# Patient Record
Sex: MALE | Race: BLACK OR AFRICAN AMERICAN | NOT HISPANIC OR LATINO | URBAN - METROPOLITAN AREA
[De-identification: names, ages, dates, MRNs, and addresses within clinical notes are randomized per-mention and may not be internally consistent; named-entity substitution may affect disease eponyms.]

---

## 2017-03-01 ENCOUNTER — OUTPATIENT (OUTPATIENT)
Dept: OUTPATIENT SERVICES | Facility: HOSPITAL | Age: 77
LOS: 1 days | End: 2017-03-01
Payer: MEDICAID

## 2017-03-14 DIAGNOSIS — R69 ILLNESS, UNSPECIFIED: ICD-10-CM

## 2017-05-01 PROCEDURE — G9001: CPT

## 2018-04-05 ENCOUNTER — INPATIENT (INPATIENT)
Facility: HOSPITAL | Age: 78
LOS: 19 days | Discharge: NURSING, REHAB OR LONG-TERM CARE | End: 2018-04-25
Attending: INTERNAL MEDICINE | Admitting: INTERNAL MEDICINE
Payer: MEDICARE

## 2018-04-05 VITALS
DIASTOLIC BLOOD PRESSURE: 118 MMHG | SYSTOLIC BLOOD PRESSURE: 237 MMHG | TEMPERATURE: 98 F | HEART RATE: 87 BPM | OXYGEN SATURATION: 100 % | RESPIRATION RATE: 16 BRPM

## 2018-04-05 DIAGNOSIS — R06.03 ACUTE RESPIRATORY DISTRESS: ICD-10-CM

## 2018-04-05 DIAGNOSIS — E11.9 TYPE 2 DIABETES MELLITUS WITHOUT COMPLICATIONS: ICD-10-CM

## 2018-04-05 DIAGNOSIS — I96 GANGRENE, NOT ELSEWHERE CLASSIFIED: ICD-10-CM

## 2018-04-05 DIAGNOSIS — Z29.9 ENCOUNTER FOR PROPHYLACTIC MEASURES, UNSPECIFIED: ICD-10-CM

## 2018-04-05 DIAGNOSIS — J81.0 ACUTE PULMONARY EDEMA: ICD-10-CM

## 2018-04-05 DIAGNOSIS — I16.1 HYPERTENSIVE EMERGENCY: ICD-10-CM

## 2018-04-05 DIAGNOSIS — N18.9 CHRONIC KIDNEY DISEASE, UNSPECIFIED: ICD-10-CM

## 2018-04-05 DIAGNOSIS — K29.70 GASTRITIS, UNSPECIFIED, WITHOUT BLEEDING: ICD-10-CM

## 2018-04-05 LAB
ALBUMIN SERPL ELPH-MCNC: 3.6 G/DL — SIGNIFICANT CHANGE UP (ref 3.3–5)
ALP SERPL-CCNC: 106 U/L — SIGNIFICANT CHANGE UP (ref 40–120)
ALT FLD-CCNC: 15 U/L — SIGNIFICANT CHANGE UP (ref 4–41)
APTT BLD: 34.1 SEC — SIGNIFICANT CHANGE UP (ref 27.5–37.4)
APTT BLD: 36.1 SEC — SIGNIFICANT CHANGE UP (ref 27.5–37.4)
AST SERPL-CCNC: 22 U/L — SIGNIFICANT CHANGE UP (ref 4–40)
BASE EXCESS BLDV CALC-SCNC: 5.1 MMOL/L — SIGNIFICANT CHANGE UP
BASE EXCESS BLDV CALC-SCNC: 6.6 MMOL/L — SIGNIFICANT CHANGE UP
BASE EXCESS BLDV CALC-SCNC: 7.9 MMOL/L — SIGNIFICANT CHANGE UP
BASOPHILS # BLD AUTO: 0.03 K/UL — SIGNIFICANT CHANGE UP (ref 0–0.2)
BASOPHILS # BLD AUTO: 0.04 K/UL — SIGNIFICANT CHANGE UP (ref 0–0.2)
BASOPHILS NFR BLD AUTO: 0.5 % — SIGNIFICANT CHANGE UP (ref 0–2)
BASOPHILS NFR BLD AUTO: 0.6 % — SIGNIFICANT CHANGE UP (ref 0–2)
BILIRUB SERPL-MCNC: 0.3 MG/DL — SIGNIFICANT CHANGE UP (ref 0.2–1.2)
BLD GP AB SCN SERPL QL: NEGATIVE — SIGNIFICANT CHANGE UP
BLOOD GAS VENOUS - CREATININE: 1.71 MG/DL — HIGH (ref 0.5–1.3)
BLOOD GAS VENOUS - CREATININE: 1.76 MG/DL — HIGH (ref 0.5–1.3)
BUN SERPL-MCNC: 28 MG/DL — HIGH (ref 7–23)
BUN SERPL-MCNC: 33 MG/DL — HIGH (ref 7–23)
CALCIUM SERPL-MCNC: 8.1 MG/DL — LOW (ref 8.4–10.5)
CALCIUM SERPL-MCNC: 8.4 MG/DL — SIGNIFICANT CHANGE UP (ref 8.4–10.5)
CHLORIDE BLDV-SCNC: 105 MMOL/L — SIGNIFICANT CHANGE UP (ref 96–108)
CHLORIDE BLDV-SCNC: 107 MMOL/L — SIGNIFICANT CHANGE UP (ref 96–108)
CHLORIDE SERPL-SCNC: 104 MMOL/L — SIGNIFICANT CHANGE UP (ref 98–107)
CHLORIDE SERPL-SCNC: 105 MMOL/L — SIGNIFICANT CHANGE UP (ref 98–107)
CK MB BLD-MCNC: 4.84 NG/ML — SIGNIFICANT CHANGE UP (ref 1–6.6)
CK SERPL-CCNC: 124 U/L — SIGNIFICANT CHANGE UP (ref 30–200)
CO2 SERPL-SCNC: 30 MMOL/L — SIGNIFICANT CHANGE UP (ref 22–31)
CO2 SERPL-SCNC: 32 MMOL/L — HIGH (ref 22–31)
CREAT SERPL-MCNC: 1.79 MG/DL — HIGH (ref 0.5–1.3)
CREAT SERPL-MCNC: 1.95 MG/DL — HIGH (ref 0.5–1.3)
CRP SERPL-MCNC: < 5 MG/L — SIGNIFICANT CHANGE UP
EOSINOPHIL # BLD AUTO: 0.07 K/UL — SIGNIFICANT CHANGE UP (ref 0–0.5)
EOSINOPHIL # BLD AUTO: 0.16 K/UL — SIGNIFICANT CHANGE UP (ref 0–0.5)
EOSINOPHIL NFR BLD AUTO: 1 % — SIGNIFICANT CHANGE UP (ref 0–6)
EOSINOPHIL NFR BLD AUTO: 2.8 % — SIGNIFICANT CHANGE UP (ref 0–6)
ERYTHROCYTE [SEDIMENTATION RATE] IN BLOOD: 2 MM/HR — SIGNIFICANT CHANGE UP (ref 1–15)
GAS PNL BLDV: 142 MMOL/L — SIGNIFICANT CHANGE UP (ref 136–146)
GAS PNL BLDV: 143 MMOL/L — SIGNIFICANT CHANGE UP (ref 136–146)
GAS PNL BLDV: 144 MMOL/L — SIGNIFICANT CHANGE UP (ref 136–146)
GLUCOSE BLDV-MCNC: 62 — LOW (ref 70–99)
GLUCOSE BLDV-MCNC: 77 — SIGNIFICANT CHANGE UP (ref 70–99)
GLUCOSE BLDV-MCNC: 84 — SIGNIFICANT CHANGE UP (ref 70–99)
GLUCOSE SERPL-MCNC: 69 MG/DL — LOW (ref 70–99)
GLUCOSE SERPL-MCNC: 86 MG/DL — SIGNIFICANT CHANGE UP (ref 70–99)
HCO3 BLDV-SCNC: 25 MMOL/L — SIGNIFICANT CHANGE UP (ref 20–27)
HCO3 BLDV-SCNC: 27 MMOL/L — SIGNIFICANT CHANGE UP (ref 20–27)
HCO3 BLDV-SCNC: 28 MMOL/L — HIGH (ref 20–27)
HCT VFR BLD CALC: 45 % — SIGNIFICANT CHANGE UP (ref 39–50)
HCT VFR BLD CALC: 45.2 % — SIGNIFICANT CHANGE UP (ref 39–50)
HCT VFR BLDV CALC: 46.8 % — SIGNIFICANT CHANGE UP (ref 39–51)
HCT VFR BLDV CALC: 47.3 % — SIGNIFICANT CHANGE UP (ref 39–51)
HCT VFR BLDV CALC: 48.2 % — SIGNIFICANT CHANGE UP (ref 39–51)
HGB BLD-MCNC: 14.6 G/DL — SIGNIFICANT CHANGE UP (ref 13–17)
HGB BLD-MCNC: 14.7 G/DL — SIGNIFICANT CHANGE UP (ref 13–17)
HGB BLDV-MCNC: 15.3 G/DL — SIGNIFICANT CHANGE UP (ref 13–17)
HGB BLDV-MCNC: 15.4 G/DL — SIGNIFICANT CHANGE UP (ref 13–17)
HGB BLDV-MCNC: 15.7 G/DL — SIGNIFICANT CHANGE UP (ref 13–17)
IMM GRANULOCYTES # BLD AUTO: 0.01 # — SIGNIFICANT CHANGE UP
IMM GRANULOCYTES # BLD AUTO: 0.02 # — SIGNIFICANT CHANGE UP
IMM GRANULOCYTES NFR BLD AUTO: 0.1 % — SIGNIFICANT CHANGE UP (ref 0–1.5)
IMM GRANULOCYTES NFR BLD AUTO: 0.3 % — SIGNIFICANT CHANGE UP (ref 0–1.5)
INR BLD: 1.04 — SIGNIFICANT CHANGE UP (ref 0.88–1.17)
INR BLD: 1.05 — SIGNIFICANT CHANGE UP (ref 0.88–1.17)
LACTATE BLDV-MCNC: 1.6 MMOL/L — SIGNIFICANT CHANGE UP (ref 0.5–2)
LACTATE BLDV-MCNC: 2.5 MMOL/L — HIGH (ref 0.5–2)
LACTATE BLDV-MCNC: 2.9 MMOL/L — HIGH (ref 0.5–2)
LYMPHOCYTES # BLD AUTO: 1.2 K/UL — SIGNIFICANT CHANGE UP (ref 1–3.3)
LYMPHOCYTES # BLD AUTO: 1.54 K/UL — SIGNIFICANT CHANGE UP (ref 1–3.3)
LYMPHOCYTES # BLD AUTO: 17.7 % — SIGNIFICANT CHANGE UP (ref 13–44)
LYMPHOCYTES # BLD AUTO: 26.7 % — SIGNIFICANT CHANGE UP (ref 13–44)
MAGNESIUM SERPL-MCNC: 1.9 MG/DL — SIGNIFICANT CHANGE UP (ref 1.6–2.6)
MCHC RBC-ENTMCNC: 29.5 PG — SIGNIFICANT CHANGE UP (ref 27–34)
MCHC RBC-ENTMCNC: 29.7 PG — SIGNIFICANT CHANGE UP (ref 27–34)
MCHC RBC-ENTMCNC: 32.4 % — SIGNIFICANT CHANGE UP (ref 32–36)
MCHC RBC-ENTMCNC: 32.5 % — SIGNIFICANT CHANGE UP (ref 32–36)
MCV RBC AUTO: 90.6 FL — SIGNIFICANT CHANGE UP (ref 80–100)
MCV RBC AUTO: 91.5 FL — SIGNIFICANT CHANGE UP (ref 80–100)
MONOCYTES # BLD AUTO: 0.59 K/UL — SIGNIFICANT CHANGE UP (ref 0–0.9)
MONOCYTES # BLD AUTO: 0.65 K/UL — SIGNIFICANT CHANGE UP (ref 0–0.9)
MONOCYTES NFR BLD AUTO: 11.3 % — SIGNIFICANT CHANGE UP (ref 2–14)
MONOCYTES NFR BLD AUTO: 8.7 % — SIGNIFICANT CHANGE UP (ref 2–14)
NEUTROPHILS # BLD AUTO: 3.36 K/UL — SIGNIFICANT CHANGE UP (ref 1.8–7.4)
NEUTROPHILS # BLD AUTO: 4.87 K/UL — SIGNIFICANT CHANGE UP (ref 1.8–7.4)
NEUTROPHILS NFR BLD AUTO: 58.4 % — SIGNIFICANT CHANGE UP (ref 43–77)
NEUTROPHILS NFR BLD AUTO: 71.9 % — SIGNIFICANT CHANGE UP (ref 43–77)
NRBC # FLD: 0 — SIGNIFICANT CHANGE UP
NRBC # FLD: 0 — SIGNIFICANT CHANGE UP
NT-PROBNP SERPL-SCNC: 3746 PG/ML — SIGNIFICANT CHANGE UP
PCO2 BLDV: 65 MMHG — HIGH (ref 41–51)
PCO2 BLDV: 68 MMHG — HIGH (ref 41–51)
PCO2 BLDV: 69 MMHG — HIGH (ref 41–51)
PH BLDV: 7.29 PH — LOW (ref 7.32–7.43)
PH BLDV: 7.3 PH — LOW (ref 7.32–7.43)
PH BLDV: 7.34 PH — SIGNIFICANT CHANGE UP (ref 7.32–7.43)
PHOSPHATE SERPL-MCNC: 3 MG/DL — SIGNIFICANT CHANGE UP (ref 2.5–4.5)
PLATELET # BLD AUTO: 208 K/UL — SIGNIFICANT CHANGE UP (ref 150–400)
PLATELET # BLD AUTO: 209 K/UL — SIGNIFICANT CHANGE UP (ref 150–400)
PMV BLD: 12.5 FL — SIGNIFICANT CHANGE UP (ref 7–13)
PMV BLD: 12.5 FL — SIGNIFICANT CHANGE UP (ref 7–13)
PO2 BLDV: 19 MMHG — LOW (ref 35–40)
PO2 BLDV: 25 MMHG — LOW (ref 35–40)
PO2 BLDV: 28 MMHG — LOW (ref 35–40)
POTASSIUM BLDV-SCNC: 3.3 MMOL/L — LOW (ref 3.4–4.5)
POTASSIUM BLDV-SCNC: 3.4 MMOL/L — SIGNIFICANT CHANGE UP (ref 3.4–4.5)
POTASSIUM BLDV-SCNC: 3.5 MMOL/L — SIGNIFICANT CHANGE UP (ref 3.4–4.5)
POTASSIUM SERPL-MCNC: 3.8 MMOL/L — SIGNIFICANT CHANGE UP (ref 3.5–5.3)
POTASSIUM SERPL-MCNC: 3.8 MMOL/L — SIGNIFICANT CHANGE UP (ref 3.5–5.3)
POTASSIUM SERPL-SCNC: 3.8 MMOL/L — SIGNIFICANT CHANGE UP (ref 3.5–5.3)
POTASSIUM SERPL-SCNC: 3.8 MMOL/L — SIGNIFICANT CHANGE UP (ref 3.5–5.3)
PROT SERPL-MCNC: 6.5 G/DL — SIGNIFICANT CHANGE UP (ref 6–8.3)
PROTHROM AB SERPL-ACNC: 11.7 SEC — SIGNIFICANT CHANGE UP (ref 9.8–13.1)
PROTHROM AB SERPL-ACNC: 12 SEC — SIGNIFICANT CHANGE UP (ref 9.8–13.1)
RBC # BLD: 4.92 M/UL — SIGNIFICANT CHANGE UP (ref 4.2–5.8)
RBC # BLD: 4.99 M/UL — SIGNIFICANT CHANGE UP (ref 4.2–5.8)
RBC # FLD: 14.8 % — HIGH (ref 10.3–14.5)
RBC # FLD: 14.8 % — HIGH (ref 10.3–14.5)
RH IG SCN BLD-IMP: POSITIVE — SIGNIFICANT CHANGE UP
RH IG SCN BLD-IMP: POSITIVE — SIGNIFICANT CHANGE UP
SAO2 % BLDV: 21.4 % — LOW (ref 60–85)
SAO2 % BLDV: 34.6 % — LOW (ref 60–85)
SAO2 % BLDV: 40.7 % — LOW (ref 60–85)
SODIUM SERPL-SCNC: 144 MMOL/L — SIGNIFICANT CHANGE UP (ref 135–145)
SODIUM SERPL-SCNC: 146 MMOL/L — HIGH (ref 135–145)
TROPONIN T SERPL-MCNC: < 0.06 NG/ML — SIGNIFICANT CHANGE UP (ref 0–0.06)
TROPONIN T SERPL-MCNC: < 0.06 NG/ML — SIGNIFICANT CHANGE UP (ref 0–0.06)
TSH SERPL-MCNC: 1.25 UIU/ML — SIGNIFICANT CHANGE UP (ref 0.27–4.2)
WBC # BLD: 5.76 K/UL — SIGNIFICANT CHANGE UP (ref 3.8–10.5)
WBC # BLD: 6.78 K/UL — SIGNIFICANT CHANGE UP (ref 3.8–10.5)
WBC # FLD AUTO: 5.76 K/UL — SIGNIFICANT CHANGE UP (ref 3.8–10.5)
WBC # FLD AUTO: 6.78 K/UL — SIGNIFICANT CHANGE UP (ref 3.8–10.5)

## 2018-04-05 PROCEDURE — 99223 1ST HOSP IP/OBS HIGH 75: CPT | Mod: GC

## 2018-04-05 PROCEDURE — 71045 X-RAY EXAM CHEST 1 VIEW: CPT | Mod: 26

## 2018-04-05 PROCEDURE — 71045 X-RAY EXAM CHEST 1 VIEW: CPT | Mod: 26,77

## 2018-04-05 PROCEDURE — 93926 LOWER EXTREMITY STUDY: CPT | Mod: 26

## 2018-04-05 PROCEDURE — 93306 TTE W/DOPPLER COMPLETE: CPT | Mod: 26

## 2018-04-05 PROCEDURE — 73630 X-RAY EXAM OF FOOT: CPT | Mod: 26,LT

## 2018-04-05 RX ORDER — IPRATROPIUM/ALBUTEROL SULFATE 18-103MCG
3 AEROSOL WITH ADAPTER (GRAM) INHALATION EVERY 6 HOURS
Qty: 0 | Refills: 0 | Status: DISCONTINUED | OUTPATIENT
Start: 2018-04-05 | End: 2018-04-05

## 2018-04-05 RX ORDER — VANCOMYCIN HCL 1 G
1000 VIAL (EA) INTRAVENOUS ONCE
Qty: 0 | Refills: 0 | Status: COMPLETED | OUTPATIENT
Start: 2018-04-05 | End: 2018-04-05

## 2018-04-05 RX ORDER — DEXTROSE 50 % IN WATER 50 %
25 SYRINGE (ML) INTRAVENOUS ONCE
Qty: 0 | Refills: 0 | Status: COMPLETED | OUTPATIENT
Start: 2018-04-05 | End: 2018-04-05

## 2018-04-05 RX ORDER — ALBUTEROL 90 UG/1
1 AEROSOL, METERED ORAL EVERY 4 HOURS
Qty: 0 | Refills: 0 | Status: DISCONTINUED | OUTPATIENT
Start: 2018-04-05 | End: 2018-04-05

## 2018-04-05 RX ORDER — NITROGLYCERIN 6.5 MG
200 CAPSULE, EXTENDED RELEASE ORAL
Qty: 50 | Refills: 0 | Status: DISCONTINUED | OUTPATIENT
Start: 2018-04-05 | End: 2018-04-05

## 2018-04-05 RX ORDER — IPRATROPIUM/ALBUTEROL SULFATE 18-103MCG
3 AEROSOL WITH ADAPTER (GRAM) INHALATION EVERY 6 HOURS
Qty: 0 | Refills: 0 | Status: DISCONTINUED | OUTPATIENT
Start: 2018-04-05 | End: 2018-04-25

## 2018-04-05 RX ORDER — IPRATROPIUM/ALBUTEROL SULFATE 18-103MCG
3 AEROSOL WITH ADAPTER (GRAM) INHALATION ONCE
Qty: 0 | Refills: 0 | Status: COMPLETED | OUTPATIENT
Start: 2018-04-05 | End: 2018-04-05

## 2018-04-05 RX ORDER — SENNA PLUS 8.6 MG/1
2 TABLET ORAL AT BEDTIME
Qty: 0 | Refills: 0 | Status: DISCONTINUED | OUTPATIENT
Start: 2018-04-05 | End: 2018-04-25

## 2018-04-05 RX ORDER — FUROSEMIDE 40 MG
40 TABLET ORAL ONCE
Qty: 0 | Refills: 0 | Status: COMPLETED | OUTPATIENT
Start: 2018-04-05 | End: 2018-04-05

## 2018-04-05 RX ORDER — ATORVASTATIN CALCIUM 80 MG/1
80 TABLET, FILM COATED ORAL AT BEDTIME
Qty: 0 | Refills: 0 | Status: DISCONTINUED | OUTPATIENT
Start: 2018-04-05 | End: 2018-04-25

## 2018-04-05 RX ORDER — TIOTROPIUM BROMIDE 18 UG/1
1 CAPSULE ORAL; RESPIRATORY (INHALATION) DAILY
Qty: 0 | Refills: 0 | Status: COMPLETED | OUTPATIENT
Start: 2018-04-05 | End: 2019-03-04

## 2018-04-05 RX ORDER — MONTELUKAST 4 MG/1
10 TABLET, CHEWABLE ORAL DAILY
Qty: 0 | Refills: 0 | Status: DISCONTINUED | OUTPATIENT
Start: 2018-04-05 | End: 2018-04-25

## 2018-04-05 RX ORDER — NITROGLYCERIN 6.5 MG
20 CAPSULE, EXTENDED RELEASE ORAL
Qty: 50 | Refills: 0 | Status: DISCONTINUED | OUTPATIENT
Start: 2018-04-05 | End: 2018-04-06

## 2018-04-05 RX ORDER — DEXTROSE 50 % IN WATER 50 %
50 SYRINGE (ML) INTRAVENOUS ONCE
Qty: 0 | Refills: 0 | Status: COMPLETED | OUTPATIENT
Start: 2018-04-05 | End: 2018-04-05

## 2018-04-05 RX ORDER — DOCUSATE SODIUM 100 MG
100 CAPSULE ORAL
Qty: 0 | Refills: 0 | Status: DISCONTINUED | OUTPATIENT
Start: 2018-04-05 | End: 2018-04-25

## 2018-04-05 RX ORDER — ASPIRIN/CALCIUM CARB/MAGNESIUM 324 MG
81 TABLET ORAL DAILY
Qty: 0 | Refills: 0 | Status: DISCONTINUED | OUTPATIENT
Start: 2018-04-05 | End: 2018-04-25

## 2018-04-05 RX ORDER — SODIUM CHLORIDE 9 MG/ML
1000 INJECTION INTRAMUSCULAR; INTRAVENOUS; SUBCUTANEOUS ONCE
Qty: 0 | Refills: 0 | Status: COMPLETED | OUTPATIENT
Start: 2018-04-05 | End: 2018-04-05

## 2018-04-05 RX ORDER — POTASSIUM CHLORIDE 20 MEQ
20 PACKET (EA) ORAL ONCE
Qty: 0 | Refills: 0 | Status: COMPLETED | OUTPATIENT
Start: 2018-04-05 | End: 2018-04-05

## 2018-04-05 RX ORDER — TAMSULOSIN HYDROCHLORIDE 0.4 MG/1
0.4 CAPSULE ORAL DAILY
Qty: 0 | Refills: 0 | Status: DISCONTINUED | OUTPATIENT
Start: 2018-04-05 | End: 2018-04-25

## 2018-04-05 RX ORDER — MORPHINE SULFATE 50 MG/1
4 CAPSULE, EXTENDED RELEASE ORAL ONCE
Qty: 0 | Refills: 0 | Status: DISCONTINUED | OUTPATIENT
Start: 2018-04-05 | End: 2018-04-05

## 2018-04-05 RX ORDER — OXYCODONE AND ACETAMINOPHEN 5; 325 MG/1; MG/1
1 TABLET ORAL EVERY 6 HOURS
Qty: 0 | Refills: 0 | Status: DISCONTINUED | OUTPATIENT
Start: 2018-04-05 | End: 2018-04-06

## 2018-04-05 RX ORDER — BUDESONIDE AND FORMOTEROL FUMARATE DIHYDRATE 160; 4.5 UG/1; UG/1
2 AEROSOL RESPIRATORY (INHALATION)
Qty: 0 | Refills: 0 | Status: DISCONTINUED | OUTPATIENT
Start: 2018-04-05 | End: 2018-04-25

## 2018-04-05 RX ORDER — PANTOPRAZOLE SODIUM 20 MG/1
40 TABLET, DELAYED RELEASE ORAL
Qty: 0 | Refills: 0 | Status: DISCONTINUED | OUTPATIENT
Start: 2018-04-05 | End: 2018-04-25

## 2018-04-05 RX ORDER — HYDRALAZINE HCL 50 MG
10 TABLET ORAL ONCE
Qty: 0 | Refills: 0 | Status: COMPLETED | OUTPATIENT
Start: 2018-04-05 | End: 2018-04-05

## 2018-04-05 RX ORDER — HYDRALAZINE HCL 50 MG
25 TABLET ORAL EVERY 8 HOURS
Qty: 0 | Refills: 0 | Status: DISCONTINUED | OUTPATIENT
Start: 2018-04-05 | End: 2018-04-06

## 2018-04-05 RX ORDER — HYDROMORPHONE HYDROCHLORIDE 2 MG/ML
0.2 INJECTION INTRAMUSCULAR; INTRAVENOUS; SUBCUTANEOUS ONCE
Qty: 0 | Refills: 0 | Status: DISCONTINUED | OUTPATIENT
Start: 2018-04-05 | End: 2018-04-05

## 2018-04-05 RX ORDER — PIPERACILLIN AND TAZOBACTAM 4; .5 G/20ML; G/20ML
3.38 INJECTION, POWDER, LYOPHILIZED, FOR SOLUTION INTRAVENOUS ONCE
Qty: 0 | Refills: 0 | Status: COMPLETED | OUTPATIENT
Start: 2018-04-05 | End: 2018-04-05

## 2018-04-05 RX ORDER — POLYETHYLENE GLYCOL 3350 17 G/17G
17 POWDER, FOR SOLUTION ORAL DAILY
Qty: 0 | Refills: 0 | Status: DISCONTINUED | OUTPATIENT
Start: 2018-04-05 | End: 2018-04-25

## 2018-04-05 RX ORDER — HEPARIN SODIUM 5000 [USP'U]/ML
5000 INJECTION INTRAVENOUS; SUBCUTANEOUS EVERY 8 HOURS
Qty: 0 | Refills: 0 | Status: DISCONTINUED | OUTPATIENT
Start: 2018-04-05 | End: 2018-04-25

## 2018-04-05 RX ORDER — CHLORHEXIDINE GLUCONATE 213 G/1000ML
1 SOLUTION TOPICAL
Qty: 0 | Refills: 0 | Status: DISCONTINUED | OUTPATIENT
Start: 2018-04-05 | End: 2018-04-10

## 2018-04-05 RX ADMIN — PANTOPRAZOLE SODIUM 40 MILLIGRAM(S): 20 TABLET, DELAYED RELEASE ORAL at 15:08

## 2018-04-05 RX ADMIN — ATORVASTATIN CALCIUM 80 MILLIGRAM(S): 80 TABLET, FILM COATED ORAL at 21:57

## 2018-04-05 RX ADMIN — MORPHINE SULFATE 4 MILLIGRAM(S): 50 CAPSULE, EXTENDED RELEASE ORAL at 07:32

## 2018-04-05 RX ADMIN — BUDESONIDE AND FORMOTEROL FUMARATE DIHYDRATE 2 PUFF(S): 160; 4.5 AEROSOL RESPIRATORY (INHALATION) at 22:00

## 2018-04-05 RX ADMIN — Medication 100 MILLIGRAM(S): at 21:57

## 2018-04-05 RX ADMIN — HYDROMORPHONE HYDROCHLORIDE 0.2 MILLIGRAM(S): 2 INJECTION INTRAMUSCULAR; INTRAVENOUS; SUBCUTANEOUS at 15:25

## 2018-04-05 RX ADMIN — MORPHINE SULFATE 4 MILLIGRAM(S): 50 CAPSULE, EXTENDED RELEASE ORAL at 07:47

## 2018-04-05 RX ADMIN — Medication 40 MILLIGRAM(S): at 09:17

## 2018-04-05 RX ADMIN — Medication 6 MICROGRAM(S)/MIN: at 10:22

## 2018-04-05 RX ADMIN — Medication 50 MILLILITER(S): at 07:51

## 2018-04-05 RX ADMIN — TAMSULOSIN HYDROCHLORIDE 0.4 MILLIGRAM(S): 0.4 CAPSULE ORAL at 15:08

## 2018-04-05 RX ADMIN — PIPERACILLIN AND TAZOBACTAM 200 GRAM(S): 4; .5 INJECTION, POWDER, LYOPHILIZED, FOR SOLUTION INTRAVENOUS at 07:05

## 2018-04-05 RX ADMIN — Medication 6 MICROGRAM(S)/MIN: at 19:00

## 2018-04-05 RX ADMIN — Medication 10 MILLIGRAM(S): at 07:50

## 2018-04-05 RX ADMIN — SODIUM CHLORIDE 1000 MILLILITER(S): 9 INJECTION INTRAMUSCULAR; INTRAVENOUS; SUBCUTANEOUS at 07:33

## 2018-04-05 RX ADMIN — HYDROMORPHONE HYDROCHLORIDE 0.2 MILLIGRAM(S): 2 INJECTION INTRAMUSCULAR; INTRAVENOUS; SUBCUTANEOUS at 15:45

## 2018-04-05 RX ADMIN — MORPHINE SULFATE 4 MILLIGRAM(S): 50 CAPSULE, EXTENDED RELEASE ORAL at 06:49

## 2018-04-05 RX ADMIN — SENNA PLUS 2 TABLET(S): 8.6 TABLET ORAL at 21:57

## 2018-04-05 RX ADMIN — Medication 25 MILLIGRAM(S): at 21:57

## 2018-04-05 RX ADMIN — Medication 3 MILLILITER(S): at 07:53

## 2018-04-05 RX ADMIN — MORPHINE SULFATE 4 MILLIGRAM(S): 50 CAPSULE, EXTENDED RELEASE ORAL at 07:05

## 2018-04-05 RX ADMIN — Medication 250 MILLIGRAM(S): at 07:32

## 2018-04-05 RX ADMIN — Medication 25 MILLIGRAM(S): at 15:08

## 2018-04-05 RX ADMIN — OXYCODONE AND ACETAMINOPHEN 1 TABLET(S): 5; 325 TABLET ORAL at 20:30

## 2018-04-05 RX ADMIN — Medication 25 MILLILITER(S): at 06:49

## 2018-04-05 RX ADMIN — POLYETHYLENE GLYCOL 3350 17 GRAM(S): 17 POWDER, FOR SOLUTION ORAL at 20:18

## 2018-04-05 RX ADMIN — MONTELUKAST 10 MILLIGRAM(S): 4 TABLET, CHEWABLE ORAL at 15:08

## 2018-04-05 RX ADMIN — Medication 10 MILLIGRAM(S): at 00:34

## 2018-04-05 RX ADMIN — OXYCODONE AND ACETAMINOPHEN 1 TABLET(S): 5; 325 TABLET ORAL at 20:00

## 2018-04-05 RX ADMIN — Medication 20 MILLIEQUIVALENT(S): at 20:17

## 2018-04-05 RX ADMIN — HEPARIN SODIUM 5000 UNIT(S): 5000 INJECTION INTRAVENOUS; SUBCUTANEOUS at 21:57

## 2018-04-05 RX ADMIN — Medication 81 MILLIGRAM(S): at 15:08

## 2018-04-05 RX ADMIN — HEPARIN SODIUM 5000 UNIT(S): 5000 INJECTION INTRAVENOUS; SUBCUTANEOUS at 15:08

## 2018-04-05 RX ADMIN — Medication 10 MILLIGRAM(S): at 06:49

## 2018-04-05 NOTE — ED ADULT NURSE NOTE - OBJECTIVE STATEMENT
pt. Received in room 17 , A&Ox3 c/o left foot pain. Pt. has a hx. DM , HTN , and appears to have gangrene on 2nd toe on left foot. Pt. states he has know about his foot condition for several weeks. pt. Received in room 17 , A&Ox2 poor hx. c/o left foot pain. Pt. has a hx. DM , HTN , and appears to have gangrene on 2nd toe on left foot. Pt. states he has know about his foot condition for several weeks. Pt. IV 18 gauge placed on right ac , labs drawn and sent. Pt. has auditory wheezing , and arrived hypertensive. MD aware , and EKG ordered. Pt. skin intact , fall risk . Pt. placed on cardiac monitor , for abnormal changes., Will continue to monitor while in the ED. Pt. lives at home , appears unkempt. Belongings placed in front of room 21.

## 2018-04-05 NOTE — H&P ADULT - NSHPREVIEWOFSYSTEMS_GEN_ALL_CORE
REVIEW OF SYSTEMS  General: Fever 1 mo ago	  Skin: No rash  Ophthalmologic: No vision change  ENMT: No oral pain	  Respiratory and Thorax: +Mild difficulty breathing prior to admission, no recent cough  Cardiovascular: No chest pain, no palpitations	  Gastrointestinal: -N/V/D	  Genitourinary: -Dysuria	  Musculoskeletal:	+Left toe pain  Neurological: -headache	  Endocrine: -Polyuria/polydipsia

## 2018-04-05 NOTE — H&P ADULT - PROBLEM SELECTOR PLAN 4
Chart history of DM II, no recorded A1c  - SSI, FS q6 while NPO on BPAP  - A1c with next lab draw Evaluated by podiatry in ED with suspected dry gangrene  - Appreciate podiatry recs; f/u GRISEL  - Observe off abx Evaluated by podiatry in ED with suspected dry gangrene  - Appreciate podiatry recs; f/u GRISEL  - Will start atorva 40 and ASA 81  - Observe off abx Evaluated by podiatry in ED with suspected dry gangrene  - Appreciate podiatry recs; f/u GRISEL  - Will start atorva 40 and ASA 81  - Received abx in ED; no infectious sx/fever/leukocytosis will observe off abx

## 2018-04-05 NOTE — H&P ADULT - PROBLEM SELECTOR PLAN 7
DVT prophylaxis with SQH given GFR 38 Per outpatient provider; patient asymptomatic  - Start protonix 40 PO daily  - Avoid NSAIDs

## 2018-04-05 NOTE — H&P ADULT - PMH
Asthma    DM (diabetes mellitus)    HTN (hypertension)    Prostate disease Asthma    CKD (chronic kidney disease)    DM (diabetes mellitus)    Gastritis    HTN (hypertension)

## 2018-04-05 NOTE — ED PROVIDER NOTE - OBJECTIVE STATEMENT
Halle Jimenez M.D: 77M hx DM, HTN, HLD, smoker, unsure of other medical problems, p/w few weeks of Halle Jimenez M.D: 77M hx DM, HTN, HLD, smoker, unsure of other medical problems, p/w few weeks of worsening pain, swelling, discoloration of left 2-4 toes. worsening pain with walking. +subjective fevers. no abd pain no n/v/c/d no cp no sob. pt says he has been following at [facility]. Pt is a poor historian. Limited history due to this.     Halle Jimenez M.D: 77M hx DM, HTN, HLD, smoker, unsure of other medical problems, p/w few weeks of worsening pain, swelling, discoloration of left 2-4 toes. worsening pain with walking. +subjective fevers. no abd pain no n/v/c/d no cp no sob. pt says he has been following at [facility].

## 2018-04-05 NOTE — CONSULT NOTE ADULT - SUBJECTIVE AND OBJECTIVE BOX
Chief Complaint:  L toe pain    HPI:  77M with PMHx of DM, HTN, HLD, smoker who presents with L foot/toe pain for last few weeks. Reports has had pain, swelling, discoloration of L toes, pain worse with walking. Denies CP over past few days. Unable to provide details on cardiac history, first time at [facility]. Patient's BP in the ED on presentation was 237/118, unclear if patient is on BP meds at home or if has been compliant. Patient became acutely SOB, went into hypoxic resp distress with concern for pulm edema. Patient was placed on Bipap and nitro gtt. BP dropped to 70-80's on 200 of nitro gtt and was adjusted to 20 and BP went up to 200's/90's. CE neg x1. ProBNP 3746. Patient with ECG showing sinus irwin, PVCs, RBBB, ST depressions in anterolateral leads. Patient given lasix 40mg IV and hydralazine IV x2.     PMH:   DM (diabetes mellitus)  HTN (hypertension)      PSH:   No significant past surgical history      Medications:   nitroglycerin  Infusion 20 MICROgram(s)/Min IV Continuous <Continuous>      Allergies:  No Known Allergies      FAMILY HISTORY:  No pertinent family history in first degree relatives      Social History:  Smoking History: current  Alcohol Use: occasional  Drug Use: denies    Review of Systems:  REVIEW OF SYSTEMS:    CONSTITUTIONAL: No weakness, +fevers or chills  EYES/ENT: No visual changes;  No dysphagia  NECK: No pain or stiffness  RESPIRATORY: No cough, wheezing, hemoptysis; +shortness of breath  CARDIOVASCULAR: No chest pain or palpitations; No lower extremity edema  GASTROINTESTINAL: No abdominal or epigastric pain. No nausea, vomiting, or hematemesis; No diarrhea or constipation. No melena or hematochezia.  BACK: No back pain  GENITOURINARY: No dysuria, frequency or hematuria  NEUROLOGICAL: No numbness or weakness  SKIN: No itching, burning, rashes, or lesions   All other review of systems is negative unless indicated above.    Physical Exam:  T(F): 98.7 (04-05), Max: 98.7 (04-05)  HR: 63 (04-05) (54 - 87)  BP: 127/72 (04-05) (71/22 - 268/85)  RR: 26 (04-05)  SpO2: 100% (04-05)  GENERAL: Moderate distress, Bipap in place  HEAD:  Atraumatic  ENT: EOMI,   CHEST/LUNG: Poor air movement throughout  BACK: No spinal tenderness  HEART: Bradycardic, reg rhtyhm, unable to appreciate murmurs  ABDOMEN: Soft, Nontender  EXTREMITIES:  No LE edema  NEUROLOGY: non-focal, cranial nerves intact      Cardiovascular Diagnostic Testing:    ECG: sinus irwin, PVCs, RBBB, ST depressions in anterolateral leads    Labs: Personally reviewed                        14.7   5.76  )-----------( 208      ( 05 Apr 2018 06:37 )             45.2     04-05    144  |  105  |  33<H>  ----------------------------<  69<L>  3.8   |  30  |  1.95<H>    Ca    8.4      05 Apr 2018 06:37    TPro  6.5  /  Alb  3.6  /  TBili  0.3  /  DBili  x   /  AST  22  /  ALT  15  /  AlkPhos  106  04-05    PT/INR - ( 05 Apr 2018 06:37 )   PT: 11.7 SEC;   INR: 1.05          PTT - ( 05 Apr 2018 06:37 )  PTT:36.1 SEC  CARDIAC MARKERS ( 05 Apr 2018 06:37 )  x     / < 0.06 ng/mL / x     / x     / x          Serum Pro-Brain Natriuretic Peptide: 3746 pg/mL (04-05 @ 06:37)

## 2018-04-05 NOTE — ED ADULT TRIAGE NOTE - CHIEF COMPLAINT QUOTE
Sent by roommate for gangrene of 2nd digit of left foot and patient endorsing pain to affected toe, patient appears unkempt and foul odor. Sfs 67 in triage states he hasn't eaten since yesterday morning due to pain in the foot.  Patient was seen a few months ago in hospital and told he had gangrene of the toe and patient has not gotten it treated. Denies dizziness, fever, chills. Hx schizophrenia

## 2018-04-05 NOTE — H&P ADULT - NSHPLABSRESULTS_GEN_ALL_CORE
LABS: Personally Read and Interpreted                          14.7   5.76  )-----------( 208      ( 05 Apr 2018 06:37 )             45.2     Hgb Trend: 14.7<--  PT/INR - ( 05 Apr 2018 06:37 )   PT: 11.7 SEC;   INR: 1.05          PTT - ( 05 Apr 2018 06:37 )  PTT:36.1 SEC    04-05    144  |  105  |  33<H>  ----------------------------<  69<L>  3.8   |  30  |  1.95<H>    Ca    8.4      05 Apr 2018 06:37    TPro  6.5  /  Alb  3.6  /  TBili  0.3  /  DBili  x   /  AST  22  /  ALT  15  /  AlkPhos  106  04-05    Creatinine Trend: 1.95<--    CARDIAC MARKERS ( 05 Apr 2018 06:37 )  x     / < 0.06 ng/mL / x     / x     / x        BNP: 3746    EKG: Sinus, PVC, RBBB.  ST depressions anterolateral leads.     IMAGING  < from: Xray Chest 1 View AP/PA (04.05.18 @ 07:06) >    IMPRESSION:   No acute disease.    < end of copied text >

## 2018-04-05 NOTE — ED PROVIDER NOTE - PROGRESS NOTE DETAILS
ad: paged podiatry & gen surg ad: pt s/o to me by nancy kay & darwin lopez podiatry & gen surg ad: pt c/o sob, wheezing & cp, improved w/ duoneb. later c/o worsening sob no longer wheezing, bedside u/s shows b line & grossly normal ef. placed pt on bipap, gave 40mg lasix, 10mg iv hydralizine w/ improvement. cardio fellow at bedside & will accept to ccu ad: trop wnl, bnp 3700. pt likely had flash pulmonary edema from htn s230s & ivf, bun/cr ratio 16 (likely ckd) ad: podiatry said to get malaika & possible eventual vascular consult. pt currently non-op for now ATTG NOTE DR. GUY Patient with worsening respiratory distress, diffuse crackles, bedside US showing lung B-lines volume overload, and nitro gtt started.  Patient placed on BiPap with appropriate response.  CCU eval at [facility].

## 2018-04-05 NOTE — H&P ADULT - HISTORY OF PRESENT ILLNESS
78 YO M DM, HTN, presents with L toe pain for last several weeks. 78 YO M DM, HTN, presents with L toe pain for last several weeks. Reports he has had pain, swelling, discoloration of L toes, pain worse with walking. Denies CP over past few days. Unable to provide details on cardiac history, first time at [facility]. Patient's BP in the ED on presentation was 237/118, unclear if patient is on BP meds at home or if has been compliant. Patient became acutely SOB, went into hypoxic resp distress with concern for pulm edema. Patient was placed on Bipap and nitro gtt. BP dropped to 70-80's on 200 of nitro gtt and was adjusted to 20 and BP went up to 200's/90's subsequently nitro gtt was uptitrated with improvement in BP. Cardiac enzymes were checked and negative in the ED. ProBNP 3746. Patient with ECG showing sinus irwin, PVCs, RBBB, ST depressions in anterolateral leads. Patient given lasix 40mg IV and hydralazine IV x2. 76 YO M DM, HTN, prostate "disease", asthma presents with L toe pain for last several weeks. Patient is poor historian and does not recall many details of his medical history. Reports he has had pain, swelling, discoloration of L toes, pain worse with walking. Denies CP over past few days. Unable to provide details on cardiac history, first time at [facility]. States he ran out of medications and has not taken them in at least a month. Reports cold symptoms over a month ago but no recent cough/fever. Patient's BP in the ED on presentation was 237/118, unclear if patient is on BP meds at home or if has been compliant. Patient became acutely SOB, went into hypoxic resp distress with concern for pulm edema. Patient was placed on Bipap and nitro gtt. BP dropped to 70-80's on 200 of nitro gtt and was adjusted to 20 and BP went up to 200's/90's subsequently nitro gtt was uptitrated with improvement in BP. Cardiac enzymes were checked and negative in the ED. ProBNP 3746. Patient with ECG showing sinus irwin, PVCs, RBBB, ST depressions in anterolateral leads. Patient given lasix 40mg IV and hydralazine IV x2. 76 YO M DM, HTN, CKD, BPH, gastritis asthma presents with L toe pain for last several weeks. Patient is poor historian and does not recall many details of his medical history. Reports he has had pain, swelling, discoloration of L toes, pain worse with walking. Denies CP over past few days. Unable to provide details on cardiac history, first time at [facility]. States he ran out of medications and has not taken them in at least a month. Reports cold symptoms over a month ago but no recent cough/fever. Patient's BP in the ED on presentation was 237/118, unclear if patient is on BP meds at home or if has been compliant. Patient became acutely SOB, went into hypoxic resp distress with concern for pulm edema. Patient was placed on Bipap and nitro gtt. BP dropped to 70-80's on 200 of nitro gtt and was adjusted to 20 and BP went up to 200's/90's subsequently nitro gtt was uptitrated with improvement in BP. Cardiac enzymes were checked and negative in the ED. ProBNP 3746. Patient with ECG showing sinus irwin, PVCs, RBBB, ST depressions in anterolateral leads. Patient given lasix 40mg IV and hydralazine IV x2.

## 2018-04-05 NOTE — H&P ADULT - PROBLEM SELECTOR PLAN 3
Likely 2/2 hypertensive emergency  - Repeat chest x-ray  - Continue lasix as above  - Restart home BP meds

## 2018-04-05 NOTE — H&P ADULT - PROBLEM SELECTOR PLAN 1
Patient hypertensive to 260s now on nitro gtt with improvement in BP to 150s.  - Restart home Patient hypertensive to 260s now on nitro gtt with improvement in BP to 150s.  - Calling outpatient provider Dr. Swann ([facility]) for outpatient med list  - Will wean nitro gtt after restarting home meds  - Continuous cardiac monitoring  - q1 vitals Patient hypertensive to 260s now on nitro gtt with improvement in BP to 150s.  - Patient poorly compliant with medications with numerous admissions to OSH per outpatient provider  - Will start hydral 25 q8 while in hospital; holding cardizem 360 daily  - Will wean nitro gtt as tolerated  - Continuous cardiac monitoring  - q1 vitals Patient hypertensive to 260s now on nitro gtt with improvement in BP to 150s.  - Patient poorly compliant with medications with numerous admissions to OSH per outpatient provider  - Will start hydral 25 q8 while in hospital; holding cardizem 360 daily  - Initial enzymes negative, repeat q8 hr  - Will wean nitro gtt as tolerated  - Continuous cardiac monitoring  - q1 vitals

## 2018-04-05 NOTE — H&P ADULT - NSHPPHYSICALEXAM_GEN_ALL_CORE
VITAL SIGNS:  T(C): 37.1 (04-05-18 @ 08:35), Max: 37.1 (04-05-18 @ 08:35)  T(F): 98.7 (04-05-18 @ 08:35), Max: 98.7 (04-05-18 @ 08:35)  HR: 52 (04-05-18 @ 10:07) (52 - 87)  BP: 152/64 (04-05-18 @ 10:07) (71/22 - 268/85)  BP(mean): --  RR: 26 (04-05-18 @ 08:35) (16 - 31)  SpO2: 100% (04-05-18 @ 08:35) (99% - 100%)  Wt(kg): --    PHYSICAL EXAM:    Constitutional: NAD on BPAP  Head: NC/AT  Eyes: PERRL, EOMI, clear conjunctiva  ENT: MMM  Neck: supple; no JVD   Respiratory: CTA B/L, no crackles, faint wheezes  Cardiac: +S1/S2; RRR; no M/R/G;  Gastrointestinal: soft, NT/ND; no rebound or guarding; +BSx4  Extremities: -SHAY, non-palpable DP pulses, discolored swollen L 2-3rd toes, no purulence  Vascular: Non-palpable DP pulse  Dermatologic: skin warm; toes as above  Neurologic: AAOx3; CNII-XII grossly intact; no focal deficits  Psychiatric: affect and characteristics of appearance, verbalizations, behaviors are appropriate

## 2018-04-05 NOTE — CONSULT NOTE ADULT - SUBJECTIVE AND OBJECTIVE BOX
Patient is a 77y old  Male who presents with a chief complaint of     HPI: Pt is a poor historian. Limited history due to this.     	Halle Jimenez M.D: 77M hx DM, HTN, HLD, smoker, unsure of other medical problems, p/w few weeks of worsening pain, swelling, discoloration of left 2-4 toes. worsening pain with walking. +subjective fevers. no abd pain no n/v/c/d no cp no sob. pt says he has been following at [facility].      PAST MEDICAL & SURGICAL HISTORY:  DM (diabetes mellitus)  HTN (hypertension)  No significant past surgical history      MEDICATIONS  (STANDING):  furosemide   Injectable 40 milliGRAM(s) IV Push Once  hydrALAZINE Injectable 10 milliGRAM(s) IV Push Once  nitroglycerin  Infusion 200 MICROgram(s)/Min (60 mL/Hr) IV Continuous <Continuous>    MEDICATIONS  (PRN):      Allergies    No Known Allergies    Intolerances        VITALS:    Vital Signs Last 24 Hrs  T(C): 37.1 (05 Apr 2018 08:35), Max: 37.1 (05 Apr 2018 08:35)  T(F): 98.7 (05 Apr 2018 08:35), Max: 98.7 (05 Apr 2018 08:35)  HR: 70 (05 Apr 2018 08:35) (54 - 87)  BP: 208/80 (05 Apr 2018 08:35) (208/80 - 268/85)  BP(mean): --  RR: 26 (05 Apr 2018 08:35) (16 - 31)  SpO2: 100% (05 Apr 2018 08:35) (99% - 100%)    LABS:                          14.7   5.76  )-----------( 208      ( 05 Apr 2018 06:37 )             45.2       04-05    144  |  105  |  33<H>  ----------------------------<  69<L>  3.8   |  30  |  1.95<H>    Ca    8.4      05 Apr 2018 06:37    TPro  6.5  /  Alb  3.6  /  TBili  0.3  /  DBili  x   /  AST  22  /  ALT  15  /  AlkPhos  106  04-05      CAPILLARY BLOOD GLUCOSE      POCT Blood Glucose.: 120 mg/dL (05 Apr 2018 07:26)  POCT Blood Glucose.: 67 mg/dL (05 Apr 2018 05:57)      PT/INR - ( 05 Apr 2018 06:37 )   PT: 11.7 SEC;   INR: 1.05          PTT - ( 05 Apr 2018 06:37 )  PTT:36.1 SEC    LOWER EXTREMITY PHYSICAL EXAM:    Vascular: DP/PT 0/4, B/L, CFT <3 seconds B/L w/ exception to LF 2nd toe no CFT, Temperature gradient WNL, B/L.   Neuro: Epicritic sensation intact to the level of foot, B/L based on pain on palpation, difficult to otherwise assess  Skin: dry gangrenous distal tip LF 2nd toe w/ some dependent edema noted nonpitting, no SOI no purulence no drainage, no malodor, no erythema nor streaking, slight opening distal 2nd toe clinically correlates w/ XR subq       RADIOLOGY & ADDITIONAL STUDIES:    < from: Xray Foot AP + Lateral + Oblique, Left (04.05.18 @ 07:09) >    EXAM:  RAD FOOT MIN 3 VIEWS LT        PROCEDURE DATE:  Apr 5 2018         INTERPRETATION:  CLINICAL INFORMATION: Gangrene 2-4 toes. Rule out   subcutaneous air, bony erosion.    TECHNIQUE: 3 views of the left foot     COMPARISON: None     FINDINGS:   Soft tissue ulceration of the distal second toe.  There is no tracking soft tissue gas collection or periosteal reaction to   suggest osteomyelitis. There is overlying soft tissue swelling.     Hallux valgus deformity.   There is arthrosis of the midfoot and first metatarsal phalangeal joint.   Small plantar calcaneal spur.    IMPRESSION:   No radiographic evidence of osteomyelitis.              CHIQUI ARCOS M.D., RADIOLOGY RESIDENT  This document has been electronically signed.  AYLIN STERLING; ATTENDING RADIOLOGIST  This document has been electronically signed. Apr 5 2018  8:32AM                  < end of copied text >

## 2018-04-05 NOTE — ED PROVIDER NOTE - MEDICAL DECISION MAKING DETAILS
77M dm, htn, unknown further hx p/w gangrene to 2nd-4th toes left foot with erythema and bogginess to top of foot. afebrile here. concern for deeper infection- osteo vs gas gangrene. for labs, inflam markers, xrays to eval for subqemphysema, bony erosion. will cover with broad spectrum antibiotics and likely dmit for further management.

## 2018-04-05 NOTE — H&P ADULT - PROBLEM SELECTOR PLAN 2
Likely 2/2 flash pulmonary edema 2/2 hypertensive emergency. Initial CXR clear  - Repeat chest x-ray, continue BPAP  - Continue lasix 40 BID  - Restart home BP meds and wean nitro gtt Likely 2/2 flash pulmonary edema 2/2 hypertensive emergency. Initial CXR clear  - Repeat chest x-ray, continue BPAP  - Duoneb for wheeze  - Continue lasix 40 BID  - Restart home BP meds and wean nitro gtt Likely 2/2 flash pulmonary edema 2/2 hypertensive emergency. Initial CXR clear  - Repeat chest x-ray, continue BPAP  - Duoneb for wheeze; restart symbicort, singulair  - Continue lasix 40 BID Likely 2/2 flash pulmonary edema 2/2 hypertensive emergency. Initial CXR clear  - Repeat chest x-ray, continue BPAP  - Duoneb for wheeze; restart symbicort, singulair  - Repeat VBG

## 2018-04-05 NOTE — H&P ADULT - PROBLEM SELECTOR PLAN 5
DVT prophylaxis with SQH given GFR 38 Chart history of DM II, no recorded A1c  - SSI, FS q6 while NPO on BPAP  - A1c with next lab draw Chart history of DM II, no recorded A1c  - Not on outpatient anti-glycemics per outpatient doctors  - Will check A1c prior to starting SSI

## 2018-04-05 NOTE — H&P ADULT - ASSESSMENT
78 YO M DM, HTN, presents with L toe pain for last several weeks. Found in the ED to be hypertensive to 260s with suspected flash pulmonary edema and transferred to CCU on nitro gtt and BPAP for further management.

## 2018-04-05 NOTE — CHART NOTE - NSCHARTNOTEFT_GEN_A_CORE
Patient with asystole on telemetry.    Exam:   Gen: No spontaneous movements  Card: No heart sounds  Resp: No breath sounds, no spontaneous chest movements  Neuro: Pupils dilated, non-reactive to light. No response to deep nailbed pressure    Assessment: Patient expiration at 12:39 by cardiorespiratory criteria. Family at bedside and aware of expiration, decline autopsy. CCU fellow Dr. Ornelas notified of expiration.     Charanjit Fisher M.D., PGY-1  Pager: ns- [phone], SY[facility]- [id number] entered in error

## 2018-04-05 NOTE — ED PROVIDER NOTE - PHYSICAL EXAMINATION
Halle Jimenez M.D.:   patient awake alert seen lying on stretcher NAD .   LUNGS Crackles and coarse breath sounds b/l.   CARD bradycardic no m/r/g.    Abdomen soft NT ND no rebound no guarding no CVA tenderness.  left sided inguinal hernia, soft.  EXT WWP edema to left foot with erythema, warm and painful to touch. ulceration to left 2nd toe. dry gangrene to 2nd-4th toe. top of foot boggy. CV 2+DP/PT bilaterally.   neuro A&Ox3 gait normal.    skin warm and dry no rash  HEENT: dry mucous membranes, PERRL, EOMI swelling to left TMJ region.

## 2018-04-05 NOTE — H&P ADULT - NSHPSOCIALHISTORY_GEN_ALL_CORE
[facility] Has a aid at home. Reports smoking a few cigars a week, denies EtOH and IVDU.

## 2018-04-05 NOTE — H&P ADULT - PROBLEM SELECTOR PLAN 6
DVT prophylaxis with SQH given GFR 38 Per outpatient provider patient has history of CKD; Cr on admission 1.95 unclear if baseline or JINA  - Continue to trend Cr  - Avoid ACE-I/ARB/RCA/nephrotoxins  - Renal diet

## 2018-04-05 NOTE — CONSULT NOTE ADULT - ASSESSMENT
78yo M w/ LF 2nd toe tip dry gangrene  ·	Pt seen evaluated  ·	No acute SOI at this time, stable dry gangrene, labs and vitals stable  ·	Only minimal open lesion at distal tip no purulence nor drainage expressed, nothing culturable not able to probe  ·	GRISEL/PVR ordered  ·	No surgical intervention warranted at this point by podiatry, recommend vascular w/u following results of GRISEL/PVR  ·	Will place wound care orders for betadine dressing to left foot 2nd digit w/ dsd  ·	Will f/u on GRISEL results, if admitted to medicine please review said results with podiatry team prior to vascular consult  ·	d/w attending

## 2018-04-05 NOTE — ED PROVIDER NOTE - CARE PLAN
Principal Discharge DX:	Gangrene of toe Principal Discharge DX:	Respiratory distress, acute  Secondary Diagnosis:	Acute pulmonary edema  Secondary Diagnosis:	Hypertensive emergency

## 2018-04-05 NOTE — ED PROVIDER NOTE - ATTENDING CONTRIBUTION TO CARE
DR. MORRISSEY, ATTENDING MD-  I performed a face to face bedside interview with patient regarding history of present illness, review of symptoms and past medical history. I completed an independent physical exam.  I have discussed patient's plan of care with the resident.   Documentation as above in the note.   HPI: 77M hx DM, HTN, HLD, smoker, unsure of other medical problems as pt is a very poor historian, p/w few weeks of worsening pain, swelling, discoloration of left 2-4 toes. worsening pain with walking. +subjective fevers. no abd pain no n/v/c/d no cp no sob. pt says he has been following at [facility].  EXAM: NAD, heart RRR, lungs rhonci throughout/diffuse, feet with poor hygiene, malodorous, no discharge, left 2-4 toe with no sensation, non tender, dark discoloration and cold to touch, DP minimally palpable.   MDM: Concern for gangrene toes but also ACS and other conditions as pt poor history, EKG shows TWI with deep TWI in lateral leads, bradycardic. Pt not complaining of chest pain but will also add Trop. obtain imaging. Pt most likely needs admission and possible surgery for toes.

## 2018-04-05 NOTE — CONSULT NOTE ADULT - ASSESSMENT
77M with PMHx of DM, HTN, HLD, smoker who presents with L foot/toe pain for last few weeks, concern for dry gangrene. Patient's BP in the ED on presentation was 237/118, unclear if patient is on BP meds at home or if has been compliant. Patient became acutely SOB, went into hypoxic resp distress with concern for pulm edema. Patient was placed on Bipap and nitro gtt. BP dropped to 70-80's on 200 of nitro gtt and was adjusted to 20 and BP went up to 200's/90's. CE neg x1. ProBNP 3746. Patient with ECG showing sinus irwin, PVCs, RBBB, ST depressions in anterolateral leads. Pt with hypertensive emergency.    -restart patient's home BP meds, continue lasix 40IV BID. Wean off nitro gtt. Wean off bipap.  -continue to trend CE/ECG  -check TTE  -A1c, TSH, FLP  -patient should be on ASA and statin given DM  -admit patient to CCU for further monitoring

## 2018-04-06 DIAGNOSIS — N17.9 ACUTE KIDNEY FAILURE, UNSPECIFIED: ICD-10-CM

## 2018-04-06 LAB
ALBUMIN SERPL ELPH-MCNC: 3.1 G/DL — LOW (ref 3.3–5)
ALBUMIN SERPL ELPH-MCNC: 3.1 G/DL — LOW (ref 3.3–5)
ALP SERPL-CCNC: 76 U/L — SIGNIFICANT CHANGE UP (ref 40–120)
ALP SERPL-CCNC: 82 U/L — SIGNIFICANT CHANGE UP (ref 40–120)
ALT FLD-CCNC: 12 U/L — SIGNIFICANT CHANGE UP (ref 4–41)
ALT FLD-CCNC: 12 U/L — SIGNIFICANT CHANGE UP (ref 4–41)
AST SERPL-CCNC: 17 U/L — SIGNIFICANT CHANGE UP (ref 4–40)
AST SERPL-CCNC: 20 U/L — SIGNIFICANT CHANGE UP (ref 4–40)
BASOPHILS # BLD AUTO: 0.02 K/UL — SIGNIFICANT CHANGE UP (ref 0–0.2)
BASOPHILS NFR BLD AUTO: 0.3 % — SIGNIFICANT CHANGE UP (ref 0–2)
BILIRUB SERPL-MCNC: 0.4 MG/DL — SIGNIFICANT CHANGE UP (ref 0.2–1.2)
BILIRUB SERPL-MCNC: 0.4 MG/DL — SIGNIFICANT CHANGE UP (ref 0.2–1.2)
BUN SERPL-MCNC: 34 MG/DL — HIGH (ref 7–23)
BUN SERPL-MCNC: 34 MG/DL — HIGH (ref 7–23)
BUN SERPL-MCNC: 36 MG/DL — HIGH (ref 7–23)
CALCIUM SERPL-MCNC: 8.2 MG/DL — LOW (ref 8.4–10.5)
CALCIUM SERPL-MCNC: 8.3 MG/DL — LOW (ref 8.4–10.5)
CALCIUM SERPL-MCNC: 8.6 MG/DL — SIGNIFICANT CHANGE UP (ref 8.4–10.5)
CHLORIDE SERPL-SCNC: 102 MMOL/L — SIGNIFICANT CHANGE UP (ref 98–107)
CHLORIDE SERPL-SCNC: 104 MMOL/L — SIGNIFICANT CHANGE UP (ref 98–107)
CHLORIDE SERPL-SCNC: 106 MMOL/L — SIGNIFICANT CHANGE UP (ref 98–107)
CHOLEST SERPL-MCNC: 147 MG/DL — SIGNIFICANT CHANGE UP (ref 120–199)
CO2 SERPL-SCNC: 29 MMOL/L — SIGNIFICANT CHANGE UP (ref 22–31)
CO2 SERPL-SCNC: 29 MMOL/L — SIGNIFICANT CHANGE UP (ref 22–31)
CO2 SERPL-SCNC: 30 MMOL/L — SIGNIFICANT CHANGE UP (ref 22–31)
CREAT SERPL-MCNC: 2.18 MG/DL — HIGH (ref 0.5–1.3)
CREAT SERPL-MCNC: 2.23 MG/DL — HIGH (ref 0.5–1.3)
CREAT SERPL-MCNC: 2.26 MG/DL — HIGH (ref 0.5–1.3)
EOSINOPHIL # BLD AUTO: 0.13 K/UL — SIGNIFICANT CHANGE UP (ref 0–0.5)
EOSINOPHIL NFR BLD AUTO: 2.2 % — SIGNIFICANT CHANGE UP (ref 0–6)
GLUCOSE SERPL-MCNC: 111 MG/DL — HIGH (ref 70–99)
GLUCOSE SERPL-MCNC: 117 MG/DL — HIGH (ref 70–99)
GLUCOSE SERPL-MCNC: 83 MG/DL — SIGNIFICANT CHANGE UP (ref 70–99)
HBA1C BLD-MCNC: 5 % — SIGNIFICANT CHANGE UP (ref 4–5.6)
HCT VFR BLD CALC: 41.4 % — SIGNIFICANT CHANGE UP (ref 39–50)
HDLC SERPL-MCNC: 58 MG/DL — HIGH (ref 35–55)
HGB BLD-MCNC: 13.6 G/DL — SIGNIFICANT CHANGE UP (ref 13–17)
IMM GRANULOCYTES # BLD AUTO: 0.02 # — SIGNIFICANT CHANGE UP
IMM GRANULOCYTES NFR BLD AUTO: 0.3 % — SIGNIFICANT CHANGE UP (ref 0–1.5)
LIPID PNL WITH DIRECT LDL SERPL: 86 MG/DL — SIGNIFICANT CHANGE UP
LYMPHOCYTES # BLD AUTO: 1.13 K/UL — SIGNIFICANT CHANGE UP (ref 1–3.3)
LYMPHOCYTES # BLD AUTO: 19.5 % — SIGNIFICANT CHANGE UP (ref 13–44)
MAGNESIUM SERPL-MCNC: 1.9 MG/DL — SIGNIFICANT CHANGE UP (ref 1.6–2.6)
MAGNESIUM SERPL-MCNC: 2.2 MG/DL — SIGNIFICANT CHANGE UP (ref 1.6–2.6)
MAGNESIUM SERPL-MCNC: 2.3 MG/DL — SIGNIFICANT CHANGE UP (ref 1.6–2.6)
MCHC RBC-ENTMCNC: 29.7 PG — SIGNIFICANT CHANGE UP (ref 27–34)
MCHC RBC-ENTMCNC: 32.9 % — SIGNIFICANT CHANGE UP (ref 32–36)
MCV RBC AUTO: 90.4 FL — SIGNIFICANT CHANGE UP (ref 80–100)
MONOCYTES # BLD AUTO: 0.6 K/UL — SIGNIFICANT CHANGE UP (ref 0–0.9)
MONOCYTES NFR BLD AUTO: 10.4 % — SIGNIFICANT CHANGE UP (ref 2–14)
NEUTROPHILS # BLD AUTO: 3.89 K/UL — SIGNIFICANT CHANGE UP (ref 1.8–7.4)
NEUTROPHILS NFR BLD AUTO: 67.3 % — SIGNIFICANT CHANGE UP (ref 43–77)
NRBC # FLD: 0 — SIGNIFICANT CHANGE UP
PHOSPHATE SERPL-MCNC: 2.9 MG/DL — SIGNIFICANT CHANGE UP (ref 2.5–4.5)
PHOSPHATE SERPL-MCNC: 3.1 MG/DL — SIGNIFICANT CHANGE UP (ref 2.5–4.5)
PHOSPHATE SERPL-MCNC: 3.6 MG/DL — SIGNIFICANT CHANGE UP (ref 2.5–4.5)
PLATELET # BLD AUTO: 182 K/UL — SIGNIFICANT CHANGE UP (ref 150–400)
PMV BLD: 11.7 FL — SIGNIFICANT CHANGE UP (ref 7–13)
POTASSIUM SERPL-MCNC: 3.9 MMOL/L — SIGNIFICANT CHANGE UP (ref 3.5–5.3)
POTASSIUM SERPL-MCNC: 4.1 MMOL/L — SIGNIFICANT CHANGE UP (ref 3.5–5.3)
POTASSIUM SERPL-MCNC: 4.2 MMOL/L — SIGNIFICANT CHANGE UP (ref 3.5–5.3)
POTASSIUM SERPL-SCNC: 3.9 MMOL/L — SIGNIFICANT CHANGE UP (ref 3.5–5.3)
POTASSIUM SERPL-SCNC: 4.1 MMOL/L — SIGNIFICANT CHANGE UP (ref 3.5–5.3)
POTASSIUM SERPL-SCNC: 4.2 MMOL/L — SIGNIFICANT CHANGE UP (ref 3.5–5.3)
PROT SERPL-MCNC: 5.9 G/DL — LOW (ref 6–8.3)
PROT SERPL-MCNC: 6.2 G/DL — SIGNIFICANT CHANGE UP (ref 6–8.3)
RBC # BLD: 4.58 M/UL — SIGNIFICANT CHANGE UP (ref 4.2–5.8)
RBC # FLD: 15 % — HIGH (ref 10.3–14.5)
SODIUM SERPL-SCNC: 142 MMOL/L — SIGNIFICANT CHANGE UP (ref 135–145)
SODIUM SERPL-SCNC: 144 MMOL/L — SIGNIFICANT CHANGE UP (ref 135–145)
SODIUM SERPL-SCNC: 145 MMOL/L — SIGNIFICANT CHANGE UP (ref 135–145)
TRIGL SERPL-MCNC: 53 MG/DL — SIGNIFICANT CHANGE UP (ref 10–149)
WBC # BLD: 5.79 K/UL — SIGNIFICANT CHANGE UP (ref 3.8–10.5)
WBC # FLD AUTO: 5.79 K/UL — SIGNIFICANT CHANGE UP (ref 3.8–10.5)

## 2018-04-06 PROCEDURE — 99233 SBSQ HOSP IP/OBS HIGH 50: CPT

## 2018-04-06 PROCEDURE — 99222 1ST HOSP IP/OBS MODERATE 55: CPT

## 2018-04-06 RX ORDER — HYDROMORPHONE HYDROCHLORIDE 2 MG/ML
0.2 INJECTION INTRAMUSCULAR; INTRAVENOUS; SUBCUTANEOUS ONCE
Qty: 0 | Refills: 0 | Status: DISCONTINUED | OUTPATIENT
Start: 2018-04-06 | End: 2018-04-06

## 2018-04-06 RX ORDER — CARVEDILOL PHOSPHATE 80 MG/1
3.12 CAPSULE, EXTENDED RELEASE ORAL EVERY 12 HOURS
Qty: 0 | Refills: 0 | Status: DISCONTINUED | OUTPATIENT
Start: 2018-04-06 | End: 2018-04-07

## 2018-04-06 RX ORDER — OXYCODONE HYDROCHLORIDE 5 MG/1
10 TABLET ORAL EVERY 12 HOURS
Qty: 0 | Refills: 0 | Status: DISCONTINUED | OUTPATIENT
Start: 2018-04-06 | End: 2018-04-06

## 2018-04-06 RX ORDER — BUDESONIDE AND FORMOTEROL FUMARATE DIHYDRATE 160; 4.5 UG/1; UG/1
2 AEROSOL RESPIRATORY (INHALATION)
Qty: 0 | Refills: 0 | COMMUNITY

## 2018-04-06 RX ORDER — AMLODIPINE BESYLATE 2.5 MG/1
5 TABLET ORAL DAILY
Qty: 0 | Refills: 0 | Status: DISCONTINUED | OUTPATIENT
Start: 2018-04-06 | End: 2018-04-07

## 2018-04-06 RX ORDER — MONTELUKAST 4 MG/1
10 TABLET, CHEWABLE ORAL
Qty: 0 | Refills: 0 | COMMUNITY

## 2018-04-06 RX ORDER — HYDROMORPHONE HYDROCHLORIDE 2 MG/ML
0.2 INJECTION INTRAMUSCULAR; INTRAVENOUS; SUBCUTANEOUS EVERY 4 HOURS
Qty: 0 | Refills: 0 | Status: DISCONTINUED | OUTPATIENT
Start: 2018-04-06 | End: 2018-04-13

## 2018-04-06 RX ORDER — HYDRALAZINE HCL 50 MG
50 TABLET ORAL EVERY 8 HOURS
Qty: 0 | Refills: 0 | Status: DISCONTINUED | OUTPATIENT
Start: 2018-04-06 | End: 2018-04-07

## 2018-04-06 RX ORDER — ALBUTEROL 90 UG/1
0 AEROSOL, METERED ORAL
Qty: 0 | Refills: 0 | COMMUNITY

## 2018-04-06 RX ORDER — MAGNESIUM SULFATE 500 MG/ML
1 VIAL (ML) INJECTION ONCE
Qty: 0 | Refills: 0 | Status: COMPLETED | OUTPATIENT
Start: 2018-04-06 | End: 2018-04-06

## 2018-04-06 RX ORDER — OXYCODONE HYDROCHLORIDE 5 MG/1
15 TABLET ORAL EVERY 12 HOURS
Qty: 0 | Refills: 0 | Status: DISCONTINUED | OUTPATIENT
Start: 2018-04-06 | End: 2018-04-07

## 2018-04-06 RX ORDER — SODIUM CHLORIDE 9 MG/ML
500 INJECTION INTRAMUSCULAR; INTRAVENOUS; SUBCUTANEOUS ONCE
Qty: 0 | Refills: 0 | Status: COMPLETED | OUTPATIENT
Start: 2018-04-06 | End: 2018-04-06

## 2018-04-06 RX ORDER — TAMSULOSIN HYDROCHLORIDE 0.4 MG/1
1 CAPSULE ORAL
Qty: 0 | Refills: 0 | COMMUNITY

## 2018-04-06 RX ADMIN — Medication 100 GRAM(S): at 09:33

## 2018-04-06 RX ADMIN — PANTOPRAZOLE SODIUM 40 MILLIGRAM(S): 20 TABLET, DELAYED RELEASE ORAL at 05:51

## 2018-04-06 RX ADMIN — HEPARIN SODIUM 5000 UNIT(S): 5000 INJECTION INTRAVENOUS; SUBCUTANEOUS at 05:50

## 2018-04-06 RX ADMIN — OXYCODONE HYDROCHLORIDE 10 MILLIGRAM(S): 5 TABLET ORAL at 13:30

## 2018-04-06 RX ADMIN — HYDROMORPHONE HYDROCHLORIDE 0.2 MILLIGRAM(S): 2 INJECTION INTRAMUSCULAR; INTRAVENOUS; SUBCUTANEOUS at 10:45

## 2018-04-06 RX ADMIN — HYDROMORPHONE HYDROCHLORIDE 0.2 MILLIGRAM(S): 2 INJECTION INTRAMUSCULAR; INTRAVENOUS; SUBCUTANEOUS at 18:32

## 2018-04-06 RX ADMIN — OXYCODONE HYDROCHLORIDE 10 MILLIGRAM(S): 5 TABLET ORAL at 12:43

## 2018-04-06 RX ADMIN — HYDROMORPHONE HYDROCHLORIDE 0.2 MILLIGRAM(S): 2 INJECTION INTRAMUSCULAR; INTRAVENOUS; SUBCUTANEOUS at 22:49

## 2018-04-06 RX ADMIN — HYDROMORPHONE HYDROCHLORIDE 0.2 MILLIGRAM(S): 2 INJECTION INTRAMUSCULAR; INTRAVENOUS; SUBCUTANEOUS at 22:19

## 2018-04-06 RX ADMIN — MONTELUKAST 10 MILLIGRAM(S): 4 TABLET, CHEWABLE ORAL at 12:43

## 2018-04-06 RX ADMIN — HYDROMORPHONE HYDROCHLORIDE 0.2 MILLIGRAM(S): 2 INJECTION INTRAMUSCULAR; INTRAVENOUS; SUBCUTANEOUS at 07:50

## 2018-04-06 RX ADMIN — SENNA PLUS 2 TABLET(S): 8.6 TABLET ORAL at 22:17

## 2018-04-06 RX ADMIN — BUDESONIDE AND FORMOTEROL FUMARATE DIHYDRATE 2 PUFF(S): 160; 4.5 AEROSOL RESPIRATORY (INHALATION) at 20:42

## 2018-04-06 RX ADMIN — OXYCODONE AND ACETAMINOPHEN 1 TABLET(S): 5; 325 TABLET ORAL at 01:45

## 2018-04-06 RX ADMIN — POLYETHYLENE GLYCOL 3350 17 GRAM(S): 17 POWDER, FOR SOLUTION ORAL at 12:43

## 2018-04-06 RX ADMIN — Medication 50 MILLIGRAM(S): at 22:17

## 2018-04-06 RX ADMIN — BUDESONIDE AND FORMOTEROL FUMARATE DIHYDRATE 2 PUFF(S): 160; 4.5 AEROSOL RESPIRATORY (INHALATION) at 09:48

## 2018-04-06 RX ADMIN — HYDROMORPHONE HYDROCHLORIDE 0.2 MILLIGRAM(S): 2 INJECTION INTRAMUSCULAR; INTRAVENOUS; SUBCUTANEOUS at 10:25

## 2018-04-06 RX ADMIN — Medication 25 MILLIGRAM(S): at 05:51

## 2018-04-06 RX ADMIN — HEPARIN SODIUM 5000 UNIT(S): 5000 INJECTION INTRAVENOUS; SUBCUTANEOUS at 22:17

## 2018-04-06 RX ADMIN — Medication 50 MILLIGRAM(S): at 14:45

## 2018-04-06 RX ADMIN — Medication 100 MILLIGRAM(S): at 05:50

## 2018-04-06 RX ADMIN — SODIUM CHLORIDE 500 MILLILITER(S): 9 INJECTION INTRAMUSCULAR; INTRAVENOUS; SUBCUTANEOUS at 11:03

## 2018-04-06 RX ADMIN — Medication 100 MILLIGRAM(S): at 18:27

## 2018-04-06 RX ADMIN — HYDROMORPHONE HYDROCHLORIDE 0.2 MILLIGRAM(S): 2 INJECTION INTRAMUSCULAR; INTRAVENOUS; SUBCUTANEOUS at 19:00

## 2018-04-06 RX ADMIN — HYDROMORPHONE HYDROCHLORIDE 0.2 MILLIGRAM(S): 2 INJECTION INTRAMUSCULAR; INTRAVENOUS; SUBCUTANEOUS at 08:10

## 2018-04-06 RX ADMIN — AMLODIPINE BESYLATE 5 MILLIGRAM(S): 2.5 TABLET ORAL at 16:24

## 2018-04-06 RX ADMIN — Medication 81 MILLIGRAM(S): at 12:43

## 2018-04-06 RX ADMIN — CHLORHEXIDINE GLUCONATE 1 APPLICATION(S): 213 SOLUTION TOPICAL at 12:44

## 2018-04-06 RX ADMIN — OXYCODONE AND ACETAMINOPHEN 1 TABLET(S): 5; 325 TABLET ORAL at 08:17

## 2018-04-06 RX ADMIN — HYDROMORPHONE HYDROCHLORIDE 0.2 MILLIGRAM(S): 2 INJECTION INTRAMUSCULAR; INTRAVENOUS; SUBCUTANEOUS at 14:44

## 2018-04-06 RX ADMIN — OXYCODONE AND ACETAMINOPHEN 1 TABLET(S): 5; 325 TABLET ORAL at 09:03

## 2018-04-06 RX ADMIN — OXYCODONE AND ACETAMINOPHEN 1 TABLET(S): 5; 325 TABLET ORAL at 02:15

## 2018-04-06 RX ADMIN — ATORVASTATIN CALCIUM 80 MILLIGRAM(S): 80 TABLET, FILM COATED ORAL at 22:17

## 2018-04-06 RX ADMIN — CARVEDILOL PHOSPHATE 3.12 MILLIGRAM(S): 80 CAPSULE, EXTENDED RELEASE ORAL at 22:17

## 2018-04-06 RX ADMIN — TAMSULOSIN HYDROCHLORIDE 0.4 MILLIGRAM(S): 0.4 CAPSULE ORAL at 12:43

## 2018-04-06 RX ADMIN — HEPARIN SODIUM 5000 UNIT(S): 5000 INJECTION INTRAVENOUS; SUBCUTANEOUS at 14:45

## 2018-04-06 RX ADMIN — HYDROMORPHONE HYDROCHLORIDE 0.2 MILLIGRAM(S): 2 INJECTION INTRAMUSCULAR; INTRAVENOUS; SUBCUTANEOUS at 15:00

## 2018-04-06 NOTE — PROGRESS NOTE ADULT - ATTENDING COMMENTS
Robin Issa is a 77 year old man with history of DM, HTN, HLD, asthma and pipe smoker who presents with L foot/toe pain for past few weeks. There was pain, swelling, discoloration of left sided toes and increased pain while walking. BP in the ED was as high as 237/118 mm Hg. It was unclear if the patient was compliant with medication. There was acute SOB, progressing to respiratory distress with evidence of acute pulmonary edema. He was placed on biPAP and started on intravenous nitroglycerin. BP initially decreased to 70-80's mm Hg on starting dose of  nitroglycerin and then rebounded back to 200's/90's mm Hg after down-titration of dose. Intial cardiac isoenzymes were not elevated. Serum pro BNP was 3746 pg/mL. Initial ECG showed sinus bradycardia with PVCs, RBBB, and  ST-segment depression in anterolateral leads. TTE done 4/5/18 showed mitral annular calcification, otherwise normal mitral valve, with mild regurgitation. The aortic root was normal. The aortic valve was calcified and trileaflet, with normal opening. The left atrium appeared normal. There was concentric left ventricular hypertrophy with mild segmental LV systolic dysfunction. There was hypokinesis of the basal inferior wall. The right atrium appeared normal. The right ventricle was normal in size and function. The tricuspid and pulmonic valves were normal. There was minimal tricuspid regurgitation. The pericardium appeared normal, with no pericardial effusion. He received furosemide (Lasix 40mg) IV and hydralazine IV x2 doses. Nitroglycerin 20 mCg/min IV was tapered and discontinued on 4/6/18.  BiPAP was discontinued on 4/6/18. Current regimen now to include:, amlodipine 5 mg daily, hydralazine 25 mg orally every 8 hours, tamsulosin 0.4 mg daily, montelukast 10 mg daily, aspirin 81 mg daily, heparin 5000 units subcutaneously every 8 hours and atorvastatin (Lipitor) 80 mg daily at bedtime. Labs noted mild elevation of serum creatinine 2.26 mg/dL on 4/6/18; 1.79 mg/dL on 4/5/18. He received 500 CC normal saline on 4/6/18 with no resulting pulmonary edema. Renal Doppler will be done to assess for renal artery stenosis. 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

## 2018-04-06 NOTE — PROGRESS NOTE ADULT - PROBLEM SELECTOR PLAN 5
JINA on CKD; patient with unclear baseline Cr but chart history of CKD; Cr increased to 2.26 today  - Likely 2/2 diuresis on admission +/- HTN emergency  - Trend Cr as BP improves  - 1200 of urine yesterday, monitor UOP JINA on CKD; patient with unclear baseline Cr but chart history of CKD; Cr increased to 2.26 today  - Likely 2/2 diuresis on admission +/- HTN emergency  - Trend Cr as BP improves  - Trial 500 cc NS today; repeat BMP in PM  - 1200 of urine yesterday, monitor UOP

## 2018-04-06 NOTE — PROGRESS NOTE ADULT - ASSESSMENT
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 No signs of active infection as this time.     -Plan for angiography next week.   -will require Internal Medicine and Cardiology evaluation/optimization, risk stratification, and documentation thereof for planned lower extremity angiogram w/ possible intervention.       PÉREZ Yung MD (PGY2)    (Please page [facility] team Surgery, Vascular [phone] for questions/concerns.) 77M p/w HTN crisis, 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 No signs of active infection as this time.     #Plan for angiogram w/ possible intervention on Tuesday 4/10/18    -will require Internal Medicine and Cardiology evaluation/optimization, risk stratification, and documentation thereof for planned lower extremity angiogram w/ possible intervention.     Please ensure:  -[email or web address] Monday 4/9  -document medical and cardiac optimization, as well as risk stratification  -send Pre-op labs the evening prior to procedure: CBC, BMP/Mg/Phos, PT/PTT/INR, T&S  -ensure 12-lead EKG in chart (w/in 48hr)  -CXR in PACS on 4/9  -adjust insulin regimen to reflect NPO status    Vascular Surgery will consent patient.      PÉREZ Yung MD (PGY2)    (Please page [facility] team Surgery, Vascular [phone] for questions/concerns.)

## 2018-04-06 NOTE — CONSULT NOTE ADULT - SUBJECTIVE AND OBJECTIVE BOX
Vascular Surgery Consult    HPI:  76 YO M DM, HTN, CKD, BPH, gastritis asthma presents with L toe pain for last several weeks. Patient is poor historian and does not recall many details of his medical history. Reports he has had pain, swelling, discoloration of L toes, pain worse with walking. Denies CP over past few days. Unable to provide details on cardiac history, first time at [facility]. States he ran out of medications and has not taken them in at least a month. Reports cold symptoms over a month ago but no recent cough/fever. Patient's BP in the ED on presentation was 237/118, unclear if patient is on BP meds at home or if has been compliant. Patient became acutely SOB, went into hypoxic resp distress with concern for pulm edema. Patient was placed on Bipap and nitro gtt. BP dropped to 70-80's on 200 of nitro gtt and was adjusted to 20 and BP went up to 200's/90's subsequently nitro gtt was uptitrated with improvement in BP. Cardiac enzymes were checked and negative in the ED. ProBNP 3746. Patient with ECG showing sinus irwin, PVCs, RBBB, ST depressions in anterolateral leads. Patient given lasix 40mg IV and hydralazine IV x2. (05 Apr 2018 10:42)    Patient evaluated by podiatry when noted to have necrotic toes on left foot. Patient had been describing pain in that foot for about 3 days, to the point that was unable to walk.  Before the foot pain, patient denies having had claudications or signs of mesenteric ischemia.      PAST MEDICAL & SURGICAL HISTORY:  Gastritis  CKD (chronic kidney disease)  Asthma  DM (diabetes mellitus)  HTN (hypertension)  No significant past surgical history      MEDICATIONS  (STANDING):  aspirin enteric coated 81 milliGRAM(s) Oral daily  atorvastatin 80 milliGRAM(s) Oral at bedtime  buDESOnide 160 MICROgram(s)/formoterol 4.5 MICROgram(s) Inhaler 2 Puff(s) Inhalation two times a day  chlorhexidine 4% Liquid 1 Application(s) Topical <User Schedule>  docusate sodium 100 milliGRAM(s) Oral two times a day  heparin  Injectable 5000 Unit(s) SubCutaneous every 8 hours  hydrALAZINE 25 milliGRAM(s) Oral every 8 hours  montelukast 10 milliGRAM(s) Oral daily  nitroglycerin  Infusion 20 MICROgram(s)/Min (6 mL/Hr) IV Continuous <Continuous>  pantoprazole    Tablet 40 milliGRAM(s) Oral before breakfast  polyethylene glycol 3350 17 Gram(s) Oral daily  senna 2 Tablet(s) Oral at bedtime  tamsulosin 0.4 milliGRAM(s) Oral daily  tiotropium 18 MICROgram(s) Capsule 1 Capsule(s) Inhalation daily    MEDICATIONS  (PRN):  ALBUTerol/ipratropium for Nebulization 3 milliLiter(s) Nebulizer every 6 hours PRN Bronchospasm  oxyCODONE    5 mG/acetaminophen 325 mG 1 Tablet(s) Oral every 6 hours PRN Severe Pain (7 - 10)      Allergies    No Known Allergies    Intolerances        Vital Signs Last 24 Hrs  T(C): 36.6 (06 Apr 2018 00:00), Max: 37.1 (05 Apr 2018 08:35)  T(F): 97.8 (06 Apr 2018 00:00), Max: 98.7 (05 Apr 2018 08:35)  HR: 76 (06 Apr 2018 00:00) (46 - 109)  BP: 127/60 (06 Apr 2018 00:00) (71/22 - 268/85)  BP(mean): 76 (06 Apr 2018 00:00) (70 - 132)  RR: 15 (06 Apr 2018 00:00) (9 - 46)  SpO2: 100% (06 Apr 2018 00:00) (99% - 100%)  Mode: AVAPS  RR (machine): 12  TV (machine): 500  FiO2: 100  PEEP: 8  ITime: 1  PIP: 25    I&O's Summary    05 Apr 2018 07:01  -  06 Apr 2018 00:43  --------------------------------------------------------  IN: 144 mL / OUT: 900 mL / NET: -756 mL        Physical Exam:  General: NAD  Respiratory: Nonlabored  Cardiovascular: RRR  Abdominal: Soft, nondistended, nontender  Extremities: Dry gangrene of left 2nd and and 3rd toes as well as 2nd tow on left foot.  Pulse exam:  Right fem pulse, No pulses on Left. Legs warm.     LABS:                        14.6   6.78  )-----------( 209      ( 05 Apr 2018 15:36 )             45.0     04-05    146<H>  |  104  |  28<H>  ----------------------------<  86  3.8   |  32<H>  |  1.79<H>    Ca    8.1<L>      05 Apr 2018 15:36  Phos  3.0     04-05  Mg     1.9     04-05    TPro  6.5  /  Alb  3.6  /  TBili  0.3  /  DBili  x   /  AST  22  /  ALT  15  /  AlkPhos  106  04-05    PT/INR - ( 05 Apr 2018 15:36 )   PT: 12.0 SEC;   INR: 1.04          PTT - ( 05 Apr 2018 15:36 )  PTT:34.1 SEC    Assessment/Plan: 77yMale presents with hypertensive crisis, 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 No signs of infection as this time.  Plan for angiography next week. Patient will need internal medicine and cardiology risk stratification for any possible intervention.  d/w Attending.    Shterental PGY3 Vascular Surgery Consult    HPI:  78 YO M DM, HTN, CKD, BPH, gastritis asthma presents with L toe pain for last several weeks. Patient is poor historian and does not recall many details of his medical history. Reports he has had pain, swelling, discoloration of L toes, pain worse with walking. Denies CP over past few days. Unable to provide details on cardiac history, first time at [facility]. States he ran out of medications and has not taken them in at least a month. Reports cold symptoms over a month ago but no recent cough/fever. Patient's BP in the ED on presentation was 237/118, unclear if patient is on BP meds at home or if has been compliant. Patient became acutely SOB, went into hypoxic resp distress with concern for pulm edema. Patient was placed on Bipap and nitro gtt. BP dropped to 70-80's on 200 of nitro gtt and was adjusted to 20 and BP went up to 200's/90's subsequently nitro gtt was uptitrated with improvement in BP. Cardiac enzymes were checked and negative in the ED. ProBNP 3746. Patient with ECG showing sinus irwin, PVCs, RBBB, ST depressions in anterolateral leads. Patient given lasix 40mg IV and hydralazine IV x2. (05 Apr 2018 10:42)    Patient evaluated by podiatry when noted to have necrotic toes on left foot. Patient had been describing pain in that foot for about 3 days, to the point that was unable to walk.  Before the foot pain, patient denies having had claudications or signs of mesenteric ischemia.      PAST MEDICAL & SURGICAL HISTORY:  Gastritis  CKD (chronic kidney disease)  Asthma  DM (diabetes mellitus)  HTN (hypertension)  No significant past surgical history      MEDICATIONS  (STANDING):  aspirin enteric coated 81 milliGRAM(s) Oral daily  atorvastatin 80 milliGRAM(s) Oral at bedtime  buDESOnide 160 MICROgram(s)/formoterol 4.5 MICROgram(s) Inhaler 2 Puff(s) Inhalation two times a day  chlorhexidine 4% Liquid 1 Application(s) Topical <User Schedule>  docusate sodium 100 milliGRAM(s) Oral two times a day  heparin  Injectable 5000 Unit(s) SubCutaneous every 8 hours  hydrALAZINE 25 milliGRAM(s) Oral every 8 hours  montelukast 10 milliGRAM(s) Oral daily  nitroglycerin  Infusion 20 MICROgram(s)/Min (6 mL/Hr) IV Continuous <Continuous>  pantoprazole    Tablet 40 milliGRAM(s) Oral before breakfast  polyethylene glycol 3350 17 Gram(s) Oral daily  senna 2 Tablet(s) Oral at bedtime  tamsulosin 0.4 milliGRAM(s) Oral daily  tiotropium 18 MICROgram(s) Capsule 1 Capsule(s) Inhalation daily    MEDICATIONS  (PRN):  ALBUTerol/ipratropium for Nebulization 3 milliLiter(s) Nebulizer every 6 hours PRN Bronchospasm  oxyCODONE    5 mG/acetaminophen 325 mG 1 Tablet(s) Oral every 6 hours PRN Severe Pain (7 - 10)      Allergies    No Known Allergies    Intolerances        Vital Signs Last 24 Hrs  T(C): 36.6 (06 Apr 2018 00:00), Max: 37.1 (05 Apr 2018 08:35)  T(F): 97.8 (06 Apr 2018 00:00), Max: 98.7 (05 Apr 2018 08:35)  HR: 76 (06 Apr 2018 00:00) (46 - 109)  BP: 127/60 (06 Apr 2018 00:00) (71/22 - 268/85)  BP(mean): 76 (06 Apr 2018 00:00) (70 - 132)  RR: 15 (06 Apr 2018 00:00) (9 - 46)  SpO2: 100% (06 Apr 2018 00:00) (99% - 100%)  Mode: AVAPS  RR (machine): 12  TV (machine): 500  FiO2: 100  PEEP: 8  ITime: 1  PIP: 25    I&O's Summary    05 Apr 2018 07:01  -  06 Apr 2018 00:43  --------------------------------------------------------  IN: 144 mL / OUT: 900 mL / NET: -756 mL        Physical Exam:  General: NAD  Respiratory: Nonlabored  Cardiovascular: RRR  Abdominal: Soft, nondistended, nontender  Extremities: Dry gangrene of left 2nd and and 3rd toes as well as 2nd tow on left foot.  Pulse exam:  Right fem pulse, No pulses on Left. Legs warm.   Right DP/PT signal, Left PT signal (weak)    LABS:                        14.6   6.78  )-----------( 209      ( 05 Apr 2018 15:36 )             45.0     04-05    146<H>  |  104  |  28<H>  ----------------------------<  86  3.8   |  32<H>  |  1.79<H>    Ca    8.1<L>      05 Apr 2018 15:36  Phos  3.0     04-05  Mg     1.9     04-05    TPro  6.5  /  Alb  3.6  /  TBili  0.3  /  DBili  x   /  AST  22  /  ALT  15  /  AlkPhos  106  04-05    PT/INR - ( 05 Apr 2018 15:36 )   PT: 12.0 SEC;   INR: 1.04          PTT - ( 05 Apr 2018 15:36 )  PTT:34.1 SEC    Assessment/Plan: 77yMale presents with hypertensive crisis, 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 No signs of infection as this time.  Plan for angiography next week. Patient will need internal medicine and cardiology risk stratification for any possible intervention.  d/w Attending.    Shterental PGY3 Vascular Surgery Consult    HPI:  78 YO M DM, HTN, CKD, BPH, gastritis asthma presents with L toe pain for last several weeks. Patient is poor historian and does not recall many details of his medical history. Reports he has had pain, swelling, discoloration of L toes, pain worse with walking. Denies CP over past few days. Unable to provide details on cardiac history, first time at [facility]. States he ran out of medications and has not taken them in at least a month. Reports cold symptoms over a month ago but no recent cough/fever. Patient's BP in the ED on presentation was 237/118, unclear if patient is on BP meds at home or if has been compliant. Patient became acutely SOB, went into hypoxic resp distress with concern for pulm edema. Patient was placed on Bipap and nitro gtt. BP dropped to 70-80's on 200 of nitro gtt and was adjusted to 20 and BP went up to 200's/90's subsequently nitro gtt was uptitrated with improvement in BP. Cardiac enzymes were checked and negative in the ED. ProBNP 3746. Patient with ECG showing sinus irwin, PVCs, RBBB, ST depressions in anterolateral leads. Patient given lasix 40mg IV and hydralazine IV x2. (05 Apr 2018 10:42)    Patient evaluated by podiatry when noted to have necrotic toes on left foot. Patient had been describing pain in that foot for about 3 days, to the point that was unable to walk.  Before the foot pain, patient denies having had claudications or signs of ischemia.      PAST MEDICAL & SURGICAL HISTORY:  Gastritis  CKD (chronic kidney disease)  Asthma  DM (diabetes mellitus)  HTN (hypertension)  No significant past surgical history      MEDICATIONS  (STANDING):  aspirin enteric coated 81 milliGRAM(s) Oral daily  atorvastatin 80 milliGRAM(s) Oral at bedtime  buDESOnide 160 MICROgram(s)/formoterol 4.5 MICROgram(s) Inhaler 2 Puff(s) Inhalation two times a day  chlorhexidine 4% Liquid 1 Application(s) Topical <User Schedule>  docusate sodium 100 milliGRAM(s) Oral two times a day  heparin  Injectable 5000 Unit(s) SubCutaneous every 8 hours  hydrALAZINE 25 milliGRAM(s) Oral every 8 hours  montelukast 10 milliGRAM(s) Oral daily  nitroglycerin  Infusion 20 MICROgram(s)/Min (6 mL/Hr) IV Continuous <Continuous>  pantoprazole    Tablet 40 milliGRAM(s) Oral before breakfast  polyethylene glycol 3350 17 Gram(s) Oral daily  senna 2 Tablet(s) Oral at bedtime  tamsulosin 0.4 milliGRAM(s) Oral daily  tiotropium 18 MICROgram(s) Capsule 1 Capsule(s) Inhalation daily    MEDICATIONS  (PRN):  ALBUTerol/ipratropium for Nebulization 3 milliLiter(s) Nebulizer every 6 hours PRN Bronchospasm  oxyCODONE    5 mG/acetaminophen 325 mG 1 Tablet(s) Oral every 6 hours PRN Severe Pain (7 - 10)      Allergies    No Known Allergies    REVIEW OF SYSTEMS:  CONSTITUTIONAL: No weakness, fevers or chills  EYES/ENT: No visual changes;  No vertigo or throat pain   NECK: No pain or stiffness  RESPIRATORY: no shortness of breath  CARDIOVASCULAR: No chest pain or palpitations at present  GASTROINTESTINAL: No abdominal or epigastric pain. No nausea, vomiting, or hematemesis; No diarrhea or constipation. No melena or hematochezia.  GENITOURINARY: No dysuria, frequency, foamy urine, urinary urgency, incontinence or hematuria  NEUROLOGICAL: No numbness or weakness  SKIN: L 2nd toe ganrene  All other review of systems is negative unless indicated above.          Vital Signs Last 24 Hrs  T(C): 36.6 (06 Apr 2018 00:00), Max: 37.1 (05 Apr 2018 08:35)  T(F): 97.8 (06 Apr 2018 00:00), Max: 98.7 (05 Apr 2018 08:35)  HR: 76 (06 Apr 2018 00:00) (46 - 109)  BP: 127/60 (06 Apr 2018 00:00) (71/22 - 268/85)  BP(mean): 76 (06 Apr 2018 00:00) (70 - 132)  RR: 15 (06 Apr 2018 00:00) (9 - 46)  SpO2: 100% (06 Apr 2018 00:00) (99% - 100%)  Mode: AVAPS  RR (machine): 12  TV (machine): 500  FiO2: 100  PEEP: 8  ITime: 1  PIP: 25    I&O's Summary    05 Apr 2018 07:01  -  06 Apr 2018 00:43  --------------------------------------------------------  IN: 144 mL / OUT: 900 mL / NET: -756 mL        Physical Exam:  General: NAD  Respiratory: Nonlabored  Cardiovascular: RRR  Abdominal: Soft, nondistended, nontender  Extremities: Dry gangrene of left 2nd and and 3rd toes as well as 2nd tow on left foot.  Pulse exam:  Right fem pulse, No pulses on Left. Legs warm.   Right DP/PT signal, Left PT signal (weak)  Neuro: Aox3  Psych: calm     LABS:                        14.6   6.78  )-----------( 209      ( 05 Apr 2018 15:36 )             45.0     04-05    146<H>  |  104  |  28<H>  ----------------------------<  86  3.8   |  32<H>  |  1.79<H>    Ca    8.1<L>      05 Apr 2018 15:36  Phos  3.0     04-05  Mg     1.9     04-05    TPro  6.5  /  Alb  3.6  /  TBili  0.3  /  DBili  x   /  AST  22  /  ALT  15  /  AlkPhos  106  04-05    PT/INR - ( 05 Apr 2018 15:36 )   PT: 12.0 SEC;   INR: 1.04          PTT - ( 05 Apr 2018 15:36 )  PTT:34.1 SEC    Assessment/Plan: 77yMale presents with hypertensive crisis, 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 No signs of infection as this time.  Plan for angiography next week. Patient will need internal medicine and cardiology risk stratification for any possible intervention.  d/w Attending.    Shterental PGY3

## 2018-04-06 NOTE — PROGRESS NOTE ADULT - SUBJECTIVE AND OBJECTIVE BOX
Vascular Surgery Progress Note  C team [id number]      Subjective/interval events:  -arterial duplex completed; results showing occlusive thrombus of L SFA  -complaining of foot/toe pain       Objective:  Vital Signs Last 24 Hrs  T(C): 36.4 (06 Apr 2018 08:00), Max: 36.6 (05 Apr 2018 21:55)  T(F): 97.5 (06 Apr 2018 08:00), Max: 97.8 (05 Apr 2018 21:55)  HR: 97 (06 Apr 2018 09:49) (46 - 109)  BP: 161/70 (06 Apr 2018 09:00) (116/61 - 187/78)  BP(mean): 94 (06 Apr 2018 09:00) (70 - 132)  RR: 11 (06 Apr 2018 09:00) (9 - 46)  SpO2: 100% (06 Apr 2018 09:49) (99% - 100%)        I&O's Summary    05 Apr 2018 07:01  -  06 Apr 2018 07:00  --------------------------------------------------------  IN: 144 mL / OUT: 1200 mL / NET: -1056 mL        PE:  Gen: NAD  Pulm: Nonlabored  CV: regular  Abd: Soft, nondistended, nontender  Ext: Dry gangrene of left 2nd and and 3rd toes as well as 2nd tow on left foot.  Pulse: palpable Right femoral pulse; no palpable pulses on Left. Legs warm to touch b/l          LABS:                                   13.6   5.79  )-----------( 182      ( 06 Apr 2018 05:15 )             41.4                14.6   6.78  )-----------( 209      ( 05 Apr 2018 15:36 )             45.0     04-06    142  |  102  |  34<H>  ----------------------------<  83  4.1   |  29  |  2.26<H>    Ca    8.2<L>      06 Apr 2018 05:15  Phos  3.6     04-06  Mg     1.9     04-06    TPro  5.9<L>  /  Alb  3.1<L>  /  TBili  0.4  /  DBili  x   /  AST  17  /  ALT  12  /  AlkPhos  76  04-06    04-05    146<H>  |  104  |  28<H>  ----------------------------<  86  3.8   |  32<H>  |  1.79<H>    Ca    8.1<L>      05 Apr 2018 15:36  Phos  3.0     04-05  Mg     1.9     04-05    TPro  6.5  /  Alb  3.6  /  TBili  0.3  /  DBili  x   /  AST  22  /  ALT  15  /  AlkPhos  106  04-05        PT/INR - ( 05 Apr 2018 15:36 )   PT: 12.0 SEC;   INR: 1.04     PTT - ( 05 Apr 2018 15:36 )  PTT:34.1 SEC        Imaging:  VA Duplex Arterial Lower Ext, Left. (04.05.18 @ 15:06) >  Left Findings: --Left external iliac artery: Patent  vessel. Normal velocities with biphasic waveforms. No  hemodynamically significant stenosis.  --Left common femoral artery: Moderate/Severe  heterogeneous plaque visualized in the proximal/mid/distal  right common femoral artery. High resistant Doppler  waveforms were noted, indicating the presence of a more  distal high-grade stenosis or occlusion.  --Left superficial femoral artery: Completely occluded. No  flow noted on color and spectral Doppler analyses. The  hypoechoic nature of the occlusive material within the  vessel suggests thatthe vessel is completely thrombosed.  --Left popliteal artery: Low-velocity flow with monophasic  waveforms detected likely via collaterals.  --The infrapopliteal arteries (anterior tibial, posterior  tibial, and peroneal arteries): Low-velocity flowwith  monophasic waveforms detected at the distal ankle, likely  via collaterals.  ------------------------------------------------------------------------  Summary/Impressions:  Occlusive thrombosis of the left superficial femoral.  Low-velocity flow detected in the left popliteal artery.  Low-velocity flow with monophasic waveforms detected at  the distal ankle, likely via collaterals.  Results communicated with Dr. MORIAH Fisher on 4/5/2018 at 3  pm with read back.    < end of copied text > Vascular Surgery Progress Note  C team [id number]      Subjective/interval events:  -arterial duplex completed; results showing occlusive thrombus of L SFA  -complaining of foot/toe pain       Objective:  Vital Signs Last 24 Hrs  T(C): 36.4 (06 Apr 2018 08:00), Max: 36.6 (05 Apr 2018 21:55)  T(F): 97.5 (06 Apr 2018 08:00), Max: 97.8 (05 Apr 2018 21:55)  HR: 97 (06 Apr 2018 09:49) (46 - 109)  BP: 161/70 (06 Apr 2018 09:00) (116/61 - 187/78)  BP(mean): 94 (06 Apr 2018 09:00) (70 - 132)  RR: 11 (06 Apr 2018 09:00) (9 - 46)  SpO2: 100% (06 Apr 2018 09:49) (99% - 100%)        I&O's Summary    05 Apr 2018 07:01  -  06 Apr 2018 07:00  --------------------------------------------------------  IN: 144 mL / OUT: 1200 mL / NET: -1056 mL        PE:  Gen: NAD  Pulm: Nonlabored  CV: regular  Abd: Soft, nondistended, nontender  Ext: Dry gangrene of left 2nd and and 3rd toes as well as 2nd tow on left foot.  Pulse: palpable Right femoral pulse; no palpable pulses on Left. Legs warm to touch b/l          LABS:                                   13.6   5.79  )-----------( 182      ( 06 Apr 2018 05:15 )             41.4                14.6   6.78  )-----------( 209      ( 05 Apr 2018 15:36 )             45.0     04-06    142  |  102  |  34<H>  ----------------------------<  83  4.1   |  29  |  2.26<H>    Ca    8.2<L>      06 Apr 2018 05:15  Phos  3.6     04-06  Mg     1.9     04-06    TPro  5.9<L>  /  Alb  3.1<L>  /  TBili  0.4  /  DBili  x   /  AST  17  /  ALT  12  /  AlkPhos  76  04-06    04-05    146<H>  |  104  |  28<H>  ----------------------------<  86  3.8   |  32<H>  |  1.79<H>    Ca    8.1<L>      05 Apr 2018 15:36  Phos  3.0     04-05  Mg     1.9     04-05    TPro  6.5  /  Alb  3.6  /  TBili  0.3  /  DBili  x   /  AST  22  /  ALT  15  /  AlkPhos  106  04-05        PT/INR - ( 05 Apr 2018 15:36 )   PT: 12.0 SEC;   INR: 1.04     PTT - ( 05 Apr 2018 15:36 )  PTT:34.1 SEC        Imaging:  VA Duplex Arterial Lower Ext, Left. (04.05.18 @ 15:06) >  Left Findings: --Left external iliac artery: Patent  vessel. Normal velocities with biphasic waveforms. No  hemodynamically significant stenosis.  --Left common femoral artery: Moderate/Severe  heterogeneous plaque visualized in the proximal/mid/distal  right common femoral artery. High resistant Doppler  waveforms were noted, indicating the presence of a more  distal high-grade stenosis or occlusion.  --Left superficial femoral artery: Completely occluded. No  flow noted on color and spectral Doppler analyses. The  hypoechoic nature of the occlusive material within the  vessel suggests thatthe vessel is completely thrombosed.  --Left popliteal artery: Low-velocity flow with monophasic  waveforms detected likely via collaterals.  --The infrapopliteal arteries (anterior tibial, posterior  tibial, and peroneal arteries): Low-velocity flowwith  monophasic waveforms detected at the distal ankle, likely  via collaterals.  ------------------------------------------------------------------------  Summary/Impressions:  Occlusive thrombosis of the left superficial femoral.  Low-velocity flow detected in the left popliteal artery.  Low-velocity flow with monophasic waveforms detected at  the distal ankle, likely via collaterals.  Results communicated with Dr. MORIAH Fisher on 4/5/2018 at 3  pm with read back.

## 2018-04-06 NOTE — PROGRESS NOTE ADULT - ASSESSMENT
76 YO M DM, HTN, presents with L toe pain for last several weeks. Found in the ED to be hypertensive to 260s with suspected flash pulmonary edema and transferred to CCU on nitro gtt and BPAP for further management.

## 2018-04-06 NOTE — PROGRESS NOTE ADULT - PROBLEM SELECTOR PLAN 1
Patient hypertensive to 260s initially requiring nitro gtt; now off nitro gtt with improvement in BP but remains above goal  - Uptitrate hydral to 50, add isordil 10   - Holding home cardizem 360 daily  - Cardiac enzymes -x2, no chest pain  - Continuous cardiac monitoring Patient hypertensive to 260s initially requiring nitro gtt; now off nitro gtt with improvement in BP but remains above goal  - Uptitrate hydral to 50  - Holding home cardizem 360 daily  - Cardiac enzymes -x2, no chest pain  - Continuous cardiac monitoring

## 2018-04-06 NOTE — PROGRESS NOTE ADULT - PROBLEM SELECTOR PLAN 2
Likely 2/2 flash pulmonary edema 2/2 hypertensive emergency. Initial CXR clear  - Resolved, patient now tolerating NC will wean NC as tolerated  - Repeat chest x-ray clear yesterday  - Continue duoneb, symbicort, singulair

## 2018-04-06 NOTE — CONSULT NOTE ADULT - ATTENDING COMMENTS
Robin Issa is a 77 year old man with history of DM, HTN, HLD, asthma and pipe smoker who presents with L foot/toe pain for past few weeks. There was pain, swelling, discoloration of left sided toes and increased pain while walking. BP in the ED was as high as 237/118 mm Hg. It was unclear if the patient was compliant with medication. There was acute SOB, progressing to respiratory distress with evidence of acute pulmonary edema. He was placed on biPAP and started on intravenous nitroglycerin. BP initially decreased to 70-80's mm Hg on starting dose of  nitroglycerin and then rebounded back to 200's/90's mm Hg after down-titration of dose. Intial cardiac isoenzymes were not elevated. Serum pro BNP was 3746 pg/mL. Initial ECG showed sinus bradycardia with PVCs, RBBB, and  ST-segment depression in anterolateral leads. TTE done 4/5/18 showed mitral annular calcification, otherwise normal mitral valve, with mild regurgitation. The aortic root was normal. The aortic valve was calcified and trileaflet, with normal opening. The left atrium appeared normal. There was concentric left ventricular hypertrophy with mild segmental LV systolic dysfunction. There was hypokinesis of the basal inferior wall. The right atrium appeared normal. The right ventricle was normal in size and function. The tricuspid and pulmonic valves were normal. There was minimal tricuspid regurgitation. The pericardium appeared normal, with no pericardial effusion. He received furosemide (Lasix 40mg) IV and hydralazine IV x2 doses. Current regimen now to include: nitroglycerin 20 mCg/min IV, hydralazine 25 mg orally every 8 hours, tamsulosin 0.4 mg daily, montelukast 10 mg daily, aspirin 81 mg daily, heparin 5000 units subcutaneously every 8 hours and atorvastatin (Lipitor) 80 mg daily at bedtime. Labs noted mild elevation of serum creatinine, as high as 1.95 mg/dL at 6:37 (1.79 mg/dL on 15:36 on 4/5/18). Renal Doppler will be done to assess for renal artery stenosis. 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
Pt. was seen and examined. Agree with above. Plan d/w the team.  Pt. 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 will benefit from angio and revascularization for limb salvage  Pt. has elevated Cr. will need renal eval prior to angio  Risks and benefits of the procedure were discussed with pt. and family and they agree to proceed. All questions were answered.  pt. is scheduled for 4/10/18 if medically optimized

## 2018-04-06 NOTE — PROGRESS NOTE ADULT - PROBLEM SELECTOR PLAN 3
Likely 2/2 hypertensive emergency  - Chest x-ray without e/o pulmonary edema  - Defer further diuresis given JINA  - Uptitrating BP regimen as above

## 2018-04-06 NOTE — PROGRESS NOTE ADULT - PROBLEM SELECTOR PLAN 4
Evaluated by podiatry in ED with suspected dry gangrene  - GRISEL with L common fem artery occlusion; vascular planning for angio next week  - Continue atorva 40 and ASA 81  - Pain control with standing percocet 5 q6, PRN .2 IV dilaudid for breakthrough Evaluated by podiatry in ED with suspected dry gangrene  - GRISEL with L common fem artery occlusion; vascular planning for angio next week  - Continue atorva 40 and ASA 81  - Pain control with standing oxy ER 10 q12, PRN .2 IV dilaudid q4 for breakthrough

## 2018-04-07 LAB
BUN SERPL-MCNC: 35 MG/DL — HIGH (ref 7–23)
CALCIUM SERPL-MCNC: 8.6 MG/DL — SIGNIFICANT CHANGE UP (ref 8.4–10.5)
CHLORIDE SERPL-SCNC: 103 MMOL/L — SIGNIFICANT CHANGE UP (ref 98–107)
CO2 SERPL-SCNC: 29 MMOL/L — SIGNIFICANT CHANGE UP (ref 22–31)
CREAT SERPL-MCNC: 2.06 MG/DL — HIGH (ref 0.5–1.3)
GLUCOSE SERPL-MCNC: 92 MG/DL — SIGNIFICANT CHANGE UP (ref 70–99)
HCT VFR BLD CALC: 44.3 % — SIGNIFICANT CHANGE UP (ref 39–50)
HGB BLD-MCNC: 14.4 G/DL — SIGNIFICANT CHANGE UP (ref 13–17)
MAGNESIUM SERPL-MCNC: 2.2 MG/DL — SIGNIFICANT CHANGE UP (ref 1.6–2.6)
MCHC RBC-ENTMCNC: 29.7 PG — SIGNIFICANT CHANGE UP (ref 27–34)
MCHC RBC-ENTMCNC: 32.5 % — SIGNIFICANT CHANGE UP (ref 32–36)
MCV RBC AUTO: 91.3 FL — SIGNIFICANT CHANGE UP (ref 80–100)
NRBC # FLD: 0 — SIGNIFICANT CHANGE UP
PHOSPHATE SERPL-MCNC: 2.7 MG/DL — SIGNIFICANT CHANGE UP (ref 2.5–4.5)
PLATELET # BLD AUTO: 185 K/UL — SIGNIFICANT CHANGE UP (ref 150–400)
PMV BLD: 12.9 FL — SIGNIFICANT CHANGE UP (ref 7–13)
POTASSIUM SERPL-MCNC: 4.2 MMOL/L — SIGNIFICANT CHANGE UP (ref 3.5–5.3)
POTASSIUM SERPL-SCNC: 4.2 MMOL/L — SIGNIFICANT CHANGE UP (ref 3.5–5.3)
RBC # BLD: 4.85 M/UL — SIGNIFICANT CHANGE UP (ref 4.2–5.8)
RBC # FLD: 15 % — HIGH (ref 10.3–14.5)
SODIUM SERPL-SCNC: 143 MMOL/L — SIGNIFICANT CHANGE UP (ref 135–145)
WBC # BLD: 6.45 K/UL — SIGNIFICANT CHANGE UP (ref 3.8–10.5)
WBC # FLD AUTO: 6.45 K/UL — SIGNIFICANT CHANGE UP (ref 3.8–10.5)

## 2018-04-07 PROCEDURE — 99232 SBSQ HOSP IP/OBS MODERATE 35: CPT

## 2018-04-07 RX ORDER — HYDROMORPHONE HYDROCHLORIDE 2 MG/ML
0.2 INJECTION INTRAMUSCULAR; INTRAVENOUS; SUBCUTANEOUS ONCE
Qty: 0 | Refills: 0 | Status: DISCONTINUED | OUTPATIENT
Start: 2018-04-07 | End: 2018-04-07

## 2018-04-07 RX ORDER — AMLODIPINE BESYLATE 2.5 MG/1
10 TABLET ORAL DAILY
Qty: 0 | Refills: 0 | Status: DISCONTINUED | OUTPATIENT
Start: 2018-04-07 | End: 2018-04-07

## 2018-04-07 RX ORDER — AMLODIPINE BESYLATE 2.5 MG/1
5 TABLET ORAL ONCE
Qty: 0 | Refills: 0 | Status: COMPLETED | OUTPATIENT
Start: 2018-04-07 | End: 2018-04-07

## 2018-04-07 RX ORDER — TIOTROPIUM BROMIDE 18 UG/1
1 CAPSULE ORAL; RESPIRATORY (INHALATION) DAILY
Qty: 0 | Refills: 0 | Status: DISCONTINUED | OUTPATIENT
Start: 2018-04-07 | End: 2018-04-25

## 2018-04-07 RX ORDER — OXYCODONE HYDROCHLORIDE 5 MG/1
10 TABLET ORAL EVERY 12 HOURS
Qty: 0 | Refills: 0 | Status: DISCONTINUED | OUTPATIENT
Start: 2018-04-07 | End: 2018-04-07

## 2018-04-07 RX ORDER — HYDROMORPHONE HYDROCHLORIDE 2 MG/ML
0.5 INJECTION INTRAMUSCULAR; INTRAVENOUS; SUBCUTANEOUS ONCE
Qty: 0 | Refills: 0 | Status: DISCONTINUED | OUTPATIENT
Start: 2018-04-07 | End: 2018-04-07

## 2018-04-07 RX ORDER — AMLODIPINE BESYLATE 2.5 MG/1
10 TABLET ORAL DAILY
Qty: 0 | Refills: 0 | Status: DISCONTINUED | OUTPATIENT
Start: 2018-04-08 | End: 2018-04-25

## 2018-04-07 RX ORDER — CARVEDILOL PHOSPHATE 80 MG/1
6.25 CAPSULE, EXTENDED RELEASE ORAL EVERY 12 HOURS
Qty: 0 | Refills: 0 | Status: DISCONTINUED | OUTPATIENT
Start: 2018-04-07 | End: 2018-04-09

## 2018-04-07 RX ORDER — OXYCODONE AND ACETAMINOPHEN 5; 325 MG/1; MG/1
1 TABLET ORAL EVERY 4 HOURS
Qty: 0 | Refills: 0 | Status: DISCONTINUED | OUTPATIENT
Start: 2018-04-07 | End: 2018-04-14

## 2018-04-07 RX ORDER — HYDRALAZINE HCL 50 MG
75 TABLET ORAL EVERY 8 HOURS
Qty: 0 | Refills: 0 | Status: DISCONTINUED | OUTPATIENT
Start: 2018-04-07 | End: 2018-04-09

## 2018-04-07 RX ADMIN — CARVEDILOL PHOSPHATE 6.25 MILLIGRAM(S): 80 CAPSULE, EXTENDED RELEASE ORAL at 17:58

## 2018-04-07 RX ADMIN — Medication 100 MILLIGRAM(S): at 06:20

## 2018-04-07 RX ADMIN — Medication 50 MILLIGRAM(S): at 06:20

## 2018-04-07 RX ADMIN — OXYCODONE AND ACETAMINOPHEN 1 TABLET(S): 5; 325 TABLET ORAL at 12:01

## 2018-04-07 RX ADMIN — BUDESONIDE AND FORMOTEROL FUMARATE DIHYDRATE 2 PUFF(S): 160; 4.5 AEROSOL RESPIRATORY (INHALATION) at 21:36

## 2018-04-07 RX ADMIN — OXYCODONE AND ACETAMINOPHEN 1 TABLET(S): 5; 325 TABLET ORAL at 10:14

## 2018-04-07 RX ADMIN — HYDROMORPHONE HYDROCHLORIDE 0.2 MILLIGRAM(S): 2 INJECTION INTRAMUSCULAR; INTRAVENOUS; SUBCUTANEOUS at 05:05

## 2018-04-07 RX ADMIN — HEPARIN SODIUM 5000 UNIT(S): 5000 INJECTION INTRAVENOUS; SUBCUTANEOUS at 06:21

## 2018-04-07 RX ADMIN — CHLORHEXIDINE GLUCONATE 1 APPLICATION(S): 213 SOLUTION TOPICAL at 12:16

## 2018-04-07 RX ADMIN — Medication 81 MILLIGRAM(S): at 12:17

## 2018-04-07 RX ADMIN — OXYCODONE HYDROCHLORIDE 10 MILLIGRAM(S): 5 TABLET ORAL at 01:25

## 2018-04-07 RX ADMIN — AMLODIPINE BESYLATE 5 MILLIGRAM(S): 2.5 TABLET ORAL at 06:20

## 2018-04-07 RX ADMIN — OXYCODONE HYDROCHLORIDE 10 MILLIGRAM(S): 5 TABLET ORAL at 01:55

## 2018-04-07 RX ADMIN — MONTELUKAST 10 MILLIGRAM(S): 4 TABLET, CHEWABLE ORAL at 12:17

## 2018-04-07 RX ADMIN — HYDROMORPHONE HYDROCHLORIDE 0.2 MILLIGRAM(S): 2 INJECTION INTRAMUSCULAR; INTRAVENOUS; SUBCUTANEOUS at 02:30

## 2018-04-07 RX ADMIN — SENNA PLUS 2 TABLET(S): 8.6 TABLET ORAL at 21:37

## 2018-04-07 RX ADMIN — HYDROMORPHONE HYDROCHLORIDE 0.2 MILLIGRAM(S): 2 INJECTION INTRAMUSCULAR; INTRAVENOUS; SUBCUTANEOUS at 23:33

## 2018-04-07 RX ADMIN — Medication 100 MILLIGRAM(S): at 17:58

## 2018-04-07 RX ADMIN — PANTOPRAZOLE SODIUM 40 MILLIGRAM(S): 20 TABLET, DELAYED RELEASE ORAL at 06:20

## 2018-04-07 RX ADMIN — AMLODIPINE BESYLATE 5 MILLIGRAM(S): 2.5 TABLET ORAL at 12:00

## 2018-04-07 RX ADMIN — HYDROMORPHONE HYDROCHLORIDE 0.2 MILLIGRAM(S): 2 INJECTION INTRAMUSCULAR; INTRAVENOUS; SUBCUTANEOUS at 14:38

## 2018-04-07 RX ADMIN — HYDROMORPHONE HYDROCHLORIDE 0.5 MILLIGRAM(S): 2 INJECTION INTRAMUSCULAR; INTRAVENOUS; SUBCUTANEOUS at 10:38

## 2018-04-07 RX ADMIN — HEPARIN SODIUM 5000 UNIT(S): 5000 INJECTION INTRAVENOUS; SUBCUTANEOUS at 14:26

## 2018-04-07 RX ADMIN — ATORVASTATIN CALCIUM 80 MILLIGRAM(S): 80 TABLET, FILM COATED ORAL at 21:37

## 2018-04-07 RX ADMIN — Medication 75 MILLIGRAM(S): at 21:37

## 2018-04-07 RX ADMIN — HYDROMORPHONE HYDROCHLORIDE 0.2 MILLIGRAM(S): 2 INJECTION INTRAMUSCULAR; INTRAVENOUS; SUBCUTANEOUS at 07:04

## 2018-04-07 RX ADMIN — TIOTROPIUM BROMIDE 1 CAPSULE(S): 18 CAPSULE ORAL; RESPIRATORY (INHALATION) at 15:13

## 2018-04-07 RX ADMIN — HYDROMORPHONE HYDROCHLORIDE 0.5 MILLIGRAM(S): 2 INJECTION INTRAMUSCULAR; INTRAVENOUS; SUBCUTANEOUS at 12:00

## 2018-04-07 RX ADMIN — POLYETHYLENE GLYCOL 3350 17 GRAM(S): 17 POWDER, FOR SOLUTION ORAL at 12:16

## 2018-04-07 RX ADMIN — HYDROMORPHONE HYDROCHLORIDE 0.2 MILLIGRAM(S): 2 INJECTION INTRAMUSCULAR; INTRAVENOUS; SUBCUTANEOUS at 04:45

## 2018-04-07 RX ADMIN — TAMSULOSIN HYDROCHLORIDE 0.4 MILLIGRAM(S): 0.4 CAPSULE ORAL at 12:17

## 2018-04-07 RX ADMIN — HYDROMORPHONE HYDROCHLORIDE 0.2 MILLIGRAM(S): 2 INJECTION INTRAMUSCULAR; INTRAVENOUS; SUBCUTANEOUS at 02:00

## 2018-04-07 RX ADMIN — Medication 75 MILLIGRAM(S): at 14:26

## 2018-04-07 RX ADMIN — CARVEDILOL PHOSPHATE 6.25 MILLIGRAM(S): 80 CAPSULE, EXTENDED RELEASE ORAL at 06:20

## 2018-04-07 RX ADMIN — HYDROMORPHONE HYDROCHLORIDE 0.2 MILLIGRAM(S): 2 INJECTION INTRAMUSCULAR; INTRAVENOUS; SUBCUTANEOUS at 07:32

## 2018-04-07 RX ADMIN — HYDROMORPHONE HYDROCHLORIDE 0.2 MILLIGRAM(S): 2 INJECTION INTRAMUSCULAR; INTRAVENOUS; SUBCUTANEOUS at 23:18

## 2018-04-07 RX ADMIN — BUDESONIDE AND FORMOTEROL FUMARATE DIHYDRATE 2 PUFF(S): 160; 4.5 AEROSOL RESPIRATORY (INHALATION) at 09:06

## 2018-04-07 NOTE — PROGRESS NOTE ADULT - PROBLEM SELECTOR PLAN 4
Evaluated by podiatry in ED with suspected dry gangrene  - GRISEL with L common fem artery occlusion; vascular planning for angio next week  - Continue atorva 40 and ASA 81  - Increase pain control to Evaluated by podiatry in ED with suspected dry gangrene  - GRISEL with L common fem artery occlusion; vascular planning for angio next week  - Continue atorva 40 and ASA 81  - Increase pain control to oxy 20 q12 with breakthrough .2 IV dilaudid q4 Evaluated by podiatry in ED with suspected dry gangrene  - GRISEL with L common fem artery occlusion; vascular planning for angio next week  - Continue atorva 40 and ASA 81  - Increase pain control to percocet 1 tab q4 with breakthrough .2 IV dilaudid q4

## 2018-04-07 NOTE — PROGRESS NOTE ADULT - PROBLEM SELECTOR PLAN 2
Likely 2/2 flash pulmonary edema 2/2 hypertensive emergency. Initial CXR clear. Now resolved  - Continue nasal cannula  - Continue duoneb PRN, symbicort, singulair

## 2018-04-07 NOTE — PROGRESS NOTE ADULT - SUBJECTIVE AND OBJECTIVE BOX
CONTACT ALAINA Fisher M.D., PGY-1  Pager: ns- [phone], lij- [phone]    HPI/INTERVAL HISTORY:10s of symptomatic VTach overnight (light headed) which resolved spontaneously; coreg uptitrated and no further events this AM. Patient still reports pain in his L toes intermittently and is requiring breakthrough dilaudid; hypertensive when in pain but in general BP improving.     OBJECTIVE:  VITAL SIGNS:  ICU Vital Signs Last 24 Hrs  T(C): 36.4 (07 Apr 2018 06:05), Max: 36.8 (06 Apr 2018 20:00)  T(F): 97.5 (07 Apr 2018 06:05), Max: 98.3 (06 Apr 2018 20:00)  HR: 67 (07 Apr 2018 06:00) (62 - 107)  BP: 191/71 (07 Apr 2018 06:00) (140/70 - 218/91)  BP(mean): 106 (07 Apr 2018 06:00) (84 - 120)  ABP: --  ABP(mean): --  RR: 13 (07 Apr 2018 06:00) (10 - 29)  SpO2: 100% (07 Apr 2018 06:00) (96% - 100%)        CAPILLARY BLOOD GLUCOSE          ALBUTerol/ipratropium for Nebulization 3 milliLiter(s) Nebulizer every 6 hours PRN  amLODIPine   Tablet 5 milliGRAM(s) Oral daily  aspirin enteric coated 81 milliGRAM(s) Oral daily  atorvastatin 80 milliGRAM(s) Oral at bedtime  buDESOnide 160 MICROgram(s)/formoterol 4.5 MICROgram(s) Inhaler 2 Puff(s) Inhalation two times a day  carvedilol 6.25 milliGRAM(s) Oral every 12 hours  chlorhexidine 4% Liquid 1 Application(s) Topical <User Schedule>  docusate sodium 100 milliGRAM(s) Oral two times a day  heparin  Injectable 5000 Unit(s) SubCutaneous every 8 hours  hydrALAZINE 50 milliGRAM(s) Oral every 8 hours  HYDROmorphone  Injectable 0.2 milliGRAM(s) IV Push every 4 hours PRN  montelukast 10 milliGRAM(s) Oral daily  oxyCODONE  ER Tablet 10 milliGRAM(s) Oral every 12 hours  pantoprazole    Tablet 40 milliGRAM(s) Oral before breakfast  polyethylene glycol 3350 17 Gram(s) Oral daily  senna 2 Tablet(s) Oral at bedtime  tamsulosin 0.4 milliGRAM(s) Oral daily  tiotropium 18 MICROgram(s) Capsule 1 Capsule(s) Inhalation daily      No Known Allergies      PHYSICAL EXAM:  Gen: NAD  HEENT: NC/AT  Neck: supple, no JVD  Resp: Intermittent wheeze bilaterally  Cardiovasc: RRR, no m/r/g  GI: soft, nondistended, nontender; +BS  Extr: DP pulses absent to palpation; L 2nd toe dark, swollen. No LE edema  Skin: normal color and turgor  Neuro: A+Ox3, following commands, moving all extremities    LABS:                        14.4   6.45  )-----------( 185      ( 07 Apr 2018 05:20 )             44.3     04-07    143  |  103  |  35<H>  ----------------------------<  92  4.2   |  29  |  2.06<H>    Ca    8.6      07 Apr 2018 05:20  Phos  2.7     04-07  Mg     2.2     04-07    TPro  6.2  /  Alb  3.1<L>  /  TBili  0.4  /  DBili  x   /  AST  20  /  ALT  12  /  AlkPhos  82  04-06    LIVER FUNCTIONS - ( 06 Apr 2018 22:05 )  Alb: 3.1 g/dL / Pro: 6.2 g/dL / ALK PHOS: 82 u/L / ALT: 12 u/L / AST: 20 u/L / GGT: x           PT/INR - ( 05 Apr 2018 15:36 )   PT: 12.0 SEC;   INR: 1.04          PTT - ( 05 Apr 2018 15:36 )  PTT:34.1 SEC    CARDIAC MARKERS ( 05 Apr 2018 15:36 )  x     / < 0.06 ng/mL / 124 u/L / 4.84 ng/mL / x

## 2018-04-07 NOTE — PROGRESS NOTE ADULT - PROBLEM SELECTOR PLAN 1
Patient hypertensive to 260s initially requiring nitro gtt; now off nitro gtt. BP remains elevated when patient is having pain  - Continue hydral 50, coreg 6.25  - Improve pain control today for BP control  - Holding home cardizem 360 daily  - Continuous cardiac monitoring Patient hypertensive to 260s initially requiring nitro gtt; now off nitro gtt. BP remains elevated when patient is having pain  - Continue hydral 50, coreg 6.25; amlodipine 5 added yesterday  - Improve pain control today for BP control  - Holding home cardizem 360 daily  - Continuous cardiac monitoring Patient hypertensive to 260s initially requiring nitro gtt; now off nitro gtt. BP remains elevated when patient is having pain  - Continue hydral 75, coreg 6.25; amlodipine 5 added yesterday, increase to 10 today  - Improve pain control today for BP control  - Holding home cardizem 360 daily  - Continuous cardiac monitoring

## 2018-04-07 NOTE — PROGRESS NOTE ADULT - PROBLEM SELECTOR PLAN 3
Likely 2/2 hypertensive emergency  - Chest x-ray without e/o pulmonary edema  - Tolerating NC, continue to monitor

## 2018-04-07 NOTE — CHART NOTE - NSCHARTNOTEFT_GEN_A_CORE
Hospital Course: 76 YO M HTN, CKD, BPH, gastritis asthma presents with L toe pain for last several weeks. Reports he has had pain, swelling, discoloration of L toes, pain worse with walking. Denies CP over past few days. States he ran out of medications and has not taken them in at least a month. Reports cold symptoms over a month ago but no recent cough/fever. Patient's BP in the ED on presentation was 237/118. Patient became acutely SOB, went into hypoxic respiratory distress with concern for pulm edema. Patient was placed on Bipap and nitro gtt. BP dropped to 70-80's on 200 of nitro gtt and was adjusted to 20 and BP went up to 200's/90's subsequently nitro gtt was up titrated with improvement in BP. Cardiac enzymes were checked and negative in the ED.Patient with ECG showing sinus irwin, PVCs, RBBB, ST depressions in anterolateral leads. Patient given lasix 40mg IV and hydralazine IV x2 and transferred to CCU on BPAP. In the CCU the patient's breathing and blood pressure improved and he was taken off of BPAP and his nitro gtt. Hydralazine was started however the patient remained hypertensive and amlodipine and carvedilol were added to his regimen. 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 Vascular was consulted and recommended angiography of his lower extremity next week. TTE was significant for mild segmental LV systolic dysfunction; for pre-op clearance the patient was scheduled for nuclear stress testing to evaluate for ischemic heart disease. Throughout his admission he reported severe toe pain and was noted to become markedly hypertensive (>200) while in pain; his pain regimen was up titrated while in the CCU with improvement however the patient remained hypertensive and his blood pressure medications were also up titrated prior to transfer to telemetry. The night prior to tranfer from the CCU the patient had a 10s episode of VTach during which he felt light headed; his coreg was increased from 3.125 to 6.25 and he had no further episodes on telemetry. He was transferred to the floor ins [facility] medical condition for further vascular work up and management of his HTN.     Follow Up:  [ ] BP regimen increased to hydral 75 tid, amlo 10 daily, coreg 6.25 on 4/7; continue to monitor BP and titrate medications avoiding ACE/ARB given JINA and nitrates prior to stress test  [ ] Pain regimen percocet 5/325 q4 with dilaudid .2 IV q4 PRN for break through; patient still reporting episodes of pain  [ ] Angiogram planned by vascular surgery early next week; document medical/cards clearance prior to any intervention  [ ] Nuclear stress testing planned for monday for cards clearance  [ ] f/u renal dopplers to rule out HTN 2/2 ANALY  [ ] continue duonebs PRN, spiriva, symbicort; patient tolerating 4L NC while in CCU Hospital Course: 78 YO M HTN, CKD, BPH, gastritis asthma presents with L toe pain for last several weeks. Reports he has had pain, swelling, discoloration of L toes, pain worse with walking. Denies CP over past few days. States he ran out of medications and has not taken them in at least a month. Reports cold symptoms over a month ago but no recent cough/fever. Patient's BP in the ED on presentation was 237/118. Patient became acutely SOB, went into hypoxic respiratory distress with concern for pulm edema. Patient was placed on Bipap and nitro gtt. BP dropped to 70-80's on 200 of nitro gtt and was adjusted to 20 and BP went up to 200's/90's subsequently nitro gtt was up titrated with improvement in BP. Cardiac enzymes were checked and negative in the ED.Patient with ECG showing sinus irwin, PVCs, RBBB, ST depressions in anterolateral leads. Patient given lasix 40mg IV and hydralazine IV x2 and transferred to CCU on BPAP. In the CCU the patient's breathing and blood pressure improved and he was taken off of BPAP and his nitro gtt. Hydralazine was started however the patient remained hypertensive and amlodipine and carvedilol were added to his regimen. 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 Vascular was consulted and recommended angiography of his lower extremity next week. TTE was significant for mild segmental LV systolic dysfunction; for pre-op clearance the patient was scheduled for nuclear stress testing to evaluate for ischemic heart disease. Throughout his admission he reported severe toe pain and was noted to become markedly hypertensive (>200) while in pain; his pain regimen was up titrated while in the CCU with improvement however the patient remained hypertensive and his blood pressure medications were also up titrated prior to transfer to telemetry. The night prior to tranfer from the CCU the patient had a 10s episode of VTach during which he felt light headed; his coreg was increased from 3.125 to 6.25 and he had no further episodes on telemetry. He was transferred to the floor ins [facility] medical condition for further vascular work up and management of his HTN.     Follow Up:  [ ] BP regimen increased to hydral 75 tid, amlo 10 daily, coreg 6.25 on 4/7; continue to monitor BP and titrate medications avoiding ACE/ARB given JINA and nitrates prior to stress test  [ ] Pain regimen percocet 5/325 q4 with dilaudid .2 IV q4 PRN for break through; patient still reporting episodes of pain  [ ] Angiogram planned by vascular surgery early next week; document medical/cards clearance prior to any intervention  [ ] Nuclear stress testing planned for monday for cards clearance  [ ] continue telemetry  [ ] f/u renal dopplers to rule out HTN 2/2 ANALY  [ ] continue duonebs PRN, spiriva, symbicort; patient tolerating 4L NC while in CCU Hospital Course: 76 YO M HTN, CKD, BPH, gastritis asthma presents with L toe pain for last several weeks. Reports he has had pain, swelling, discoloration of L toes, pain worse with walking. Denies CP over past few days. States he ran out of medications and has not taken them in at least a month. Reports cold symptoms over a month ago but no recent cough/fever. Patient's BP in the ED on presentation was 237/118. Patient became acutely SOB, went into hypoxic respiratory distress with concern for pulm edema. Patient was placed on Bipap and nitro gtt. BP dropped to 70-80's on 200 of nitro gtt and was adjusted to 20 and BP went up to 200's/90's subsequently nitro gtt was up titrated with improvement in BP. Cardiac enzymes were checked and negative in the ED.Patient with ECG showing sinus irwin, PVCs, RBBB, ST depressions in anterolateral leads. Patient given lasix 40mg IV and hydralazine IV x2 and transferred to CCU on BPAP. In the CCU the patient's breathing and blood pressure improved and he was taken off of BPAP and his nitro gtt. Hydralazine was started however the patient remained hypertensive and amlodipine and carvedilol were added to his regimen. 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 Vascular was consulted and recommended angiography of his lower extremity next week. TTE was significant for mild segmental LV systolic dysfunction; for pre-op clearance the patient was scheduled for nuclear stress testing to evaluate for ischemic heart disease. Throughout his admission he reported severe toe pain and was noted to become markedly hypertensive (>200) while in pain; his pain regimen was up titrated while in the CCU with improvement however the patient remained hypertensive and his blood pressure medications were also up titrated prior to transfer to telemetry. The night prior to tranfer from the CCU the patient had a 10s episode of VTach during which he felt light headed; his coreg was increased from 3.125 to 6.25 and he had no further episodes on telemetry. He was transferred to the floor ins [facility] [place] [facility] medical condition for further vascular work up and management of his HTN. Sign out given to and patient accepted by Dr. Hagen.    Follow Up:  [ ] BP regimen increased to hydral 75 tid, amlo 10 daily, coreg 6.25 on 4/7; continue to monitor BP and titrate medications avoiding ACE/ARB given JINA and nitrates prior to stress test  [ ] Pain regimen percocet 5/325 q4 with dilaudid .2 IV q4 PRN for break through; patient still reporting episodes of pain  [ ] Angiogram planned by vascular surgery early next week; document medical/cards clearance prior to any intervention  [ ] Nuclear stress testing planned for monday for cards clearance  [ ] continue telemetry  [ ] f/u renal dopplers to rule out HTN 2/2 ANALY  [ ] continue duonebs PRN, spiriva, symbicort; patient tolerating 4L NC while in CCU

## 2018-04-08 DIAGNOSIS — G93.40 ENCEPHALOPATHY, UNSPECIFIED: ICD-10-CM

## 2018-04-08 DIAGNOSIS — Z29.9 ENCOUNTER FOR PROPHYLACTIC MEASURES, UNSPECIFIED: ICD-10-CM

## 2018-04-08 DIAGNOSIS — J45.909 UNSPECIFIED ASTHMA, UNCOMPLICATED: ICD-10-CM

## 2018-04-08 LAB
BUN SERPL-MCNC: 27 MG/DL — HIGH (ref 7–23)
CALCIUM SERPL-MCNC: 9 MG/DL — SIGNIFICANT CHANGE UP (ref 8.4–10.5)
CHLORIDE SERPL-SCNC: 103 MMOL/L — SIGNIFICANT CHANGE UP (ref 98–107)
CO2 SERPL-SCNC: 23 MMOL/L — SIGNIFICANT CHANGE UP (ref 22–31)
CREAT SERPL-MCNC: 1.54 MG/DL — HIGH (ref 0.5–1.3)
GLUCOSE SERPL-MCNC: 91 MG/DL — SIGNIFICANT CHANGE UP (ref 70–99)
HCT VFR BLD CALC: 45.6 % — SIGNIFICANT CHANGE UP (ref 39–50)
HGB BLD-MCNC: 14.8 G/DL — SIGNIFICANT CHANGE UP (ref 13–17)
MAGNESIUM SERPL-MCNC: 2 MG/DL — SIGNIFICANT CHANGE UP (ref 1.6–2.6)
MCHC RBC-ENTMCNC: 29.2 PG — SIGNIFICANT CHANGE UP (ref 27–34)
MCHC RBC-ENTMCNC: 32.5 % — SIGNIFICANT CHANGE UP (ref 32–36)
MCV RBC AUTO: 89.9 FL — SIGNIFICANT CHANGE UP (ref 80–100)
NRBC # FLD: 0 — SIGNIFICANT CHANGE UP
PHOSPHATE SERPL-MCNC: 2.3 MG/DL — LOW (ref 2.5–4.5)
PLATELET # BLD AUTO: 198 K/UL — SIGNIFICANT CHANGE UP (ref 150–400)
PMV BLD: 12.9 FL — SIGNIFICANT CHANGE UP (ref 7–13)
POTASSIUM SERPL-MCNC: 4 MMOL/L — SIGNIFICANT CHANGE UP (ref 3.5–5.3)
POTASSIUM SERPL-SCNC: 4 MMOL/L — SIGNIFICANT CHANGE UP (ref 3.5–5.3)
RBC # BLD: 5.07 M/UL — SIGNIFICANT CHANGE UP (ref 4.2–5.8)
RBC # FLD: 14.8 % — HIGH (ref 10.3–14.5)
SODIUM SERPL-SCNC: 140 MMOL/L — SIGNIFICANT CHANGE UP (ref 135–145)
WBC # BLD: 5.63 K/UL — SIGNIFICANT CHANGE UP (ref 3.8–10.5)
WBC # FLD AUTO: 5.63 K/UL — SIGNIFICANT CHANGE UP (ref 3.8–10.5)

## 2018-04-08 PROCEDURE — 99233 SBSQ HOSP IP/OBS HIGH 50: CPT

## 2018-04-08 RX ORDER — HALOPERIDOL DECANOATE 100 MG/ML
0.5 INJECTION INTRAMUSCULAR EVERY 8 HOURS
Qty: 0 | Refills: 0 | Status: DISCONTINUED | OUTPATIENT
Start: 2018-04-08 | End: 2018-04-09

## 2018-04-08 RX ORDER — ISOSORBIDE DINITRATE 5 MG/1
10 TABLET ORAL THREE TIMES A DAY
Qty: 0 | Refills: 0 | Status: DISCONTINUED | OUTPATIENT
Start: 2018-04-08 | End: 2018-04-11

## 2018-04-08 RX ADMIN — Medication 75 MILLIGRAM(S): at 22:23

## 2018-04-08 RX ADMIN — CARVEDILOL PHOSPHATE 6.25 MILLIGRAM(S): 80 CAPSULE, EXTENDED RELEASE ORAL at 16:48

## 2018-04-08 RX ADMIN — MONTELUKAST 10 MILLIGRAM(S): 4 TABLET, CHEWABLE ORAL at 08:12

## 2018-04-08 RX ADMIN — ISOSORBIDE DINITRATE 10 MILLIGRAM(S): 5 TABLET ORAL at 13:38

## 2018-04-08 RX ADMIN — Medication 75 MILLIGRAM(S): at 08:12

## 2018-04-08 RX ADMIN — Medication 81 MILLIGRAM(S): at 08:12

## 2018-04-08 RX ADMIN — Medication 75 MILLIGRAM(S): at 13:38

## 2018-04-08 RX ADMIN — CARVEDILOL PHOSPHATE 6.25 MILLIGRAM(S): 80 CAPSULE, EXTENDED RELEASE ORAL at 08:12

## 2018-04-08 RX ADMIN — AMLODIPINE BESYLATE 10 MILLIGRAM(S): 2.5 TABLET ORAL at 08:12

## 2018-04-08 RX ADMIN — BUDESONIDE AND FORMOTEROL FUMARATE DIHYDRATE 2 PUFF(S): 160; 4.5 AEROSOL RESPIRATORY (INHALATION) at 08:13

## 2018-04-08 RX ADMIN — ATORVASTATIN CALCIUM 80 MILLIGRAM(S): 80 TABLET, FILM COATED ORAL at 22:23

## 2018-04-08 RX ADMIN — TAMSULOSIN HYDROCHLORIDE 0.4 MILLIGRAM(S): 0.4 CAPSULE ORAL at 08:12

## 2018-04-08 RX ADMIN — ISOSORBIDE DINITRATE 10 MILLIGRAM(S): 5 TABLET ORAL at 22:23

## 2018-04-08 RX ADMIN — TIOTROPIUM BROMIDE 1 CAPSULE(S): 18 CAPSULE ORAL; RESPIRATORY (INHALATION) at 08:14

## 2018-04-08 RX ADMIN — HALOPERIDOL DECANOATE 0.5 MILLIGRAM(S): 100 INJECTION INTRAMUSCULAR at 21:37

## 2018-04-08 RX ADMIN — Medication 100 MILLIGRAM(S): at 16:48

## 2018-04-08 RX ADMIN — BUDESONIDE AND FORMOTEROL FUMARATE DIHYDRATE 2 PUFF(S): 160; 4.5 AEROSOL RESPIRATORY (INHALATION) at 22:23

## 2018-04-08 NOTE — PROGRESS NOTE ADULT - ASSESSMENT
77M HTN, CKD, BPH, gangrene L toe is s/p CCU stay for respiratory failure secondary hypertensive emergency

## 2018-04-08 NOTE — PROGRESS NOTE ADULT - PROBLEM SELECTOR PLAN 3
Patient requires cardiac clearance before he can go to OR, will plan for stress test tomorrow with cardiology and follow up podiatry recommendations, if febrile would start broad spectrum antibiotic (Vancomycin and Piperacillin/Tazobactam)

## 2018-04-08 NOTE — PROGRESS NOTE ADULT - PROBLEM SELECTOR PLAN 2
BP somewhat better controlled, elevated with pain. Continue amlodipine, hydralazine, and coreg. Would add isordil 10 mg po TID and continue pain management. Plan for nuclear stress test in am.

## 2018-04-08 NOTE — PROGRESS NOTE ADULT - PROBLEM SELECTOR PLAN 2
May be secondary hypertensive emergency and gangrene of his L toe  -patient is being verbally combative to staff, but has not been physically combative to staff or myself. Haloperidol is ordered PRN but none has been given, goal is to avoid if possible and psychiatry was consulted appreciate their recommendations

## 2018-04-08 NOTE — PROVIDER CONTACT NOTE (OTHER) - SITUATION
refusing am vs check and meds. agitated when approached. refusing am vs check, meds and labs. agitated when approached.

## 2018-04-08 NOTE — PROGRESS NOTE ADULT - SUBJECTIVE AND OBJECTIVE BOX
Subjective/Objective: Patient resting in bed, denies SOB and appears comfortable but foot pain persistent.    MEDICATIONS  (STANDING):  amLODIPine   Tablet 10 milliGRAM(s) Oral daily  aspirin enteric coated 81 milliGRAM(s) Oral daily  atorvastatin 80 milliGRAM(s) Oral at bedtime  buDESOnide 160 MICROgram(s)/formoterol 4.5 MICROgram(s) Inhaler 2 Puff(s) Inhalation two times a day  carvedilol 6.25 milliGRAM(s) Oral every 12 hours  chlorhexidine 4% Liquid 1 Application(s) Topical <User Schedule>  docusate sodium 100 milliGRAM(s) Oral two times a day  heparin  Injectable 5000 Unit(s) SubCutaneous every 8 hours  hydrALAZINE 75 milliGRAM(s) Oral every 8 hours  montelukast 10 milliGRAM(s) Oral daily  pantoprazole    Tablet 40 milliGRAM(s) Oral before breakfast  polyethylene glycol 3350 17 Gram(s) Oral daily  senna 2 Tablet(s) Oral at bedtime  tamsulosin 0.4 milliGRAM(s) Oral daily  tiotropium 18 MICROgram(s) Capsule 1 Capsule(s) Inhalation daily    MEDICATIONS  (PRN):  ALBUTerol/ipratropium for Nebulization 3 milliLiter(s) Nebulizer every 6 hours PRN Bronchospasm  HYDROmorphone  Injectable 0.2 milliGRAM(s) IV Push every 4 hours PRN Severe Pain (7 - 10)  oxyCODONE    5 mG/acetaminophen 325 mG 1 Tablet(s) Oral every 4 hours PRN Moderate Pain (4 - 6)          Vital Signs Last 24 Hrs  T(C): 36.4 (08 Apr 2018 08:06), Max: 36.7 (07 Apr 2018 21:35)  T(F): 97.5 (08 Apr 2018 08:06), Max: 98.1 (07 Apr 2018 21:35)  HR: 76 (08 Apr 2018 08:06) (62 - 84)  BP: 200/85 (08 Apr 2018 08:06) (159/80 - 200/85)  BP(mean): 105 (07 Apr 2018 16:00) (101 - 112)  RR: 18 (08 Apr 2018 08:06) (12 - 26)  SpO2: 98% (08 Apr 2018 08:06) (94% - 100%)  I&O's Detail    07 Apr 2018 07:01  -  08 Apr 2018 07:00  --------------------------------------------------------  IN:    Oral Fluid: 300 mL  Total IN: 300 mL    OUT:    Incontinent per Condom Catheter: 2 mL    Voided: 420 mL  Total OUT: 422 mL    Total NET: -122 mL      08 Apr 2018 07:01  -  08 Apr 2018 10:59  --------------------------------------------------------  IN:    Oral Fluid: 120 mL  Total IN: 120 mL    OUT:  Total OUT: 0 mL    Total NET: 120 mL      PHYSICAL EXAM  GEN: NAD, skin W & D  RESP: CTA ant  CV: nl S1S2  GI: soft, NT/ND, BS +  EXT: no C/C/E, L 2nd toe black and swollen  NEURO: awake, responsive    EKG/ TELEM: NSR with one episode NSVT noted.    LABS:                          14.8   5.63  )-----------( 198      ( 08 Apr 2018 06:16 )             45.6       08 Apr 2018 06:16    140    |  103    |  27<H>  ----------------------------<  91     4.0     |  23     |  1.54<H>    07 Apr 2018 05:20    143    |  103    |  35<H>  ----------------------------<  92     4.2     |  29     |  2.06<H>    Ca    9.0        08 Apr 2018 06:16  Ca    8.6        07 Apr 2018 05:20  Phos  2.3<L>     08 Apr 2018 06:16  Phos  2.7       07 Apr 2018 05:20  Mg     2.0       08 Apr 2018 06:16  Mg     2.2       07 Apr 2018 05:20    TPro  6.2    /  Alb  3.1<L>  /  TBili  0.4    /  DBili  x      /  AST  20     /  ALT  12     /  AlkPhos  82     06 Apr 2018 22:05        Creatine Kinase, Serum: 124 u/L (04-05-18 @ 15:36)    CKMB: 4.84 ng/mL (04-05-18 @ 15:36)    Troponin T, Serum: < 0.06 ng/mL (04-05-18 @ 15:36)

## 2018-04-08 NOTE — PROGRESS NOTE ADULT - ASSESSMENT
77M with HTN, CKD, BPH, asthma, and gastritis presents with L toe pain and acute SOB -- acute pulmonary edema in setting of HTN emergency requiring IV NTG and BiPaP, Now slowly improving with BP control and pain management.

## 2018-04-08 NOTE — PROGRESS NOTE ADULT - PROBLEM SELECTOR PLAN 1
Appreciate cardiology follow up, patient's HTN being carefully managed with the addition of isordil to amlodipine, carvedilol and hydralazine  -continue to monitor respiratory status as patient felt increasing SOB while severe hypertension and is at risk for developing pulmonary flash edema

## 2018-04-08 NOTE — PROGRESS NOTE ADULT - PROBLEM SELECTOR PLAN 3
Vascular following plan is for angiogram and possible intervention on Tuesday 4/10. Continue ASA and lipitor. Pain management.

## 2018-04-08 NOTE — PROGRESS NOTE ADULT - PROBLEM SELECTOR PLAN 6
Patient complains of shortness of breath, but his lungs are currently clear  -continue home asthma regimen including Montelukast

## 2018-04-08 NOTE — PROGRESS NOTE ADULT - SUBJECTIVE AND OBJECTIVE BOX
Chief Complaint: Patient is a 77y old  Male who presents with a chief complaint of Toe Pain (05 Apr 2018 10:42)    INTERVAL HPI/OVERNIGHT EVENTS:   Patient is encephalopathic, acutely agitated, cursing at nursing staff and saying he needs to go home due to shortness of breath     MEDICATIONS  (STANDING):  amLODIPine   Tablet 10 milliGRAM(s) Oral daily  aspirin enteric coated 81 milliGRAM(s) Oral daily  atorvastatin 80 milliGRAM(s) Oral at bedtime  buDESOnide 160 MICROgram(s)/formoterol 4.5 MICROgram(s) Inhaler 2 Puff(s) Inhalation two times a day  carvedilol 6.25 milliGRAM(s) Oral every 12 hours  chlorhexidine 4% Liquid 1 Application(s) Topical <User Schedule>  docusate sodium 100 milliGRAM(s) Oral two times a day  heparin  Injectable 5000 Unit(s) SubCutaneous every 8 hours  hydrALAZINE 75 milliGRAM(s) Oral every 8 hours  isosorbide   dinitrate Tablet (ISORDIL) 10 milliGRAM(s) Oral three times a day  montelukast 10 milliGRAM(s) Oral daily  pantoprazole    Tablet 40 milliGRAM(s) Oral before breakfast  polyethylene glycol 3350 17 Gram(s) Oral daily  senna 2 Tablet(s) Oral at bedtime  tamsulosin 0.4 milliGRAM(s) Oral daily  tiotropium 18 MICROgram(s) Capsule 1 Capsule(s) Inhalation daily    MEDICATIONS  (PRN):  ALBUTerol/ipratropium for Nebulization 3 milliLiter(s) Nebulizer every 6 hours PRN Bronchospasm  haloperidol    Injectable 0.5 milliGRAM(s) IntraMuscular every 8 hours PRN agitation  HYDROmorphone  Injectable 0.2 milliGRAM(s) IV Push every 4 hours PRN Severe Pain (7 - 10)  oxyCODONE    5 mG/acetaminophen 325 mG 1 Tablet(s) Oral every 4 hours PRN Moderate Pain (4 - 6)      Vital Signs Last 24 Hrs  T(C): 37 (08 Apr 2018 13:38), Max: 37 (08 Apr 2018 13:38)  T(F): 98.6 (08 Apr 2018 13:38), Max: 98.6 (08 Apr 2018 13:38)  HR: 77 (08 Apr 2018 13:38) (69 - 77)  BP: 156/65 (08 Apr 2018 13:38) (118/64 - 200/85)  BP(mean): 105 (07 Apr 2018 16:00) (105 - 105)  RR: 16 (08 Apr 2018 13:38) (16 - 20)  SpO2: 97% (08 Apr 2018 13:38) (94% - 100%)      I&O's Detail    07 Apr 2018 07:01  -  08 Apr 2018 07:00  --------------------------------------------------------  IN:    Oral Fluid: 300 mL  Total IN: 300 mL    OUT:    Incontinent per Condom Catheter: 2 mL    Voided: 420 mL  Total OUT: 422 mL    Total NET: -122 mL      08 Apr 2018 07:01  -  08 Apr 2018 15:12  --------------------------------------------------------  IN:    Oral Fluid: 120 mL  Total IN: 120 mL    OUT:  Total OUT: 0 mL    Total NET: 120 mL        CAPILLARY BLOOD GLUCOSE    PHYSICAL EXAM:    GENERAL: Encephalopathic, combative   Pulm: CTA b/l without R/W/R  CV: S1&S2+ RRR without R/M/G   ABDOMEN: bs+, soft, nt, nd,   EXTREMITIES:  2+ peripheral pulses, no appreciable edema in b/l LE  Neuro: Encephalopathic    LABS:                        14.8   5.63  )-----------( 198      ( 08 Apr 2018 06:16 )             45.6     04-08    140  |  103  |  27<H>  ----------------------------<  91  4.0   |  23  |  1.54<H>    Ca    9.0      08 Apr 2018 06:16  Phos  2.3     04-08  Mg     2.0     04-08    TPro  6.2  /  Alb  3.1<L>  /  TBili  0.4  /  DBili  x   /  AST  20  /  ALT  12  /  AlkPhos  82  04-06    LIVER FUNCTIONS - ( 06 Apr 2018 22:05 )  Alb: 3.1 g/dL / Pro: 6.2 g/dL / ALK PHOS: 82 u/L / ALT: 12 u/L / AST: 20 u/L / GGT: x     / T. Bili 0.4 mg/dL / D. Bili x         Microbiology:    RADIOLOGY & ADDITIONAL TESTS:    Imaging Personally Reviewed:     Consultant(s) Notes Reviewed:    Cardiology     Care Discussed with Consultants/Other Providers

## 2018-04-09 DIAGNOSIS — F05 DELIRIUM DUE TO KNOWN PHYSIOLOGICAL CONDITION: ICD-10-CM

## 2018-04-09 LAB
BUN SERPL-MCNC: 35 MG/DL — HIGH (ref 7–23)
CALCIUM SERPL-MCNC: 9.3 MG/DL — SIGNIFICANT CHANGE UP (ref 8.4–10.5)
CHLORIDE SERPL-SCNC: 105 MMOL/L — SIGNIFICANT CHANGE UP (ref 98–107)
CO2 SERPL-SCNC: 23 MMOL/L — SIGNIFICANT CHANGE UP (ref 22–31)
CREAT SERPL-MCNC: 1.82 MG/DL — HIGH (ref 0.5–1.3)
GLUCOSE SERPL-MCNC: 149 MG/DL — HIGH (ref 70–99)
HCT VFR BLD CALC: 48 % — SIGNIFICANT CHANGE UP (ref 39–50)
HGB BLD-MCNC: 15.6 G/DL — SIGNIFICANT CHANGE UP (ref 13–17)
MCHC RBC-ENTMCNC: 29.3 PG — SIGNIFICANT CHANGE UP (ref 27–34)
MCHC RBC-ENTMCNC: 32.5 % — SIGNIFICANT CHANGE UP (ref 32–36)
MCV RBC AUTO: 90.2 FL — SIGNIFICANT CHANGE UP (ref 80–100)
NRBC # FLD: 0 — SIGNIFICANT CHANGE UP
PHOSPHATE SERPL-MCNC: 2.6 MG/DL — SIGNIFICANT CHANGE UP (ref 2.5–4.5)
PLATELET # BLD AUTO: 202 K/UL — SIGNIFICANT CHANGE UP (ref 150–400)
PMV BLD: 13.3 FL — HIGH (ref 7–13)
POTASSIUM SERPL-MCNC: 3.5 MMOL/L — SIGNIFICANT CHANGE UP (ref 3.5–5.3)
POTASSIUM SERPL-SCNC: 3.5 MMOL/L — SIGNIFICANT CHANGE UP (ref 3.5–5.3)
RBC # BLD: 5.32 M/UL — SIGNIFICANT CHANGE UP (ref 4.2–5.8)
RBC # FLD: 14.8 % — HIGH (ref 10.3–14.5)
RH IG SCN BLD-IMP: POSITIVE — SIGNIFICANT CHANGE UP
SODIUM SERPL-SCNC: 144 MMOL/L — SIGNIFICANT CHANGE UP (ref 135–145)
WBC # BLD: 5.69 K/UL — SIGNIFICANT CHANGE UP (ref 3.8–10.5)
WBC # FLD AUTO: 5.69 K/UL — SIGNIFICANT CHANGE UP (ref 3.8–10.5)

## 2018-04-09 PROCEDURE — 99233 SBSQ HOSP IP/OBS HIGH 50: CPT

## 2018-04-09 PROCEDURE — 90792 PSYCH DIAG EVAL W/MED SRVCS: CPT

## 2018-04-09 RX ORDER — HALOPERIDOL DECANOATE 100 MG/ML
1 INJECTION INTRAMUSCULAR EVERY 6 HOURS
Qty: 0 | Refills: 0 | Status: DISCONTINUED | OUTPATIENT
Start: 2018-04-09 | End: 2018-04-25

## 2018-04-09 RX ORDER — HALOPERIDOL DECANOATE 100 MG/ML
1 INJECTION INTRAMUSCULAR
Qty: 0 | Refills: 0 | Status: DISCONTINUED | OUTPATIENT
Start: 2018-04-09 | End: 2018-04-25

## 2018-04-09 RX ORDER — CARVEDILOL PHOSPHATE 80 MG/1
12.5 CAPSULE, EXTENDED RELEASE ORAL EVERY 12 HOURS
Qty: 0 | Refills: 0 | Status: DISCONTINUED | OUTPATIENT
Start: 2018-04-09 | End: 2018-04-11

## 2018-04-09 RX ORDER — HYDRALAZINE HCL 50 MG
100 TABLET ORAL THREE TIMES A DAY
Qty: 0 | Refills: 0 | Status: DISCONTINUED | OUTPATIENT
Start: 2018-04-09 | End: 2018-04-25

## 2018-04-09 RX ADMIN — TIOTROPIUM BROMIDE 1 CAPSULE(S): 18 CAPSULE ORAL; RESPIRATORY (INHALATION) at 09:49

## 2018-04-09 RX ADMIN — AMLODIPINE BESYLATE 10 MILLIGRAM(S): 2.5 TABLET ORAL at 05:22

## 2018-04-09 RX ADMIN — ISOSORBIDE DINITRATE 10 MILLIGRAM(S): 5 TABLET ORAL at 05:22

## 2018-04-09 RX ADMIN — ISOSORBIDE DINITRATE 10 MILLIGRAM(S): 5 TABLET ORAL at 21:58

## 2018-04-09 RX ADMIN — ISOSORBIDE DINITRATE 10 MILLIGRAM(S): 5 TABLET ORAL at 13:40

## 2018-04-09 RX ADMIN — CARVEDILOL PHOSPHATE 12.5 MILLIGRAM(S): 80 CAPSULE, EXTENDED RELEASE ORAL at 17:14

## 2018-04-09 RX ADMIN — PANTOPRAZOLE SODIUM 40 MILLIGRAM(S): 20 TABLET, DELAYED RELEASE ORAL at 05:23

## 2018-04-09 RX ADMIN — TAMSULOSIN HYDROCHLORIDE 0.4 MILLIGRAM(S): 0.4 CAPSULE ORAL at 09:50

## 2018-04-09 RX ADMIN — MONTELUKAST 10 MILLIGRAM(S): 4 TABLET, CHEWABLE ORAL at 09:51

## 2018-04-09 RX ADMIN — Medication 100 MILLIGRAM(S): at 21:58

## 2018-04-09 RX ADMIN — Medication 81 MILLIGRAM(S): at 09:50

## 2018-04-09 RX ADMIN — Medication 75 MILLIGRAM(S): at 05:23

## 2018-04-09 RX ADMIN — BUDESONIDE AND FORMOTEROL FUMARATE DIHYDRATE 2 PUFF(S): 160; 4.5 AEROSOL RESPIRATORY (INHALATION) at 21:58

## 2018-04-09 RX ADMIN — Medication 100 MILLIGRAM(S): at 13:40

## 2018-04-09 RX ADMIN — BUDESONIDE AND FORMOTEROL FUMARATE DIHYDRATE 2 PUFF(S): 160; 4.5 AEROSOL RESPIRATORY (INHALATION) at 09:49

## 2018-04-09 RX ADMIN — Medication 100 MILLIGRAM(S): at 05:22

## 2018-04-09 RX ADMIN — ATORVASTATIN CALCIUM 80 MILLIGRAM(S): 80 TABLET, FILM COATED ORAL at 21:58

## 2018-04-09 NOTE — CHART NOTE - NSCHARTNOTEFT_GEN_A_CORE
Vascular Surgery Note    CC: dry gangrene   HPI: 77M p/w HTN crisis, 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 No signs of active infection as this time.        Angiogram 4/10/18  - patient does not require cardiac clearance prior to angiogram; blood loss is minimal, stress is low. Would prefer to undergo nuclear stress following angriogram or if patient requires an open bypass as to avoid radiation dosage  - continue NPOpmn  - continue preoperative labs ordered prior to 4/10      [facility] PGY2  [phone]

## 2018-04-09 NOTE — PROGRESS NOTE ADULT - PROBLEM SELECTOR PLAN 3
resolved  currently calm and pleasant  Has capacity for medical decision making per psych  Haldol PRN ordered but none has been given, goal is to avoid if possible

## 2018-04-09 NOTE — BEHAVIORAL HEALTH ASSESSMENT NOTE - RISK ASSESSMENT
Risk factors include acute and chronic medical issues.  Protective factors include stable domicile, social support, no past psych hosp or SA, no substance use.  Pt denies SI/HI/AH/VH, manic or psychotic symptoms.  Pt does not present an acute risk of harm to self or others.  Pt does not require inpatient psychiatric hospitalization.

## 2018-04-09 NOTE — PROGRESS NOTE ADULT - PROBLEM SELECTOR PLAN 4
Unclear etiology however creatinine continues to improve daily. 1.54 today down from 2.06. Stat labs ordered. patient needs BMP with mag and phos and cbc. BUN and creatinine are trending down

## 2018-04-09 NOTE — BEHAVIORAL HEALTH ASSESSMENT NOTE - SUMMARY
77M domiciled alone in public 8 housing, No known  past psychiatric history, treatment, hospitalization, suicide attempt, or substance use. 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 No signs of active infection as this time. Has scheduled stress test angiogram on Tuesday 4/10. Psychiatry consulted for agitation/ capacity    Seen and examined at bedside. AAOX3. Pt is alert and linear, but appears to be irritable at times. Pt states that he lives alone in public housing and does not have assistance. Pt reports caring for himself, cooking. Pt clearly verbalizes understanding of why he is in the hospital and procedures he is set to have. Patient denies SI and HI. Patient denies AVTH. No depression/anxiety/manic symptoms elicited. No delusions elicited. . Pt states that he is doing well psychiatrically.  Pt clearly states that he would like one more day to feel better and that he would like to do whatever it takes to get better medically.   Pt states that he has no friends or family and prefers it that way. Pt denies past psychiatric history, treatment, hospitalization, suicide attempt, or substance use.  No Collateral available  Recommend PRN agitation. as written above, monitor EKG for qtc,   Pt has capacity to make medical decisions   No psychiatric indications for inpatient hospitalization , Pt psychiatrically cleared.

## 2018-04-09 NOTE — BEHAVIORAL HEALTH ASSESSMENT NOTE - HPI (INCLUDE ILLNESS QUALITY, SEVERITY, DURATION, TIMING, CONTEXT, MODIFYING FACTORS, ASSOCIATED SIGNS AND SYMPTOMS)
77M domiciled alone in public 8 housing, No known  past psychiatric history, treatment, hospitalization, suicide attempt, or substance use. 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 No signs of active infection as this time. Has scheduled stress test angiogram on Tuesday 4/10. Psychiatry consulted for agitation.   Seen and examined at bedside. Pt is a&ox3. Pt liable during interview occasionally throwing his hand in air continuously stating that  he wants to go home. Pt reports pain in his foot for the reason he is irritable. Pt denies SI/ HI/ audio/ visual/ tactile/ hallucinations .  Pt denies past psychiatric history, treatment, hospitalization, suicide attempt, or substance use. 77M domiciled alone in public 8 housing, No known  past psychiatric history, treatment, hospitalization, suicide attempt, or substance use. 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 No signs of active infection as this time. Has scheduled stress test angiogram on Tuesday 4/10. Psychiatry consulted for agitation.   Seen and examined at bedside. Pt is a&ox3. Pt liable during interview occasionally throwing his hand in air continuously stating that  he wants to go home. Pt reports pain in his foot for the reason he is irritable.  Pt states that he lives alone in public housing and does not have assistance. Pt reports caring for himself, cooking. Pt states that he has no friends or family and prefers it that way. Pt askedto be be left alone, to stop aPt denies SI/ HI/ audio/ visual/ tactile/ hallucinations .  Pt denies past psychiatric history, treatment, hospitalization, suicide attempt, or substance use. 77M domiciled alone in public 8 housing, No known  past psychiatric history, treatment, hospitalization, suicide attempt, or substance use. 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 No signs of active infection as this time. Has scheduled stress test angiogram on Tuesday 4/10. Psychiatry consulted for agitation.   Seen and examined at bedside. Pt is a&ox3. Pt liable during interview occasionally throwing his hand in air continuously stating that  he wants to go home. Pt reports pain in his foot for the reason he is irritable.  Pt states that he lives alone in public housing and does not have assistance. Pt reports caring for himself, cooking. Pt states that he has no friends or family and prefers it that way. Pt asked to be left alone, to stop a Pt denies SI/ HI/ audio/ visual/ tactile/ hallucinations .  Pt denies past psychiatric history, treatment, hospitalization, suicide attempt, or substance use.  No Collateral available 77M domiciled alone in public 8 housing, No known  past psychiatric history, treatment, hospitalization, suicide attempt, or substance use. 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 No signs of active infection as this time. Has scheduled stress test angiogram on Tuesday 4/10. Psychiatry consulted for agitation/ capacity    Seen and examined at bedside. AAOX3. Pt is alert and linear, but appears to be irritable at times. Pt states that he lives alone in public housing and does not have assistance. Pt reports caring for himself, cooking. Pt clearly verbalizes understanding of why he is in the hospital and procedures he is set to have. Patient denies SI and HI. Patient denies AVTH. No depression/anxiety/manic symptoms elicited. No delusions elicited. . Pt states that he is doing well psychiatrically.  Pt clearly states that he would like one more day to feel better and that he would like to do whatever it takes to get better medically.   Pt states that he has no friends or family and prefers it that way. Pt denies past psychiatric history, treatment, hospitalization, suicide attempt, or substance use.  No Collateral available

## 2018-04-09 NOTE — PROGRESS NOTE ADULT - SUBJECTIVE AND OBJECTIVE BOX
Patient is a 77y old  Male who presents with a chief complaint of Toe Pain (05 Apr 2018 10:42)      SUBJECTIVE / OVERNIGHT EVENTS: No acute events overnight. Per NP, patient sent back prior to stress test due to concern that patient may not be consentable and was expressing that he wants to go home. Patient currently states he has on/off pain of 2nd digit of left foot and denies and chest pain, SOB, N/V/D. Patient is agreeable to having the necessary steps/procedure done in order to resolve his toe pain. States he lives alone and performs ADLs himself. Patient is now  from his wife for many years and has a son whom he is estranged from since 1970s.    MEDICATIONS  (STANDING):  amLODIPine   Tablet 10 milliGRAM(s) Oral daily  aspirin enteric coated 81 milliGRAM(s) Oral daily  atorvastatin 80 milliGRAM(s) Oral at bedtime  buDESOnide 160 MICROgram(s)/formoterol 4.5 MICROgram(s) Inhaler 2 Puff(s) Inhalation two times a day  carvedilol 12.5 milliGRAM(s) Oral every 12 hours  chlorhexidine 4% Liquid 1 Application(s) Topical <User Schedule>  docusate sodium 100 milliGRAM(s) Oral two times a day  heparin  Injectable 5000 Unit(s) SubCutaneous every 8 hours  hydrALAZINE 100 milliGRAM(s) Oral three times a day  isosorbide   dinitrate Tablet (ISORDIL) 10 milliGRAM(s) Oral three times a day  montelukast 10 milliGRAM(s) Oral daily  pantoprazole    Tablet 40 milliGRAM(s) Oral before breakfast  polyethylene glycol 3350 17 Gram(s) Oral daily  senna 2 Tablet(s) Oral at bedtime  tamsulosin 0.4 milliGRAM(s) Oral daily  tiotropium 18 MICROgram(s) Capsule 1 Capsule(s) Inhalation daily    MEDICATIONS  (PRN):  ALBUTerol/ipratropium for Nebulization 3 milliLiter(s) Nebulizer every 6 hours PRN Bronchospasm  haloperidol     Tablet 1 milliGRAM(s) Oral four times a day PRN agitation  haloperidol    Injectable 1 milliGRAM(s) IntraMuscular every 6 hours PRN Agitation  haloperidol    Injectable 1 milliGRAM(s) IV Push every 6 hours PRN Agitation  HYDROmorphone  Injectable 0.2 milliGRAM(s) IV Push every 4 hours PRN Severe Pain (7 - 10)  oxyCODONE    5 mG/acetaminophen 325 mG 1 Tablet(s) Oral every 4 hours PRN Moderate Pain (4 - 6)      T(C): 36.4 (04-09-18 @ 13:36), Max: 36.4 (04-09-18 @ 13:36)  HR: 73 (04-09-18 @ 13:36) (70 - 90)  BP: 161/71 (04-09-18 @ 13:36) (132/70 - 163/90)  RR: 18 (04-09-18 @ 13:36) (18 - 18)  SpO2: 98% (04-09-18 @ 13:36) (98% - 100%)  CAPILLARY BLOOD GLUCOSE        I&O's Summary    08 Apr 2018 07:01  -  09 Apr 2018 07:00  --------------------------------------------------------  IN: 220 mL / OUT: 0 mL / NET: 220 mL    09 Apr 2018 07:01  -  09 Apr 2018 15:45  --------------------------------------------------------  IN: 0 mL / OUT: 200 mL / NET: -200 mL        PHYSICAL EXAM:  GENERAL: no apparent distress, on room air, disheveled appearance  EYES: sclera clear b/l  CHEST/LUNG: Clear to auscultation bilaterally; No wheezing or crackles  HEART: s1/s2, no murmurs  ABDOMEN: Soft, Nontender, Nondistended; Bowel sounds present  EXTREMITIES:  legs warm to touch, no palpable pulses on Left, dusky appearance of toes on left foot with dry gangrene of 2nd digit of left foot. No clubbing, cyanosis, or edema  NEUROLOGY: awake, alert, responds to Qs appropriately, oriented to self, knew he was in hospital and why he is in hospital, thought month was May and unsure of year  PSYCH: calm and pleasant    LABS:                        15.6   5.69  )-----------( 202      ( 09 Apr 2018 11:00 )             48.0     04-09    144  |  105  |  35<H>  ----------------------------<  149<H>  3.5   |  23  |  1.82<H>    Ca    9.3      09 Apr 2018 11:00  Phos  2.6     04-09  Mg     2.0     04-08                RADIOLOGY & ADDITIONAL TESTS:

## 2018-04-09 NOTE — PROGRESS NOTE ADULT - SUBJECTIVE AND OBJECTIVE BOX
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 No signs of active infection as this time.    24/Overnight events: Patient seen and examined. Plan for angiogram 4/10. Will require medicine and cardiology clearance.        Objective:  Vital Signs Last 24 Hrs  T(C): 36.4 (2018 13:36), Max: 36.4 (2018 13:36)  T(F): 97.5 (2018 13:36), Max: 97.5 (2018 13:36)  HR: 73 (2018 13:36) (70 - 90)  BP: 161/71 (2018 13:36) (132/70 - 163/90)  BP(mean): --  RR: 18 (2018 13:36) (18 - 18)  SpO2: 98% (2018 13:36) (98% - 100%)    Physical Exam:  General: NAD  Respiratory: non-labored  RLE: dry gangrene unchanged     MEDICATIONS  (STANDING):  amLODIPine   Tablet 10 milliGRAM(s) Oral daily  aspirin enteric coated 81 milliGRAM(s) Oral daily  atorvastatin 80 milliGRAM(s) Oral at bedtime  buDESOnide 160 MICROgram(s)/formoterol 4.5 MICROgram(s) Inhaler 2 Puff(s) Inhalation two times a day  carvedilol 12.5 milliGRAM(s) Oral every 12 hours  chlorhexidine 4% Liquid 1 Application(s) Topical <User Schedule>  docusate sodium 100 milliGRAM(s) Oral two times a day  heparin  Injectable 5000 Unit(s) SubCutaneous every 8 hours  hydrALAZINE 100 milliGRAM(s) Oral three times a day  isosorbide   dinitrate Tablet (ISORDIL) 10 milliGRAM(s) Oral three times a day  montelukast 10 milliGRAM(s) Oral daily  pantoprazole    Tablet 40 milliGRAM(s) Oral before breakfast  polyethylene glycol 3350 17 Gram(s) Oral daily  senna 2 Tablet(s) Oral at bedtime  tamsulosin 0.4 milliGRAM(s) Oral daily  tiotropium 18 MICROgram(s) Capsule 1 Capsule(s) Inhalation daily    MEDICATIONS  (PRN):  ALBUTerol/ipratropium for Nebulization 3 milliLiter(s) Nebulizer every 6 hours PRN Bronchospasm  haloperidol     Tablet 1 milliGRAM(s) Oral four times a day PRN agitation  haloperidol    Injectable 1 milliGRAM(s) IntraMuscular every 6 hours PRN Agitation  haloperidol    Injectable 1 milliGRAM(s) IV Push every 6 hours PRN Agitation  HYDROmorphone  Injectable 0.2 milliGRAM(s) IV Push every 4 hours PRN Severe Pain (7 - 10)  oxyCODONE    5 mG/acetaminophen 325 mG 1 Tablet(s) Oral every 4 hours PRN Moderate Pain (4 - 6)    I&O's Detail    2018 07:  -  2018 07:00  --------------------------------------------------------  IN:    Oral Fluid: 220 mL  Total IN: 220 mL    OUT:  Total OUT: 0 mL    Total NET: 220 mL      2018 07:  -  2018 15:41  --------------------------------------------------------  IN:  Total IN: 0 mL    OUT:    Voided: 200 mL  Total OUT: 200 mL    Total NET: -200 mL        Daily     Daily Weight in k.6 (2018 07:29)    LABS:                        15.6   5.69  )-----------( 202      ( 2018 11:00 )             48.0     -09    144  |  105  |  35<H>  ----------------------------<  149<H>  3.5   |  23  |  1.82<H>    Ca    9.3      2018 11:00  Phos  2.6     04-09  Mg     2.0     -08            RADIOLOGY & ADDITIONAL STUDIES: CC: dry gangrene   HPI: 77M p/w HTN crisis, 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 No signs of active infection as this time.    24/Overnight events: Patient seen and examined. Plan for angiogram 4/10. Will require medicine and cardiology risk stratification.        Objective:  Vital Signs Last 24 Hrs  T(C): 36.4 (2018 13:36), Max: 36.4 (2018 13:36)  T(F): 97.5 (2018 13:36), Max: 97.5 (2018 13:36)  HR: 73 (2018 13:36) (70 - 90)  BP: 161/71 (2018 13:36) (132/70 - 163/90)  BP(mean): --  RR: 18 (2018 13:36) (18 - 18)  SpO2: 98% (2018 13:36) (98% - 100%)    Physical Exam:  General: NAD  Respiratory: non-labored  RLE: dry gangrene unchanged     MEDICATIONS  (STANDING):  amLODIPine   Tablet 10 milliGRAM(s) Oral daily  aspirin enteric coated 81 milliGRAM(s) Oral daily  atorvastatin 80 milliGRAM(s) Oral at bedtime  buDESOnide 160 MICROgram(s)/formoterol 4.5 MICROgram(s) Inhaler 2 Puff(s) Inhalation two times a day  carvedilol 12.5 milliGRAM(s) Oral every 12 hours  chlorhexidine 4% Liquid 1 Application(s) Topical <User Schedule>  docusate sodium 100 milliGRAM(s) Oral two times a day  heparin  Injectable 5000 Unit(s) SubCutaneous every 8 hours  hydrALAZINE 100 milliGRAM(s) Oral three times a day  isosorbide   dinitrate Tablet (ISORDIL) 10 milliGRAM(s) Oral three times a day  montelukast 10 milliGRAM(s) Oral daily  pantoprazole    Tablet 40 milliGRAM(s) Oral before breakfast  polyethylene glycol 3350 17 Gram(s) Oral daily  senna 2 Tablet(s) Oral at bedtime  tamsulosin 0.4 milliGRAM(s) Oral daily  tiotropium 18 MICROgram(s) Capsule 1 Capsule(s) Inhalation daily    MEDICATIONS  (PRN):  ALBUTerol/ipratropium for Nebulization 3 milliLiter(s) Nebulizer every 6 hours PRN Bronchospasm  haloperidol     Tablet 1 milliGRAM(s) Oral four times a day PRN agitation  haloperidol    Injectable 1 milliGRAM(s) IntraMuscular every 6 hours PRN Agitation  haloperidol    Injectable 1 milliGRAM(s) IV Push every 6 hours PRN Agitation  HYDROmorphone  Injectable 0.2 milliGRAM(s) IV Push every 4 hours PRN Severe Pain (7 - 10)  oxyCODONE    5 mG/acetaminophen 325 mG 1 Tablet(s) Oral every 4 hours PRN Moderate Pain (4 - 6)    I&O's Detail    2018 07:  -  2018 07:00  --------------------------------------------------------  IN:    Oral Fluid: 220 mL  Total IN: 220 mL    OUT:  Total OUT: 0 mL    Total NET: 220 mL      2018 07:  -  2018 15:41  --------------------------------------------------------  IN:  Total IN: 0 mL    OUT:    Voided: 200 mL  Total OUT: 200 mL    Total NET: -200 mL        Daily     Daily Weight in k.6 (2018 07:29)    LABS:                        15.6   5.69  )-----------( 202      ( 2018 11:00 )             48.0     09    144  |  105  |  35<H>  ----------------------------<  149<H>  3.5   |  23  |  1.82<H>    Ca    9.3      2018 11:00  Phos  2.6     04-09  Mg     2.0     04-08            RADIOLOGY & ADDITIONAL STUDIES: CC: dry gangrene   HPI: 77M p/w HTN crisis, 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 No signs of active infection as this time.    24/Overnight events: Patient seen and examined. Plan for angiogram 4/10.          Objective:  Vital Signs Last 24 Hrs  T(C): 36.4 (2018 13:36), Max: 36.4 (2018 13:36)  T(F): 97.5 (2018 13:36), Max: 97.5 (2018 13:36)  HR: 73 (2018 13:36) (70 - 90)  BP: 161/71 (2018 13:36) (132/70 - 163/90)  BP(mean): --  RR: 18 (2018 13:36) (18 - 18)  SpO2: 98% (2018 13:36) (98% - 100%)    Physical Exam:  General: NAD  Respiratory: non-labored  RLE: dry gangrene unchanged     MEDICATIONS  (STANDING):  amLODIPine   Tablet 10 milliGRAM(s) Oral daily  aspirin enteric coated 81 milliGRAM(s) Oral daily  atorvastatin 80 milliGRAM(s) Oral at bedtime  buDESOnide 160 MICROgram(s)/formoterol 4.5 MICROgram(s) Inhaler 2 Puff(s) Inhalation two times a day  carvedilol 12.5 milliGRAM(s) Oral every 12 hours  chlorhexidine 4% Liquid 1 Application(s) Topical <User Schedule>  docusate sodium 100 milliGRAM(s) Oral two times a day  heparin  Injectable 5000 Unit(s) SubCutaneous every 8 hours  hydrALAZINE 100 milliGRAM(s) Oral three times a day  isosorbide   dinitrate Tablet (ISORDIL) 10 milliGRAM(s) Oral three times a day  montelukast 10 milliGRAM(s) Oral daily  pantoprazole    Tablet 40 milliGRAM(s) Oral before breakfast  polyethylene glycol 3350 17 Gram(s) Oral daily  senna 2 Tablet(s) Oral at bedtime  tamsulosin 0.4 milliGRAM(s) Oral daily  tiotropium 18 MICROgram(s) Capsule 1 Capsule(s) Inhalation daily    MEDICATIONS  (PRN):  ALBUTerol/ipratropium for Nebulization 3 milliLiter(s) Nebulizer every 6 hours PRN Bronchospasm  haloperidol     Tablet 1 milliGRAM(s) Oral four times a day PRN agitation  haloperidol    Injectable 1 milliGRAM(s) IntraMuscular every 6 hours PRN Agitation  haloperidol    Injectable 1 milliGRAM(s) IV Push every 6 hours PRN Agitation  HYDROmorphone  Injectable 0.2 milliGRAM(s) IV Push every 4 hours PRN Severe Pain (7 - 10)  oxyCODONE    5 mG/acetaminophen 325 mG 1 Tablet(s) Oral every 4 hours PRN Moderate Pain (4 - 6)    I&O's Detail    2018 07:  -  2018 07:00  --------------------------------------------------------  IN:    Oral Fluid: 220 mL  Total IN: 220 mL    OUT:  Total OUT: 0 mL    Total NET: 220 mL      2018 07:  -  2018 15:41  --------------------------------------------------------  IN:  Total IN: 0 mL    OUT:    Voided: 200 mL  Total OUT: 200 mL    Total NET: -200 mL        Daily     Daily Weight in k.6 (2018 07:29)    LABS:                        15.6   5.69  )-----------( 202      ( 2018 11:00 )             48.0         144  |  105  |  35<H>  ----------------------------<  149<H>  3.5   |  23  |  1.82<H>    Ca    9.3      2018 11:00  Phos  2.6     04-09  Mg     2.0     -08            RADIOLOGY & ADDITIONAL STUDIES:

## 2018-04-09 NOTE — PROGRESS NOTE ADULT - SUBJECTIVE AND OBJECTIVE BOX
Date of Admission:  4/5/18  24 hour events:  NO overnight events  Vital Signs Last 24 Hrs  T(C): 36.3 (09 Apr 2018 05:21), Max: 37 (08 Apr 2018 13:38)  T(F): 97.4 (09 Apr 2018 05:21), Max: 98.6 (08 Apr 2018 13:38)  HR: 83 (09 Apr 2018 05:21) (70 - 90)  BP: 146/90 (09 Apr 2018 05:21) (118/64 - 200/85)  BP(mean): --  RR: 18 (09 Apr 2018 05:21) (16 - 18)  SpO2: 100% (09 Apr 2018 05:21) (97% - 100%)  I&O's Summary    08 Apr 2018 07:01  -  09 Apr 2018 07:00  --------------------------------------------------------  IN: 220 mL / OUT: 0 mL / NET: 220 mL    09 Apr 2018 07:01  -  09 Apr 2018 07:35  --------------------------------------------------------  IN: 0 mL / OUT: 200 mL / NET: -200 mL        MEDICATIONS:  amLODIPine   Tablet 10 milliGRAM(s) Oral daily  aspirin enteric coated 81 milliGRAM(s) Oral daily  carvedilol 6.25 milliGRAM(s) Oral every 12 hours  heparin  Injectable 5000 Unit(s) SubCutaneous every 8 hours  hydrALAZINE 75 milliGRAM(s) Oral every 8 hours  isosorbide   dinitrate Tablet (ISORDIL) 10 milliGRAM(s) Oral three times a day  tamsulosin 0.4 milliGRAM(s) Oral daily      ALBUTerol/ipratropium for Nebulization 3 milliLiter(s) Nebulizer every 6 hours PRN  buDESOnide 160 MICROgram(s)/formoterol 4.5 MICROgram(s) Inhaler 2 Puff(s) Inhalation two times a day  montelukast 10 milliGRAM(s) Oral daily  tiotropium 18 MICROgram(s) Capsule 1 Capsule(s) Inhalation daily    haloperidol    Injectable 0.5 milliGRAM(s) IntraMuscular every 8 hours PRN  HYDROmorphone  Injectable 0.2 milliGRAM(s) IV Push every 4 hours PRN  oxyCODONE    5 mG/acetaminophen 325 mG 1 Tablet(s) Oral every 4 hours PRN    docusate sodium 100 milliGRAM(s) Oral two times a day  pantoprazole    Tablet 40 milliGRAM(s) Oral before breakfast  polyethylene glycol 3350 17 Gram(s) Oral daily  senna 2 Tablet(s) Oral at bedtime    atorvastatin 80 milliGRAM(s) Oral at bedtime    chlorhexidine 4% Liquid 1 Application(s) Topical <User Schedule>      REVIEW OF SYSTEMS:  Complete 10point ROS negative.    PHYSICAL EXAM:  General: NAD  Cardiovascular: Normal S1 S2, No JVD, No murmurs, No edema  Respiratory: Lungs clear to auscultation	  Gastrointestinal:  Soft, Non-tender, + BS	  Skin: warm and dry, No rashes, No ecchymoses, No cyanosis	  Extremities: Normal range of motion, No clubbing, cyanosis or edema  Vascular: Peripheral pulses palpable 2+ bilaterally    LABS:	 	    CBC Full  -  ( 08 Apr 2018 06:16 )  WBC Count : 5.63 K/uL  Hemoglobin : 14.8 g/dL  Hematocrit : 45.6 %  Platelet Count - Automated : 198 K/uL  Mean Cell Volume : 89.9 fL  Mean Cell Hemoglobin : 29.2 pg  Mean Cell Hemoglobin Concentration : 32.5 %  Auto Neutrophil # : x  Auto Lymphocyte # : x  Auto Monocyte # : x  Auto Eosinophil # : x  Auto Basophil # : x  Auto Neutrophil % : x  Auto Lymphocyte % : x  Auto Monocyte % : x  Auto Eosinophil % : x  Auto Basophil % : x    04-08    140  |  103  |  27<H>  ----------------------------<  91  4.0   |  23  |  1.54<H>    Ca    9.0      08 Apr 2018 06:16  Phos  2.3     04-08  Mg     2.0     04-08        proBNP: Serum Pro-Brain Natriuretic Peptide: 3746 pg/mL (04-05 @ 06:37) Date of Admission:  4/5/18  24 hour events:  NO overnight events  Vital Signs Last 24 Hrs  T(C): 36.3 (09 Apr 2018 05:21), Max: 37 (08 Apr 2018 13:38)  T(F): 97.4 (09 Apr 2018 05:21), Max: 98.6 (08 Apr 2018 13:38)  HR: 83 (09 Apr 2018 05:21) (70 - 90)  BP: 146/90 (09 Apr 2018 05:21) (118/64 - 200/85)  BP(mean): --  RR: 18 (09 Apr 2018 05:21) (16 - 18)  SpO2: 100% (09 Apr 2018 05:21) (97% - 100%)  I&O's Summary    08 Apr 2018 07:01  -  09 Apr 2018 07:00  --------------------------------------------------------  IN: 220 mL / OUT: 0 mL / NET: 220 mL    09 Apr 2018 07:01  -  09 Apr 2018 07:35  --------------------------------------------------------  IN: 0 mL / OUT: 200 mL / NET: -200 mL        MEDICATIONS:  amLODIPine   Tablet 10 milliGRAM(s) Oral daily  aspirin enteric coated 81 milliGRAM(s) Oral daily  carvedilol 6.25 milliGRAM(s) Oral every 12 hours  heparin  Injectable 5000 Unit(s) SubCutaneous every 8 hours  hydrALAZINE 75 milliGRAM(s) Oral every 8 hours  isosorbide   dinitrate Tablet (ISORDIL) 10 milliGRAM(s) Oral three times a day  tamsulosin 0.4 milliGRAM(s) Oral daily      ALBUTerol/ipratropium for Nebulization 3 milliLiter(s) Nebulizer every 6 hours PRN  buDESOnide 160 MICROgram(s)/formoterol 4.5 MICROgram(s) Inhaler 2 Puff(s) Inhalation two times a day  montelukast 10 milliGRAM(s) Oral daily  tiotropium 18 MICROgram(s) Capsule 1 Capsule(s) Inhalation daily    haloperidol    Injectable 0.5 milliGRAM(s) IntraMuscular every 8 hours PRN  HYDROmorphone  Injectable 0.2 milliGRAM(s) IV Push every 4 hours PRN  oxyCODONE    5 mG/acetaminophen 325 mG 1 Tablet(s) Oral every 4 hours PRN    docusate sodium 100 milliGRAM(s) Oral two times a day  pantoprazole    Tablet 40 milliGRAM(s) Oral before breakfast  polyethylene glycol 3350 17 Gram(s) Oral daily  senna 2 Tablet(s) Oral at bedtime    atorvastatin 80 milliGRAM(s) Oral at bedtime    chlorhexidine 4% Liquid 1 Application(s) Topical <User Schedule>      REVIEW OF SYSTEMS:  Complete 10point ROS negative.    PHYSICAL EXAM:  General: NAD  Cardiovascular: Normal S1 S2, No JVD, No murmurs, No edema  Respiratory: Lungs clear to auscultation	  Gastrointestinal:  Soft, Non-tender, + BS	  Skin: warm and dry, No rashes, No ecchymoses, No cyanosis	  Extremities: Normal range of motion, No clubbing, cyanosis or edema  Vascular: Peripheral pulses palpable 2+ bilaterally

## 2018-04-09 NOTE — PROGRESS NOTE ADULT - ASSESSMENT
77M with HTN, CKD, BPH, asthma, and gastritis presents with L toe pain and acute SOB -- acute pulmonary edema in setting of HTN emergency requiring IV NTG and BiPaP, Now slowly improving with BP control and pain management. 77M with HTN, CKD, BPH, asthma, and gastritis presents with L toe pain and acute SOB -- acute pulmonary edema in setting of HTN emergency requiring IV NTG and BiPaP, awaiting Nuclear stress test today.

## 2018-04-09 NOTE — BEHAVIORAL HEALTH ASSESSMENT NOTE - NSBHCHARTREVIEWVS_PSY_A_CORE FT
Vital Signs Last 24 Hrs  T(C): 36.3 (09 Apr 2018 05:21), Max: 37 (08 Apr 2018 13:38)  T(F): 97.4 (09 Apr 2018 05:21), Max: 98.6 (08 Apr 2018 13:38)  HR: 83 (09 Apr 2018 05:21) (70 - 90)  BP: 146/90 (09 Apr 2018 05:21) (132/70 - 163/90)  BP(mean): --  RR: 18 (09 Apr 2018 05:21) (16 - 18)  SpO2: 100% (09 Apr 2018 05:21) (97% - 100%)

## 2018-04-09 NOTE — PROGRESS NOTE ADULT - PROBLEM SELECTOR PLAN 6
No wheezing or SOB  -continue home asthma regimen including Montelukast No wheezing or SOB  -continue home asthma regimen including Montelukast  CXR clear and without acute pulmonary disease

## 2018-04-09 NOTE — PROGRESS NOTE ADULT - ATTENDING COMMENTS
Dispo: awaiting cardiac stress test then vascular angiogram, will need PT eval and safe discharge planning Dispo: awaiting cardiac stress test then vascular angiogram, will need PT eval and safe discharge planning    Communicated possible delay in procedure with vascular. States if stress test can be done tmw and clearance obtained, may still be able to go for procedure in evening? Dispo: awaiting cardiac stress test then vascular angiogram, will need PT eval and safe discharge planning    Communicated possible delay in procedure with vascular. States if stress test can be done tmw and clearance obtained, may still be able to go for procedure in evening? Will keep NPO p MN for now

## 2018-04-09 NOTE — BEHAVIORAL HEALTH ASSESSMENT NOTE - CASE SUMMARY
77 year old BM with delirium   1- at present patient has capacity to consent for  cardiac stress test  2-would continue with prn meds only

## 2018-04-09 NOTE — BEHAVIORAL HEALTH ASSESSMENT NOTE - NSBHCHARTREVIEWLAB_PSY_A_CORE FT
04-09    144  |  105  |  35<H>  ----------------------------<  149<H>  3.5   |  23  |  1.82<H>    Ca    9.3      09 Apr 2018 11:00  Phos  2.6     04-09  Mg     2.0     04-08                          15.6   5.69  )-----------( 202      ( 09 Apr 2018 11:00 )             48.0

## 2018-04-09 NOTE — PROGRESS NOTE ADULT - PROBLEM SELECTOR PLAN 1
Pending cardiac stress test and clearance prior to OR   if febrile would start broad spectrum antibiotic (Vancomycin and Zosyn)  No evidence of OM on Xray Pending cardiac stress test and clearance prior to OR  Patient agreeable to stress test and vascular procedure at this time   if febrile would start broad spectrum antibiotic (Vancomycin and Zosyn)  No evidence of OM on Xray

## 2018-04-09 NOTE — PROGRESS NOTE ADULT - ASSESSMENT
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 No signs of active infection as this time.     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 No signs of active infection as this time.     plan for angiogram w/ possible intervention on Tuesday 4/10/18  - NPOpmn starting tonight w/IVFs  - please document medical and cardiac risk stratification   - please send pre-op labs the evening prior to procedure: CBC, BMP/Mg/Phos, PT/PTT/INR, T&S  - ensure 12-lead EKG in chart (w/in 48hr), most recent from 4/5  - CXR in PACS on 4/9-10  - adjust insulin regimen to reflect NPO status    Familia Calix PGY2  [id number]

## 2018-04-09 NOTE — PROGRESS NOTE ADULT - PROBLEM SELECTOR PLAN 2
BP somewhat better controlled, elevated with pain. Continue amlodipine, hydralazine, and coreg. Would add isordil 10 mg po TID and continue pain management. Plan for nuclear stress test in am. BP is elevated, would increase hydralazine to 100 tid and coreg to 12.5 bid. Continue norvasc and isordil.  -Nuclear stress test today

## 2018-04-10 LAB
BUN SERPL-MCNC: 42 MG/DL — HIGH (ref 7–23)
CALCIUM SERPL-MCNC: 8.8 MG/DL — SIGNIFICANT CHANGE UP (ref 8.4–10.5)
CHLORIDE SERPL-SCNC: 109 MMOL/L — HIGH (ref 98–107)
CO2 SERPL-SCNC: 22 MMOL/L — SIGNIFICANT CHANGE UP (ref 22–31)
CREAT SERPL-MCNC: 2.06 MG/DL — HIGH (ref 0.5–1.3)
GLUCOSE SERPL-MCNC: 85 MG/DL — SIGNIFICANT CHANGE UP (ref 70–99)
HCT VFR BLD CALC: 41.6 % — SIGNIFICANT CHANGE UP (ref 39–50)
HGB BLD-MCNC: 13.8 G/DL — SIGNIFICANT CHANGE UP (ref 13–17)
MAGNESIUM SERPL-MCNC: 2.1 MG/DL — SIGNIFICANT CHANGE UP (ref 1.6–2.6)
MCHC RBC-ENTMCNC: 30.2 PG — SIGNIFICANT CHANGE UP (ref 27–34)
MCHC RBC-ENTMCNC: 33.2 % — SIGNIFICANT CHANGE UP (ref 32–36)
MCV RBC AUTO: 91 FL — SIGNIFICANT CHANGE UP (ref 80–100)
NRBC # FLD: 0 — SIGNIFICANT CHANGE UP
PLATELET # BLD AUTO: 184 K/UL — SIGNIFICANT CHANGE UP (ref 150–400)
PMV BLD: 12.6 FL — SIGNIFICANT CHANGE UP (ref 7–13)
POTASSIUM SERPL-MCNC: 3.9 MMOL/L — SIGNIFICANT CHANGE UP (ref 3.5–5.3)
POTASSIUM SERPL-SCNC: 3.9 MMOL/L — SIGNIFICANT CHANGE UP (ref 3.5–5.3)
RBC # BLD: 4.57 M/UL — SIGNIFICANT CHANGE UP (ref 4.2–5.8)
RBC # FLD: 14.9 % — HIGH (ref 10.3–14.5)
SODIUM SERPL-SCNC: 144 MMOL/L — SIGNIFICANT CHANGE UP (ref 135–145)
WBC # BLD: 6.34 K/UL — SIGNIFICANT CHANGE UP (ref 3.8–10.5)
WBC # FLD AUTO: 6.34 K/UL — SIGNIFICANT CHANGE UP (ref 3.8–10.5)

## 2018-04-10 PROCEDURE — 99233 SBSQ HOSP IP/OBS HIGH 50: CPT

## 2018-04-10 PROCEDURE — 36246 INS CATH ABD/L-EXT ART 2ND: CPT | Mod: LT

## 2018-04-10 PROCEDURE — 75710 ARTERY X-RAYS ARM/LEG: CPT | Mod: 26,LT

## 2018-04-10 PROCEDURE — 75625 CONTRAST EXAM ABDOMINL AORTA: CPT | Mod: 26

## 2018-04-10 PROCEDURE — 99152 MOD SED SAME PHYS/QHP 5/>YRS: CPT

## 2018-04-10 RX ORDER — SODIUM CHLORIDE 9 MG/ML
1000 INJECTION INTRAMUSCULAR; INTRAVENOUS; SUBCUTANEOUS
Qty: 0 | Refills: 0 | Status: DISCONTINUED | OUTPATIENT
Start: 2018-04-10 | End: 2018-04-11

## 2018-04-10 RX ADMIN — SENNA PLUS 2 TABLET(S): 8.6 TABLET ORAL at 22:04

## 2018-04-10 RX ADMIN — PANTOPRAZOLE SODIUM 40 MILLIGRAM(S): 20 TABLET, DELAYED RELEASE ORAL at 06:09

## 2018-04-10 RX ADMIN — ATORVASTATIN CALCIUM 80 MILLIGRAM(S): 80 TABLET, FILM COATED ORAL at 22:01

## 2018-04-10 RX ADMIN — TAMSULOSIN HYDROCHLORIDE 0.4 MILLIGRAM(S): 0.4 CAPSULE ORAL at 16:57

## 2018-04-10 RX ADMIN — TIOTROPIUM BROMIDE 1 CAPSULE(S): 18 CAPSULE ORAL; RESPIRATORY (INHALATION) at 10:08

## 2018-04-10 RX ADMIN — Medication 100 MILLIGRAM(S): at 14:42

## 2018-04-10 RX ADMIN — BUDESONIDE AND FORMOTEROL FUMARATE DIHYDRATE 2 PUFF(S): 160; 4.5 AEROSOL RESPIRATORY (INHALATION) at 10:08

## 2018-04-10 RX ADMIN — HEPARIN SODIUM 5000 UNIT(S): 5000 INJECTION INTRAVENOUS; SUBCUTANEOUS at 22:02

## 2018-04-10 RX ADMIN — BUDESONIDE AND FORMOTEROL FUMARATE DIHYDRATE 2 PUFF(S): 160; 4.5 AEROSOL RESPIRATORY (INHALATION) at 22:05

## 2018-04-10 RX ADMIN — CARVEDILOL PHOSPHATE 12.5 MILLIGRAM(S): 80 CAPSULE, EXTENDED RELEASE ORAL at 16:57

## 2018-04-10 RX ADMIN — Medication 81 MILLIGRAM(S): at 16:57

## 2018-04-10 RX ADMIN — AMLODIPINE BESYLATE 10 MILLIGRAM(S): 2.5 TABLET ORAL at 06:08

## 2018-04-10 RX ADMIN — ISOSORBIDE DINITRATE 10 MILLIGRAM(S): 5 TABLET ORAL at 14:41

## 2018-04-10 RX ADMIN — ISOSORBIDE DINITRATE 10 MILLIGRAM(S): 5 TABLET ORAL at 06:09

## 2018-04-10 RX ADMIN — SODIUM CHLORIDE 60 MILLILITER(S): 9 INJECTION INTRAMUSCULAR; INTRAVENOUS; SUBCUTANEOUS at 14:04

## 2018-04-10 RX ADMIN — MONTELUKAST 10 MILLIGRAM(S): 4 TABLET, CHEWABLE ORAL at 16:57

## 2018-04-10 RX ADMIN — CARVEDILOL PHOSPHATE 12.5 MILLIGRAM(S): 80 CAPSULE, EXTENDED RELEASE ORAL at 06:09

## 2018-04-10 RX ADMIN — Medication 100 MILLIGRAM(S): at 06:09

## 2018-04-10 NOTE — PROGRESS NOTE ADULT - PROBLEM SELECTOR PLAN 6
No wheezing or SOB  -continue home asthma regimen including Montelukast  CXR clear and without acute pulmonary disease

## 2018-04-10 NOTE — PROGRESS NOTE ADULT - SUBJECTIVE AND OBJECTIVE BOX
Subjective/Objective: Patient resting in bed, feels well.     MEDICATIONS  (STANDING):  amLODIPine   Tablet 10 milliGRAM(s) Oral daily  aspirin enteric coated 81 milliGRAM(s) Oral daily  atorvastatin 80 milliGRAM(s) Oral at bedtime  buDESOnide 160 MICROgram(s)/formoterol 4.5 MICROgram(s) Inhaler 2 Puff(s) Inhalation two times a day  carvedilol 12.5 milliGRAM(s) Oral every 12 hours  chlorhexidine 4% Liquid 1 Application(s) Topical <User Schedule>  docusate sodium 100 milliGRAM(s) Oral two times a day  heparin  Injectable 5000 Unit(s) SubCutaneous every 8 hours  hydrALAZINE 100 milliGRAM(s) Oral three times a day  isosorbide   dinitrate Tablet (ISORDIL) 10 milliGRAM(s) Oral three times a day  montelukast 10 milliGRAM(s) Oral daily  pantoprazole    Tablet 40 milliGRAM(s) Oral before breakfast  polyethylene glycol 3350 17 Gram(s) Oral daily  senna 2 Tablet(s) Oral at bedtime  tamsulosin 0.4 milliGRAM(s) Oral daily  tiotropium 18 MICROgram(s) Capsule 1 Capsule(s) Inhalation daily    MEDICATIONS  (PRN):  ALBUTerol/ipratropium for Nebulization 3 milliLiter(s) Nebulizer every 6 hours PRN Bronchospasm  haloperidol     Tablet 1 milliGRAM(s) Oral four times a day PRN agitation  haloperidol    Injectable 1 milliGRAM(s) IntraMuscular every 6 hours PRN Agitation  haloperidol    Injectable 1 milliGRAM(s) IV Push every 6 hours PRN Agitation  HYDROmorphone  Injectable 0.2 milliGRAM(s) IV Push every 4 hours PRN Severe Pain (7 - 10)  oxyCODONE    5 mG/acetaminophen 325 mG 1 Tablet(s) Oral every 4 hours PRN Moderate Pain (4 - 6)          Vital Signs Last 24 Hrs  T(C): 36.3 (10 Apr 2018 05:56), Max: 36.4 (09 Apr 2018 13:36)  T(F): 97.3 (10 Apr 2018 05:56), Max: 97.6 (09 Apr 2018 21:53)  HR: 74 (10 Apr 2018 05:56) (72 - 74)  BP: 156/65 (10 Apr 2018 05:56) (122/73 - 174/71)  BP(mean): --  RR: 18 (10 Apr 2018 05:56) (18 - 18)  SpO2: 100% (10 Apr 2018 05:56) (98% - 100%)  I&O's Detail    09 Apr 2018 07:01  -  10 Apr 2018 07:00  --------------------------------------------------------  IN:    Oral Fluid: 150 mL  Total IN: 150 mL    OUT:    Voided: 200 mL  Total OUT: 200 mL    Total NET: -50 mL      PHYSICAL EXAM  GEN: NAD, skin W & D   RESP: CTA B/L  CV: nl S1S2  GI: soft, NT/ND  EXT: no C/C/E, L 2nd toe black  NEURO: A & O X 3          EKG/ TELEM: NSR/ SB    LABS:                          13.8   6.34  )-----------( 184      ( 10 Apr 2018 06:42 )             41.6       10 Apr 2018 06:42    144    |  109<H>  |  42<H>  ----------------------------<  85     3.9     |  22     |  2.06<H>    09 Apr 2018 11:00    144    |  105    |  35<H>  ----------------------------<  149<H>  3.5     |  23     |  1.82<H>    Ca    8.8        10 Apr 2018 06:42  Ca    9.3        09 Apr 2018 11:00  Phos  2.6       09 Apr 2018 11:00  Mg     2.1       10 Apr 2018 06:42

## 2018-04-10 NOTE — PROGRESS NOTE ADULT - ATTENDING COMMENTS
Dispo: awaiting cardiac stress test then vascular procedure, will need PT eval and safe discharge planning

## 2018-04-10 NOTE — PROGRESS NOTE ADULT - SUBJECTIVE AND OBJECTIVE BOX
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 No signs of active infection as this time.    24/Overnight events: Patient seen at bedside, planned angiogram today. As discussed, patient does not require cardiac clearance prior to angiogram; blood loss is minimal, stress is low. Would prefer to undergo nuclear stress following angiogram or if patient requires an open bypass as to avoid radiation dosage. Maintain NPO status for procedure.       Objective:  Vital Signs Last 24 Hrs  T(C): 36.3 (10 Apr 2018 05:56), Max: 36.4 (2018 13:36)  T(F): 97.3 (10 Apr 2018 05:56), Max: 97.6 (2018 21:53)  HR: 74 (10 Apr 2018 05:56) (72 - 74)  BP: 156/65 (10 Apr 2018 05:56) (122/73 - 174/71)  BP(mean): --  RR: 18 (10 Apr 2018 05:56) (18 - 18)  SpO2: 100% (10 Apr 2018 05:56) (98% - 100%)    Physical Exam:  General: NAD  Respiratory: non-labored   RLE: dry gangrene unchanged    MEDICATIONS  (STANDING):  amLODIPine   Tablet 10 milliGRAM(s) Oral daily  aspirin enteric coated 81 milliGRAM(s) Oral daily  atorvastatin 80 milliGRAM(s) Oral at bedtime  buDESOnide 160 MICROgram(s)/formoterol 4.5 MICROgram(s) Inhaler 2 Puff(s) Inhalation two times a day  carvedilol 12.5 milliGRAM(s) Oral every 12 hours  chlorhexidine 4% Liquid 1 Application(s) Topical <User Schedule>  docusate sodium 100 milliGRAM(s) Oral two times a day  heparin  Injectable 5000 Unit(s) SubCutaneous every 8 hours  hydrALAZINE 100 milliGRAM(s) Oral three times a day  isosorbide   dinitrate Tablet (ISORDIL) 10 milliGRAM(s) Oral three times a day  montelukast 10 milliGRAM(s) Oral daily  pantoprazole    Tablet 40 milliGRAM(s) Oral before breakfast  polyethylene glycol 3350 17 Gram(s) Oral daily  senna 2 Tablet(s) Oral at bedtime  tamsulosin 0.4 milliGRAM(s) Oral daily  tiotropium 18 MICROgram(s) Capsule 1 Capsule(s) Inhalation daily    MEDICATIONS  (PRN):  ALBUTerol/ipratropium for Nebulization 3 milliLiter(s) Nebulizer every 6 hours PRN Bronchospasm  haloperidol     Tablet 1 milliGRAM(s) Oral four times a day PRN agitation  haloperidol    Injectable 1 milliGRAM(s) IntraMuscular every 6 hours PRN Agitation  haloperidol    Injectable 1 milliGRAM(s) IV Push every 6 hours PRN Agitation  HYDROmorphone  Injectable 0.2 milliGRAM(s) IV Push every 4 hours PRN Severe Pain (7 - 10)  oxyCODONE    5 mG/acetaminophen 325 mG 1 Tablet(s) Oral every 4 hours PRN Moderate Pain (4 - 6)    I&O's Detail    2018 07:01  -  10 Apr 2018 07:00  --------------------------------------------------------  IN:    Oral Fluid: 150 mL  Total IN: 150 mL    OUT:    Voided: 200 mL  Total OUT: 200 mL    Total NET: -50 mL        Daily     Daily Weight in k.6 (2018 07:29)    LABS:                        15.6   5.69  )-----------( 202      ( 2018 11:00 )             48.0     0409    144  |  105  |  35<H>  ----------------------------<  149<H>  3.5   |  23  |  1.82<H>    Ca    9.3      2018 11:00  Phos  2.6     04-09            RADIOLOGY & ADDITIONAL STUDIES:

## 2018-04-10 NOTE — PROGRESS NOTE ADULT - ASSESSMENT
78yo M w/ LF 2nd toe tip dry gangrene  ·	Pt seen evaluated  ·	No acute SOI at this time, stable dry gangrene, labs and vitals stable  ·	Angio with vasc today, f/u vasc rec  ·	No pod surgical intervention warranted at this point by podiatry  ·	Daily betadine dressing to left foot 2nd digit w/ dsd  ·	D/w attending

## 2018-04-10 NOTE — PROGRESS NOTE ADULT - SUBJECTIVE AND OBJECTIVE BOX
Patient is a 77y old  Male who presents with a chief complaint of Toe Pain (05 Apr 2018 10:42)       INTERVAL HPI/OVERNIGHT EVENTS:  Patient seen and evaluated at bedside.  Pt is resting comfortable in NAD. Denies N/V/F/C.  Pain rated at X/10    Allergies    No Known Allergies    Intolerances        Vital Signs Last 24 Hrs  T(C): 36.3 (10 Apr 2018 05:56), Max: 36.4 (09 Apr 2018 21:53)  T(F): 97.3 (10 Apr 2018 05:56), Max: 97.6 (09 Apr 2018 21:53)  HR: 74 (10 Apr 2018 16:41) (72 - 74)  BP: 152/80 (10 Apr 2018 16:41) (122/73 - 174/71)  BP(mean): --  RR: 16 (10 Apr 2018 16:41) (16 - 18)  SpO2: 97% (10 Apr 2018 16:41) (97% - 100%)    LABS:                        13.8   6.34  )-----------( 184      ( 10 Apr 2018 06:42 )             41.6     04-10    144  |  109<H>  |  42<H>  ----------------------------<  85  3.9   |  22  |  2.06<H>    Ca    8.8      10 Apr 2018 06:42  Phos  2.6     04-09  Mg     2.1     04-10          CAPILLARY BLOOD GLUCOSE      POCT Blood Glucose.: 86 mg/dL (10 Apr 2018 11:00)      Lower Extremity Physical Exam:  Vascular: DP/PT 0/4, B/L, CFT <3 seconds B/L w/ exception to LF 2nd toe no CFT, Temperature gradient WNL, B/L.   Neuro: Epicritic sensation intact to the level of foot, B/L based on pain on palpation, difficult to otherwise assess  Skin: dry gangrenous distal tip LF 2nd toe w/ some dependent edema noted nonpitting, no SOI no purulence no drainage, no malodor, no erythema nor streaking, slight opening distal 2nd toe clinically correlates w/ XR subq       RADIOLOGY & ADDITIONAL TESTS: Patient is a 77y old  Male who presents with a chief complaint of Toe Pain (05 Apr 2018 10:42)       INTERVAL HPI/OVERNIGHT EVENTS:  Patient seen and evaluated at bedside.  Pt is resting comfortable in NAD. Denies N/V/F/C.  Pain rated at X/10    Allergies    No Known Allergies    Intolerances        Vital Signs Last 24 Hrs  T(C): 36.3 (10 Apr 2018 05:56), Max: 36.4 (09 Apr 2018 21:53)  T(F): 97.3 (10 Apr 2018 05:56), Max: 97.6 (09 Apr 2018 21:53)  HR: 74 (10 Apr 2018 16:41) (72 - 74)  BP: 152/80 (10 Apr 2018 16:41) (122/73 - 174/71)  BP(mean): --  RR: 16 (10 Apr 2018 16:41) (16 - 18)  SpO2: 97% (10 Apr 2018 16:41) (97% - 100%)    LABS:                        13.8   6.34  )-----------( 184      ( 10 Apr 2018 06:42 )             41.6     04-10    144  |  109<H>  |  42<H>  ----------------------------<  85  3.9   |  22  |  2.06<H>    Ca    8.8      10 Apr 2018 06:42  Phos  2.6     04-09  Mg     2.1     04-10          CAPILLARY BLOOD GLUCOSE      POCT Blood Glucose.: 86 mg/dL (10 Apr 2018 11:00)      Lower Extremity Physical Exam:  Vascular: DP/PT 0/4, B/L, CFT <3 seconds B/L w/ exception to LF 2nd toe no CFT, Temperature gradient WNL, B/L.   Neuro: Epicritic sensation intact to the level of foot, B/L based on pain on palpation, difficult to otherwise assess  Skin: dry gangrenous distal tip LF 2nd toe w/ some dependent edema noted nonpitting, no SOI no purulence no drainage, no malodor, no erythema nor streaking, slight opening distal 2nd toe clinically correlates w/ XR subq       RADIOLOGY & ADDITIONAL TESTS:  < from: Cardiac Cath Lab - Adult (04.10.18 @ 12:48) >  INDICATIONS: PVD with L foot gangrene  PROCEDURE:  --  Left leg angiography.  --  Fzymb-bdqz-vllzfnfxn angiography.  --  Hemostasis with Mynx.  Local anesthetic given. The puncture site was infiltrated with 1 %  lidocaine. Right femoral artery access. A 5fr Sheath [facility] was inserted  in the vessel, utilizing the Seldinger technique. Second order selective  catherization was perfromed and catheter was positioned in L femoral  artery. Left leg angiography showed  CFA:occluded  Profunda: patent  SFA: occluded  AK pop: occluded  BK pop: patent  TP trunk: patent  PT: patent to the foot  Peroneal: patent to the foot  AT: occluded  DP: occluded Ggaah-bgut-rosythlnj angiography. A catheter was positioned.  WENCESLAO: patent bl  Ext IA: patent bl  IIA: patent bl Hemostasis with Mynx. RADIATION EXPOSURE: 6.8 min.  CONTRAST GIVEN: Visipaque 35 ml.  MEDICATIONS GIVEN: Midazolam, 1 mg, IV. Fentanyl, 25 mcg, IV. 2% Lidocaine,  10 ml, subcutaneously. 0.9% Normal saline, 20 ml, IV.  DISPOSITION: onscious sedation was administered by RN under my supervision.  Pt. received 25mcg of Fentanyl and 1mg of Versad. Pt. was monitored from  1:09pm till 1:52pm for thetotal of 41min.  At the end of the procedure sheath was removed and the access site was  closed with Mynx device. No bleeding or hematoma at the end of the  procedure.  Prepared and signed by  Nimisha Ro M.D.  Signed 04/10/2018 14:16:52    < end of copied text >

## 2018-04-10 NOTE — CHART NOTE - NSCHARTNOTEFT_GEN_A_CORE
Pt returned to 5N s/p peripheral angiogram by vascular surgery. Right femoral site dressing C/D/I. No evidence of hematoma or active bleeding. Right femoral pulse 2+. R DP/PT by doppler in setting of severe PAD with plan for possible bypass by vascular surgery. Foot is warm to touch. Pt is resting comfortably without complaints.

## 2018-04-10 NOTE — PROGRESS NOTE ADULT - PROBLEM SELECTOR PLAN 3
has episodes of agitation, ? delirium  Has capacity for medical decision making per psych  Haldol PRN ordered but none has been given, goal is to avoid if possible

## 2018-04-10 NOTE — PROGRESS NOTE ADULT - PROBLEM SELECTOR PLAN 3
Awaiting angiogram and possible intervention today. No cardiac clearance needed at present, stress test cancelled, however if patient requires surgery nuclear stress will be needed.

## 2018-04-10 NOTE — PROGRESS NOTE ADULT - SUBJECTIVE AND OBJECTIVE BOX
Patient is a 77y old  Male who presents with a chief complaint of Toe Pain (05 Apr 2018 10:42)      SUBJECTIVE / OVERNIGHT EVENTS: No acute events overnight. Patient easily agitated when seen in cath recovery. Denies any pain or SOB. Yelling to be left alone    MEDICATIONS  (STANDING):  amLODIPine   Tablet 10 milliGRAM(s) Oral daily  aspirin enteric coated 81 milliGRAM(s) Oral daily  atorvastatin 80 milliGRAM(s) Oral at bedtime  buDESOnide 160 MICROgram(s)/formoterol 4.5 MICROgram(s) Inhaler 2 Puff(s) Inhalation two times a day  carvedilol 12.5 milliGRAM(s) Oral every 12 hours  docusate sodium 100 milliGRAM(s) Oral two times a day  heparin  Injectable 5000 Unit(s) SubCutaneous every 8 hours  hydrALAZINE 100 milliGRAM(s) Oral three times a day  isosorbide   dinitrate Tablet (ISORDIL) 10 milliGRAM(s) Oral three times a day  montelukast 10 milliGRAM(s) Oral daily  pantoprazole    Tablet 40 milliGRAM(s) Oral before breakfast  polyethylene glycol 3350 17 Gram(s) Oral daily  senna 2 Tablet(s) Oral at bedtime  sodium chloride 0.9%. 1000 milliLiter(s) (60 mL/Hr) IV Continuous <Continuous>  tamsulosin 0.4 milliGRAM(s) Oral daily  tiotropium 18 MICROgram(s) Capsule 1 Capsule(s) Inhalation daily    MEDICATIONS  (PRN):  ALBUTerol/ipratropium for Nebulization 3 milliLiter(s) Nebulizer every 6 hours PRN Bronchospasm  haloperidol     Tablet 1 milliGRAM(s) Oral four times a day PRN agitation  haloperidol    Injectable 1 milliGRAM(s) IntraMuscular every 6 hours PRN Agitation  haloperidol    Injectable 1 milliGRAM(s) IV Push every 6 hours PRN Agitation  HYDROmorphone  Injectable 0.2 milliGRAM(s) IV Push every 4 hours PRN Severe Pain (7 - 10)  oxyCODONE    5 mG/acetaminophen 325 mG 1 Tablet(s) Oral every 4 hours PRN Moderate Pain (4 - 6)      T(C): 36.3 (04-10-18 @ 05:56), Max: 36.4 (04-09-18 @ 21:53)  HR: 74 (04-10-18 @ 05:56) (72 - 74)  BP: 156/65 (04-10-18 @ 05:56) (122/73 - 174/71)  RR: 18 (04-10-18 @ 05:56) (18 - 18)  SpO2: 100% (04-10-18 @ 05:56) (100% - 100%)  CAPILLARY BLOOD GLUCOSE      POCT Blood Glucose.: 86 mg/dL (10 Apr 2018 11:00)    I&O's Summary    09 Apr 2018 07:01  -  10 Apr 2018 07:00  --------------------------------------------------------  IN: 150 mL / OUT: 200 mL / NET: -50 mL      PHYSICAL EXAM: seen in cath recovery  GENERAL: no apparent distress, on room air, disheveled appearance trimming beard with razor and RN at bedside  EYES: sclera clear b/l  CHEST/LUNG: Clear to auscultation bilaterally; No wheezing or crackles  HEART: s1/s2, no murmurs  ABDOMEN: Soft, Nontender, Nondistended; Bowel sounds present  EXTREMITIES:  legs warm to touch, no palpable pulses on Left, dusky appearance of toes on left foot with dry gangrene of 2nd digit of left foot. No clubbing, cyanosis, or edema  NEUROLOGY: awake, alert, responds to Qs appropriately, oriented to self  PSYCH: seems focused on trimming beard and withdrawn, when asked how he is doing, yells "leave me alone". Oriented to self and knew he was in hospital    LABS:                        13.8   6.34  )-----------( 184      ( 10 Apr 2018 06:42 )             41.6     04-10    144  |  109<H>  |  42<H>  ----------------------------<  85  3.9   |  22  |  2.06<H>    Ca    8.8      10 Apr 2018 06:42  Phos  2.6     04-09  Mg     2.1     04-10                RADIOLOGY & ADDITIONAL TESTS: Patient is a 77y old  Male who presents with a chief complaint of Toe Pain (05 Apr 2018 10:42)      SUBJECTIVE / OVERNIGHT EVENTS: No acute events overnight. Patient easily agitated when seen in cath recovery. Denies any pain or SOB. Yelling to be left alone    MEDICATIONS  (STANDING):  amLODIPine   Tablet 10 milliGRAM(s) Oral daily  aspirin enteric coated 81 milliGRAM(s) Oral daily  atorvastatin 80 milliGRAM(s) Oral at bedtime  buDESOnide 160 MICROgram(s)/formoterol 4.5 MICROgram(s) Inhaler 2 Puff(s) Inhalation two times a day  carvedilol 12.5 milliGRAM(s) Oral every 12 hours  docusate sodium 100 milliGRAM(s) Oral two times a day  heparin  Injectable 5000 Unit(s) SubCutaneous every 8 hours  hydrALAZINE 100 milliGRAM(s) Oral three times a day  isosorbide   dinitrate Tablet (ISORDIL) 10 milliGRAM(s) Oral three times a day  montelukast 10 milliGRAM(s) Oral daily  pantoprazole    Tablet 40 milliGRAM(s) Oral before breakfast  polyethylene glycol 3350 17 Gram(s) Oral daily  senna 2 Tablet(s) Oral at bedtime  sodium chloride 0.9%. 1000 milliLiter(s) (60 mL/Hr) IV Continuous <Continuous>  tamsulosin 0.4 milliGRAM(s) Oral daily  tiotropium 18 MICROgram(s) Capsule 1 Capsule(s) Inhalation daily    MEDICATIONS  (PRN):  ALBUTerol/ipratropium for Nebulization 3 milliLiter(s) Nebulizer every 6 hours PRN Bronchospasm  haloperidol     Tablet 1 milliGRAM(s) Oral four times a day PRN agitation  haloperidol    Injectable 1 milliGRAM(s) IntraMuscular every 6 hours PRN Agitation  haloperidol    Injectable 1 milliGRAM(s) IV Push every 6 hours PRN Agitation  HYDROmorphone  Injectable 0.2 milliGRAM(s) IV Push every 4 hours PRN Severe Pain (7 - 10)  oxyCODONE    5 mG/acetaminophen 325 mG 1 Tablet(s) Oral every 4 hours PRN Moderate Pain (4 - 6)      T(C): 36.3 (04-10-18 @ 05:56), Max: 36.4 (04-09-18 @ 21:53)  HR: 74 (04-10-18 @ 05:56) (72 - 74)  BP: 156/65 (04-10-18 @ 05:56) (122/73 - 174/71)  RR: 18 (04-10-18 @ 05:56) (18 - 18)  SpO2: 100% (04-10-18 @ 05:56) (100% - 100%)  CAPILLARY BLOOD GLUCOSE      POCT Blood Glucose.: 86 mg/dL (10 Apr 2018 11:00)    I&O's Summary    09 Apr 2018 07:01  -  10 Apr 2018 07:00  --------------------------------------------------------  IN: 150 mL / OUT: 200 mL / NET: -50 mL      PHYSICAL EXAM: seen in cath recovery  GENERAL: no apparent distress, on room air, disheveled appearance trimming beard with razor and RN at bedside  EYES: sclera clear b/l  CHEST/LUNG: Clear to auscultation bilaterally; No wheezing or crackles  HEART: s1/s2, no murmurs  ABDOMEN: Soft, Nontender, Nondistended; Bowel sounds present  EXTREMITIES:  legs warm to touch, no palpable pulses on Left, dusky appearance of toes on left foot with dry gangrene of 2nd digit of left foot. no peripheral edema, untrimmed toenails  NEUROLOGY: awake, alert, responds to Qs appropriately, oriented to self  PSYCH: seems focused on trimming beard and withdrawn, when asked how he is doing, yells "leave me alone". Oriented to self and knew he was in hospital    LABS:                        13.8   6.34  )-----------( 184      ( 10 Apr 2018 06:42 )             41.6     04-10    144  |  109<H>  |  42<H>  ----------------------------<  85  3.9   |  22  |  2.06<H>    Ca    8.8      10 Apr 2018 06:42  Phos  2.6     04-09  Mg     2.1     04-10                RADIOLOGY & ADDITIONAL TESTS:

## 2018-04-10 NOTE — PROGRESS NOTE ADULT - ASSESSMENT
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 No signs of active infection as this time.     plan for angiogram w/ possible intervention today, Tuesday 4/10/18  - NPO today  - labs pending  - type and screen x2: done  - consent in chart    Familia Calix PGY2  [id number]

## 2018-04-10 NOTE — PROGRESS NOTE ADULT - PROBLEM SELECTOR PLAN 1
s/p angiogram today found to have occluded Ukyie-aacp-wlvsahoso angiography  Pending cardiac stress test and clearance prior to OR  Patient agreeable to stress test and vascular procedure at this time   if febrile would start broad spectrum antibiotic (Vancomycin and Zosyn)  No evidence of OM on Xray

## 2018-04-10 NOTE — PROGRESS NOTE ADULT - ASSESSMENT
77M with HTN, CKD, BPH, asthma, and gastritis presents with L toe pain and acute SOB -- acute pulmonary edema in setting of HTN emergency requiring IV NTG and BiPaP now better controlled on meds, awaiting vascular angiogram and possible intervention for L toe gangrene.

## 2018-04-11 LAB
BUN SERPL-MCNC: 43 MG/DL — HIGH (ref 7–23)
CALCIUM SERPL-MCNC: 8.7 MG/DL — SIGNIFICANT CHANGE UP (ref 8.4–10.5)
CHLORIDE SERPL-SCNC: 109 MMOL/L — HIGH (ref 98–107)
CO2 SERPL-SCNC: 21 MMOL/L — LOW (ref 22–31)
CREAT SERPL-MCNC: 1.87 MG/DL — HIGH (ref 0.5–1.3)
GLUCOSE SERPL-MCNC: 76 MG/DL — SIGNIFICANT CHANGE UP (ref 70–99)
HCT VFR BLD CALC: 44.1 % — SIGNIFICANT CHANGE UP (ref 39–50)
HGB BLD-MCNC: 14.3 G/DL — SIGNIFICANT CHANGE UP (ref 13–17)
MAGNESIUM SERPL-MCNC: 2.1 MG/DL — SIGNIFICANT CHANGE UP (ref 1.6–2.6)
MCHC RBC-ENTMCNC: 29.6 PG — SIGNIFICANT CHANGE UP (ref 27–34)
MCHC RBC-ENTMCNC: 32.4 % — SIGNIFICANT CHANGE UP (ref 32–36)
MCV RBC AUTO: 91.3 FL — SIGNIFICANT CHANGE UP (ref 80–100)
NRBC # FLD: 0 — SIGNIFICANT CHANGE UP
PHOSPHATE SERPL-MCNC: 2.9 MG/DL — SIGNIFICANT CHANGE UP (ref 2.5–4.5)
PLATELET # BLD AUTO: 180 K/UL — SIGNIFICANT CHANGE UP (ref 150–400)
PMV BLD: 13 FL — SIGNIFICANT CHANGE UP (ref 7–13)
POTASSIUM SERPL-MCNC: 3.6 MMOL/L — SIGNIFICANT CHANGE UP (ref 3.5–5.3)
POTASSIUM SERPL-SCNC: 3.6 MMOL/L — SIGNIFICANT CHANGE UP (ref 3.5–5.3)
RBC # BLD: 4.83 M/UL — SIGNIFICANT CHANGE UP (ref 4.2–5.8)
RBC # FLD: 14.6 % — HIGH (ref 10.3–14.5)
SODIUM SERPL-SCNC: 143 MMOL/L — SIGNIFICANT CHANGE UP (ref 135–145)
WBC # BLD: 6.04 K/UL — SIGNIFICANT CHANGE UP (ref 3.8–10.5)
WBC # FLD AUTO: 6.04 K/UL — SIGNIFICANT CHANGE UP (ref 3.8–10.5)

## 2018-04-11 PROCEDURE — 99233 SBSQ HOSP IP/OBS HIGH 50: CPT

## 2018-04-11 PROCEDURE — 93016 CV STRESS TEST SUPVJ ONLY: CPT | Mod: GC

## 2018-04-11 PROCEDURE — 93018 CV STRESS TEST I&R ONLY: CPT | Mod: GC

## 2018-04-11 PROCEDURE — 78452 HT MUSCLE IMAGE SPECT MULT: CPT | Mod: 26

## 2018-04-11 RX ORDER — CARVEDILOL PHOSPHATE 80 MG/1
25 CAPSULE, EXTENDED RELEASE ORAL EVERY 12 HOURS
Qty: 0 | Refills: 0 | Status: DISCONTINUED | OUTPATIENT
Start: 2018-04-11 | End: 2018-04-20

## 2018-04-11 RX ORDER — ISOSORBIDE DINITRATE 5 MG/1
20 TABLET ORAL THREE TIMES A DAY
Qty: 0 | Refills: 0 | Status: DISCONTINUED | OUTPATIENT
Start: 2018-04-11 | End: 2018-04-25

## 2018-04-11 RX ADMIN — TAMSULOSIN HYDROCHLORIDE 0.4 MILLIGRAM(S): 0.4 CAPSULE ORAL at 13:04

## 2018-04-11 RX ADMIN — HEPARIN SODIUM 5000 UNIT(S): 5000 INJECTION INTRAVENOUS; SUBCUTANEOUS at 21:25

## 2018-04-11 RX ADMIN — POLYETHYLENE GLYCOL 3350 17 GRAM(S): 17 POWDER, FOR SOLUTION ORAL at 13:04

## 2018-04-11 RX ADMIN — Medication 100 MILLIGRAM(S): at 07:02

## 2018-04-11 RX ADMIN — HEPARIN SODIUM 5000 UNIT(S): 5000 INJECTION INTRAVENOUS; SUBCUTANEOUS at 07:04

## 2018-04-11 RX ADMIN — PANTOPRAZOLE SODIUM 40 MILLIGRAM(S): 20 TABLET, DELAYED RELEASE ORAL at 07:02

## 2018-04-11 RX ADMIN — CARVEDILOL PHOSPHATE 25 MILLIGRAM(S): 80 CAPSULE, EXTENDED RELEASE ORAL at 17:47

## 2018-04-11 RX ADMIN — BUDESONIDE AND FORMOTEROL FUMARATE DIHYDRATE 2 PUFF(S): 160; 4.5 AEROSOL RESPIRATORY (INHALATION) at 13:08

## 2018-04-11 RX ADMIN — MONTELUKAST 10 MILLIGRAM(S): 4 TABLET, CHEWABLE ORAL at 13:04

## 2018-04-11 RX ADMIN — ISOSORBIDE DINITRATE 20 MILLIGRAM(S): 5 TABLET ORAL at 21:25

## 2018-04-11 RX ADMIN — ATORVASTATIN CALCIUM 80 MILLIGRAM(S): 80 TABLET, FILM COATED ORAL at 21:25

## 2018-04-11 RX ADMIN — Medication 100 MILLIGRAM(S): at 13:04

## 2018-04-11 RX ADMIN — AMLODIPINE BESYLATE 10 MILLIGRAM(S): 2.5 TABLET ORAL at 07:04

## 2018-04-11 RX ADMIN — Medication 100 MILLIGRAM(S): at 21:25

## 2018-04-11 RX ADMIN — Medication 81 MILLIGRAM(S): at 13:04

## 2018-04-11 RX ADMIN — ISOSORBIDE DINITRATE 20 MILLIGRAM(S): 5 TABLET ORAL at 13:07

## 2018-04-11 RX ADMIN — OXYCODONE AND ACETAMINOPHEN 1 TABLET(S): 5; 325 TABLET ORAL at 22:20

## 2018-04-11 RX ADMIN — ISOSORBIDE DINITRATE 10 MILLIGRAM(S): 5 TABLET ORAL at 07:02

## 2018-04-11 RX ADMIN — BUDESONIDE AND FORMOTEROL FUMARATE DIHYDRATE 2 PUFF(S): 160; 4.5 AEROSOL RESPIRATORY (INHALATION) at 21:26

## 2018-04-11 RX ADMIN — HEPARIN SODIUM 5000 UNIT(S): 5000 INJECTION INTRAVENOUS; SUBCUTANEOUS at 13:04

## 2018-04-11 RX ADMIN — CARVEDILOL PHOSPHATE 12.5 MILLIGRAM(S): 80 CAPSULE, EXTENDED RELEASE ORAL at 07:02

## 2018-04-11 RX ADMIN — Medication 100 MILLIGRAM(S): at 17:47

## 2018-04-11 RX ADMIN — SENNA PLUS 2 TABLET(S): 8.6 TABLET ORAL at 21:25

## 2018-04-11 RX ADMIN — OXYCODONE AND ACETAMINOPHEN 1 TABLET(S): 5; 325 TABLET ORAL at 21:25

## 2018-04-11 NOTE — PHYSICAL THERAPY INITIAL EVALUATION ADULT - DISCHARGE DISPOSITION, PT EVAL
anticipate home with no needs; please follow  for updated physical therapy notes. Patient reports possible sx

## 2018-04-11 NOTE — PROGRESS NOTE ADULT - PROBLEM SELECTOR PLAN 2
BP remains elevated, increased isordil to 20tid and increased coreg to 25 bid, continue norvasc at 10

## 2018-04-11 NOTE — PROGRESS NOTE ADULT - ASSESSMENT
77M with HTN, CKD, BPH, asthma, and gastritis presents with L toe pain and acute SOB -- acute pulmonary edema in setting of HTN emergency requiring IV NTG and BiPaP now better controlled on meds, course complicated by left toe dry gangrene s/p angiogram of left leg, now requiring a fem-pop bypass will need cardiac optimization/clearance prior to procedure.  ECHO revealed  Mild segmental left ventricular systolic dysfunction and hypokinesis of the basal inferior wall.  Now awaiting nuclear stress test (scheduled for today)

## 2018-04-11 NOTE — PHYSICAL THERAPY INITIAL EVALUATION ADULT - ADDITIONAL COMMENTS
Patient lives in an apartment building with an elevator. Patient reports he did not use a Assistive Device prior to admission. Patient reports he has no friends and no family

## 2018-04-11 NOTE — PROGRESS NOTE ADULT - SUBJECTIVE AND OBJECTIVE BOX
Date of Admission:  18  24 hour events:  No overnight events, s/p angiogram  Vital Signs Last 24 Hrs  T(C): 36.7 (2018 05:00), Max: 36.7 (2018 05:00)  T(F): 98 (2018 05:00), Max: 98 (2018 05:00)  HR: 67 (2018 05:00) (67 - 74)  BP: 149/77 (2018 05:00) (149/77 - 152/80)  BP(mean): --  RR: 17 (2018 05:00) (16 - 17)  SpO2: 100% (2018 05:00) (94% - 100%)  I&O's Summary    10 Apr 2018 07:01  -  2018 07:00  --------------------------------------------------------  IN: 0 mL / OUT: 200 mL / NET: -200 mL        MEDICATIONS:  amLODIPine   Tablet 10 milliGRAM(s) Oral daily  aspirin enteric coated 81 milliGRAM(s) Oral daily  carvedilol 12.5 milliGRAM(s) Oral every 12 hours  heparin  Injectable 5000 Unit(s) SubCutaneous every 8 hours  hydrALAZINE 100 milliGRAM(s) Oral three times a day  isosorbide   dinitrate Tablet (ISORDIL) 10 milliGRAM(s) Oral three times a day  tamsulosin 0.4 milliGRAM(s) Oral daily  ALBUTerol/ipratropium for Nebulization 3 milliLiter(s) Nebulizer every 6 hours PRN  buDESOnide 160 MICROgram(s)/formoterol 4.5 MICROgram(s) Inhaler 2 Puff(s) Inhalation two times a day  montelukast 10 milliGRAM(s) Oral daily  tiotropium 18 MICROgram(s) Capsule 1 Capsule(s) Inhalation daily  haloperidol     Tablet 1 milliGRAM(s) Oral four times a day PRN  haloperidol    Injectable 1 milliGRAM(s) IntraMuscular every 6 hours PRN  haloperidol    Injectable 1 milliGRAM(s) IV Push every 6 hours PRN  HYDROmorphone  Injectable 0.2 milliGRAM(s) IV Push every 4 hours PRN  oxyCODONE    5 mG/acetaminophen 325 mG 1 Tablet(s) Oral every 4 hours PRN  docusate sodium 100 milliGRAM(s) Oral two times a day  pantoprazole    Tablet 40 milliGRAM(s) Oral before breakfast  polyethylene glycol 3350 17 Gram(s) Oral daily  senna 2 Tablet(s) Oral at bedtime  atorvastatin 80 milliGRAM(s) Oral at bedtime  sodium chloride 0.9%. 1000 milliLiter(s) IV Continuous <Continuous>      REVIEW OF SYSTEMS:  Complete 10point ROS negative.    PHYSICAL EXAM:  General: NAD  Cardiovascular: Normal S1 S2, No JVD, No murmurs, No edema  Respiratory: Lungs clear to auscultation	  Gastrointestinal:  Soft, Non-tender, + BS	  Skin: warm and dry, No rashes, No ecchymoses, No cyanosis	  Extremities: Normal range of motion, No clubbing, cyanosis or edema  Vascular: Peripheral pulses palpable 2+ bilaterally    LABS:	 	    CBC Full  -  ( 2018 06:30 )  WBC Count : 6.04 K/uL  Hemoglobin : 14.3 g/dL  Hematocrit : 44.1 %  Platelet Count - Automated : 180 K/uL  Mean Cell Volume : 91.3 fL  Mean Cell Hemoglobin : 29.6 pg  Mean Cell Hemoglobin Concentration : 32.4 %  Auto Neutrophil # : x  Auto Lymphocyte # : x  Auto Monocyte # : x  Auto Eosinophil # : x  Auto Basophil # : x  Auto Neutrophil % : x  Auto Lymphocyte % : x  Auto Monocyte % : x  Auto Eosinophil % : x  Auto Basophil % : x    -    143  |  109<H>  |  43<H>  ----------------------------<  77  3.6   |  21<L>  |  1.87<H>  04-10    144  |  109<H>  |  42<H>  ----------------------------<  85  3.9   |  22  |  2.06<H>    Ca    8.7      2018 06:30  Ca    8.8      10 Apr 2018 06:42  Phos  2.9     -11  Phos  2.6     04-09  Mg     2.1     04-11  Mg     2.1     04-10    < from: Transthoracic Echocardiogram (18 @ 16:48) >  CONCLUSIONS:  1. Mitral annular calcification, otherwise normal mitral  valve. Mild mitral regurgitation.  2. Concentric left ventricular hypertrophy.  3. Mild segmental left ventricular systolic dysfunction.  Hypokinesis of the basal inferior wall.  4. Normal right ventricular size and function.  ------------------------------------------------------------------------  Confirmed on  2018 - 18:30:08 by Lee Restrepo M.D.    < end of copied text >  < from: Cardiac Cath Lab - Adult (04.10.18 @ 12:48) >  MR number: [id number]  : 1940  Gender: Male  Race: B  Case Physician(s):  Nimisha Ro M.D.  Referring Physician:  Nimisha Ro M.D.  INDICATIONS: PVD with L foot gangrene  PROCEDURE:  --  Left leg angiography.  --  Hydnx-zwdi-hdkbytihy angiography.  --  Hemostasis with Mynx.  Local anesthetic given. The puncture site was infiltrated with 1 %  lidocaine. Right femoral artery access. A 5fr Sheath [place] was inserted  in the vessel, utilizing the Seldinger technique. Second order selective  catherization was perfromed and catheter was positioned in L femoral  artery. Left leg angiography showed  CFA:occluded  Profunda: patent  SFA: occluded  AK pop: occluded  BK pop: patent  TP trunk: patent  PT: patent to the foot  Peroneal: patent to the foot  AT: occluded  DP: occluded Didge-bayn-qhltqbizg angiography. A catheter was positioned.  WENCESLAO: patent bl  Ext IA: patent bl  IIA: patent bl Hemostasis with Mynx. RADIATION EXPOSURE: 6.8 min.  CONTRAST GIVEN: Visipaque 35 ml.  MEDICATIONS GIVEN: Midazolam, 1 mg, IV. Fentanyl, 25 mcg, IV. 2% Lidocaine,  10 ml, subcutaneously. 0.9% Normal saline, 20 ml, IV.  DISPOSITION: onscious sedation was administered by RN under my supervision.  Pt. received 25mcg of Fentanyl and 1mg of Versad. Pt. was monitored from  1:09pm till 1:52pm for thetotal of 41min.  At the end of the procedure sheath was removed and the access site was  closed with Mynx device. No bleeding or hematoma at the end of the  procedure.  Prepared and signed by  Nimisha Ro M.D.  Signed 04/10/2018 14:16:52  HEMODYNAMICTABLES  Outputs:  Baseline  Outputs:  -- CALCULATIONS[age]  Outputs:  -- CALCULATIONS: Body Surface Area: 1.75  Outputs:  -- CALCULATIONS: Height in cm: 172.00  Outputs:  -- CALCULATIONS: Sex: Male  Outputs:  -- CALCULATIONS: Weight in k.00  Outputs:  -- OUTPUTS: O2 consumption: 218.15  Outputs:  -- OUTPUTS: Vo2 Indexed: 125.00    < end of copied text >

## 2018-04-11 NOTE — PROGRESS NOTE ADULT - PROBLEM SELECTOR PLAN 3
-patient will need fem-pop bypass, he continues to have severe pain  -awaiting nuc stress test today, continuing to medically optimize for now

## 2018-04-11 NOTE — PROGRESS NOTE ADULT - SUBJECTIVE AND OBJECTIVE BOX
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 No signs of active infection as this time.    24/Overnight events: Patient seen at bedside, now s/p angiogram showing occluded left SFA and AK pop. Patient scheduled for bypass and fem endarterectomy. Patient undergoing nuclear stress test today; will require cardiac and medical clearance prior to procedure. Will also consult general surgery prior to procedure regarding femoral hernia.    Objective:  Vital Signs Last 24 Hrs  T(C): 36.7 (2018 05:00), Max: 36.7 (2018 05:00)  T(F): 98 (2018 05:00), Max: 98 (2018 05:00)  HR: 67 (2018 05:00) (67 - 74)  BP: 149/77 (2018 05:00) (149/77 - 152/80)  BP(mean): --  RR: 17 (2018 05:00) (16 - 17)  SpO2: 100% (2018 05:00) (94% - 100%)    Physical Exam:  General: NAD  Respiratory: non-labored   RLE: dry gangrene unchanged    MEDICATIONS  (STANDING):  amLODIPine   Tablet 10 milliGRAM(s) Oral daily  aspirin enteric coated 81 milliGRAM(s) Oral daily  atorvastatin 80 milliGRAM(s) Oral at bedtime  buDESOnide 160 MICROgram(s)/formoterol 4.5 MICROgram(s) Inhaler 2 Puff(s) Inhalation two times a day  carvedilol 25 milliGRAM(s) Oral every 12 hours  docusate sodium 100 milliGRAM(s) Oral two times a day  heparin  Injectable 5000 Unit(s) SubCutaneous every 8 hours  hydrALAZINE 100 milliGRAM(s) Oral three times a day  isosorbide   dinitrate Tablet (ISORDIL) 20 milliGRAM(s) Oral three times a day  montelukast 10 milliGRAM(s) Oral daily  pantoprazole    Tablet 40 milliGRAM(s) Oral before breakfast  polyethylene glycol 3350 17 Gram(s) Oral daily  senna 2 Tablet(s) Oral at bedtime  sodium chloride 0.9%. 1000 milliLiter(s) (60 mL/Hr) IV Continuous <Continuous>  tamsulosin 0.4 milliGRAM(s) Oral daily  tiotropium 18 MICROgram(s) Capsule 1 Capsule(s) Inhalation daily    MEDICATIONS  (PRN):  ALBUTerol/ipratropium for Nebulization 3 milliLiter(s) Nebulizer every 6 hours PRN Bronchospasm  haloperidol     Tablet 1 milliGRAM(s) Oral four times a day PRN agitation  haloperidol    Injectable 1 milliGRAM(s) IntraMuscular every 6 hours PRN Agitation  haloperidol    Injectable 1 milliGRAM(s) IV Push every 6 hours PRN Agitation  HYDROmorphone  Injectable 0.2 milliGRAM(s) IV Push every 4 hours PRN Severe Pain (7 - 10)  oxyCODONE    5 mG/acetaminophen 325 mG 1 Tablet(s) Oral every 4 hours PRN Moderate Pain (4 - 6)    I&O's Detail    10 Apr 2018 07:01  -  2018 07:00  --------------------------------------------------------  IN:  Total IN: 0 mL    OUT:    Voided: 200 mL  Total OUT: 200 mL    Total NET: -200 mL      2018 07:01  -  2018 12:07  --------------------------------------------------------  IN:    Oral Fluid: 440 mL  Total IN: 440 mL    OUT:    Voided: 100 mL  Total OUT: 100 mL    Total NET: 340 mL        Daily     Daily Weight in k (2018 06:00)    LABS:                        14.3   6.04  )-----------( 180      ( 2018 06:30 )             44.1     04-11    143  |  109<H>  |  43<H>  ----------------------------<  77  3.6   |  21<L>  |  1.87<H>    Ca    8.7      2018 06:30  Phos  2.9     04-11  Mg     2.1     04-11            RADIOLOGY & ADDITIONAL STUDIES:

## 2018-04-11 NOTE — PROGRESS NOTE ADULT - PROBLEM SELECTOR PLAN 1
s/p angiogram showing occluded left SFA and AK pop pending bypass and fem endarterectomy   Stress test done:  large, severe defects in inferior and inferoseptal walls that are  partially reversible, suggestive of infarct with mild partial ischemia. There was a severe defect in the diaphragmatic wall of the RV that was fixed, consistent  with RV infarct. Awaiting cardiology clearance for vasc procedure  if febrile would start broad spectrum antibiotic (Vancomycin and Zosyn)  No evidence of OM on Xray

## 2018-04-11 NOTE — PROGRESS NOTE ADULT - ASSESSMENT
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 No signs of active infection as this time.     now s/p angiogram showing occluded left SFA and AK pop  - patient scheduled for bypass and fem endarterectomy   - patient undergoing nuclear stress test today; will require cardiac and medical clearance prior to procedure  - will also consult general surgery prior to procedure regarding femoral hernia and clearance    [facility] PGY2  [id number]

## 2018-04-11 NOTE — PHYSICAL THERAPY INITIAL EVALUATION ADULT - PERTINENT HX OF CURRENT PROBLEM, REHAB EVAL
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

## 2018-04-11 NOTE — PROGRESS NOTE ADULT - ATTENDING COMMENTS
Dispo: awaiting cardio clearance prior to vascular bypass procedure, will need PT eval and safe discharge planning

## 2018-04-11 NOTE — PROGRESS NOTE ADULT - PROBLEM SELECTOR PLAN 3
has episodes of agitation, ? delirium  Has capacity for medical decision making per psych  Haldol PRN ordered, goal is to avoid if possible

## 2018-04-11 NOTE — PROGRESS NOTE ADULT - ATTENDING COMMENTS
Agree with above assessment and plan. Vascular surgery input appreciated will need eventual bypass however needs ischemic eval prior to surgery  For nuclear stress test today

## 2018-04-11 NOTE — PROGRESS NOTE ADULT - PROBLEM SELECTOR PLAN 6
HSQ tid for prevention of VTE during hospitalization   ASA 81mg given risk of cardiac disease with uncontrolled hypertension

## 2018-04-11 NOTE — PROGRESS NOTE ADULT - SUBJECTIVE AND OBJECTIVE BOX
Patient is a 77y old  Male who presents with a chief complaint of Toe Pain (05 Apr 2018 10:42)      SUBJECTIVE / OVERNIGHT EVENTS: No acute events overnight. No complaints this AM.    MEDICATIONS  (STANDING):  amLODIPine   Tablet 10 milliGRAM(s) Oral daily  aspirin enteric coated 81 milliGRAM(s) Oral daily  atorvastatin 80 milliGRAM(s) Oral at bedtime  buDESOnide 160 MICROgram(s)/formoterol 4.5 MICROgram(s) Inhaler 2 Puff(s) Inhalation two times a day  carvedilol 25 milliGRAM(s) Oral every 12 hours  docusate sodium 100 milliGRAM(s) Oral two times a day  heparin  Injectable 5000 Unit(s) SubCutaneous every 8 hours  hydrALAZINE 100 milliGRAM(s) Oral three times a day  isosorbide   dinitrate Tablet (ISORDIL) 20 milliGRAM(s) Oral three times a day  montelukast 10 milliGRAM(s) Oral daily  pantoprazole    Tablet 40 milliGRAM(s) Oral before breakfast  polyethylene glycol 3350 17 Gram(s) Oral daily  senna 2 Tablet(s) Oral at bedtime  sodium chloride 0.9%. 1000 milliLiter(s) (60 mL/Hr) IV Continuous <Continuous>  tamsulosin 0.4 milliGRAM(s) Oral daily  tiotropium 18 MICROgram(s) Capsule 1 Capsule(s) Inhalation daily    MEDICATIONS  (PRN):  ALBUTerol/ipratropium for Nebulization 3 milliLiter(s) Nebulizer every 6 hours PRN Bronchospasm  haloperidol     Tablet 1 milliGRAM(s) Oral four times a day PRN agitation  haloperidol    Injectable 1 milliGRAM(s) IntraMuscular every 6 hours PRN Agitation  haloperidol    Injectable 1 milliGRAM(s) IV Push every 6 hours PRN Agitation  HYDROmorphone  Injectable 0.2 milliGRAM(s) IV Push every 4 hours PRN Severe Pain (7 - 10)  oxyCODONE    5 mG/acetaminophen 325 mG 1 Tablet(s) Oral every 4 hours PRN Moderate Pain (4 - 6)      T(C): 36.3 (04-11-18 @ 14:31), Max: 36.7 (04-11-18 @ 05:00)  HR: 71 (04-11-18 @ 14:31) (67 - 71)  BP: 98/62 (04-11-18 @ 14:31) (98/62 - 150/72)  RR: 18 (04-11-18 @ 14:31) (17 - 18)  SpO2: 96% (04-11-18 @ 14:31) (94% - 100%)  CAPILLARY BLOOD GLUCOSE        I&O's Summary    10 Apr 2018 07:01  -  11 Apr 2018 07:00  --------------------------------------------------------  IN: 0 mL / OUT: 200 mL / NET: -200 mL    11 Apr 2018 07:01  -  11 Apr 2018 17:09  --------------------------------------------------------  IN: 880 mL / OUT: 100 mL / NET: 780 mL      PHYSICAL EXAM:  GENERAL: no apparent distress, on room air, disheveled appearanceEYES: sclera clear b/l  CHEST/LUNG: Clear to auscultation bilaterally; No wheezing or crackles  HEART: s1/s2, no murmurs  ABDOMEN: Soft, Nontender, Nondistended; Bowel sounds present  EXTREMITIES:  legs warm to touch, no palpable pulses on Left, dusky appearance of toes on left foot with dry gangrene of 2nd digit of left foot. no peripheral edema, untrimmed toenails  NEUROLOGY: awake, alert, responds to Qs appropriately  PSYCH: pleasant, oriented to self and place, knows why he is in hospital    LABS:                        14.3   6.04  )-----------( 180      ( 11 Apr 2018 06:30 )             44.1     04-11    143  |  109<H>  |  43<H>  ----------------------------<  77  3.6   |  21<L>  |  1.87<H>    Ca    8.7      11 Apr 2018 06:30  Phos  2.9     04-11  Mg     2.1     04-11                RADIOLOGY & ADDITIONAL TESTS:

## 2018-04-12 DIAGNOSIS — Z09 ENCOUNTER FOR FOLLOW-UP EXAMINATION AFTER COMPLETED TREATMENT FOR CONDITIONS OTHER THAN MALIGNANT NEOPLASM: ICD-10-CM

## 2018-04-12 LAB
APTT BLD: 32.7 SEC — SIGNIFICANT CHANGE UP (ref 27.5–37.4)
BUN SERPL-MCNC: 39 MG/DL — HIGH (ref 7–23)
CALCIUM SERPL-MCNC: 8.5 MG/DL — SIGNIFICANT CHANGE UP (ref 8.4–10.5)
CHLORIDE SERPL-SCNC: 108 MMOL/L — HIGH (ref 98–107)
CO2 SERPL-SCNC: 23 MMOL/L — SIGNIFICANT CHANGE UP (ref 22–31)
CREAT SERPL-MCNC: 2.1 MG/DL — HIGH (ref 0.5–1.3)
GLUCOSE SERPL-MCNC: 77 MG/DL — SIGNIFICANT CHANGE UP (ref 70–99)
HCT VFR BLD CALC: 40.2 % — SIGNIFICANT CHANGE UP (ref 39–50)
HGB BLD-MCNC: 13.5 G/DL — SIGNIFICANT CHANGE UP (ref 13–17)
INR BLD: 1.02 — SIGNIFICANT CHANGE UP (ref 0.88–1.17)
MAGNESIUM SERPL-MCNC: 2.2 MG/DL — SIGNIFICANT CHANGE UP (ref 1.6–2.6)
MCHC RBC-ENTMCNC: 30.1 PG — SIGNIFICANT CHANGE UP (ref 27–34)
MCHC RBC-ENTMCNC: 33.6 % — SIGNIFICANT CHANGE UP (ref 32–36)
MCV RBC AUTO: 89.7 FL — SIGNIFICANT CHANGE UP (ref 80–100)
NRBC # FLD: 0 — SIGNIFICANT CHANGE UP
PLATELET # BLD AUTO: 180 K/UL — SIGNIFICANT CHANGE UP (ref 150–400)
PMV BLD: 13.3 FL — HIGH (ref 7–13)
POTASSIUM SERPL-MCNC: 3.7 MMOL/L — SIGNIFICANT CHANGE UP (ref 3.5–5.3)
POTASSIUM SERPL-SCNC: 3.7 MMOL/L — SIGNIFICANT CHANGE UP (ref 3.5–5.3)
PROTHROM AB SERPL-ACNC: 11.7 SEC — SIGNIFICANT CHANGE UP (ref 9.8–13.1)
RBC # BLD: 4.48 M/UL — SIGNIFICANT CHANGE UP (ref 4.2–5.8)
RBC # FLD: 14.7 % — HIGH (ref 10.3–14.5)
SODIUM SERPL-SCNC: 143 MMOL/L — SIGNIFICANT CHANGE UP (ref 135–145)
WBC # BLD: 5.97 K/UL — SIGNIFICANT CHANGE UP (ref 3.8–10.5)
WBC # FLD AUTO: 5.97 K/UL — SIGNIFICANT CHANGE UP (ref 3.8–10.5)

## 2018-04-12 PROCEDURE — 93970 EXTREMITY STUDY: CPT | Mod: 26

## 2018-04-12 PROCEDURE — 99232 SBSQ HOSP IP/OBS MODERATE 35: CPT

## 2018-04-12 PROCEDURE — 99233 SBSQ HOSP IP/OBS HIGH 50: CPT

## 2018-04-12 RX ADMIN — BUDESONIDE AND FORMOTEROL FUMARATE DIHYDRATE 2 PUFF(S): 160; 4.5 AEROSOL RESPIRATORY (INHALATION) at 21:37

## 2018-04-12 RX ADMIN — HEPARIN SODIUM 5000 UNIT(S): 5000 INJECTION INTRAVENOUS; SUBCUTANEOUS at 21:37

## 2018-04-12 RX ADMIN — OXYCODONE AND ACETAMINOPHEN 1 TABLET(S): 5; 325 TABLET ORAL at 21:38

## 2018-04-12 RX ADMIN — Medication 100 MILLIGRAM(S): at 14:41

## 2018-04-12 RX ADMIN — OXYCODONE AND ACETAMINOPHEN 1 TABLET(S): 5; 325 TABLET ORAL at 12:47

## 2018-04-12 RX ADMIN — Medication 100 MILLIGRAM(S): at 05:48

## 2018-04-12 RX ADMIN — OXYCODONE AND ACETAMINOPHEN 1 TABLET(S): 5; 325 TABLET ORAL at 22:30

## 2018-04-12 RX ADMIN — CARVEDILOL PHOSPHATE 25 MILLIGRAM(S): 80 CAPSULE, EXTENDED RELEASE ORAL at 17:16

## 2018-04-12 RX ADMIN — ISOSORBIDE DINITRATE 20 MILLIGRAM(S): 5 TABLET ORAL at 05:48

## 2018-04-12 RX ADMIN — PANTOPRAZOLE SODIUM 40 MILLIGRAM(S): 20 TABLET, DELAYED RELEASE ORAL at 05:48

## 2018-04-12 RX ADMIN — MONTELUKAST 10 MILLIGRAM(S): 4 TABLET, CHEWABLE ORAL at 12:39

## 2018-04-12 RX ADMIN — Medication 100 MILLIGRAM(S): at 21:37

## 2018-04-12 RX ADMIN — HEPARIN SODIUM 5000 UNIT(S): 5000 INJECTION INTRAVENOUS; SUBCUTANEOUS at 05:48

## 2018-04-12 RX ADMIN — ATORVASTATIN CALCIUM 80 MILLIGRAM(S): 80 TABLET, FILM COATED ORAL at 21:37

## 2018-04-12 RX ADMIN — ISOSORBIDE DINITRATE 20 MILLIGRAM(S): 5 TABLET ORAL at 21:37

## 2018-04-12 RX ADMIN — AMLODIPINE BESYLATE 10 MILLIGRAM(S): 2.5 TABLET ORAL at 05:48

## 2018-04-12 RX ADMIN — TIOTROPIUM BROMIDE 1 CAPSULE(S): 18 CAPSULE ORAL; RESPIRATORY (INHALATION) at 10:37

## 2018-04-12 RX ADMIN — HYDROMORPHONE HYDROCHLORIDE 0.2 MILLIGRAM(S): 2 INJECTION INTRAMUSCULAR; INTRAVENOUS; SUBCUTANEOUS at 23:49

## 2018-04-12 RX ADMIN — Medication 81 MILLIGRAM(S): at 12:37

## 2018-04-12 RX ADMIN — HEPARIN SODIUM 5000 UNIT(S): 5000 INJECTION INTRAVENOUS; SUBCUTANEOUS at 14:42

## 2018-04-12 RX ADMIN — POLYETHYLENE GLYCOL 3350 17 GRAM(S): 17 POWDER, FOR SOLUTION ORAL at 12:39

## 2018-04-12 RX ADMIN — Medication 100 MILLIGRAM(S): at 17:16

## 2018-04-12 RX ADMIN — TAMSULOSIN HYDROCHLORIDE 0.4 MILLIGRAM(S): 0.4 CAPSULE ORAL at 12:39

## 2018-04-12 RX ADMIN — ISOSORBIDE DINITRATE 20 MILLIGRAM(S): 5 TABLET ORAL at 14:41

## 2018-04-12 RX ADMIN — BUDESONIDE AND FORMOTEROL FUMARATE DIHYDRATE 2 PUFF(S): 160; 4.5 AEROSOL RESPIRATORY (INHALATION) at 10:38

## 2018-04-12 RX ADMIN — OXYCODONE AND ACETAMINOPHEN 1 TABLET(S): 5; 325 TABLET ORAL at 13:00

## 2018-04-12 NOTE — PROGRESS NOTE ADULT - SUBJECTIVE AND OBJECTIVE BOX
Patient is a 77y old  Male who presents with a chief complaint of Toe Pain (05 Apr 2018 10:42)      SUBJECTIVE / OVERNIGHT EVENTS: No acute events overnight. Reports some throbbing pain of left foot toes, requesting pain medication.    MEDICATIONS  (STANDING):  amLODIPine   Tablet 10 milliGRAM(s) Oral daily  aspirin enteric coated 81 milliGRAM(s) Oral daily  atorvastatin 80 milliGRAM(s) Oral at bedtime  buDESOnide 160 MICROgram(s)/formoterol 4.5 MICROgram(s) Inhaler 2 Puff(s) Inhalation two times a day  carvedilol 25 milliGRAM(s) Oral every 12 hours  docusate sodium 100 milliGRAM(s) Oral two times a day  heparin  Injectable 5000 Unit(s) SubCutaneous every 8 hours  hydrALAZINE 100 milliGRAM(s) Oral three times a day  isosorbide   dinitrate Tablet (ISORDIL) 20 milliGRAM(s) Oral three times a day  montelukast 10 milliGRAM(s) Oral daily  pantoprazole    Tablet 40 milliGRAM(s) Oral before breakfast  polyethylene glycol 3350 17 Gram(s) Oral daily  senna 2 Tablet(s) Oral at bedtime  tamsulosin 0.4 milliGRAM(s) Oral daily  tiotropium 18 MICROgram(s) Capsule 1 Capsule(s) Inhalation daily    MEDICATIONS  (PRN):  ALBUTerol/ipratropium for Nebulization 3 milliLiter(s) Nebulizer every 6 hours PRN Bronchospasm  haloperidol     Tablet 1 milliGRAM(s) Oral four times a day PRN agitation  haloperidol    Injectable 1 milliGRAM(s) IntraMuscular every 6 hours PRN Agitation  haloperidol    Injectable 1 milliGRAM(s) IV Push every 6 hours PRN Agitation  HYDROmorphone  Injectable 0.2 milliGRAM(s) IV Push every 4 hours PRN Severe Pain (7 - 10)  oxyCODONE    5 mG/acetaminophen 325 mG 1 Tablet(s) Oral every 4 hours PRN Moderate Pain (4 - 6)      T(C): 36.8 (04-12-18 @ 13:39), Max: 36.8 (04-12-18 @ 13:39)  HR: 72 (04-12-18 @ 13:39) (54 - 90)  BP: 161/84 (04-12-18 @ 13:39) (107/65 - 161/84)  RR: 18 (04-12-18 @ 13:39) (16 - 18)  SpO2: 94% (04-12-18 @ 13:39) (94% - 98%)  CAPILLARY BLOOD GLUCOSE        I&O's Summary    11 Apr 2018 07:01  -  12 Apr 2018 07:00  --------------------------------------------------------  IN: 1180 mL / OUT: 100 mL / NET: 1080 mL    12 Apr 2018 07:01  -  12 Apr 2018 14:46  --------------------------------------------------------  IN: 250 mL / OUT: 275 mL / NET: -25 mL    PHYSICAL EXAM:  GENERAL: no apparent distress, on room air, disheveled appearance  HEENT: sclera clear b/l, soft, mobile, round mass over left TMJ nontender to palpation (likely lipoma)  CHEST/LUNG: Clear to auscultation bilaterally; No wheezing or crackles  HEART: s1/s2, no murmurs  ABDOMEN: Soft, Nontender, Nondistended; Bowel sounds present  EXTREMITIES:  legs warm to touch, no palpable pulses on Left, dusky appearance of toes on left foot with dry gangrene of 2nd digit of left foot. no peripheral edema, untrimmed toenails  NEUROLOGY: awake, alert, responds to Qs appropriately  PSYCH: pleasant, oriented to self and place, knows why he is in hospital  LABS:                        13.5   5.97  )-----------( 180      ( 12 Apr 2018 06:15 )             40.2     04-12    143  |  108<H>  |  39<H>  ----------------------------<  78  3.7   |  23  |  2.10<H>    Ca    8.5      12 Apr 2018 06:15  Phos  2.9     04-11  Mg     2.2     04-12      PT/INR - ( 12 Apr 2018 06:15 )   PT: 11.7 SEC;   INR: 1.02          PTT - ( 12 Apr 2018 06:15 )  PTT:32.7 SEC          RADIOLOGY & ADDITIONAL TESTS:

## 2018-04-12 NOTE — PROGRESS NOTE ADULT - PROBLEM SELECTOR PLAN 3
currently at baseline, calm and pleasant  has episodes of agitation, ? delirium  Has capacity for medical decision making per psych  Haldol PRN ordered, goal is to avoid if possible

## 2018-04-12 NOTE — DIETITIAN INITIAL EVALUATION ADULT. - PROBLEM SELECTOR PLAN 6
Per outpatient provider patient has history of CKD; Cr on admission 1.95 unclear if baseline or JINA  - Continue to trend Cr  - Avoid ACE-I/ARB/RCA/nephrotoxins  - Renal diet

## 2018-04-12 NOTE — DIETITIAN INITIAL EVALUATION ADULT. - PROBLEM SELECTOR PLAN 1
Patient hypertensive to 260s now on nitro gtt with improvement in BP to 150s.  - Patient poorly compliant with medications with numerous admissions to OSH per outpatient provider  - Will start hydral 25 q8 while in hospital; holding cardizem 360 daily  - Initial enzymes negative, repeat q8 hr  - Will wean nitro gtt as tolerated  - Continuous cardiac monitoring  - q1 vitals

## 2018-04-12 NOTE — DIETITIAN INITIAL EVALUATION ADULT. - OTHER INFO
Pt seen for Length of stay. Pt 76 yo male appears drowsy/sleepy @ time of visit. Per Pt his appetite not that well. Pt also stated he probably lost weight, but unable to quantify. RDN asked Pt to discuss food preferences, but unable to discuss 2/2 his drowsiness. Pt partially edentulous noted. Pt C/O chewing difficulty. No report of nausea/vomiting/diarrhea @ present. Case discussed with nurse. RDN remains available.

## 2018-04-12 NOTE — PROGRESS NOTE ADULT - PROBLEM SELECTOR PLAN 1
Patient is normotensive on current regimen for BP control, nuclear stress test reveals no significant active ischemia, ECHO reveals no significant valvular disease.  -Patient remains an elevated risk for surgery given his comorbidities of HTN and CKD, but has been optimized and is currently not having ischemia, valvular disease, or arrythmia.

## 2018-04-12 NOTE — DIETITIAN INITIAL EVALUATION ADULT. - PROBLEM SELECTOR PLAN 4
Evaluated by podiatry in ED with suspected dry gangrene  - Appreciate podiatry recs; f/u GRISEL  - Will start atorva 40 and ASA 81  - Received abx in ED; no infectious sx/fever/leukocytosis will observe off abx

## 2018-04-12 NOTE — CONSULT NOTE ADULT - SUBJECTIVE AND OBJECTIVE BOX
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 The patient, who is not the best of historians, states that he had a "left groin hernia repair" at [facility] in the past, unsure of the date, and this recurrence has been going on for "a long time." Denies pain. There is no overlying skin changes. Patient had recent nuclear stress test, which revealed fixed inferior and inferoseptal defects with mild ischemia. Per cardiology, patient is medically optimized for procedure and may proceed without further cardiac workup.      PAST MEDICAL & SURGICAL HISTORY:  Gastritis  CKD (chronic kidney disease)  Asthma  DM (diabetes mellitus)  HTN (hypertension)  No significant past surgical history      Review of Systems:   General: denies weight change, fever or fatigue  HEENT: denies sore throat, hoarseness  Respiratory: denies cough, shortness of breath at rest and on exertion, wheezing  Cardiovascular: denies chest pain, abnormal heart rhythm, PND, palpitations  Gastrointestinal: denies nausea, vomiting, diarrhea, bloody or black bowel movements  Genitourinary: denies frequent urination, painful urination, kidney disease  Neurological: denies seizures, headaches  Musculoskeletal: denies any joint pains  Psychiatric: denies depression, anxiety    MEDICATIONS  (STANDING):  amLODIPine   Tablet 10 milliGRAM(s) Oral daily  aspirin enteric coated 81 milliGRAM(s) Oral daily  atorvastatin 80 milliGRAM(s) Oral at bedtime  buDESOnide 160 MICROgram(s)/formoterol 4.5 MICROgram(s) Inhaler 2 Puff(s) Inhalation two times a day  carvedilol 25 milliGRAM(s) Oral every 12 hours  docusate sodium 100 milliGRAM(s) Oral two times a day  heparin  Injectable 5000 Unit(s) SubCutaneous every 8 hours  hydrALAZINE 100 milliGRAM(s) Oral three times a day  isosorbide   dinitrate Tablet (ISORDIL) 20 milliGRAM(s) Oral three times a day  montelukast 10 milliGRAM(s) Oral daily  pantoprazole    Tablet 40 milliGRAM(s) Oral before breakfast  polyethylene glycol 3350 17 Gram(s) Oral daily  senna 2 Tablet(s) Oral at bedtime  tamsulosin 0.4 milliGRAM(s) Oral daily  tiotropium 18 MICROgram(s) Capsule 1 Capsule(s) Inhalation daily    MEDICATIONS  (PRN):  ALBUTerol/ipratropium for Nebulization 3 milliLiter(s) Nebulizer every 6 hours PRN Bronchospasm  haloperidol     Tablet 1 milliGRAM(s) Oral four times a day PRN agitation  haloperidol    Injectable 1 milliGRAM(s) IntraMuscular every 6 hours PRN Agitation  haloperidol    Injectable 1 milliGRAM(s) IV Push every 6 hours PRN Agitation  HYDROmorphone  Injectable 0.2 milliGRAM(s) IV Push every 4 hours PRN Severe Pain (7 - 10)  oxyCODONE    5 mG/acetaminophen 325 mG 1 Tablet(s) Oral every 4 hours PRN Moderate Pain (4 - 6)       SOCIAL HISTORY:  Occupation:  Smoking Hx: denies  Etoh Hx: denies  IVDA Hx: denies    FAMILY HISTORY:  No pertinent family history in first degree relatives       Objective:   Vital Signs Last 24 Hrs  T(C): 36.6 (12 Apr 2018 17:00), Max: 36.8 (12 Apr 2018 13:39)  T(F): 97.8 (12 Apr 2018 17:00), Max: 98.3 (12 Apr 2018 13:39)  HR: 89 (12 Apr 2018 17:00) (54 - 89)  BP: 130/78 (12 Apr 2018 17:00) (127/82 - 161/84)  BP(mean): --  RR: 18 (12 Apr 2018 17:00) (16 - 18)  SpO2: 98% (12 Apr 2018 17:00) (94% - 98%)    Physical Exam:  General: no acute distress.  HEENT: normocephalic   Neck: Neck supple   Chest: non-labored breathing, no wheezing or rhonci  Cardiac: Regular rhythm, rate of 80    Abdomen: soft, non-distended, non-tender, no guarding or rebound  Extremities: dry gangrene of left 2nd and 3rd toes  Groin: Right sided moderate-large sized, reducible femoral hernia; no overlying skin changes; completely non-tender    LABS:                        13.5   5.97  )-----------( 180      ( 12 Apr 2018 06:15 )             40.2     04-12    143  |  108<H>  |  39<H>  ----------------------------<  77  3.7   |  23  |  2.10<H>    Ca    8.5      12 Apr 2018 06:15  Phos  2.9     04-11  Mg     2.2     04-12      PT/INR - ( 12 Apr 2018 06:15 )   PT: 11.7 SEC;   INR: 1.02          PTT - ( 12 Apr 2018 06:15 )  PTT:32.7 SEC      RADIOLOGY & ADDITIONAL STUDIES: CC: femoral hernia  HPI: The patient is a 77 year old diabetic male, being followed by vascular surgery for 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 The patient, who is not the best of historians, states that he had a "left groin hernia repair" at [facility] in the past, unsure of the date, and this recurrence has been going on for "a long time." Denies pain. There is no overlying skin changes. Patient had recent nuclear stress test, which revealed fixed inferior and inferoseptal defects with mild ischemia. Per cardiology, patient is medically optimized for procedure and may proceed without further cardiac workup.      PAST MEDICAL & SURGICAL HISTORY:  Gastritis  CKD (chronic kidney disease)  Asthma  DM (diabetes mellitus)  HTN (hypertension)  No significant past surgical history      Review of Systems:   General: denies weight change, fever or fatigue  HEENT: denies sore throat, hoarseness  Respiratory: denies cough, shortness of breath at rest and on exertion, wheezing  Cardiovascular: denies chest pain, abnormal heart rhythm, PND, palpitations  Gastrointestinal: denies nausea, vomiting, diarrhea, bloody or black bowel movements  Genitourinary: denies frequent urination, painful urination, kidney disease  Neurological: denies seizures, headaches  Musculoskeletal: denies any joint pains  Psychiatric: denies depression, anxiety    MEDICATIONS  (STANDING):  amLODIPine   Tablet 10 milliGRAM(s) Oral daily  aspirin enteric coated 81 milliGRAM(s) Oral daily  atorvastatin 80 milliGRAM(s) Oral at bedtime  buDESOnide 160 MICROgram(s)/formoterol 4.5 MICROgram(s) Inhaler 2 Puff(s) Inhalation two times a day  carvedilol 25 milliGRAM(s) Oral every 12 hours  docusate sodium 100 milliGRAM(s) Oral two times a day  heparin  Injectable 5000 Unit(s) SubCutaneous every 8 hours  hydrALAZINE 100 milliGRAM(s) Oral three times a day  isosorbide   dinitrate Tablet (ISORDIL) 20 milliGRAM(s) Oral three times a day  montelukast 10 milliGRAM(s) Oral daily  pantoprazole    Tablet 40 milliGRAM(s) Oral before breakfast  polyethylene glycol 3350 17 Gram(s) Oral daily  senna 2 Tablet(s) Oral at bedtime  tamsulosin 0.4 milliGRAM(s) Oral daily  tiotropium 18 MICROgram(s) Capsule 1 Capsule(s) Inhalation daily    MEDICATIONS  (PRN):  ALBUTerol/ipratropium for Nebulization 3 milliLiter(s) Nebulizer every 6 hours PRN Bronchospasm  haloperidol     Tablet 1 milliGRAM(s) Oral four times a day PRN agitation  haloperidol    Injectable 1 milliGRAM(s) IntraMuscular every 6 hours PRN Agitation  haloperidol    Injectable 1 milliGRAM(s) IV Push every 6 hours PRN Agitation  HYDROmorphone  Injectable 0.2 milliGRAM(s) IV Push every 4 hours PRN Severe Pain (7 - 10)  oxyCODONE    5 mG/acetaminophen 325 mG 1 Tablet(s) Oral every 4 hours PRN Moderate Pain (4 - 6)       SOCIAL HISTORY:  Occupation:  Smoking Hx: denies  Etoh Hx: denies  IVDA Hx: denies    FAMILY HISTORY:  No pertinent family history in first degree relatives       Objective:   Vital Signs Last 24 Hrs  T(C): 36.6 (12 Apr 2018 17:00), Max: 36.8 (12 Apr 2018 13:39)  T(F): 97.8 (12 Apr 2018 17:00), Max: 98.3 (12 Apr 2018 13:39)  HR: 89 (12 Apr 2018 17:00) (54 - 89)  BP: 130/78 (12 Apr 2018 17:00) (127/82 - 161/84)  BP(mean): --  RR: 18 (12 Apr 2018 17:00) (16 - 18)  SpO2: 98% (12 Apr 2018 17:00) (94% - 98%)    Physical Exam:  General: no acute distress.  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

## 2018-04-12 NOTE — PROGRESS NOTE ADULT - PROBLEM SELECTOR PLAN 1
s/p angiogram showing occluded left SFA and AK pop pending bypass and fem endarterectomy. No evidence of OM on Xray  Stress test done:  large, severe defects in inferior and inferoseptal walls that are  partially reversible, suggestive of infarct with mild partial ischemia. There was a severe defect in the diaphragmatic wall of the RV that was fixed, consistent  with RV infarct. Cleared by cardiology for vascular procedure. No further workup needed prior to procedure.

## 2018-04-12 NOTE — PROGRESS NOTE ADULT - ATTENDING COMMENTS
Patient seen and examined. Agree with above assessment and plan. Patient requires fem pop bypass. Had nuclear stress test yesterday that revealed fixed inferior and inferoseptal defects with mild ischemia.  Patient medically optimized for procedure and may proceed without further cardiac workup. Continue current meds

## 2018-04-12 NOTE — DIETITIAN INITIAL EVALUATION ADULT. - PROBLEM SELECTOR PLAN 5
Chart history of DM II, no recorded A1c  - Not on outpatient anti-glycemics per outpatient doctors  - Will check A1c prior to starting SSI

## 2018-04-12 NOTE — PROGRESS NOTE ADULT - PROBLEM SELECTOR PLAN 2
BP stable, continue isordil, amlodipine, carvedilol and hydralazine increased isordil and carvedilol for better BP control  continue norvasc and hydralazine

## 2018-04-12 NOTE — PROGRESS NOTE ADULT - ASSESSMENT
77M with HTN, CKD, BPH, asthma, and gastritis presents with L toe pain and acute SOB -- acute pulmonary edema in setting of HTN emergency requiring IV NTG and BiPaP now better controlled on meds, course complicated by left toe dry gangrene s/p angiogram of left leg, now requiring a fem-pop bypass

## 2018-04-12 NOTE — CONSULT NOTE ADULT - ASSESSMENT
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     - no acute surgical intervention at the moment regarding patient's femoral hernia  - will document clearance for proceeding with femoral endarterectomy and fem-pop bypass  - above d/w Dr. 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     - no acute surgical intervention at the moment regarding patient's femoral hernia, however will discuss with vascular surgery regarding their planned bypass  - will document clearance for proceeding with femoral endarterectomy and fem-pop bypass; patient currently not yet cleared  - above d/w Dr. Ordaz [facility] [place] PGY2  [id number]

## 2018-04-12 NOTE — DIETITIAN INITIAL EVALUATION ADULT. - PROBLEM SELECTOR PLAN 2
Likely 2/2 flash pulmonary edema 2/2 hypertensive emergency. Initial CXR clear  - Repeat chest x-ray, continue BPAP  - Duoneb for wheeze; restart symbicort, singulair  - Repeat VBG

## 2018-04-12 NOTE — DIETITIAN INITIAL EVALUATION ADULT. - NS AS NUTRI INTERV MEALS SNACK
1. Suggest: PO diet rx: Mechanical Soft, DASH/TLC (cholesterol and sodium restricted); PO supplement: Ensure Enlive 8oz. 2x daily (will provide additional ~700 Kcal, ~40 gm Protein);                2. Encourage & assist Pt with meals; Monitor PO diet tolerance;             3. Monitor labs, weights, hydration status;               4. Suggest: Swallow Bedside Assessment Adult &/or MBS if needed;/Texture-modified diet/Other (specify)/Diets modified for specific foods and ingredients

## 2018-04-12 NOTE — PROGRESS NOTE ADULT - SUBJECTIVE AND OBJECTIVE BOX
Date of Admission:  18  24 hour events:  Patient had nuclear stress test yesterday  Vital Signs Last 24 Hrs  T(C): 36.7 (2018 05:48), Max: 36.7 (2018 17:00)  T(F): 98.1 (2018 05:48), Max: 98.1 (2018 05:48)  HR: 54 (2018 05:48) (54 - 90)  BP: 127/82 (2018 05:48) (98/62 - 127/82)  BP(mean): --  RR: 16 (2018 05:48) (16 - 18)  SpO2: 97% (2018 05:48) (96% - 98%)  I&O's Summary    2018 07:01  -  2018 07:00  --------------------------------------------------------  IN: 1180 mL / OUT: 100 mL / NET: 1080 mL    MEDICATIONS:  amLODIPine   Tablet 10 milliGRAM(s) Oral daily  aspirin enteric coated 81 milliGRAM(s) Oral daily  carvedilol 25 milliGRAM(s) Oral every 12 hours  heparin  Injectable 5000 Unit(s) SubCutaneous every 8 hours  hydrALAZINE 100 milliGRAM(s) Oral three times a day  isosorbide   dinitrate Tablet (ISORDIL) 20 milliGRAM(s) Oral three times a day  tamsulosin 0.4 milliGRAM(s) Oral daily  ALBUTerol/ipratropium for Nebulization 3 milliLiter(s) Nebulizer every 6 hours PRN  buDESOnide 160 MICROgram(s)/formoterol 4.5 MICROgram(s) Inhaler 2 Puff(s) Inhalation two times a day  montelukast 10 milliGRAM(s) Oral daily  tiotropium 18 MICROgram(s) Capsule 1 Capsule(s) Inhalation daily  haloperidol     Tablet 1 milliGRAM(s) Oral four times a day PRN  haloperidol    Injectable 1 milliGRAM(s) IntraMuscular every 6 hours PRN  haloperidol    Injectable 1 milliGRAM(s) IV Push every 6 hours PRN  HYDROmorphone  Injectable 0.2 milliGRAM(s) IV Push every 4 hours PRN  oxyCODONE    5 mG/acetaminophen 325 mG 1 Tablet(s) Oral every 4 hours PRN  docusate sodium 100 milliGRAM(s) Oral two times a day  pantoprazole    Tablet 40 milliGRAM(s) Oral before breakfast  polyethylene glycol 3350 17 Gram(s) Oral daily  senna 2 Tablet(s) Oral at bedtime  atorvastatin 80 milliGRAM(s) Oral at bedtime    REVIEW OF SYSTEMS:  Complete 10point ROS negative.    PHYSICAL EXAM:  General: NAD  Cardiovascular: Normal S1 S2, No JVD, No murmurs, No edema  Respiratory: Lungs clear to auscultation	  Gastrointestinal:  Soft, Non-tender, + BS	  Skin: warm and dry, No rashes, No ecchymoses, No cyanosis	  Extremities: left leg with gangrenous toe, no BLLE edema  Vascular: Peripheral pulses palpable 2+ bilaterally    LABS:	 	    CBC Full  -  ( 2018 06:15 )  WBC Count : 5.97 K/uL  Hemoglobin : 13.5 g/dL  Hematocrit : 40.2 %  Platelet Count - Automated : 180 K/uL  Mean Cell Volume : 89.7 fL  Mean Cell Hemoglobin : 30.1 pg  Mean Cell Hemoglobin Concentration : 33.6 %  Auto Neutrophil # : x  Auto Lymphocyte # : x  Auto Monocyte # : x  Auto Eosinophil # : x  Auto Basophil # : x  Auto Neutrophil % : x  Auto Lymphocyte % : x  Auto Monocyte % : x  Auto Eosinophil % : x  Auto Basophil % : x        143  |  108<H>  |  39<H>  ----------------------------<  77  3.7   |  23  |  2.10<H>  04-11    143  |  109<H>  |  43<H>  ----------------------------<  77  3.6   |  21<L>  |  1.87<H>    Ca    8.5      2018 06:15  Ca    8.7      2018 06:30  Phos  2.9     04-11  Mg     2.2     04-12  Mg     2.1     04-11    PREVIOUS DIAGNOSTIC TESTING:    [ ] Echocardiogram:< from: Transthoracic Echocardiogram (18 @ 16:48) >  OBSERVATIONS:  Mitral Valve: Mitral annular calcification, otherwise  normal mitral valve. Mild mitral regurgitation.  Aortic Root: Normal aortic root.  Aortic Valve: Calcified trileaflet aortic valve with normal  opening.  Left Atrium: Normal left atrium.  Left Ventricle: Mild segmental left ventricular systolic  dysfunction.  Hypokinesis of the basal inferior wall.  Concentric left ventricular hypertrophy.  Right Heart: Normal right atrium. Normal right ventricular  size and function. Normal tricuspid valve.  Minimal  tricuspid regurgitation. Normal pulmonic valve.  Pericardium/PleuraNormal pericardium with no pericardial  effusion.  ------------------------------------------------------------------------  CONCLUSIONS:  1. Mitral annular calcification, otherwise normal mitral  valve. Mild mitral regurgitation.  2. Concentric left ventricular hypertrophy.  3. Mild segmental left ventricular systolic dysfunction.  Hypokinesis of the basal inferior wall.  4. Normal right ventricular size and function.  ------------------------------------------------------------------------  Confirmed on  2018 - 18:30:08 by Lee Restrepo M.D.  ------------------------------------------------------------------------    < end of copied text >    [ ]  Catheterization:< from: Cardiac Cath Lab - Adult (04.10.18 @ 12:48) >  INDICATIONS: PVD with L foot gangrene  PROCEDURE:  --  Left leg angiography.  --  Wzccj-cuov-yxqnvoulo angiography.  --  Hemostasis with Mynx.  Local anesthetic given. The puncture site was infiltrated with 1 %  lidocaine. Right femoral artery access. A 5fr Sheath [place] was inserted  in the vessel, utilizing the Seldinger technique. Second order selective  catherization was perfromed and catheter was positioned in L femoral  artery. Left leg angiography showed  CFA:occluded  Profunda: patent  SFA: occluded  AK pop: occluded  BK pop: patent  TP trunk: patent  PT: patent to the foot  Peroneal: patent to the foot  AT: occluded  DP: occluded Ehjwl-vvsp-kuccemylf angiography. A catheter was positioned.  WENCESLAO: patent bl  Ext IA: patent bl  IIA: patent bl Hemostasis with Mynx. RADIATION EXPOSURE: 6.8 min.  CONTRAST GIVEN: Visipaque 35 ml.  MEDICATIONS GIVEN: Midazolam, 1 mg, IV. Fentanyl, 25 mcg, IV. 2% Lidocaine,  10 ml, subcutaneously. 0.9% Normal saline, 20 ml, IV.  DISPOSITION: onscious sedation was administered by RN under my supervision.  Pt. received 25mcg of Fentanyl and 1mg of Versad. Pt. was monitored from  1:09pm till 1:52pm for thetotal of 41min.  At the end of the procedure sheath was removed and the access site was  closed with Mynx device. No bleeding or hematoma at the end of the  procedure.  Prepared and signed by  Nimisha Ro M.D.  Signed 04/10/2018 14:16:52  HEMODYNAMICTABLES  Outputs:  Baseline  Outputs:  -- CALCULATIONS[age]  Outputs:  -- CALCULATIONS: Body Surface Area: 1.75  Outputs:  -- CALCULATIONS: Height in cm: 172.00  Outputs:  -- CALCULATIONS: Sex: Male  Outputs:  -- CALCULATIONS: Weight in k.00  Outputs:  -- OUTPUTS: O2 consumption: 218.15  Outputs:  -- OUTPUTS: Vo2 Indexed: 125.00    < end of copied text >    [ ] Stress Test:< from: Nuclear Stress Test-Pharmacologic (18 @ 09:28) >  STRESS TEST IMPRESSIONS:  Chest Pain: No chest pain with administration of  Regadenoson.  Symptom: Dyspnea.  HR Response: Appropriate.  BP Response: Appropriate.  Heart Rhythm: Normal Sinus Rhythm - Occassional VPD's - 71  BPM.  Conduction defects: RBBB.  Baseline ECG: T wave abnormality in II , III, aVF, V3, V4,  V5, V6.  ECG Abnormalities: None.  Arrhythmia: Frequent VPDs occurred duiring Regadenoson  injection.  ------------------------------------------------------------------------  PROCEDURE:  7.75 mCi of Tc 99m Tetrofosmin were injected during stress  protocol. Approximately 45 minutes later, tomographic  images were obtained in a 180 degree arc from right  anterior oblique to left anterior oblique with 64 stops.  At a separate time on 2018, 23.9 mCi of Tc 99m  Tetrofosmin were injected at rest. Approximately 45  minute(s) later, tomographic images were obtained in a 180  degree arc from right anterior oblique to left anterior  oblique with 64 stops. The tomographic slices were  reconstructed in 3 orthogonal planes (short axis,  horizontal long axis and vertical long axis).  Interpretation was performed both by visual and  quantitative analysis.  Rest and stress images were acquired using CZT-based  system with pinhole collimation ([employer], [employer]), and reconstructed using MLEM algorithm.  Images were re-acquired with the patient in aprone  position.  ------------------------------------------------------------------------  NUCLEAR FINDINGS:  Review of raw data shows: The study is of good technical  quality.  The left ventricle was hypertrophied. There are large,  severe defects in inferior and inferoseptal walls that are  partially reversible, suggestive of infarct with mild  partial ischemia. There was a severe defect in the  diaphragmatic wall of the RV that was fixed, consistent  with RV infarct  ------------------------------------------------------------------------  GATED ANALYSIS:  Post-stress gated wall motion analysis was performed (LVEF  = 45 %;LVEDV = 135 ml.), revealing mild overall  hypokinesis by [facility] There was basal inferior and  inferoseptal akinesis. There was severe mid inferior and  inferoseptal hypokinesis. The remaining segments  contracted well. The diaphragmatic wall of the RV was  akinetic.  ------------------------------------------------------------------------  IMPRESSIONS:AbnormalStudy  * Myocardial Perfusion SPECT results are abnormal.  * Review of raw data shows: The study is of good technical  quality.  * The left ventricle was hypertrophied. There are large,  severe defects in inferior and inferoseptal walls that are  partially reversible, suggestive of infarct with mild  partial ischemia. There was a severe defect in the  diaphragmatic wall of the RV that was fixed, consistent  with RV infarct  * Post-stress gated wall motion analysis was performed  (LVEF = 45 %;LVEDV = 135 ml.), revealing mild overall  hypokinesis by [employer] There was basal inferior and  inferoseptal akinesis. There was severe mid inferior and  inferoseptal hypokinesis. The remaining segments  contracted well. The diaphragmatic wall of theRV was  akinetic.  ------------------------------------------------------------------------  Confirmed on  2018 - 15:04:51 by Fermin Coy M.D.  ------------------------------------------------------------------------    < end of copied text >

## 2018-04-13 DIAGNOSIS — H11.31 CONJUNCTIVAL HEMORRHAGE, RIGHT EYE: ICD-10-CM

## 2018-04-13 LAB
BUN SERPL-MCNC: 37 MG/DL — HIGH (ref 7–23)
CALCIUM SERPL-MCNC: 8.4 MG/DL — SIGNIFICANT CHANGE UP (ref 8.4–10.5)
CHLORIDE SERPL-SCNC: 110 MMOL/L — HIGH (ref 98–107)
CO2 SERPL-SCNC: 21 MMOL/L — LOW (ref 22–31)
CREAT SERPL-MCNC: 1.96 MG/DL — HIGH (ref 0.5–1.3)
GLUCOSE SERPL-MCNC: 83 MG/DL — SIGNIFICANT CHANGE UP (ref 70–99)
HCT VFR BLD CALC: 39.6 % — SIGNIFICANT CHANGE UP (ref 39–50)
HGB BLD-MCNC: 13.1 G/DL — SIGNIFICANT CHANGE UP (ref 13–17)
MAGNESIUM SERPL-MCNC: 2.2 MG/DL — SIGNIFICANT CHANGE UP (ref 1.6–2.6)
MCHC RBC-ENTMCNC: 30 PG — SIGNIFICANT CHANGE UP (ref 27–34)
MCHC RBC-ENTMCNC: 33.1 % — SIGNIFICANT CHANGE UP (ref 32–36)
MCV RBC AUTO: 90.8 FL — SIGNIFICANT CHANGE UP (ref 80–100)
NRBC # FLD: 0 — SIGNIFICANT CHANGE UP
PLATELET # BLD AUTO: 185 K/UL — SIGNIFICANT CHANGE UP (ref 150–400)
PMV BLD: 13.5 FL — HIGH (ref 7–13)
POTASSIUM SERPL-MCNC: 4 MMOL/L — SIGNIFICANT CHANGE UP (ref 3.5–5.3)
POTASSIUM SERPL-SCNC: 4 MMOL/L — SIGNIFICANT CHANGE UP (ref 3.5–5.3)
RBC # BLD: 4.36 M/UL — SIGNIFICANT CHANGE UP (ref 4.2–5.8)
RBC # FLD: 14.5 % — SIGNIFICANT CHANGE UP (ref 10.3–14.5)
SODIUM SERPL-SCNC: 143 MMOL/L — SIGNIFICANT CHANGE UP (ref 135–145)
WBC # BLD: 5.84 K/UL — SIGNIFICANT CHANGE UP (ref 3.8–10.5)
WBC # FLD AUTO: 5.84 K/UL — SIGNIFICANT CHANGE UP (ref 3.8–10.5)

## 2018-04-13 PROCEDURE — 74176 CT ABD & PELVIS W/O CONTRAST: CPT | Mod: 26

## 2018-04-13 PROCEDURE — 99233 SBSQ HOSP IP/OBS HIGH 50: CPT

## 2018-04-13 RX ADMIN — POLYETHYLENE GLYCOL 3350 17 GRAM(S): 17 POWDER, FOR SOLUTION ORAL at 11:37

## 2018-04-13 RX ADMIN — ISOSORBIDE DINITRATE 20 MILLIGRAM(S): 5 TABLET ORAL at 05:06

## 2018-04-13 RX ADMIN — HYDROMORPHONE HYDROCHLORIDE 0.2 MILLIGRAM(S): 2 INJECTION INTRAMUSCULAR; INTRAVENOUS; SUBCUTANEOUS at 20:15

## 2018-04-13 RX ADMIN — MONTELUKAST 10 MILLIGRAM(S): 4 TABLET, CHEWABLE ORAL at 11:36

## 2018-04-13 RX ADMIN — Medication 100 MILLIGRAM(S): at 14:00

## 2018-04-13 RX ADMIN — HEPARIN SODIUM 5000 UNIT(S): 5000 INJECTION INTRAVENOUS; SUBCUTANEOUS at 14:00

## 2018-04-13 RX ADMIN — SENNA PLUS 2 TABLET(S): 8.6 TABLET ORAL at 19:58

## 2018-04-13 RX ADMIN — ISOSORBIDE DINITRATE 20 MILLIGRAM(S): 5 TABLET ORAL at 14:00

## 2018-04-13 RX ADMIN — AMLODIPINE BESYLATE 10 MILLIGRAM(S): 2.5 TABLET ORAL at 05:08

## 2018-04-13 RX ADMIN — ATORVASTATIN CALCIUM 80 MILLIGRAM(S): 80 TABLET, FILM COATED ORAL at 19:58

## 2018-04-13 RX ADMIN — BUDESONIDE AND FORMOTEROL FUMARATE DIHYDRATE 2 PUFF(S): 160; 4.5 AEROSOL RESPIRATORY (INHALATION) at 08:45

## 2018-04-13 RX ADMIN — TIOTROPIUM BROMIDE 1 CAPSULE(S): 18 CAPSULE ORAL; RESPIRATORY (INHALATION) at 11:36

## 2018-04-13 RX ADMIN — CARVEDILOL PHOSPHATE 25 MILLIGRAM(S): 80 CAPSULE, EXTENDED RELEASE ORAL at 17:02

## 2018-04-13 RX ADMIN — HYDROMORPHONE HYDROCHLORIDE 0.2 MILLIGRAM(S): 2 INJECTION INTRAMUSCULAR; INTRAVENOUS; SUBCUTANEOUS at 05:03

## 2018-04-13 RX ADMIN — Medication 100 MILLIGRAM(S): at 17:02

## 2018-04-13 RX ADMIN — OXYCODONE AND ACETAMINOPHEN 1 TABLET(S): 5; 325 TABLET ORAL at 11:45

## 2018-04-13 RX ADMIN — CARVEDILOL PHOSPHATE 25 MILLIGRAM(S): 80 CAPSULE, EXTENDED RELEASE ORAL at 05:07

## 2018-04-13 RX ADMIN — Medication 100 MILLIGRAM(S): at 19:58

## 2018-04-13 RX ADMIN — BUDESONIDE AND FORMOTEROL FUMARATE DIHYDRATE 2 PUFF(S): 160; 4.5 AEROSOL RESPIRATORY (INHALATION) at 19:59

## 2018-04-13 RX ADMIN — HEPARIN SODIUM 5000 UNIT(S): 5000 INJECTION INTRAVENOUS; SUBCUTANEOUS at 19:59

## 2018-04-13 RX ADMIN — PANTOPRAZOLE SODIUM 40 MILLIGRAM(S): 20 TABLET, DELAYED RELEASE ORAL at 06:07

## 2018-04-13 RX ADMIN — HEPARIN SODIUM 5000 UNIT(S): 5000 INJECTION INTRAVENOUS; SUBCUTANEOUS at 05:07

## 2018-04-13 RX ADMIN — ISOSORBIDE DINITRATE 20 MILLIGRAM(S): 5 TABLET ORAL at 19:58

## 2018-04-13 RX ADMIN — Medication 81 MILLIGRAM(S): at 11:36

## 2018-04-13 RX ADMIN — OXYCODONE AND ACETAMINOPHEN 1 TABLET(S): 5; 325 TABLET ORAL at 11:02

## 2018-04-13 RX ADMIN — TAMSULOSIN HYDROCHLORIDE 0.4 MILLIGRAM(S): 0.4 CAPSULE ORAL at 11:37

## 2018-04-13 RX ADMIN — HYDROMORPHONE HYDROCHLORIDE 0.2 MILLIGRAM(S): 2 INJECTION INTRAMUSCULAR; INTRAVENOUS; SUBCUTANEOUS at 00:04

## 2018-04-13 RX ADMIN — Medication 100 MILLIGRAM(S): at 05:07

## 2018-04-13 RX ADMIN — HYDROMORPHONE HYDROCHLORIDE 0.2 MILLIGRAM(S): 2 INJECTION INTRAMUSCULAR; INTRAVENOUS; SUBCUTANEOUS at 05:18

## 2018-04-13 RX ADMIN — HYDROMORPHONE HYDROCHLORIDE 0.2 MILLIGRAM(S): 2 INJECTION INTRAMUSCULAR; INTRAVENOUS; SUBCUTANEOUS at 19:59

## 2018-04-13 NOTE — PROGRESS NOTE ADULT - PROBLEM SELECTOR PLAN 3
Well controlled on current BP regimen (amlodipine, coreg, hydralazine, isordil) Well controlled on current BP regimen, meds titrated up yesterday. Continue amlodipine, coreg, hydralazine, and isordil.

## 2018-04-13 NOTE — PROGRESS NOTE ADULT - PROBLEM SELECTOR PLAN 4
Cr stable (appears baseline may be from 1.5-2.2) currently at baseline, calm and pleasant  has episodes of agitation, ? delirium  Has capacity for medical decision making per psych  Haldol PRN ordered, goal is to avoid if possible

## 2018-04-13 NOTE — PROGRESS NOTE ADULT - ASSESSMENT
77M HTN, CKD, BPH, gangrene L toe is s/p CCU stay for respiratory failure secondary hypertensive emergency 77M with HTN, CKD, BPH, asthma, and gastritis presents with L toe pain and acute SOB. Found to be in acute pulmonary edema in setting of HTN emergency requiring IV NTG and BiPaP now improved with better BP control. Course c/b left toe dry gangrene s/p angiogram of left leg, now awaiting peripheral bypass and endarterectomy.

## 2018-04-13 NOTE — PROGRESS NOTE ADULT - SUBJECTIVE AND OBJECTIVE BOX
Progress Note    Patient feeling well. Denies pain, numbness or tingling in either leg or groin. No events overnight.    Vital Signs Last 24 Hrs  T(C): 36.9 (04-13-18 @ 05:36), Max: 36.9 (04-13-18 @ 05:36)  T(F): 98.5 (04-13-18 @ 05:36), Max: 98.5 (04-13-18 @ 05:36)  HR: 65 (04-13-18 @ 05:36) (65 - 89)  BP: 147/67 (04-13-18 @ 05:36) (130/78 - 161/84)  BP(mean): --  RR: 18 (04-13-18 @ 05:36) (18 - 18)  SpO2: 98% (04-13-18 @ 05:36) (94% - 100%)  I&O's Detail    12 Apr 2018 07:01  -  13 Apr 2018 07:00  --------------------------------------------------------  IN:    Oral Fluid: 250 mL  Total IN: 250 mL    OUT:    Voided: 775 mL  Total OUT: 775 mL    Total NET: -525 mL      13 Apr 2018 07:01  -  13 Apr 2018 11:01  --------------------------------------------------------  IN:    Oral Fluid: 220 mL  Total IN: 220 mL    OUT:    Voided: 100 mL  Total OUT: 100 mL    Total NET: 120 mL    PE  GenL NAD  L groin with large hernia, nontender,                          13.1   5.84  )-----------( 185      ( 13 Apr 2018 06:25 )             39.6     04-13    143  |  110<H>  |  37<H>  ----------------------------<  83  4.0   |  21<L>  |  1.96<H>    Ca    8.4      13 Apr 2018 06:25  Mg     2.2     04-13      PT/INR - ( 12 Apr 2018 06:15 )   PT: 11.7 SEC;   INR: 1.02          PTT - ( 12 Apr 2018 06:15 )  PTT:32.7 SEC  CAPILLARY BLOOD GLUCOSE          MEDICATIONS  (STANDING):  amLODIPine   Tablet 10 milliGRAM(s) Oral daily  artificial tears (preservative free) Ophthalmic Solution 1 Drop(s) Right EYE two times a day  aspirin enteric coated 81 milliGRAM(s) Oral daily  atorvastatin 80 milliGRAM(s) Oral at bedtime  buDESOnide 160 MICROgram(s)/formoterol 4.5 MICROgram(s) Inhaler 2 Puff(s) Inhalation two times a day  carvedilol 25 milliGRAM(s) Oral every 12 hours  docusate sodium 100 milliGRAM(s) Oral two times a day  heparin  Injectable 5000 Unit(s) SubCutaneous every 8 hours  hydrALAZINE 100 milliGRAM(s) Oral three times a day  isosorbide   dinitrate Tablet (ISORDIL) 20 milliGRAM(s) Oral three times a day  montelukast 10 milliGRAM(s) Oral daily  pantoprazole    Tablet 40 milliGRAM(s) Oral before breakfast  polyethylene glycol 3350 17 Gram(s) Oral daily  senna 2 Tablet(s) Oral at bedtime  tamsulosin 0.4 milliGRAM(s) Oral daily  tiotropium 18 MICROgram(s) Capsule 1 Capsule(s) Inhalation daily    MEDICATIONS  (PRN):  ALBUTerol/ipratropium for Nebulization 3 milliLiter(s) Nebulizer every 6 hours PRN Bronchospasm  haloperidol     Tablet 1 milliGRAM(s) Oral four times a day PRN agitation  haloperidol    Injectable 1 milliGRAM(s) IntraMuscular every 6 hours PRN Agitation  haloperidol    Injectable 1 milliGRAM(s) IV Push every 6 hours PRN Agitation  HYDROmorphone  Injectable 0.2 milliGRAM(s) IV Push every 4 hours PRN Severe Pain (7 - 10)  oxyCODONE    5 mG/acetaminophen 325 mG 1 Tablet(s) Oral every 4 hours PRN Moderate Pain (4 - 6)

## 2018-04-13 NOTE — PROGRESS NOTE ADULT - ATTENDING COMMENTS
Dispo: pending vascular bypass procedure, PT eval post procedure Dispo: pending vascular bypass procedure, PT eval

## 2018-04-13 NOTE — PROGRESS NOTE ADULT - SUBJECTIVE AND OBJECTIVE BOX
Patient is a 77y old  Male who presents with a chief complaint of Toe Pain (05 Apr 2018 10:42)      SUBJECTIVE / OVERNIGHT EVENTS: No acute events overnight. Patient denies eye pain. States he may have rubbed his eye this morning causing it to be red. Stated he had femoral hernia surgery about 4 months ago at [facility] but the hernia has now come back. States it is not causing him any discomfort or pain. Anxious to get surgery over with and go home.    MEDICATIONS  (STANDING):  amLODIPine   Tablet 10 milliGRAM(s) Oral daily  artificial tears (preservative free) Ophthalmic Solution 1 Drop(s) Right EYE two times a day  aspirin enteric coated 81 milliGRAM(s) Oral daily  atorvastatin 80 milliGRAM(s) Oral at bedtime  buDESOnide 160 MICROgram(s)/formoterol 4.5 MICROgram(s) Inhaler 2 Puff(s) Inhalation two times a day  carvedilol 25 milliGRAM(s) Oral every 12 hours  docusate sodium 100 milliGRAM(s) Oral two times a day  heparin  Injectable 5000 Unit(s) SubCutaneous every 8 hours  hydrALAZINE 100 milliGRAM(s) Oral three times a day  isosorbide   dinitrate Tablet (ISORDIL) 20 milliGRAM(s) Oral three times a day  montelukast 10 milliGRAM(s) Oral daily  pantoprazole    Tablet 40 milliGRAM(s) Oral before breakfast  polyethylene glycol 3350 17 Gram(s) Oral daily  senna 2 Tablet(s) Oral at bedtime  tamsulosin 0.4 milliGRAM(s) Oral daily  tiotropium 18 MICROgram(s) Capsule 1 Capsule(s) Inhalation daily    MEDICATIONS  (PRN):  ALBUTerol/ipratropium for Nebulization 3 milliLiter(s) Nebulizer every 6 hours PRN Bronchospasm  haloperidol     Tablet 1 milliGRAM(s) Oral four times a day PRN agitation  haloperidol    Injectable 1 milliGRAM(s) IntraMuscular every 6 hours PRN Agitation  haloperidol    Injectable 1 milliGRAM(s) IV Push every 6 hours PRN Agitation  HYDROmorphone  Injectable 0.2 milliGRAM(s) IV Push every 4 hours PRN Severe Pain (7 - 10)  oxyCODONE    5 mG/acetaminophen 325 mG 1 Tablet(s) Oral every 4 hours PRN Moderate Pain (4 - 6)      T(C): 36.8 (04-13-18 @ 10:15), Max: 36.9 (04-13-18 @ 05:36)  HR: 63 (04-13-18 @ 10:15) (63 - 89)  BP: 128/62 (04-13-18 @ 10:15) (128/62 - 161/84)  RR: 17 (04-13-18 @ 10:15) (17 - 18)  SpO2: 98% (04-13-18 @ 10:15) (94% - 100%)  CAPILLARY BLOOD GLUCOSE        I&O's Summary    12 Apr 2018 07:01  -  13 Apr 2018 07:00  --------------------------------------------------------  IN: 250 mL / OUT: 775 mL / NET: -525 mL    13 Apr 2018 07:01  -  13 Apr 2018 12:22  --------------------------------------------------------  IN: 220 mL / OUT: 250 mL / NET: -30 mL    PHYSICAL EXAM:  GENERAL: no apparent distress, on room air, disheveled, frail appearance  HEENT: right eye sclera with erythema but no discharge, soft, mobile, round mass over left TMJ nontender to palpation (likely lipoma)  CHEST/LUNG: Clear to auscultation bilaterally; No wheezing or crackles  HEART: s1/s2, no murmurs  ABDOMEN: Soft, Nontender, Nondistended; Bowel sounds present  EXTREMITIES:  legs warm to touch, no palpable pulses on Left, dusky appearance of toes on left foot with dry gangrene of 2nd digit of left foot. no peripheral edema, untrimmed toenails  NEUROLOGY: awake, alert, responds to Qs appropriately  PSYCH: pleasant, oriented to self and place, knows why he is in hospital    LABS:                        13.1   5.84  )-----------( 185      ( 13 Apr 2018 06:25 )             39.6     04-13    143  |  110<H>  |  37<H>  ----------------------------<  83  4.0   |  21<L>  |  1.96<H>    Ca    8.4      13 Apr 2018 06:25  Mg     2.2     04-13      PT/INR - ( 12 Apr 2018 06:15 )   PT: 11.7 SEC;   INR: 1.02          PTT - ( 12 Apr 2018 06:15 )  PTT:32.7 SEC          RADIOLOGY & ADDITIONAL TESTS:

## 2018-04-13 NOTE — PROGRESS NOTE ADULT - PROBLEM SELECTOR PLAN 2
Awaiting peripheral bypass and endarterectomy, patient is optimized from cardiac perspective for planned surgery.

## 2018-04-13 NOTE — PROGRESS NOTE ADULT - SUBJECTIVE AND OBJECTIVE BOX
Subjective/Objective: Patient resting in bed, no overnight events. 5B NSVT noted on telemetry, pt asymptomatic.    MEDICATIONS  (STANDING):  amLODIPine   Tablet 10 milliGRAM(s) Oral daily  aspirin enteric coated 81 milliGRAM(s) Oral daily  atorvastatin 80 milliGRAM(s) Oral at bedtime  buDESOnide 160 MICROgram(s)/formoterol 4.5 MICROgram(s) Inhaler 2 Puff(s) Inhalation two times a day  carvedilol 25 milliGRAM(s) Oral every 12 hours  docusate sodium 100 milliGRAM(s) Oral two times a day  heparin  Injectable 5000 Unit(s) SubCutaneous every 8 hours  hydrALAZINE 100 milliGRAM(s) Oral three times a day  isosorbide   dinitrate Tablet (ISORDIL) 20 milliGRAM(s) Oral three times a day  montelukast 10 milliGRAM(s) Oral daily  pantoprazole    Tablet 40 milliGRAM(s) Oral before breakfast  polyethylene glycol 3350 17 Gram(s) Oral daily  senna 2 Tablet(s) Oral at bedtime  tamsulosin 0.4 milliGRAM(s) Oral daily  tiotropium 18 MICROgram(s) Capsule 1 Capsule(s) Inhalation daily    MEDICATIONS  (PRN):  ALBUTerol/ipratropium for Nebulization 3 milliLiter(s) Nebulizer every 6 hours PRN Bronchospasm  haloperidol     Tablet 1 milliGRAM(s) Oral four times a day PRN agitation  haloperidol    Injectable 1 milliGRAM(s) IntraMuscular every 6 hours PRN Agitation  haloperidol    Injectable 1 milliGRAM(s) IV Push every 6 hours PRN Agitation  HYDROmorphone  Injectable 0.2 milliGRAM(s) IV Push every 4 hours PRN Severe Pain (7 - 10)  oxyCODONE    5 mG/acetaminophen 325 mG 1 Tablet(s) Oral every 4 hours PRN Moderate Pain (4 - 6)          Vital Signs Last 24 Hrs  T(C): 36.9 (13 Apr 2018 05:36), Max: 36.9 (13 Apr 2018 05:36)  T(F): 98.5 (13 Apr 2018 05:36), Max: 98.5 (13 Apr 2018 05:36)  HR: 65 (13 Apr 2018 05:36) (65 - 89)  BP: 147/67 (13 Apr 2018 05:36) (130/78 - 161/84)  BP(mean): --  RR: 18 (13 Apr 2018 05:36) (18 - 18)  SpO2: 98% (13 Apr 2018 05:36) (94% - 100%)  I&O's Detail    12 Apr 2018 07:01  -  13 Apr 2018 07:00  --------------------------------------------------------  IN:    Oral Fluid: 250 mL  Total IN: 250 mL    OUT:    Voided: 775 mL  Total OUT: 775 mL    Total NET: -525 mL      PHYSICAL EXAM  GEN: NAD, skin W & D  RESP: CTA ant  CV: nl S1S2  GI: soft, NT/ND, BS +  EXT: no C/C/E, + gangrenous L toe  NEURO: A & O X 3  PSYCH: calm. cooperative        EKG/ TELEM: NSR with occ VPC and 5B NSVT noted    LABS: 4/13/2018 pending                          13.5   5.97  )-----------( 180      ( 12 Apr 2018 06:15 )             40.2     PT/INR - ( 12 Apr 2018 06:15 )   PT: 11.7 SEC;   INR: 1.02          PTT - ( 12 Apr 2018 06:15 )  PTT:32.7 SEC  12 Apr 2018 06:15    143    |  108<H>  |  39<H>  ----------------------------<  77     3.7     |  23     |  2.10<H>    Ca    8.5        12 Apr 2018 06:15  Mg     2.2       12 Apr 2018 06:15

## 2018-04-13 NOTE — PROGRESS NOTE ADULT - SUBJECTIVE AND OBJECTIVE BOX
CC: dry gangrene   HPI: 77M p/w HTN crisis, found to have dry gangrene of toes with left disease worse than right; arterial duplex showing complete occlusion of L SFA. No signs of active infection as this time.    24/Overnight events: Patient seen at bedside, now s/p angiogram showing occluded left SFA and AK pop. Patient scheduled for fem-pop bypass and fem endarterectomy. Patient had recent nuclear stress test, which revealed fixed inferior and inferoseptal defects with mild ischemia. Per cardiology, patient is medically optimized for procedure and may proceed without further cardiac workup.       Objective:  Vital Signs Last 24 Hrs  T(C): 36.9 (2018 05:36), Max: 36.9 (2018 05:36)  T(F): 98.5 (2018 05:36), Max: 98.5 (2018 05:36)  HR: 65 (2018 05:36) (65 - 89)  BP: 147/67 (2018 05:36) (130/78 - 161/84)  BP(mean): --  RR: 18 (2018 05:36) (18 - 18)  SpO2: 98% (2018 05:36) (94% - 100%)    Physical Exam:  General: NAD  Respiratory: non-labored   RLE: dry gangrene unchanged    MEDICATIONS  (STANDING):  amLODIPine   Tablet 10 milliGRAM(s) Oral daily  aspirin enteric coated 81 milliGRAM(s) Oral daily  atorvastatin 80 milliGRAM(s) Oral at bedtime  buDESOnide 160 MICROgram(s)/formoterol 4.5 MICROgram(s) Inhaler 2 Puff(s) Inhalation two times a day  carvedilol 25 milliGRAM(s) Oral every 12 hours  docusate sodium 100 milliGRAM(s) Oral two times a day  heparin  Injectable 5000 Unit(s) SubCutaneous every 8 hours  hydrALAZINE 100 milliGRAM(s) Oral three times a day  isosorbide   dinitrate Tablet (ISORDIL) 20 milliGRAM(s) Oral three times a day  montelukast 10 milliGRAM(s) Oral daily  pantoprazole    Tablet 40 milliGRAM(s) Oral before breakfast  polyethylene glycol 3350 17 Gram(s) Oral daily  senna 2 Tablet(s) Oral at bedtime  tamsulosin 0.4 milliGRAM(s) Oral daily  tiotropium 18 MICROgram(s) Capsule 1 Capsule(s) Inhalation daily    MEDICATIONS  (PRN):  ALBUTerol/ipratropium for Nebulization 3 milliLiter(s) Nebulizer every 6 hours PRN Bronchospasm  haloperidol     Tablet 1 milliGRAM(s) Oral four times a day PRN agitation  haloperidol    Injectable 1 milliGRAM(s) IntraMuscular every 6 hours PRN Agitation  haloperidol    Injectable 1 milliGRAM(s) IV Push every 6 hours PRN Agitation  HYDROmorphone  Injectable 0.2 milliGRAM(s) IV Push every 4 hours PRN Severe Pain (7 - 10)  oxyCODONE    5 mG/acetaminophen 325 mG 1 Tablet(s) Oral every 4 hours PRN Moderate Pain (4 - 6)    I&O's Detail    2018 07:01  -  2018 07:00  --------------------------------------------------------  IN:    Oral Fluid: 250 mL  Total IN: 250 mL    OUT:    Voided: 775 mL  Total OUT: 775 mL    Total NET: -525 mL        Daily     Daily Weight in k.7 (2018 01:50)    LABS:                        13.1   5.84  )-----------( 185      ( 2018 06:25 )             39.6     04-13    143  |  110<H>  |  37<H>  ----------------------------<  83  4.0   |  21<L>  |  1.96<H>    Ca    8.4      2018 06:25  Mg     2.2     04-13      PT/INR - ( 2018 06:15 )   PT: 11.7 SEC;   INR: 1.02          PTT - ( 2018 06:15 )  PTT:32.7 SEC      RADIOLOGY & ADDITIONAL STUDIES:

## 2018-04-13 NOTE — PROGRESS NOTE ADULT - PROBLEM SELECTOR PLAN 1
s/p angiogram showing occluded left SFA and AK pop pending bypass and fem endarterectomy. No evidence of OM on Xray  Stress test done:  large, severe defects in inferior and inferoseptal walls that are  partially reversible, suggestive of infarct with mild partial ischemia. There was a severe defect in the diaphragmatic wall of the RV that was fixed, consistent  with RV infarct. Cleared by cardiology for vascular procedure. No further workup required from medicine or cardiology prior to procedure.   Currently pending general surgery clearance/input regarding large left femoral hernia s/p angiogram showing occluded left SFA and AK pop pending bypass and fem endarterectomy. No evidence of OM on Xray  Stress test done:  large, severe defects in inferior and inferoseptal walls that are  partially reversible, suggestive of infarct with mild partial ischemia. There was a severe defect in the diaphragmatic wall of the RV that was fixed, consistent  with RV infarct. Cleared by cardiology for vascular procedure. No further workup required from medicine or cardiology prior to procedure.   Currently pending general surgery clearance/input regarding large left femoral hernia, CT A/P ordered by surgery to eval further  Percocet PRN for pain

## 2018-04-13 NOTE — PROGRESS NOTE ADULT - PROBLEM SELECTOR PLAN 6
HSQ tid for prevention of VTE during hospitalization   ASA 81mg given risk of cardiac disease with uncontrolled hypertension No wheezing or SOB  -continue home asthma regimen including Montelukast  CXR clear and without acute pulmonary disease

## 2018-04-13 NOTE — PROGRESS NOTE ADULT - PROBLEM SELECTOR PLAN 3
currently at baseline, calm and pleasant  has episodes of agitation, ? delirium  Has capacity for medical decision making per psych  Haldol PRN ordered, goal is to avoid if possible likely due to trauma from patient rubbing eyes  Artifical tears BID

## 2018-04-13 NOTE — PROGRESS NOTE ADULT - ASSESSMENT
77M with HTN, CKD, BPH, asthma, and gastritis presents with L toe pain and acute SOB -- acute pulmonary edema in setting of HTN emergency requiring IV NTG and BiPaP now improved with better BP control, course c/b left toe dry gangrene s/p angiogram of left leg, now awaiting peripheral bypass and endarterectomy.      Problem/Plan - 1:  ·  Problem: Cardiology follow-up encounter.  Plan: Patient is normotensive on current regimen for BP control, nuclear stress test reveals no significant active ischemia, ECHO reveals no significant valvular disease.  -Patient remains an elevated risk for surgery given his comorbidities of HTN and CKD, but has been optimized and is currently not having ischemia, valvular disease, or arrythmia.      Problem/Plan - 2:  ·  Problem: Respiratory distress, acute.  Plan: Remains euvolemic, no SOB. Continue coreg, hydralazine, and isordil.      Problem/Plan - 3:  ·  Problem: Hypertensive emergency.  Plan: BP remains elevated, increased isordil to 20tid and increased coreg to 25 bid, continue norvasc at 10.      Problem/Plan - 4:  ·  Problem: Gangrene of toe.  Plan: - patient cardiac status has been evaluated, and he is optimized for bypass and fem endarterectomy.      Problem/Plan - 5:  ·  Problem: JINA (acute kidney injury).  Plan: creatinine 1.87 today, down from 2.06, will continue to monitor, hold diuretics for now.      Problem/Plan - 6:  Problem: Delirium due to another medical condition. Plan: A & O today and cooperative.    Attending Attestation:   Patient seen and examined. Agree with above assessment and plan. Patient requires fem pop bypass. Had nuclear stress test yesterday that revealed fixed inferior and inferoseptal defects with mild ischemia.  Patient medically optimized for procedure and may proceed without further cardiac workup. Continue current meds .      Electronic Signatures:  Melba Garcia (NP)  (Signed 12-Apr-2018 09:42)  	Authored: Progress Note, Subjective and Objective, Assessment and Plan  Suni Lutz)  (Signed 12-Apr-2018 11:37)  	Authored: Attending Attestation  	Co-Signer: Progress Note, Subjective and Objective, Assessment and Plan      Last Updated: 12-Apr-2018 11:37 by Suni Lutz) 77M with HTN, CKD, BPH, asthma, and gastritis presents with L toe pain and acute SOB -- acute pulmonary edema in setting of HTN emergency requiring IV NTG and BiPaP now improved with better BP control, course c/b left toe dry gangrene s/p angiogram of left leg, now awaiting peripheral bypass and endarterectomy.

## 2018-04-13 NOTE — PROGRESS NOTE ADULT - PROBLEM SELECTOR PLAN 5
No wheezing or SOB  -continue home asthma regimen including Montelukast  CXR clear and without acute pulmonary disease Cr stable (appears baseline may be from 1.5-2.2)

## 2018-04-13 NOTE — PROGRESS NOTE ADULT - ASSESSMENT
77M p/w HTN crisis, found to have dry gangrene of toes with left disease worse than right; arterial duplex showing complete occlusion of L SFA. No signs of active infection as this time.      now s/p angiogram showing occluded left SFA and AK pop  - patient scheduled for bypass and fem endarterectomy   - ptient no s/p nuclear stress test, which revealed fixed inferior and inferoseptal defects with mild ischemia. Per cardiology, patient is medically optimized for procedure and may proceed without further cardiac workup.  - pending general surgery clearance/input regarding large left femoral hernia    [facility] PGY2  [id number]

## 2018-04-13 NOTE — PROGRESS NOTE ADULT - PROBLEM SELECTOR PLAN 2
c/w isordil and carvedilol, norvasc and hydralazine BP now stable and well controlled  c/w isordil and carvedilol, norvasc and hydralazine

## 2018-04-13 NOTE — PROGRESS NOTE ADULT - ASSESSMENT
77M p/w HTN crisis, found to have dry gangrene of toes with left disease worse than right; arterial duplex showing complete occlusion of L SFA. Also has large L hernia    - Recommend CT scan with IV contrast to further characterize hernia     CINDY Hinson MD PGY 2   [id number]

## 2018-04-13 NOTE — PROGRESS NOTE ADULT - PROBLEM SELECTOR PLAN 1
Continues to remain euvolemic. Continue hydralazine and isordil.  NSVT: asymptomatic, would maintain K > 4.0 and Mg > 2.0.

## 2018-04-14 LAB
BUN SERPL-MCNC: 36 MG/DL — HIGH (ref 7–23)
CALCIUM SERPL-MCNC: 8.1 MG/DL — LOW (ref 8.4–10.5)
CHLORIDE SERPL-SCNC: 108 MMOL/L — HIGH (ref 98–107)
CO2 SERPL-SCNC: 19 MMOL/L — LOW (ref 22–31)
CREAT SERPL-MCNC: 1.95 MG/DL — HIGH (ref 0.5–1.3)
GLUCOSE SERPL-MCNC: 85 MG/DL — SIGNIFICANT CHANGE UP (ref 70–99)
HCT VFR BLD CALC: 37.8 % — LOW (ref 39–50)
HGB BLD-MCNC: 12.6 G/DL — LOW (ref 13–17)
MAGNESIUM SERPL-MCNC: 2 MG/DL — SIGNIFICANT CHANGE UP (ref 1.6–2.6)
MCHC RBC-ENTMCNC: 30.1 PG — SIGNIFICANT CHANGE UP (ref 27–34)
MCHC RBC-ENTMCNC: 33.3 % — SIGNIFICANT CHANGE UP (ref 32–36)
MCV RBC AUTO: 90.2 FL — SIGNIFICANT CHANGE UP (ref 80–100)
NRBC # FLD: 0 — SIGNIFICANT CHANGE UP
PHOSPHATE SERPL-MCNC: 3.1 MG/DL — SIGNIFICANT CHANGE UP (ref 2.5–4.5)
PLATELET # BLD AUTO: 177 K/UL — SIGNIFICANT CHANGE UP (ref 150–400)
PMV BLD: 12.4 FL — SIGNIFICANT CHANGE UP (ref 7–13)
POTASSIUM SERPL-MCNC: 3.7 MMOL/L — SIGNIFICANT CHANGE UP (ref 3.5–5.3)
POTASSIUM SERPL-SCNC: 3.7 MMOL/L — SIGNIFICANT CHANGE UP (ref 3.5–5.3)
RBC # BLD: 4.19 M/UL — LOW (ref 4.2–5.8)
RBC # FLD: 14.3 % — SIGNIFICANT CHANGE UP (ref 10.3–14.5)
SODIUM SERPL-SCNC: 141 MMOL/L — SIGNIFICANT CHANGE UP (ref 135–145)
WBC # BLD: 6.41 K/UL — SIGNIFICANT CHANGE UP (ref 3.8–10.5)
WBC # FLD AUTO: 6.41 K/UL — SIGNIFICANT CHANGE UP (ref 3.8–10.5)

## 2018-04-14 PROCEDURE — 99233 SBSQ HOSP IP/OBS HIGH 50: CPT

## 2018-04-14 PROCEDURE — 99232 SBSQ HOSP IP/OBS MODERATE 35: CPT | Mod: GC

## 2018-04-14 RX ORDER — MORPHINE SULFATE 50 MG/1
1 CAPSULE, EXTENDED RELEASE ORAL EVERY 4 HOURS
Qty: 0 | Refills: 0 | Status: DISCONTINUED | OUTPATIENT
Start: 2018-04-14 | End: 2018-04-16

## 2018-04-14 RX ORDER — HYDROMORPHONE HYDROCHLORIDE 2 MG/ML
0.2 INJECTION INTRAMUSCULAR; INTRAVENOUS; SUBCUTANEOUS EVERY 4 HOURS
Qty: 0 | Refills: 0 | Status: DISCONTINUED | OUTPATIENT
Start: 2018-04-14 | End: 2018-04-14

## 2018-04-14 RX ADMIN — ATORVASTATIN CALCIUM 80 MILLIGRAM(S): 80 TABLET, FILM COATED ORAL at 21:42

## 2018-04-14 RX ADMIN — SENNA PLUS 2 TABLET(S): 8.6 TABLET ORAL at 21:42

## 2018-04-14 RX ADMIN — TAMSULOSIN HYDROCHLORIDE 0.4 MILLIGRAM(S): 0.4 CAPSULE ORAL at 12:59

## 2018-04-14 RX ADMIN — OXYCODONE AND ACETAMINOPHEN 1 TABLET(S): 5; 325 TABLET ORAL at 20:00

## 2018-04-14 RX ADMIN — ISOSORBIDE DINITRATE 20 MILLIGRAM(S): 5 TABLET ORAL at 21:42

## 2018-04-14 RX ADMIN — Medication 1 DROP(S): at 17:08

## 2018-04-14 RX ADMIN — TIOTROPIUM BROMIDE 1 CAPSULE(S): 18 CAPSULE ORAL; RESPIRATORY (INHALATION) at 10:56

## 2018-04-14 RX ADMIN — Medication 100 MILLIGRAM(S): at 17:08

## 2018-04-14 RX ADMIN — Medication 100 MILLIGRAM(S): at 05:06

## 2018-04-14 RX ADMIN — HEPARIN SODIUM 5000 UNIT(S): 5000 INJECTION INTRAVENOUS; SUBCUTANEOUS at 05:07

## 2018-04-14 RX ADMIN — OXYCODONE AND ACETAMINOPHEN 1 TABLET(S): 5; 325 TABLET ORAL at 04:21

## 2018-04-14 RX ADMIN — OXYCODONE AND ACETAMINOPHEN 1 TABLET(S): 5; 325 TABLET ORAL at 13:20

## 2018-04-14 RX ADMIN — MONTELUKAST 10 MILLIGRAM(S): 4 TABLET, CHEWABLE ORAL at 12:59

## 2018-04-14 RX ADMIN — CARVEDILOL PHOSPHATE 25 MILLIGRAM(S): 80 CAPSULE, EXTENDED RELEASE ORAL at 05:06

## 2018-04-14 RX ADMIN — Medication 100 MILLIGRAM(S): at 21:42

## 2018-04-14 RX ADMIN — BUDESONIDE AND FORMOTEROL FUMARATE DIHYDRATE 2 PUFF(S): 160; 4.5 AEROSOL RESPIRATORY (INHALATION) at 09:45

## 2018-04-14 RX ADMIN — HEPARIN SODIUM 5000 UNIT(S): 5000 INJECTION INTRAVENOUS; SUBCUTANEOUS at 21:42

## 2018-04-14 RX ADMIN — OXYCODONE AND ACETAMINOPHEN 1 TABLET(S): 5; 325 TABLET ORAL at 05:20

## 2018-04-14 RX ADMIN — OXYCODONE AND ACETAMINOPHEN 1 TABLET(S): 5; 325 TABLET ORAL at 14:20

## 2018-04-14 RX ADMIN — Medication 1 DROP(S): at 05:07

## 2018-04-14 RX ADMIN — BUDESONIDE AND FORMOTEROL FUMARATE DIHYDRATE 2 PUFF(S): 160; 4.5 AEROSOL RESPIRATORY (INHALATION) at 21:42

## 2018-04-14 RX ADMIN — ISOSORBIDE DINITRATE 20 MILLIGRAM(S): 5 TABLET ORAL at 12:59

## 2018-04-14 RX ADMIN — Medication 81 MILLIGRAM(S): at 12:58

## 2018-04-14 RX ADMIN — PANTOPRAZOLE SODIUM 40 MILLIGRAM(S): 20 TABLET, DELAYED RELEASE ORAL at 05:06

## 2018-04-14 RX ADMIN — ISOSORBIDE DINITRATE 20 MILLIGRAM(S): 5 TABLET ORAL at 05:06

## 2018-04-14 RX ADMIN — Medication 100 MILLIGRAM(S): at 12:59

## 2018-04-14 RX ADMIN — HEPARIN SODIUM 5000 UNIT(S): 5000 INJECTION INTRAVENOUS; SUBCUTANEOUS at 12:59

## 2018-04-14 RX ADMIN — AMLODIPINE BESYLATE 10 MILLIGRAM(S): 2.5 TABLET ORAL at 05:06

## 2018-04-14 RX ADMIN — OXYCODONE AND ACETAMINOPHEN 1 TABLET(S): 5; 325 TABLET ORAL at 19:14

## 2018-04-14 NOTE — PROGRESS NOTE ADULT - SUBJECTIVE AND OBJECTIVE BOX
Patient is a 77y old  Male who presents with a chief complaint of Toe Pain (05 Apr 2018 10:42)    patient seen and examine at bed side   no acute issue  No acute events overnight.           MEDICATIONS  (STANDING):  amLODIPine   Tablet 10 milliGRAM(s) Oral daily  artificial tears (preservative free) Ophthalmic Solution 1 Drop(s) Right EYE two times a day  aspirin enteric coated 81 milliGRAM(s) Oral daily  atorvastatin 80 milliGRAM(s) Oral at bedtime  buDESOnide 160 MICROgram(s)/formoterol 4.5 MICROgram(s) Inhaler 2 Puff(s) Inhalation two times a day  carvedilol 25 milliGRAM(s) Oral every 12 hours  docusate sodium 100 milliGRAM(s) Oral two times a day  heparin  Injectable 5000 Unit(s) SubCutaneous every 8 hours  hydrALAZINE 100 milliGRAM(s) Oral three times a day  isosorbide   dinitrate Tablet (ISORDIL) 20 milliGRAM(s) Oral three times a day  montelukast 10 milliGRAM(s) Oral daily  pantoprazole    Tablet 40 milliGRAM(s) Oral before breakfast  polyethylene glycol 3350 17 Gram(s) Oral daily  senna 2 Tablet(s) Oral at bedtime  tamsulosin 0.4 milliGRAM(s) Oral daily  tiotropium 18 MICROgram(s) Capsule 1 Capsule(s) Inhalation daily    MEDICATIONS  (PRN):  ALBUTerol/ipratropium for Nebulization 3 milliLiter(s) Nebulizer every 6 hours PRN Bronchospasm  haloperidol     Tablet 1 milliGRAM(s) Oral four times a day PRN agitation  haloperidol    Injectable 1 milliGRAM(s) IntraMuscular every 6 hours PRN Agitation  haloperidol    Injectable 1 milliGRAM(s) IV Push every 6 hours PRN Agitation  morphine  - Injectable 1 milliGRAM(s) IV Push every 4 hours PRN Severe Pain (7 - 10)  oxyCODONE    5 mG/acetaminophen 325 mG 1 Tablet(s) Oral every 4 hours PRN Moderate Pain (4 - 6)      Vital Signs Last 24 Hrs  T(C): 36.8 (14 Apr 2018 05:04), Max: 36.8 (14 Apr 2018 05:04)  T(F): 98.3 (14 Apr 2018 05:04), Max: 98.3 (14 Apr 2018 05:04)  HR: 71 (14 Apr 2018 05:04) (58 - 71)  BP: 145/78 (14 Apr 2018 05:04) (132/67 - 156/75)  BP(mean): --  RR: 18 (14 Apr 2018 05:04) (18 - 18)  SpO2: 97% (14 Apr 2018 05:04) (96% - 100%)    CAPILLARY BLOOD GLUCOSE      I&O's Detail    13 Apr 2018 07:01  -  14 Apr 2018 07:00  --------------------------------------------------------  IN:    Oral Fluid: 970 mL  Total IN: 970 mL    OUT:    Voided: 650 mL  Total OUT: 650 mL    Total NET: 320 mL      14 Apr 2018 07:01  -  14 Apr 2018 12:21  --------------------------------------------------------  IN:    Oral Fluid: 240 mL  Total IN: 240 mL    OUT:    Voided: 175 mL  Total OUT: 175 mL    Total NET: 65 mL        PHYSICAL EXAM:  GENERAL: no apparent distress, on room air, disheveled, frail appearance  HEENT: right eye sclera with erythema but no discharge, soft, mobile, round mass over left TMJ nontender to palpation (likely lipoma)  CHEST/LUNG: Clear to auscultation bilaterally; No wheezing or crackles  HEART: s1/s2, no murmurs  ABDOMEN: Soft, Nontender, Nondistended; Bowel sounds present  EXTREMITIES:  legs warm to touch, no palpable pulses on Left, dusky appearance of toes on left foot with dry gangrene of 2nd digit of left foot. no peripheral edema, untrimmed toenails  NEUROLOGY: awake, alert, responds to Qs appropriately  PSYCH: pleasant, oriented to self and place, knows why he is in hospital    LABS:                                         12.6   6.41  )-----------( 177      ( 14 Apr 2018 05:27 )             37.8       04-14    141  |  108<H>  |  36<H>  ----------------------------<  85  3.7   |  19<L>  |  1.95<H>    Ca    8.1<L>      14 Apr 2018 05:27  Phos  3.1     04-14  Mg     2.0     04-14          RADIOLOGY & ADDITIONAL TESTS:

## 2018-04-14 NOTE — PROGRESS NOTE ADULT - ASSESSMENT
77M with HTN, CKD, BPH, asthma, and gastritis presents with L toe pain and acute SOB. Found to be in acute pulmonary edema in setting of HTN emergency requiring IV NTG and BiPaP now improved with better BP control. Course c/b left toe dry gangrene s/p angiogram of left leg, now awaiting peripheral bypass and endarterectomy.       1. Gangrene of toe.     s/p angiogram showing occluded left SFA and AK pop pending bypass and fem endarterectomy. No evidence of OM on Xray  Stress test done:  large, severe defects in inferior and inferoseptal walls that are  partially reversible, suggestive of infarct with mild partial ischemia. There was a severe defect in the diaphragmatic wall of the RV that was fixed, consistent  with RV infarct. Cleared by cardiology for vascular procedure. No further workup required from medicine or cardiology prior to procedure.   Currently pending general surgery clearance/input regarding large left femoral hernia, CT A/P ordered by surgery to eval further  Percocet PRN for pain.    2.Hypertensive emergency.     BP now stable and well controlled  c/w isordil and carvedilol, norvasc and hydralazine.    3. Scleral hemorrhage, right.   likely due to trauma from patient rubbing eyes  Artifical tears BID.    4. Encephalopathy. resolve  Haldol PRN ordered, goal is to avoid if possible.    5.CKD (chronic kidney disease).    Cr stable (appears baseline may be from 1.5-2.2).    6.Asthma.   No wheezing or SOB  -continue home asthma regimen including Montelukast  CXR clear and without acute pulmonary disease.    Gi and dvt prophylaxis    Attending Attestation:   Dispo: pending vascular bypass procedure, PT eval .

## 2018-04-14 NOTE — PROGRESS NOTE ADULT - ATTENDING COMMENTS
Robin Issa is a 77 year old man with history of DM, HTN, HLD, asthma and pipe smoker who presents with L foot/toe pain for past few weeks. There was pain, swelling, discoloration of left sided toes and increased pain while walking. BP in the ED was as high as 237/118 mm Hg. It was unclear if the patient was compliant with medication. There was acute SOB, progressing to respiratory distress with evidence of acute pulmonary edema. He was placed on biPAP and started on intravenous nitroglycerin. BP initially decreased to 70-80's mm Hg on starting dose of  nitroglycerin and then rebounded back to 200's/90's mm Hg after down-titration of dose. Initial cardiac isoenzymes were not elevated. Serum pro BNP was 3746 pg/mL. Initial ECG showed sinus bradycardia with PVCs, RBBB, and  ST-segment depression in anterolateral leads. TTE done 4/5/18 showed mitral annular calcification, otherwise normal mitral valve, with mild regurgitation. The aortic root was normal. The aortic valve was calcified and trileaflet, with normal opening. The left atrium appeared normal. There was concentric left ventricular hypertrophy with mild segmental LV systolic dysfunction. There was hypokinesis of the basal inferior wall. The right atrium appeared normal. The right ventricle was normal in size and function. The tricuspid and pulmonic valves were normal. There was minimal tricuspid regurgitation. The pericardium appeared normal, with no pericardial effusion. He received furosemide (Lasix 40mg) IV and hydralazine IV x2 doses. Nitroglycerin 20 mCg/min IV was tapered and discontinued on 4/6/18.  BiPAP was discontinued on 4/6/18. Peripheral angiogram done 4/10/18 showed CFA occluded, Profunda patent, SFA occluded, AK pop occluded, BK pop patent, TP trunk patent, PT patent to the foot, Peroneal patent to the foot, AT occluded, DP occluded.  [id number] angiography noted WENCESLAO: patent bilaterally, Ext IA patent bilaterally. As of 4/14/18, overall BP control is improved. He is awaiting peripheral bypass and endarterectomy. Standing regimen includes: amlodipine(Norvasc) 10 mg daily, EC aspirin 81 mg daily, atorvastatin (Lipitor) 80 mg daily at bedtime, budesonide 160 mCg)/formoterol 4.5 mCg Inhaler 2 Puffs twice daily, carvedilol (Coreg) 25 mg every 12 hours, docusate sodium 100 mg twice daily, heparin 5000 Units Subcutaneously every 8 hours, hydralazine 100 mg by mouth three times daily, isosorbide   dinitrate (Isordil) 20 mg by mouth three times daily, montelukast 10 mg daily, pantoprazole (Protonix 40 mg daily by mouth before breakfast, polyethylene glycol 3350 17 Gm by mouth daily, senna 2 Tablet(s) by mouth at bedtime, tamsulosin (Flomax) 0.4 mg daily and tiotropium 18 mCg Capsule 1 Capsule Inhalation daily.

## 2018-04-14 NOTE — PROGRESS NOTE ADULT - PROBLEM SELECTOR PLAN 1
Patient admitted with acute systolic HF exacerbation (HFrEF) R/T HTN emergency, now appears euvolemic. Continue hydralazine and isordil. Strict I & O

## 2018-04-14 NOTE — PROGRESS NOTE ADULT - ASSESSMENT
77M with HTN, CKD, BPH, asthma, and gastritis presents with L toe pain and acute SOB -- acute pulmonary edema in setting of HTN emergency requiring IV NTG and BiPaP now improved with better BP control, course c/b left toe dry gangrene s/p angiogram of left leg, now awaiting peripheral bypass and endarterectomy.

## 2018-04-14 NOTE — PROGRESS NOTE ADULT - PROBLEM SELECTOR PLAN 3
Awaiting planned peripheral bypass and endarterectomy, optimized from cardiac perspective for planned surgery.

## 2018-04-14 NOTE — PROGRESS NOTE ADULT - SUBJECTIVE AND OBJECTIVE BOX
Subjective/Objective: Patient asleep at present, appears comfortable. Per RN no acute overnight events.    MEDICATIONS  (STANDING):  amLODIPine   Tablet 10 milliGRAM(s) Oral daily  artificial tears (preservative free) Ophthalmic Solution 1 Drop(s) Right EYE two times a day  aspirin enteric coated 81 milliGRAM(s) Oral daily  atorvastatin 80 milliGRAM(s) Oral at bedtime  buDESOnide 160 MICROgram(s)/formoterol 4.5 MICROgram(s) Inhaler 2 Puff(s) Inhalation two times a day  carvedilol 25 milliGRAM(s) Oral every 12 hours  docusate sodium 100 milliGRAM(s) Oral two times a day  heparin  Injectable 5000 Unit(s) SubCutaneous every 8 hours  hydrALAZINE 100 milliGRAM(s) Oral three times a day  isosorbide   dinitrate Tablet (ISORDIL) 20 milliGRAM(s) Oral three times a day  montelukast 10 milliGRAM(s) Oral daily  pantoprazole    Tablet 40 milliGRAM(s) Oral before breakfast  polyethylene glycol 3350 17 Gram(s) Oral daily  senna 2 Tablet(s) Oral at bedtime  tamsulosin 0.4 milliGRAM(s) Oral daily  tiotropium 18 MICROgram(s) Capsule 1 Capsule(s) Inhalation daily    MEDICATIONS  (PRN):  ALBUTerol/ipratropium for Nebulization 3 milliLiter(s) Nebulizer every 6 hours PRN Bronchospasm  haloperidol     Tablet 1 milliGRAM(s) Oral four times a day PRN agitation  haloperidol    Injectable 1 milliGRAM(s) IntraMuscular every 6 hours PRN Agitation  haloperidol    Injectable 1 milliGRAM(s) IV Push every 6 hours PRN Agitation  morphine  - Injectable 1 milliGRAM(s) IV Push every 4 hours PRN Severe Pain (7 - 10)  oxyCODONE    5 mG/acetaminophen 325 mG 1 Tablet(s) Oral every 4 hours PRN Moderate Pain (4 - 6)          Vital Signs Last 24 Hrs  T(C): 36.8 (14 Apr 2018 05:04), Max: 36.8 (13 Apr 2018 10:15)  T(F): 98.3 (14 Apr 2018 05:04), Max: 98.3 (13 Apr 2018 10:15)  HR: 71 (14 Apr 2018 05:04) (58 - 71)  BP: 145/78 (14 Apr 2018 05:04) (128/62 - 156/75)  BP(mean): --  RR: 18 (14 Apr 2018 05:04) (17 - 18)  SpO2: 97% (14 Apr 2018 05:04) (96% - 100%)  I&O's Detail    13 Apr 2018 07:01  -  14 Apr 2018 07:00  --------------------------------------------------------  IN:    Oral Fluid: 970 mL  Total IN: 970 mL    OUT:    Voided: 650 mL  Total OUT: 650 mL    Total NET: 320 mL    PHYSICAL EXAM  GEN:  RESP:  CV:  GI:  EXT:  NEURO:  PSYCH:        EKG/ TELEM: [facility]    LABS:                          12.6   6.41  )-----------( 177      ( 14 Apr 2018 05:27 )             37.8       14 Apr 2018 05:27    141    |  108<H>  |  36<H>  ----------------------------<  85     3.7     |  19<L>  |  1.95<H>    13 Apr 2018 06:25    143    |  110<H>  |  37<H>  ----------------------------<  83     4.0     |  21<L>  |  1.96<H>    Ca    8.1<L>      14 Apr 2018 05:27  Ca    8.4        13 Apr 2018 06:25  Phos  3.1       14 Apr 2018 05:27  Mg     2.0       14 Apr 2018 05:27  Mg     2.2       13 Apr 2018 06:25

## 2018-04-15 LAB
BUN SERPL-MCNC: 32 MG/DL — HIGH (ref 7–23)
CALCIUM SERPL-MCNC: 8.2 MG/DL — LOW (ref 8.4–10.5)
CHLORIDE SERPL-SCNC: 107 MMOL/L — SIGNIFICANT CHANGE UP (ref 98–107)
CO2 SERPL-SCNC: 21 MMOL/L — LOW (ref 22–31)
CREAT SERPL-MCNC: 1.78 MG/DL — HIGH (ref 0.5–1.3)
GLUCOSE SERPL-MCNC: 80 MG/DL — SIGNIFICANT CHANGE UP (ref 70–99)
HCT VFR BLD CALC: 35.4 % — LOW (ref 39–50)
HGB BLD-MCNC: 11.7 G/DL — LOW (ref 13–17)
MAGNESIUM SERPL-MCNC: 1.9 MG/DL — SIGNIFICANT CHANGE UP (ref 1.6–2.6)
MCHC RBC-ENTMCNC: 29.6 PG — SIGNIFICANT CHANGE UP (ref 27–34)
MCHC RBC-ENTMCNC: 33.1 % — SIGNIFICANT CHANGE UP (ref 32–36)
MCV RBC AUTO: 89.6 FL — SIGNIFICANT CHANGE UP (ref 80–100)
NRBC # FLD: 0 — SIGNIFICANT CHANGE UP
PHOSPHATE SERPL-MCNC: 2.9 MG/DL — SIGNIFICANT CHANGE UP (ref 2.5–4.5)
PLATELET # BLD AUTO: 194 K/UL — SIGNIFICANT CHANGE UP (ref 150–400)
PMV BLD: 13 FL — SIGNIFICANT CHANGE UP (ref 7–13)
POTASSIUM SERPL-MCNC: 3.8 MMOL/L — SIGNIFICANT CHANGE UP (ref 3.5–5.3)
POTASSIUM SERPL-SCNC: 3.8 MMOL/L — SIGNIFICANT CHANGE UP (ref 3.5–5.3)
RBC # BLD: 3.95 M/UL — LOW (ref 4.2–5.8)
RBC # FLD: 14.6 % — HIGH (ref 10.3–14.5)
SODIUM SERPL-SCNC: 140 MMOL/L — SIGNIFICANT CHANGE UP (ref 135–145)
WBC # BLD: 5.38 K/UL — SIGNIFICANT CHANGE UP (ref 3.8–10.5)
WBC # FLD AUTO: 5.38 K/UL — SIGNIFICANT CHANGE UP (ref 3.8–10.5)

## 2018-04-15 PROCEDURE — 99233 SBSQ HOSP IP/OBS HIGH 50: CPT

## 2018-04-15 RX ORDER — OXYCODONE AND ACETAMINOPHEN 5; 325 MG/1; MG/1
1 TABLET ORAL EVERY 4 HOURS
Qty: 0 | Refills: 0 | Status: DISCONTINUED | OUTPATIENT
Start: 2018-04-15 | End: 2018-04-22

## 2018-04-15 RX ADMIN — OXYCODONE AND ACETAMINOPHEN 1 TABLET(S): 5; 325 TABLET ORAL at 17:04

## 2018-04-15 RX ADMIN — HEPARIN SODIUM 5000 UNIT(S): 5000 INJECTION INTRAVENOUS; SUBCUTANEOUS at 05:27

## 2018-04-15 RX ADMIN — MONTELUKAST 10 MILLIGRAM(S): 4 TABLET, CHEWABLE ORAL at 12:00

## 2018-04-15 RX ADMIN — Medication 100 MILLIGRAM(S): at 05:26

## 2018-04-15 RX ADMIN — OXYCODONE AND ACETAMINOPHEN 1 TABLET(S): 5; 325 TABLET ORAL at 18:00

## 2018-04-15 RX ADMIN — Medication 100 MILLIGRAM(S): at 12:00

## 2018-04-15 RX ADMIN — OXYCODONE AND ACETAMINOPHEN 1 TABLET(S): 5; 325 TABLET ORAL at 12:30

## 2018-04-15 RX ADMIN — ISOSORBIDE DINITRATE 20 MILLIGRAM(S): 5 TABLET ORAL at 05:26

## 2018-04-15 RX ADMIN — HEPARIN SODIUM 5000 UNIT(S): 5000 INJECTION INTRAVENOUS; SUBCUTANEOUS at 20:44

## 2018-04-15 RX ADMIN — BUDESONIDE AND FORMOTEROL FUMARATE DIHYDRATE 2 PUFF(S): 160; 4.5 AEROSOL RESPIRATORY (INHALATION) at 10:25

## 2018-04-15 RX ADMIN — CARVEDILOL PHOSPHATE 25 MILLIGRAM(S): 80 CAPSULE, EXTENDED RELEASE ORAL at 05:26

## 2018-04-15 RX ADMIN — ATORVASTATIN CALCIUM 80 MILLIGRAM(S): 80 TABLET, FILM COATED ORAL at 20:44

## 2018-04-15 RX ADMIN — Medication 1 DROP(S): at 17:04

## 2018-04-15 RX ADMIN — SENNA PLUS 2 TABLET(S): 8.6 TABLET ORAL at 20:44

## 2018-04-15 RX ADMIN — TIOTROPIUM BROMIDE 1 CAPSULE(S): 18 CAPSULE ORAL; RESPIRATORY (INHALATION) at 10:25

## 2018-04-15 RX ADMIN — BUDESONIDE AND FORMOTEROL FUMARATE DIHYDRATE 2 PUFF(S): 160; 4.5 AEROSOL RESPIRATORY (INHALATION) at 20:44

## 2018-04-15 RX ADMIN — OXYCODONE AND ACETAMINOPHEN 1 TABLET(S): 5; 325 TABLET ORAL at 01:32

## 2018-04-15 RX ADMIN — PANTOPRAZOLE SODIUM 40 MILLIGRAM(S): 20 TABLET, DELAYED RELEASE ORAL at 05:27

## 2018-04-15 RX ADMIN — AMLODIPINE BESYLATE 10 MILLIGRAM(S): 2.5 TABLET ORAL at 05:27

## 2018-04-15 RX ADMIN — Medication 100 MILLIGRAM(S): at 20:44

## 2018-04-15 RX ADMIN — Medication 81 MILLIGRAM(S): at 12:00

## 2018-04-15 RX ADMIN — OXYCODONE AND ACETAMINOPHEN 1 TABLET(S): 5; 325 TABLET ORAL at 02:20

## 2018-04-15 RX ADMIN — Medication 1 DROP(S): at 05:27

## 2018-04-15 RX ADMIN — ISOSORBIDE DINITRATE 20 MILLIGRAM(S): 5 TABLET ORAL at 12:00

## 2018-04-15 RX ADMIN — OXYCODONE AND ACETAMINOPHEN 1 TABLET(S): 5; 325 TABLET ORAL at 13:30

## 2018-04-15 RX ADMIN — TAMSULOSIN HYDROCHLORIDE 0.4 MILLIGRAM(S): 0.4 CAPSULE ORAL at 12:00

## 2018-04-15 RX ADMIN — HEPARIN SODIUM 5000 UNIT(S): 5000 INJECTION INTRAVENOUS; SUBCUTANEOUS at 12:00

## 2018-04-15 NOTE — PROGRESS NOTE ADULT - SUBJECTIVE AND OBJECTIVE BOX
Patient is a 77y old  Male who presents with a chief complaint of Toe Pain (05 Apr 2018 10:42)    patient seen and examine at bed side   complaining about pain in toe   and get better with the medications           MEDICATIONS  (STANDING):  amLODIPine   Tablet 10 milliGRAM(s) Oral daily  artificial tears (preservative free) Ophthalmic Solution 1 Drop(s) Right EYE two times a day  aspirin enteric coated 81 milliGRAM(s) Oral daily  atorvastatin 80 milliGRAM(s) Oral at bedtime  buDESOnide 160 MICROgram(s)/formoterol 4.5 MICROgram(s) Inhaler 2 Puff(s) Inhalation two times a day  carvedilol 25 milliGRAM(s) Oral every 12 hours  docusate sodium 100 milliGRAM(s) Oral two times a day  heparin  Injectable 5000 Unit(s) SubCutaneous every 8 hours  hydrALAZINE 100 milliGRAM(s) Oral three times a day  isosorbide   dinitrate Tablet (ISORDIL) 20 milliGRAM(s) Oral three times a day  montelukast 10 milliGRAM(s) Oral daily  pantoprazole    Tablet 40 milliGRAM(s) Oral before breakfast  polyethylene glycol 3350 17 Gram(s) Oral daily  senna 2 Tablet(s) Oral at bedtime  tamsulosin 0.4 milliGRAM(s) Oral daily  tiotropium 18 MICROgram(s) Capsule 1 Capsule(s) Inhalation daily    MEDICATIONS  (PRN):  ALBUTerol/ipratropium for Nebulization 3 milliLiter(s) Nebulizer every 6 hours PRN Bronchospasm  haloperidol     Tablet 1 milliGRAM(s) Oral four times a day PRN agitation  haloperidol    Injectable 1 milliGRAM(s) IntraMuscular every 6 hours PRN Agitation  haloperidol    Injectable 1 milliGRAM(s) IV Push every 6 hours PRN Agitation  morphine  - Injectable 1 milliGRAM(s) IV Push every 4 hours PRN Severe Pain (7 - 10)  oxyCODONE    5 mG/acetaminophen 325 mG 1 Tablet(s) Oral every 4 hours PRN Moderate Pain (4 - 6)          Vital Signs Last 24 Hrs  T(C): 36.4 (15 Apr 2018 05:25), Max: 37 (14 Apr 2018 21:40)  T(F): 97.6 (15 Apr 2018 05:25), Max: 98.6 (14 Apr 2018 21:40)  HR: 64 (15 Apr 2018 11:58) (55 - 77)  BP: 139/72 (15 Apr 2018 11:58) (124/75 - 153/68)  BP(mean): --  RR: 16 (15 Apr 2018 05:25) (16 - 18)  SpO2: 98% (15 Apr 2018 05:25) (98% - 100%)          PHYSICAL EXAM:  GENERAL: no apparent distress, on room air, disheveled, frail appearance  HEENT: right eye sclera with erythema but no discharge, soft, mobile, round mass over left TMJ nontender to palpation (likely lipoma)  CHEST/LUNG: Clear to auscultation bilaterally; No wheezing or crackles  HEART: s1/s2, no murmurs  ABDOMEN: Soft, Nontender, Nondistended; Bowel sounds present  EXTREMITIES:  legs warm to touch, no palpable pulses on Left, dusky appearance of toes on left foot with dry gangrene of 2nd digit of left foot. no peripheral edema, untrimmed toenails  NEUROLOGY: awake, alert, responds to Qs appropriately  PSYCH: pleasant, oriented to self and place, knows why he is in hospital    LABS:                                                      11.7   5.38  )-----------( 194      ( 15 Apr 2018 05:50 )             35.4       04-15    140  |  107  |  32<H>  ----------------------------<  80  3.8   |  21<L>  |  1.78<H>    Ca    8.2<L>      15 Apr 2018 05:50  Phos  2.9     04-15  Mg     1.9     04-15          RADIOLOGY & ADDITIONAL TESTS:

## 2018-04-16 DIAGNOSIS — F20.9 SCHIZOPHRENIA, UNSPECIFIED: ICD-10-CM

## 2018-04-16 DIAGNOSIS — I47.2 VENTRICULAR TACHYCARDIA: ICD-10-CM

## 2018-04-16 LAB
BUN SERPL-MCNC: 26 MG/DL — HIGH (ref 7–23)
CALCIUM SERPL-MCNC: 8 MG/DL — LOW (ref 8.4–10.5)
CHLORIDE SERPL-SCNC: 107 MMOL/L — SIGNIFICANT CHANGE UP (ref 98–107)
CO2 SERPL-SCNC: 21 MMOL/L — LOW (ref 22–31)
CREAT SERPL-MCNC: 1.63 MG/DL — HIGH (ref 0.5–1.3)
GLUCOSE SERPL-MCNC: 77 MG/DL — SIGNIFICANT CHANGE UP (ref 70–99)
HCT VFR BLD CALC: 38 % — LOW (ref 39–50)
HGB BLD-MCNC: 12.5 G/DL — LOW (ref 13–17)
MAGNESIUM SERPL-MCNC: 2 MG/DL — SIGNIFICANT CHANGE UP (ref 1.6–2.6)
MCHC RBC-ENTMCNC: 29.9 PG — SIGNIFICANT CHANGE UP (ref 27–34)
MCHC RBC-ENTMCNC: 32.9 % — SIGNIFICANT CHANGE UP (ref 32–36)
MCV RBC AUTO: 90.9 FL — SIGNIFICANT CHANGE UP (ref 80–100)
NRBC # FLD: 0 — SIGNIFICANT CHANGE UP
PHOSPHATE SERPL-MCNC: 2.9 MG/DL — SIGNIFICANT CHANGE UP (ref 2.5–4.5)
PLATELET # BLD AUTO: 205 K/UL — SIGNIFICANT CHANGE UP (ref 150–400)
PMV BLD: 12.8 FL — SIGNIFICANT CHANGE UP (ref 7–13)
POTASSIUM SERPL-MCNC: 3.6 MMOL/L — SIGNIFICANT CHANGE UP (ref 3.5–5.3)
POTASSIUM SERPL-SCNC: 3.6 MMOL/L — SIGNIFICANT CHANGE UP (ref 3.5–5.3)
RBC # BLD: 4.18 M/UL — LOW (ref 4.2–5.8)
RBC # FLD: 14.5 % — SIGNIFICANT CHANGE UP (ref 10.3–14.5)
SODIUM SERPL-SCNC: 139 MMOL/L — SIGNIFICANT CHANGE UP (ref 135–145)
WBC # BLD: 5.18 K/UL — SIGNIFICANT CHANGE UP (ref 3.8–10.5)
WBC # FLD AUTO: 5.18 K/UL — SIGNIFICANT CHANGE UP (ref 3.8–10.5)

## 2018-04-16 PROCEDURE — 99233 SBSQ HOSP IP/OBS HIGH 50: CPT

## 2018-04-16 PROCEDURE — 99232 SBSQ HOSP IP/OBS MODERATE 35: CPT

## 2018-04-16 RX ORDER — QUETIAPINE FUMARATE 200 MG/1
25 TABLET, FILM COATED ORAL AT BEDTIME
Qty: 0 | Refills: 0 | Status: DISCONTINUED | OUTPATIENT
Start: 2018-04-16 | End: 2018-04-25

## 2018-04-16 RX ORDER — OXYCODONE AND ACETAMINOPHEN 5; 325 MG/1; MG/1
2 TABLET ORAL EVERY 4 HOURS
Qty: 0 | Refills: 0 | Status: DISCONTINUED | OUTPATIENT
Start: 2018-04-16 | End: 2018-04-23

## 2018-04-16 RX ORDER — POTASSIUM CHLORIDE 20 MEQ
40 PACKET (EA) ORAL ONCE
Qty: 0 | Refills: 0 | Status: COMPLETED | OUTPATIENT
Start: 2018-04-16 | End: 2018-04-16

## 2018-04-16 RX ADMIN — CARVEDILOL PHOSPHATE 25 MILLIGRAM(S): 80 CAPSULE, EXTENDED RELEASE ORAL at 17:17

## 2018-04-16 RX ADMIN — HEPARIN SODIUM 5000 UNIT(S): 5000 INJECTION INTRAVENOUS; SUBCUTANEOUS at 13:40

## 2018-04-16 RX ADMIN — OXYCODONE AND ACETAMINOPHEN 1 TABLET(S): 5; 325 TABLET ORAL at 12:56

## 2018-04-16 RX ADMIN — MORPHINE SULFATE 1 MILLIGRAM(S): 50 CAPSULE, EXTENDED RELEASE ORAL at 05:30

## 2018-04-16 RX ADMIN — Medication 1 DROP(S): at 05:14

## 2018-04-16 RX ADMIN — HEPARIN SODIUM 5000 UNIT(S): 5000 INJECTION INTRAVENOUS; SUBCUTANEOUS at 20:55

## 2018-04-16 RX ADMIN — Medication 1 DROP(S): at 17:17

## 2018-04-16 RX ADMIN — ISOSORBIDE DINITRATE 20 MILLIGRAM(S): 5 TABLET ORAL at 20:55

## 2018-04-16 RX ADMIN — MORPHINE SULFATE 1 MILLIGRAM(S): 50 CAPSULE, EXTENDED RELEASE ORAL at 05:15

## 2018-04-16 RX ADMIN — ATORVASTATIN CALCIUM 80 MILLIGRAM(S): 80 TABLET, FILM COATED ORAL at 20:55

## 2018-04-16 RX ADMIN — OXYCODONE AND ACETAMINOPHEN 1 TABLET(S): 5; 325 TABLET ORAL at 11:56

## 2018-04-16 RX ADMIN — Medication 100 MILLIGRAM(S): at 05:14

## 2018-04-16 RX ADMIN — BUDESONIDE AND FORMOTEROL FUMARATE DIHYDRATE 2 PUFF(S): 160; 4.5 AEROSOL RESPIRATORY (INHALATION) at 09:43

## 2018-04-16 RX ADMIN — ISOSORBIDE DINITRATE 20 MILLIGRAM(S): 5 TABLET ORAL at 05:14

## 2018-04-16 RX ADMIN — OXYCODONE AND ACETAMINOPHEN 1 TABLET(S): 5; 325 TABLET ORAL at 17:32

## 2018-04-16 RX ADMIN — TAMSULOSIN HYDROCHLORIDE 0.4 MILLIGRAM(S): 0.4 CAPSULE ORAL at 13:37

## 2018-04-16 RX ADMIN — QUETIAPINE FUMARATE 25 MILLIGRAM(S): 200 TABLET, FILM COATED ORAL at 20:55

## 2018-04-16 RX ADMIN — PANTOPRAZOLE SODIUM 40 MILLIGRAM(S): 20 TABLET, DELAYED RELEASE ORAL at 05:14

## 2018-04-16 RX ADMIN — MORPHINE SULFATE 1 MILLIGRAM(S): 50 CAPSULE, EXTENDED RELEASE ORAL at 10:07

## 2018-04-16 RX ADMIN — Medication 81 MILLIGRAM(S): at 13:37

## 2018-04-16 RX ADMIN — TIOTROPIUM BROMIDE 1 CAPSULE(S): 18 CAPSULE ORAL; RESPIRATORY (INHALATION) at 09:44

## 2018-04-16 RX ADMIN — Medication 100 MILLIGRAM(S): at 20:54

## 2018-04-16 RX ADMIN — AMLODIPINE BESYLATE 10 MILLIGRAM(S): 2.5 TABLET ORAL at 05:14

## 2018-04-16 RX ADMIN — Medication 40 MILLIEQUIVALENT(S): at 10:26

## 2018-04-16 RX ADMIN — SENNA PLUS 2 TABLET(S): 8.6 TABLET ORAL at 20:55

## 2018-04-16 RX ADMIN — CARVEDILOL PHOSPHATE 25 MILLIGRAM(S): 80 CAPSULE, EXTENDED RELEASE ORAL at 05:14

## 2018-04-16 RX ADMIN — Medication 100 MILLIGRAM(S): at 13:38

## 2018-04-16 RX ADMIN — OXYCODONE AND ACETAMINOPHEN 1 TABLET(S): 5; 325 TABLET ORAL at 16:32

## 2018-04-16 RX ADMIN — MONTELUKAST 10 MILLIGRAM(S): 4 TABLET, CHEWABLE ORAL at 13:37

## 2018-04-16 RX ADMIN — MORPHINE SULFATE 1 MILLIGRAM(S): 50 CAPSULE, EXTENDED RELEASE ORAL at 10:22

## 2018-04-16 RX ADMIN — HEPARIN SODIUM 5000 UNIT(S): 5000 INJECTION INTRAVENOUS; SUBCUTANEOUS at 05:14

## 2018-04-16 RX ADMIN — BUDESONIDE AND FORMOTEROL FUMARATE DIHYDRATE 2 PUFF(S): 160; 4.5 AEROSOL RESPIRATORY (INHALATION) at 20:54

## 2018-04-16 NOTE — PROGRESS NOTE BEHAVIORAL HEALTH - CASE SUMMARY
77M domiciled alone in [place] 8 housing, No known  past psychiatric of schizophrenia, unknown  treatment, hospitalization, suicide attempt, or substance use. 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 No signs of active infection as this time. Has scheduled stress test angiogram on Tuesday 4/10. Psychiatry consulted for agitation/ capacity. Pt continues to have capacity to participate in his discharge planning.

## 2018-04-16 NOTE — PROGRESS NOTE BEHAVIORAL HEALTH - NSBHCONSULTOBSREASON_PSY_A_CORE FT
no indication enhanced  supervision

## 2018-04-16 NOTE — PROGRESS NOTE BEHAVIORAL HEALTH - AXIS III
DM, HTN, CKD, BPH, gastritis asthma

## 2018-04-16 NOTE — PROGRESS NOTE ADULT - ASSESSMENT
77M with HTN, CKD, BPH, asthma, and gastritis presents with L toe pain and acute SOB. Found to be in acute pulmonary edema in setting of HTN emergency requiring IV NTG and BiPaP now improved with better BP control. Course c/b left toe dry gangrene s/p angiogram of left leg, now awaiting peripheral bypass and endarterectomy.

## 2018-04-16 NOTE — PROGRESS NOTE ADULT - SUBJECTIVE AND OBJECTIVE BOX
Patient is a 77y old  Male who presents with a chief complaint of Toe Pain (05 Apr 2018 10:42)       INTERVAL HPI/OVERNIGHT EVENTS:  Patient seen and evaluated at bedside.  Pt is resting comfortable in NAD. Denies N/V/F/C.  Pain rated at X/10    Allergies    No Known Allergies    Intolerances        Vital Signs Last 24 Hrs  T(C): 36.4 (16 Apr 2018 05:12), Max: 36.4 (15 Apr 2018 14:35)  T(F): 97.5 (16 Apr 2018 05:12), Max: 97.6 (15 Apr 2018 14:35)  HR: 66 (16 Apr 2018 05:12) (55 - 66)  BP: 169/88 (16 Apr 2018 05:12) (113/44 - 169/88)  BP(mean): --  RR: 16 (16 Apr 2018 05:12) (16 - 16)  SpO2: 99% (16 Apr 2018 05:12) (99% - 100%)    LABS:                        12.5   5.18  )-----------( 205      ( 16 Apr 2018 06:48 )             38.0     04-16    139  |  107  |  26<H>  ----------------------------<  77  3.6   |  21<L>  |  1.63<H>    Ca    8.0<L>      16 Apr 2018 06:48  Phos  2.9     04-16  Mg     2.0     04-16          CAPILLARY BLOOD GLUCOSE          Lower Extremity Physical Exam:  Vascular: DP/PT 0/4, B/L, CFT <3 seconds B/L w/ exception to LF 2nd toe no CFT, Temperature gradient WNL, B/L.   Neuro: Epicritic sensation intact to the level of foot, B/L based on pain on palpation, difficult to otherwise assess  Skin: dry gangrenous distal tip LF 2nd toe w/ some dependent edema noted nonpitting, no SOI no purulence no drainage, no malodor, no erythema nor streaking, slight opening distal 2nd toe clinically correlates w/ XR subq     RADIOLOGY & ADDITIONAL TESTS:

## 2018-04-16 NOTE — PROGRESS NOTE ADULT - PROBLEM SELECTOR PLAN 1
s/p angiogram showing occluded left SFA and AK pop pending bypass and fem endarterectomy. No evidence of OM on Xray  Stress test done:  large, severe defects in inferior and inferoseptal walls that are  partially reversible, suggestive of infarct with mild partial ischemia. There was a severe defect in the diaphragmatic wall of the RV that was fixed, consistent  with RV infarct. Cleared by cardiology for vascular procedure. No further workup required from medicine or cardiology prior to procedure.   Currently pending general surgery clearance/input regarding large left femoral hernia  Percocet PRN for pain

## 2018-04-16 NOTE — CHART NOTE - NSCHARTNOTEFT_GEN_A_CORE
Patient with 39 beats of NSVT asymptomatic. K repleted. Patient was transferred to vascular surgery service.  d/w vascular #[id number] aware of telemetry events.

## 2018-04-16 NOTE — PROGRESS NOTE BEHAVIORAL HEALTH - NSBHCONSULTMEDAGITATION_PSY_A_CORE FT
Haldol 1mg PO/IV/IM  offer Po first if not available IV, if still not available IM

## 2018-04-16 NOTE — PROGRESS NOTE ADULT - ASSESSMENT
76yo M w/ LF 2nd toe tip dry gangrene  ·	Pt seen evaluated  ·	No acute SOI at this time, stable dry gangrene, labs and vitals stable  ·	f/u bypass  ·	No pod surgical intervention warranted at this point by podiatry  ·	Daily betadine dressing to left foot 2nd digit w/ dsd  ·	D/w attending

## 2018-04-16 NOTE — PROGRESS NOTE ADULT - SUBJECTIVE AND OBJECTIVE BOX
Patient is a 77y old  Male who presents with a chief complaint of Toe Pain (05 Apr 2018 10:42)        SUBJECTIVE / OVERNIGHT EVENTS:    pt c/o SOB, just ambulated back to bed from bathroom  he denies cp/palpitations    Tele: 39 beats NSVT ~ 9am    MEDICATIONS  (STANDING):  amLODIPine   Tablet 10 milliGRAM(s) Oral daily  artificial tears (preservative free) Ophthalmic Solution 1 Drop(s) Right EYE two times a day  aspirin enteric coated 81 milliGRAM(s) Oral daily  atorvastatin 80 milliGRAM(s) Oral at bedtime  buDESOnide 160 MICROgram(s)/formoterol 4.5 MICROgram(s) Inhaler 2 Puff(s) Inhalation two times a day  carvedilol 25 milliGRAM(s) Oral every 12 hours  docusate sodium 100 milliGRAM(s) Oral two times a day  heparin  Injectable 5000 Unit(s) SubCutaneous every 8 hours  hydrALAZINE 100 milliGRAM(s) Oral three times a day  isosorbide   dinitrate Tablet (ISORDIL) 20 milliGRAM(s) Oral three times a day  montelukast 10 milliGRAM(s) Oral daily  pantoprazole    Tablet 40 milliGRAM(s) Oral before breakfast  polyethylene glycol 3350 17 Gram(s) Oral daily  QUEtiapine 25 milliGRAM(s) Oral at bedtime  senna 2 Tablet(s) Oral at bedtime  tamsulosin 0.4 milliGRAM(s) Oral daily  tiotropium 18 MICROgram(s) Capsule 1 Capsule(s) Inhalation daily    MEDICATIONS  (PRN):  ALBUTerol/ipratropium for Nebulization 3 milliLiter(s) Nebulizer every 6 hours PRN Bronchospasm  haloperidol     Tablet 1 milliGRAM(s) Oral four times a day PRN agitation  haloperidol    Injectable 1 milliGRAM(s) IntraMuscular every 6 hours PRN Agitation  haloperidol    Injectable 1 milliGRAM(s) IV Push every 6 hours PRN Agitation  oxyCODONE    5 mG/acetaminophen 325 mG 1 Tablet(s) Oral every 4 hours PRN Moderate Pain (4 - 6)  oxyCODONE    5 mG/acetaminophen 325 mG 2 Tablet(s) Oral every 4 hours PRN Severe Pain (7 - 10)      Vital Signs Last 24 Hrs  T(C): 37.1 (16 Apr 2018 12:46), Max: 37.1 (16 Apr 2018 12:46)  T(F): 98.7 (16 Apr 2018 12:46), Max: 98.7 (16 Apr 2018 12:46)  HR: 55 (16 Apr 2018 12:46) (55 - 69)  BP: 139/71 (16 Apr 2018 12:46) (113/44 - 169/88)  BP(mean): --  RR: 16 (16 Apr 2018 12:46) (16 - 20)  SpO2: 100% (16 Apr 2018 12:46) (97% - 100%)  CAPILLARY BLOOD GLUCOSE        I&O's Summary    15 Apr 2018 07:01  -  16 Apr 2018 07:00  --------------------------------------------------------  IN: 400 mL / OUT: 850 mL / NET: -450 mL    16 Apr 2018 07:01  -  16 Apr 2018 14:30  --------------------------------------------------------  IN: 120 mL / OUT: 225 mL / NET: -105 mL        PHYSICAL EXAM:  GENERAL: NAD  CHEST/LUNG: Clear to auscultation bilaterally; No wheezing or crackles  HEART: s1/s2, no murmurs  ABDOMEN: Soft, Nontender, Nondistended; Bowel sounds present  EXTREMITIES:  legs warm to touch, no palpable pulses on Left, dusky appearance of toes on left foot with dry gangrene of 2nd digit of left foot. no peripheral edema, untrimmed toenails  NEUROLOGY: awake, alert, responds to Qs appropriately      LABS:                        12.5   5.18  )-----------( 205      ( 16 Apr 2018 06:48 )             38.0     04-16    139  |  107  |  26<H>  ----------------------------<  77  3.6   |  21<L>  |  1.63<H>    Ca    8.0<L>      16 Apr 2018 06:48  Phos  2.9     04-16  Mg     2.0     04-16                RADIOLOGY & ADDITIONAL TESTS:    Imaging Personally Reviewed:  Consultant(s) Notes Reviewed:    Care Discussed with Consultants/Other Providers: discussed plan of care w/ Vascular Surgery

## 2018-04-17 DIAGNOSIS — I47.2 VENTRICULAR TACHYCARDIA: ICD-10-CM

## 2018-04-17 DIAGNOSIS — I50.21 ACUTE SYSTOLIC (CONGESTIVE) HEART FAILURE: ICD-10-CM

## 2018-04-17 LAB
APTT BLD: 37.3 SEC — SIGNIFICANT CHANGE UP (ref 27.5–37.4)
BLD GP AB SCN SERPL QL: NEGATIVE — SIGNIFICANT CHANGE UP
BUN SERPL-MCNC: 27 MG/DL — HIGH (ref 7–23)
CALCIUM SERPL-MCNC: 8.3 MG/DL — LOW (ref 8.4–10.5)
CHLORIDE SERPL-SCNC: 109 MMOL/L — HIGH (ref 98–107)
CO2 SERPL-SCNC: 23 MMOL/L — SIGNIFICANT CHANGE UP (ref 22–31)
CREAT SERPL-MCNC: 1.9 MG/DL — HIGH (ref 0.5–1.3)
GLUCOSE SERPL-MCNC: 88 MG/DL — SIGNIFICANT CHANGE UP (ref 70–99)
HCT VFR BLD CALC: 36.4 % — LOW (ref 39–50)
HGB BLD-MCNC: 12 G/DL — LOW (ref 13–17)
INR BLD: 1.11 — SIGNIFICANT CHANGE UP (ref 0.88–1.17)
MAGNESIUM SERPL-MCNC: 2 MG/DL — SIGNIFICANT CHANGE UP (ref 1.6–2.6)
MCHC RBC-ENTMCNC: 29.9 PG — SIGNIFICANT CHANGE UP (ref 27–34)
MCHC RBC-ENTMCNC: 33 % — SIGNIFICANT CHANGE UP (ref 32–36)
MCV RBC AUTO: 90.8 FL — SIGNIFICANT CHANGE UP (ref 80–100)
NRBC # FLD: 0 — SIGNIFICANT CHANGE UP
PHOSPHATE SERPL-MCNC: 2.8 MG/DL — SIGNIFICANT CHANGE UP (ref 2.5–4.5)
PLATELET # BLD AUTO: 199 K/UL — SIGNIFICANT CHANGE UP (ref 150–400)
PMV BLD: 12.8 FL — SIGNIFICANT CHANGE UP (ref 7–13)
POTASSIUM SERPL-MCNC: 4.4 MMOL/L — SIGNIFICANT CHANGE UP (ref 3.5–5.3)
POTASSIUM SERPL-SCNC: 4.4 MMOL/L — SIGNIFICANT CHANGE UP (ref 3.5–5.3)
PROTHROM AB SERPL-ACNC: 12.4 SEC — SIGNIFICANT CHANGE UP (ref 9.8–13.1)
RBC # BLD: 4.01 M/UL — LOW (ref 4.2–5.8)
RBC # FLD: 14.8 % — HIGH (ref 10.3–14.5)
RH IG SCN BLD-IMP: POSITIVE — SIGNIFICANT CHANGE UP
SODIUM SERPL-SCNC: 142 MMOL/L — SIGNIFICANT CHANGE UP (ref 135–145)
WBC # BLD: 4.67 K/UL — SIGNIFICANT CHANGE UP (ref 3.8–10.5)
WBC # FLD AUTO: 4.67 K/UL — SIGNIFICANT CHANGE UP (ref 3.8–10.5)

## 2018-04-17 PROCEDURE — 99233 SBSQ HOSP IP/OBS HIGH 50: CPT

## 2018-04-17 PROCEDURE — 99231 SBSQ HOSP IP/OBS SF/LOW 25: CPT

## 2018-04-17 RX ORDER — FUROSEMIDE 40 MG
20 TABLET ORAL ONCE
Qty: 0 | Refills: 0 | Status: COMPLETED | OUTPATIENT
Start: 2018-04-17 | End: 2018-04-17

## 2018-04-17 RX ADMIN — ATORVASTATIN CALCIUM 80 MILLIGRAM(S): 80 TABLET, FILM COATED ORAL at 21:43

## 2018-04-17 RX ADMIN — HEPARIN SODIUM 5000 UNIT(S): 5000 INJECTION INTRAVENOUS; SUBCUTANEOUS at 15:23

## 2018-04-17 RX ADMIN — ISOSORBIDE DINITRATE 20 MILLIGRAM(S): 5 TABLET ORAL at 05:16

## 2018-04-17 RX ADMIN — CARVEDILOL PHOSPHATE 25 MILLIGRAM(S): 80 CAPSULE, EXTENDED RELEASE ORAL at 17:04

## 2018-04-17 RX ADMIN — OXYCODONE AND ACETAMINOPHEN 2 TABLET(S): 5; 325 TABLET ORAL at 11:00

## 2018-04-17 RX ADMIN — MONTELUKAST 10 MILLIGRAM(S): 4 TABLET, CHEWABLE ORAL at 13:13

## 2018-04-17 RX ADMIN — Medication 100 MILLIGRAM(S): at 17:04

## 2018-04-17 RX ADMIN — TAMSULOSIN HYDROCHLORIDE 0.4 MILLIGRAM(S): 0.4 CAPSULE ORAL at 13:12

## 2018-04-17 RX ADMIN — OXYCODONE AND ACETAMINOPHEN 2 TABLET(S): 5; 325 TABLET ORAL at 05:28

## 2018-04-17 RX ADMIN — POLYETHYLENE GLYCOL 3350 17 GRAM(S): 17 POWDER, FOR SOLUTION ORAL at 13:13

## 2018-04-17 RX ADMIN — OXYCODONE AND ACETAMINOPHEN 2 TABLET(S): 5; 325 TABLET ORAL at 18:00

## 2018-04-17 RX ADMIN — CARVEDILOL PHOSPHATE 25 MILLIGRAM(S): 80 CAPSULE, EXTENDED RELEASE ORAL at 05:16

## 2018-04-17 RX ADMIN — Medication 20 MILLIGRAM(S): at 08:39

## 2018-04-17 RX ADMIN — HEPARIN SODIUM 5000 UNIT(S): 5000 INJECTION INTRAVENOUS; SUBCUTANEOUS at 23:07

## 2018-04-17 RX ADMIN — AMLODIPINE BESYLATE 10 MILLIGRAM(S): 2.5 TABLET ORAL at 05:16

## 2018-04-17 RX ADMIN — ISOSORBIDE DINITRATE 20 MILLIGRAM(S): 5 TABLET ORAL at 21:43

## 2018-04-17 RX ADMIN — Medication 1 DROP(S): at 05:17

## 2018-04-17 RX ADMIN — ISOSORBIDE DINITRATE 20 MILLIGRAM(S): 5 TABLET ORAL at 13:13

## 2018-04-17 RX ADMIN — OXYCODONE AND ACETAMINOPHEN 2 TABLET(S): 5; 325 TABLET ORAL at 17:04

## 2018-04-17 RX ADMIN — HEPARIN SODIUM 5000 UNIT(S): 5000 INJECTION INTRAVENOUS; SUBCUTANEOUS at 05:16

## 2018-04-17 RX ADMIN — Medication 100 MILLIGRAM(S): at 05:16

## 2018-04-17 RX ADMIN — OXYCODONE AND ACETAMINOPHEN 2 TABLET(S): 5; 325 TABLET ORAL at 06:20

## 2018-04-17 RX ADMIN — BUDESONIDE AND FORMOTEROL FUMARATE DIHYDRATE 2 PUFF(S): 160; 4.5 AEROSOL RESPIRATORY (INHALATION) at 21:43

## 2018-04-17 RX ADMIN — BUDESONIDE AND FORMOTEROL FUMARATE DIHYDRATE 2 PUFF(S): 160; 4.5 AEROSOL RESPIRATORY (INHALATION) at 08:39

## 2018-04-17 RX ADMIN — TIOTROPIUM BROMIDE 1 CAPSULE(S): 18 CAPSULE ORAL; RESPIRATORY (INHALATION) at 08:39

## 2018-04-17 RX ADMIN — OXYCODONE AND ACETAMINOPHEN 1 TABLET(S): 5; 325 TABLET ORAL at 22:40

## 2018-04-17 RX ADMIN — OXYCODONE AND ACETAMINOPHEN 1 TABLET(S): 5; 325 TABLET ORAL at 21:43

## 2018-04-17 RX ADMIN — Medication 100 MILLIGRAM(S): at 21:43

## 2018-04-17 RX ADMIN — PANTOPRAZOLE SODIUM 40 MILLIGRAM(S): 20 TABLET, DELAYED RELEASE ORAL at 05:16

## 2018-04-17 RX ADMIN — Medication 100 MILLIGRAM(S): at 13:12

## 2018-04-17 RX ADMIN — OXYCODONE AND ACETAMINOPHEN 2 TABLET(S): 5; 325 TABLET ORAL at 10:10

## 2018-04-17 RX ADMIN — Medication 81 MILLIGRAM(S): at 13:13

## 2018-04-17 RX ADMIN — QUETIAPINE FUMARATE 25 MILLIGRAM(S): 200 TABLET, FILM COATED ORAL at 21:43

## 2018-04-17 NOTE — PROGRESS NOTE ADULT - ASSESSMENT
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 OR planning.  -L heart cath tomorrow per cards  -f/u gen surg consult  -medications as above  -DVT ppx  -NPOpMN

## 2018-04-17 NOTE — PROGRESS NOTE ADULT - ASSESSMENT
77M with HTN, CKD, BPH, asthma, and gastritis presents with L toe pain and acute SOB -- acute pulmonary edema in setting of HTN emergency requiring IV NTG and BiPaP now improved with better BP control, course c/b left toe dry gangrene s/p angiogram of left leg, awaiting peripheral bypass and endarterectomy. Today patient with mild SOB with crackles and yesterday with NSVT -- ? volume overload.

## 2018-04-17 NOTE — PROGRESS NOTE ADULT - PROBLEM SELECTOR PLAN 1
Patient today with mild SOB and crackles, min wheeze, less U/O (? I&O accuracy), and weight slightly up.  Would recommend Lasix 20 mg IV X 1, duonebs prn. Continue coreg, isordil, hydralazine. Strict I & O, daily standing weights.

## 2018-04-17 NOTE — PROGRESS NOTE ADULT - ATTENDING COMMENTS
Agree with above assessment and plan. Patient initially admitted with HTN emergency and flash pulmonary edema with toe gangrene. Had LE angiogram that showed need for bypass. Had nuclear stress test with infarct and mild varun-infarct ischemia however had 39 beats of NSVT yesterday. K was 3.6.  -Agree with lasix 20 mg IV x 1  -will reassess fluid status this afternoon to see if ready for cardiac catheterization  -would continue coreg 25 mg BID with isordil and hydralazine

## 2018-04-17 NOTE — PROGRESS NOTE ADULT - PROBLEM SELECTOR PLAN 1
s/p angiogram showing occluded left SFA and AK pop pending bypass and fem endarterectomy. No evidence of OM on Xray  Stress test done:  large, severe defects in inferior and inferoseptal walls that are  partially reversible, suggestive of infarct with mild partial ischemia. There was a severe defect in the diaphragmatic wall of the RV that was fixed, consistent  with RV infarct.   Was initially cleared by cardiology for vascular procedure, but developed 39 beats NSVT yesterday  pending [facility] tomorrow by Cards     Percocet PRN for pain

## 2018-04-17 NOTE — CHART NOTE - NSCHARTNOTEFT_GEN_A_CORE
Source: Patient [ X]    Family [ ]     other [ X] electronic chart, RN    Diet : NPO for Procedure/Test    77M with HTN, CKD, BPH, asthma, and gastritis presents with L toe pain and acute SOB. Found to be in acute pulmonary edema in setting of HTN emergency requiring IV NTG and BiPaP now improved with better BP control. Course c/b left toe dry gangrene s/p angiogram of left leg, now awaiting peripheral bypass and endarterectomy.     Patient seen for consult received 4/13 for Assessment. Pt was initially seen 4/12/18. Pt is A&O X 2. Able to make needs known. Reports good po intakes. Denies any nausea/vomiting/diarrhea/constipation. RN confirms good meal completion. Pt appears malnourished based on physical findings of: severe temporal/buccal and clavicle wasting.     Current Weight: (4/17/18) 145 pounds, same reflective of 5 pounds weight increase from admit wt.     Pertinent Labs:  HIGH: BUN 27, Creat 1.90.  LOW: Albumin 3.1    Skin: Left 2nd Toe Gangrene    Estimated Needs:   [X ] no change since previous assessment  [ ] recalculated:     Previous Nutrition Diagnosis:     [  ]Inadequate Oral Intake [X ] Increased Nutrient Needs     Nutrition Diagnosis is [X ] ongoing  [ ] resolved [ ] not applicable                                     ~~~~~~~~~Additional Recommendations~~~~~~~~~~~~~~    1. When able to resume PO diet: Recommend Low Na/ Mechanical Soft diet with Ensure Enlive 3x daily.   2. Monitor weights, labs, BM's, skin integrity, PO intakes.   3. Please Encourage po intake, assist with meals and menu selections.  4. Nutrition remains available.    ELLIS Christensen RD, CDN, CDE Source: Patient [ X]    Family [ ]     other [ X] electronic chart, RN    Diet : NPO for Procedure/Test    77M with HTN, CKD, BPH, asthma, and gastritis presents with L toe pain and acute SOB. Found to be in acute pulmonary edema in setting of HTN emergency requiring IV NTG and BiPaP now improved with better BP control. Course c/b left toe dry gangrene s/p angiogram of left leg, now awaiting peripheral bypass and endarterectomy.     Patient seen for consult received 4/13 for Assessment. Pt was initially seen 4/12/18. Pt is A&O X 2. Able to make needs known. Reports good po intakes. Denies any nausea/vomiting/diarrhea/constipation. RN confirms good meal completion. Pt appears malnourished based on physical findings of: severe temporal/buccal and clavicle wasting.     Current Weight: (4/17/18) 145 pounds, same reflective of 5 pounds weight increase from admit wt.     Pertinent Labs:  HIGH: BUN 27, Creat 1.90.  LOW: Albumin 3.1    Skin: Left 2nd Toe Gangrene    Estimated Needs:   [X ] no change since previous assessment  [ ] recalculated:     Nutrition Diagnosis:     [  ]Inadequate Oral Intake [X] Increased Nutrient Needs     Nutrition Diagnosis is [X] ongoing  [ ] resolved [ ] not applicable                                         ~~~~~~~~~Additional Recommendations~~~~~~~~~~~~~~    1. When able to resume PO diet: Recommend Low Na/ Mechanical Soft diet with Ensure Enlive 3x daily.     2. Monitor weights, labs, BM's, skin integrity, PO intakes.     3. Please Encourage po intake, assist with meals and menu selections.    4. Nutrition remains available.    ELLIS Christensen RD, CDN, CDE

## 2018-04-17 NOTE — PROGRESS NOTE ADULT - ATTENDING COMMENTS
Pt. was seen and examined. Agree with above. Plan d/w the team.  Pt. is stable, no acute events overnight.  awaiting cath by cards for preoperative eval  will f/up gen surg recs re: hernia

## 2018-04-17 NOTE — PROGRESS NOTE ADULT - SUBJECTIVE AND OBJECTIVE BOX
Patient is a 77y old  Male who presents with a chief complaint of Toe Pain (05 Apr 2018 10:42)        SUBJECTIVE / OVERNIGHT EVENTS:  no acute events o/n  pt resting comfortably  denies cp/sob/palpitations     Tele: sinus irwin 50s, 2.1 sec pause @ 9am    MEDICATIONS  (STANDING):  amLODIPine   Tablet 10 milliGRAM(s) Oral daily  artificial tears (preservative free) Ophthalmic Solution 1 Drop(s) Right EYE two times a day  aspirin enteric coated 81 milliGRAM(s) Oral daily  atorvastatin 80 milliGRAM(s) Oral at bedtime  buDESOnide 160 MICROgram(s)/formoterol 4.5 MICROgram(s) Inhaler 2 Puff(s) Inhalation two times a day  carvedilol 25 milliGRAM(s) Oral every 12 hours  docusate sodium 100 milliGRAM(s) Oral two times a day  heparin  Injectable 5000 Unit(s) SubCutaneous every 8 hours  hydrALAZINE 100 milliGRAM(s) Oral three times a day  isosorbide   dinitrate Tablet (ISORDIL) 20 milliGRAM(s) Oral three times a day  montelukast 10 milliGRAM(s) Oral daily  pantoprazole    Tablet 40 milliGRAM(s) Oral before breakfast  polyethylene glycol 3350 17 Gram(s) Oral daily  QUEtiapine 25 milliGRAM(s) Oral at bedtime  senna 2 Tablet(s) Oral at bedtime  tamsulosin 0.4 milliGRAM(s) Oral daily  tiotropium 18 MICROgram(s) Capsule 1 Capsule(s) Inhalation daily    MEDICATIONS  (PRN):  ALBUTerol/ipratropium for Nebulization 3 milliLiter(s) Nebulizer every 6 hours PRN Bronchospasm  haloperidol     Tablet 1 milliGRAM(s) Oral four times a day PRN agitation  haloperidol    Injectable 1 milliGRAM(s) IntraMuscular every 6 hours PRN Agitation  haloperidol    Injectable 1 milliGRAM(s) IV Push every 6 hours PRN Agitation  oxyCODONE    5 mG/acetaminophen 325 mG 1 Tablet(s) Oral every 4 hours PRN Moderate Pain (4 - 6)  oxyCODONE    5 mG/acetaminophen 325 mG 2 Tablet(s) Oral every 4 hours PRN Severe Pain (7 - 10)      Vital Signs Last 24 Hrs  T(C): 36.3 (17 Apr 2018 13:00), Max: 36.9 (17 Apr 2018 04:45)  T(F): 97.4 (17 Apr 2018 13:00), Max: 98.4 (17 Apr 2018 04:45)  HR: 60 (17 Apr 2018 13:00) (55 - 78)  BP: 160/73 (17 Apr 2018 13:00) (123/64 - 160/73)  BP(mean): --  RR: 18 (17 Apr 2018 13:00) (16 - 20)  SpO2: 99% (17 Apr 2018 13:00) (95% - 100%)  CAPILLARY BLOOD GLUCOSE        I&O's Summary    16 Apr 2018 07:01  -  17 Apr 2018 07:00  --------------------------------------------------------  IN: 580 mL / OUT: 685 mL / NET: -105 mL    17 Apr 2018 07:01  -  17 Apr 2018 13:45  --------------------------------------------------------  IN: 0 mL / OUT: 1150 mL / NET: -1150 mL        PHYSICAL EXAM:  GENERAL: NAD  HEENT: soft, mobile, round mass over left TMJ nontender to palpation (likely lipoma)  CHEST/LUNG: Clear to auscultation bilaterally; No wheezing or crackles  HEART: s1/s2, no murmurs  ABDOMEN: Soft, Nontender, Nondistended; Bowel sounds present  EXTREMITIES:  legs warm to touch, no palpable pulses on Left, dusky appearance of toes on left foot with dry gangrene of 2nd digit of left foot. no peripheral edema      LABS:                        12.0   4.67  )-----------( 199      ( 17 Apr 2018 06:49 )             36.4     04-17    142  |  109<H>  |  27<H>  ----------------------------<  88  4.4   |  23  |  1.90<H>    Ca    8.3<L>      17 Apr 2018 06:49  Phos  2.8     04-17  Mg     2.0     04-17      PT/INR - ( 17 Apr 2018 06:49 )   PT: 12.4 SEC;   INR: 1.11          PTT - ( 17 Apr 2018 06:49 )  PTT:37.3 SEC          RADIOLOGY & ADDITIONAL TESTS:    Imaging Personally Reviewed:  Consultant(s) Notes Reviewed:    Care Discussed with Consultants/Other Providers:

## 2018-04-17 NOTE — PROGRESS NOTE ADULT - PROBLEM SELECTOR PLAN 2
Awaiting peripheral bypass and endarterectomy. Although stress test with fixed defects and no active ischemia given NSVT and mild volume overload may need to consider LHC. Will discuss with Dr. Lutz.

## 2018-04-17 NOTE — CHART NOTE - NSCHARTNOTEFT_GEN_A_CORE
General Surgery    Patient planning for femoral-popliteal artery bypass and endarterectomy of femoral artery.  At this point there are no general surgical contraindications to proceeding with operation. General surgery will be available if needed intraoperatively.  D/w Dr. Nieto and Dr. Faizan Hinson MD PGY 2   [id number]

## 2018-04-17 NOTE — PROGRESS NOTE ADULT - PROBLEM SELECTOR PLAN 4
Creatinine remains stable and continues to trend down. Creatinine remains stable and continues to trend down but today's is pending.

## 2018-04-17 NOTE — PROGRESS NOTE ADULT - SUBJECTIVE AND OBJECTIVE BOX
VASCULAR SURGERY PROGRESS NOTE 71610  S: No acute events overnight, pt seen and examined  O:  T(C): 36.9 (04-17-18 @ 04:45), Max: 37.1 (04-16-18 @ 12:46)  HR: 55 (04-17-18 @ 08:35) (55 - 78)  BP: 123/64 (04-17-18 @ 08:35) (123/64 - 152/78)  RR: 18 (04-17-18 @ 08:35) (16 - 20)  SpO2: 100% (04-17-18 @ 08:35) (95% - 100%)  Wt(kg): --  04-16 @ 07:01  -  04-17 @ 07:00  --------------------------------------------------------  IN:    Oral Fluid: 580 mL  Total IN: 580 mL    OUT:    Voided: 685 mL  Total OUT: 685 mL    Total NET: -105 mL      04-17 @ 07:01  -  04-17 @ 12:21  --------------------------------------------------------  IN:  Total IN: 0 mL    OUT:    Voided: 300 mL  Total OUT: 300 mL  Total NET: -300 mL      Gen: NAD  Abdomen: LLQ hernia  RLE: PT and DP signals present  LLE: PT signal present, DP absent       LABS:                         12.0   4.67  )-----------( 199      ( 17 Apr 2018 06:49 )             36.4     04-17    142  |  109<H>  |  27<H>  ----------------------------<  88  4.4   |  23  |  1.90<H>    Ca    8.3<L>      17 Apr 2018 06:49  Phos  2.8     04-17  Mg     2.0     04-17      PT/INR - ( 17 Apr 2018 06:49 )   PT: 12.4 SEC;   INR: 1.11          PTT - ( 17 Apr 2018 06:49 )  PTT:37.3 SEC      MEDICATIONS  (STANDING):  amLODIPine   Tablet 10 milliGRAM(s) Oral daily  artificial tears (preservative free) Ophthalmic Solution 1 Drop(s) Right EYE two times a day  aspirin enteric coated 81 milliGRAM(s) Oral daily  atorvastatin 80 milliGRAM(s) Oral at bedtime  buDESOnide 160 MICROgram(s)/formoterol 4.5 MICROgram(s) Inhaler 2 Puff(s) Inhalation two times a day  carvedilol 25 milliGRAM(s) Oral every 12 hours  docusate sodium 100 milliGRAM(s) Oral two times a day  heparin  Injectable 5000 Unit(s) SubCutaneous every 8 hours  hydrALAZINE 100 milliGRAM(s) Oral three times a day  isosorbide   dinitrate Tablet (ISORDIL) 20 milliGRAM(s) Oral three times a day  montelukast 10 milliGRAM(s) Oral daily  pantoprazole    Tablet 40 milliGRAM(s) Oral before breakfast  polyethylene glycol 3350 17 Gram(s) Oral daily  QUEtiapine 25 milliGRAM(s) Oral at bedtime  senna 2 Tablet(s) Oral at bedtime  tamsulosin 0.4 milliGRAM(s) Oral daily  tiotropium 18 MICROgram(s) Capsule 1 Capsule(s) Inhalation daily    MEDICATIONS  (PRN):  ALBUTerol/ipratropium for Nebulization 3 milliLiter(s) Nebulizer every 6 hours PRN Bronchospasm  haloperidol     Tablet 1 milliGRAM(s) Oral four times a day PRN agitation  haloperidol    Injectable 1 milliGRAM(s) IntraMuscular every 6 hours PRN Agitation  haloperidol    Injectable 1 milliGRAM(s) IV Push every 6 hours PRN Agitation  oxyCODONE    5 mG/acetaminophen 325 mG 1 Tablet(s) Oral every 4 hours PRN Moderate Pain (4 - 6)  oxyCODONE    5 mG/acetaminophen 325 mG 2 Tablet(s) Oral every 4 hours PRN Severe Pain (7 - 10)

## 2018-04-17 NOTE — PROGRESS NOTE ADULT - PROBLEM SELECTOR PLAN 3
39B NSVT in setting of hypokalemia vs. mild volume overload. Continue to monitor and maintain K > 4.0 and Mg > 2.0

## 2018-04-17 NOTE — PROGRESS NOTE ADULT - SUBJECTIVE AND OBJECTIVE BOX
Subjective/Objective: Patient resting in bed at present, appears comfortable but states "breathing is not good". Denies any other symptoms. No overnight events noted. 39B NSVT noted yesterday.     MEDICATIONS  (STANDING):  amLODIPine   Tablet 10 milliGRAM(s) Oral daily  artificial tears (preservative free) Ophthalmic Solution 1 Drop(s) Right EYE two times a day  aspirin enteric coated 81 milliGRAM(s) Oral daily  atorvastatin 80 milliGRAM(s) Oral at bedtime  buDESOnide 160 MICROgram(s)/formoterol 4.5 MICROgram(s) Inhaler 2 Puff(s) Inhalation two times a day  carvedilol 25 milliGRAM(s) Oral every 12 hours  docusate sodium 100 milliGRAM(s) Oral two times a day  heparin  Injectable 5000 Unit(s) SubCutaneous every 8 hours  hydrALAZINE 100 milliGRAM(s) Oral three times a day  isosorbide   dinitrate Tablet (ISORDIL) 20 milliGRAM(s) Oral three times a day  montelukast 10 milliGRAM(s) Oral daily  pantoprazole    Tablet 40 milliGRAM(s) Oral before breakfast  polyethylene glycol 3350 17 Gram(s) Oral daily  QUEtiapine 25 milliGRAM(s) Oral at bedtime  senna 2 Tablet(s) Oral at bedtime  tamsulosin 0.4 milliGRAM(s) Oral daily  tiotropium 18 MICROgram(s) Capsule 1 Capsule(s) Inhalation daily    MEDICATIONS  (PRN):  ALBUTerol/ipratropium for Nebulization 3 milliLiter(s) Nebulizer every 6 hours PRN Bronchospasm  haloperidol     Tablet 1 milliGRAM(s) Oral four times a day PRN agitation  haloperidol    Injectable 1 milliGRAM(s) IntraMuscular every 6 hours PRN Agitation  haloperidol    Injectable 1 milliGRAM(s) IV Push every 6 hours PRN Agitation  oxyCODONE    5 mG/acetaminophen 325 mG 1 Tablet(s) Oral every 4 hours PRN Moderate Pain (4 - 6)  oxyCODONE    5 mG/acetaminophen 325 mG 2 Tablet(s) Oral every 4 hours PRN Severe Pain (7 - 10)          Vital Signs Last 24 Hrs  T(C): 36.9 (17 Apr 2018 04:45), Max: 37.1 (16 Apr 2018 12:46)  T(F): 98.4 (17 Apr 2018 04:45), Max: 98.7 (16 Apr 2018 12:46)  HR: 73 (17 Apr 2018 04:45) (55 - 78)  BP: 152/78 (17 Apr 2018 04:45) (120/58 - 152/78)  BP(mean): --  RR: 16 (17 Apr 2018 04:45) (16 - 20)  SpO2: 95% (17 Apr 2018 04:45) (95% - 100%)  I&O's Detail    16 Apr 2018 07:01  -  17 Apr 2018 07:00  --------------------------------------------------------  IN:    Oral Fluid: 580 mL  Total IN: 580 mL    OUT:    Voided: 685 mL  Total OUT: 685 mL    Total NET: -105 mL      PHYSICAL EXAM  GEN: NAD at rest, skin W & D  RESP: + crackles 1/3 up B/L with minimal wheeze, no JVD  CV: nl S1S2, no M/R/C  GI: soft, NT/ND, BS +  EXT: no C/C/E, L toe dry gangrene unchanged  NEURO: awake, responsive  PSYCH: calm, cooperative        EKG/ TELEM: NSR occ VPC    LABS:                          12.0   4.67  )-----------( 199      ( 17 Apr 2018 06:49 )             36.4     PT/INR - ( 17 Apr 2018 06:49 )   PT: 12.4 SEC;   INR: 1.11          PTT - ( 17 Apr 2018 06:49 )  PTT:37.3 SEC  16 Apr 2018 06:48    139    |  107    |  26<H>  ----------------------------<  77     3.6     |  21<L>  |  1.63<H>    Ca    8.0<L>      16 Apr 2018 06:48  Phos  2.9       16 Apr 2018 06:48  Mg     2.0       16 Apr 2018 06:48 Subjective/Objective: Patient resting in bed at present, appears comfortable but states "breathing is not good". Denies any other symptoms. No overnight events noted. 39B NSVT noted yesterday.     MEDICATIONS  (STANDING):  amLODIPine   Tablet 10 milliGRAM(s) Oral daily  artificial tears (preservative free) Ophthalmic Solution 1 Drop(s) Right EYE two times a day  aspirin enteric coated 81 milliGRAM(s) Oral daily  atorvastatin 80 milliGRAM(s) Oral at bedtime  buDESOnide 160 MICROgram(s)/formoterol 4.5 MICROgram(s) Inhaler 2 Puff(s) Inhalation two times a day  carvedilol 25 milliGRAM(s) Oral every 12 hours  docusate sodium 100 milliGRAM(s) Oral two times a day  heparin  Injectable 5000 Unit(s) SubCutaneous every 8 hours  hydrALAZINE 100 milliGRAM(s) Oral three times a day  isosorbide   dinitrate Tablet (ISORDIL) 20 milliGRAM(s) Oral three times a day  montelukast 10 milliGRAM(s) Oral daily  pantoprazole    Tablet 40 milliGRAM(s) Oral before breakfast  polyethylene glycol 3350 17 Gram(s) Oral daily  QUEtiapine 25 milliGRAM(s) Oral at bedtime  senna 2 Tablet(s) Oral at bedtime  tamsulosin 0.4 milliGRAM(s) Oral daily  tiotropium 18 MICROgram(s) Capsule 1 Capsule(s) Inhalation daily    MEDICATIONS  (PRN):  ALBUTerol/ipratropium for Nebulization 3 milliLiter(s) Nebulizer every 6 hours PRN Bronchospasm  haloperidol     Tablet 1 milliGRAM(s) Oral four times a day PRN agitation  haloperidol    Injectable 1 milliGRAM(s) IntraMuscular every 6 hours PRN Agitation  haloperidol    Injectable 1 milliGRAM(s) IV Push every 6 hours PRN Agitation  oxyCODONE    5 mG/acetaminophen 325 mG 1 Tablet(s) Oral every 4 hours PRN Moderate Pain (4 - 6)  oxyCODONE    5 mG/acetaminophen 325 mG 2 Tablet(s) Oral every 4 hours PRN Severe Pain (7 - 10)          Vital Signs Last 24 Hrs  T(C): 36.9 (17 Apr 2018 04:45), Max: 37.1 (16 Apr 2018 12:46)  T(F): 98.4 (17 Apr 2018 04:45), Max: 98.7 (16 Apr 2018 12:46)  HR: 73 (17 Apr 2018 04:45) (55 - 78)  BP: 152/78 (17 Apr 2018 04:45) (120/58 - 152/78)  BP(mean): --  RR: 16 (17 Apr 2018 04:45) (16 - 20)  SpO2: 95% (17 Apr 2018 04:45) (95% - 100%)  I&O's Detail    16 Apr 2018 07:01  -  17 Apr 2018 07:00  --------------------------------------------------------  IN:    Oral Fluid: 580 mL  Total IN: 580 mL    OUT:    Voided: 685 mL  Total OUT: 685 mL    Total NET: -105 mL      PHYSICAL EXAM  GEN: NAD at rest, skin W & D  RESP: + crackles 1/3 up B/L with minimal wheeze, no JVD  CV: nl S1S2, no M/R/C  GI: soft, NT/ND, BS +  EXT: no C/C/E, L toe dry gangrene unchanged  NEURO: awake, responsive  PSYCH: calm, cooperative        EKG/ TELEM: NSR occ VPC    LABS: 4/17/2018 BMP pending                          12.0   4.67  )-----------( 199      ( 17 Apr 2018 06:49 )             36.4     PT/INR - ( 17 Apr 2018 06:49 )   PT: 12.4 SEC;   INR: 1.11          PTT - ( 17 Apr 2018 06:49 )  PTT:37.3 SEC  16 Apr 2018 06:48    139    |  107    |  26<H>  ----------------------------<  77     3.6     |  21<L>  |  1.63<H>    Ca    8.0<L>      16 Apr 2018 06:48  Phos  2.9       16 Apr 2018 06:48  Mg     2.0       16 Apr 2018 06:48

## 2018-04-18 DIAGNOSIS — I44.1 ATRIOVENTRICULAR BLOCK, SECOND DEGREE: ICD-10-CM

## 2018-04-18 LAB
ALBUMIN SERPL ELPH-MCNC: 2.9 G/DL — LOW (ref 3.3–5)
ALP SERPL-CCNC: 66 U/L — SIGNIFICANT CHANGE UP (ref 40–120)
ALT FLD-CCNC: 9 U/L — SIGNIFICANT CHANGE UP (ref 4–41)
AST SERPL-CCNC: 16 U/L — SIGNIFICANT CHANGE UP (ref 4–40)
BILIRUB SERPL-MCNC: 0.4 MG/DL — SIGNIFICANT CHANGE UP (ref 0.2–1.2)
BLD GP AB SCN SERPL QL: NEGATIVE — SIGNIFICANT CHANGE UP
BUN SERPL-MCNC: 31 MG/DL — HIGH (ref 7–23)
CALCIUM SERPL-MCNC: 8.4 MG/DL — SIGNIFICANT CHANGE UP (ref 8.4–10.5)
CHLORIDE SERPL-SCNC: 105 MMOL/L — SIGNIFICANT CHANGE UP (ref 98–107)
CO2 SERPL-SCNC: 23 MMOL/L — SIGNIFICANT CHANGE UP (ref 22–31)
CREAT SERPL-MCNC: 1.92 MG/DL — HIGH (ref 0.5–1.3)
GLUCOSE SERPL-MCNC: 84 MG/DL — SIGNIFICANT CHANGE UP (ref 70–99)
HCT VFR BLD CALC: 37.2 % — LOW (ref 39–50)
HGB BLD-MCNC: 12.1 G/DL — LOW (ref 13–17)
MAGNESIUM SERPL-MCNC: 1.9 MG/DL — SIGNIFICANT CHANGE UP (ref 1.6–2.6)
MCHC RBC-ENTMCNC: 29.8 PG — SIGNIFICANT CHANGE UP (ref 27–34)
MCHC RBC-ENTMCNC: 32.5 % — SIGNIFICANT CHANGE UP (ref 32–36)
MCV RBC AUTO: 91.6 FL — SIGNIFICANT CHANGE UP (ref 80–100)
NRBC # FLD: 0 — SIGNIFICANT CHANGE UP
PHOSPHATE SERPL-MCNC: 3.1 MG/DL — SIGNIFICANT CHANGE UP (ref 2.5–4.5)
PLATELET # BLD AUTO: 207 K/UL — SIGNIFICANT CHANGE UP (ref 150–400)
PMV BLD: 12.6 FL — SIGNIFICANT CHANGE UP (ref 7–13)
POTASSIUM SERPL-MCNC: 4.2 MMOL/L — SIGNIFICANT CHANGE UP (ref 3.5–5.3)
POTASSIUM SERPL-SCNC: 4.2 MMOL/L — SIGNIFICANT CHANGE UP (ref 3.5–5.3)
PROT SERPL-MCNC: 5.8 G/DL — LOW (ref 6–8.3)
RBC # BLD: 4.06 M/UL — LOW (ref 4.2–5.8)
RBC # FLD: 14.6 % — HIGH (ref 10.3–14.5)
RH IG SCN BLD-IMP: POSITIVE — SIGNIFICANT CHANGE UP
SODIUM SERPL-SCNC: 139 MMOL/L — SIGNIFICANT CHANGE UP (ref 135–145)
WBC # BLD: 5.35 K/UL — SIGNIFICANT CHANGE UP (ref 3.8–10.5)
WBC # FLD AUTO: 5.35 K/UL — SIGNIFICANT CHANGE UP (ref 3.8–10.5)

## 2018-04-18 PROCEDURE — 99231 SBSQ HOSP IP/OBS SF/LOW 25: CPT

## 2018-04-18 PROCEDURE — 93454 CORONARY ARTERY ANGIO S&I: CPT | Mod: 26

## 2018-04-18 PROCEDURE — 99233 SBSQ HOSP IP/OBS HIGH 50: CPT

## 2018-04-18 RX ADMIN — CARVEDILOL PHOSPHATE 25 MILLIGRAM(S): 80 CAPSULE, EXTENDED RELEASE ORAL at 05:30

## 2018-04-18 RX ADMIN — AMLODIPINE BESYLATE 10 MILLIGRAM(S): 2.5 TABLET ORAL at 05:30

## 2018-04-18 RX ADMIN — HEPARIN SODIUM 5000 UNIT(S): 5000 INJECTION INTRAVENOUS; SUBCUTANEOUS at 05:30

## 2018-04-18 RX ADMIN — BUDESONIDE AND FORMOTEROL FUMARATE DIHYDRATE 2 PUFF(S): 160; 4.5 AEROSOL RESPIRATORY (INHALATION) at 22:39

## 2018-04-18 RX ADMIN — OXYCODONE AND ACETAMINOPHEN 2 TABLET(S): 5; 325 TABLET ORAL at 06:30

## 2018-04-18 RX ADMIN — Medication 100 MILLIGRAM(S): at 16:23

## 2018-04-18 RX ADMIN — TAMSULOSIN HYDROCHLORIDE 0.4 MILLIGRAM(S): 0.4 CAPSULE ORAL at 10:33

## 2018-04-18 RX ADMIN — Medication 100 MILLIGRAM(S): at 14:36

## 2018-04-18 RX ADMIN — CARVEDILOL PHOSPHATE 25 MILLIGRAM(S): 80 CAPSULE, EXTENDED RELEASE ORAL at 16:23

## 2018-04-18 RX ADMIN — OXYCODONE AND ACETAMINOPHEN 1 TABLET(S): 5; 325 TABLET ORAL at 10:28

## 2018-04-18 RX ADMIN — Medication 100 MILLIGRAM(S): at 22:36

## 2018-04-18 RX ADMIN — OXYCODONE AND ACETAMINOPHEN 1 TABLET(S): 5; 325 TABLET ORAL at 22:35

## 2018-04-18 RX ADMIN — Medication 81 MILLIGRAM(S): at 10:33

## 2018-04-18 RX ADMIN — BUDESONIDE AND FORMOTEROL FUMARATE DIHYDRATE 2 PUFF(S): 160; 4.5 AEROSOL RESPIRATORY (INHALATION) at 10:28

## 2018-04-18 RX ADMIN — MONTELUKAST 10 MILLIGRAM(S): 4 TABLET, CHEWABLE ORAL at 10:33

## 2018-04-18 RX ADMIN — ATORVASTATIN CALCIUM 80 MILLIGRAM(S): 80 TABLET, FILM COATED ORAL at 22:36

## 2018-04-18 RX ADMIN — OXYCODONE AND ACETAMINOPHEN 2 TABLET(S): 5; 325 TABLET ORAL at 05:31

## 2018-04-18 RX ADMIN — PANTOPRAZOLE SODIUM 40 MILLIGRAM(S): 20 TABLET, DELAYED RELEASE ORAL at 05:31

## 2018-04-18 RX ADMIN — Medication 100 MILLIGRAM(S): at 05:30

## 2018-04-18 RX ADMIN — ISOSORBIDE DINITRATE 20 MILLIGRAM(S): 5 TABLET ORAL at 22:35

## 2018-04-18 RX ADMIN — OXYCODONE AND ACETAMINOPHEN 1 TABLET(S): 5; 325 TABLET ORAL at 16:23

## 2018-04-18 RX ADMIN — QUETIAPINE FUMARATE 25 MILLIGRAM(S): 200 TABLET, FILM COATED ORAL at 22:36

## 2018-04-18 RX ADMIN — ISOSORBIDE DINITRATE 20 MILLIGRAM(S): 5 TABLET ORAL at 05:30

## 2018-04-18 RX ADMIN — ISOSORBIDE DINITRATE 20 MILLIGRAM(S): 5 TABLET ORAL at 14:36

## 2018-04-18 RX ADMIN — Medication 1 DROP(S): at 05:30

## 2018-04-18 RX ADMIN — OXYCODONE AND ACETAMINOPHEN 1 TABLET(S): 5; 325 TABLET ORAL at 17:20

## 2018-04-18 NOTE — PROGRESS NOTE ADULT - ASSESSMENT
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 left heart catheterization today.  -f/u cath results  -f/u gen surg consult  -medications as above  -DVT ppx  -NPO  -OR planning

## 2018-04-18 NOTE — CHART NOTE - NSCHARTNOTEFT_GEN_A_CORE
Creatinine stable today at 1.92, D/W Dr. Lutz, [facility] scheduled for today.

## 2018-04-18 NOTE — PROGRESS NOTE ADULT - PROBLEM SELECTOR PLAN 1
Patient improved today, no SOB, lungs clear. Net neg 1925 cc U/O after Lasix 20 mg IV X1. Continue coreg, isordil, and hydralazine. Strict I & O.

## 2018-04-18 NOTE — PROGRESS NOTE ADULT - ASSESSMENT
77M with HTN, CKD, BPH, asthma, and gastritis presents with L toe pain and acute SOB -- acute pulmonary edema in setting of HTN emergency requiring IV NTG and BiPaP now improved with better BP control, course c/b left toe dry gangrene s/p angiogram of left leg, awaiting peripheral bypass and endarterectomy. Appears euvolemic today after good diuresis.

## 2018-04-18 NOTE — PROGRESS NOTE ADULT - SUBJECTIVE AND OBJECTIVE BOX
Subjective/Objective: Patient feels better today, no SOB, diuresed well after Lasix yesterday. No overnight events except for a few episodes of 2nd degree AVHB, Mobitz I noted on telemetry.    MEDICATIONS  (STANDING):  amLODIPine   Tablet 10 milliGRAM(s) Oral daily  artificial tears (preservative free) Ophthalmic Solution 1 Drop(s) Right EYE two times a day  aspirin enteric coated 81 milliGRAM(s) Oral daily  atorvastatin 80 milliGRAM(s) Oral at bedtime  buDESOnide 160 MICROgram(s)/formoterol 4.5 MICROgram(s) Inhaler 2 Puff(s) Inhalation two times a day  carvedilol 25 milliGRAM(s) Oral every 12 hours  docusate sodium 100 milliGRAM(s) Oral two times a day  heparin  Injectable 5000 Unit(s) SubCutaneous every 8 hours  hydrALAZINE 100 milliGRAM(s) Oral three times a day  isosorbide   dinitrate Tablet (ISORDIL) 20 milliGRAM(s) Oral three times a day  montelukast 10 milliGRAM(s) Oral daily  pantoprazole    Tablet 40 milliGRAM(s) Oral before breakfast  polyethylene glycol 3350 17 Gram(s) Oral daily  QUEtiapine 25 milliGRAM(s) Oral at bedtime  senna 2 Tablet(s) Oral at bedtime  tamsulosin 0.4 milliGRAM(s) Oral daily  tiotropium 18 MICROgram(s) Capsule 1 Capsule(s) Inhalation daily    MEDICATIONS  (PRN):  ALBUTerol/ipratropium for Nebulization 3 milliLiter(s) Nebulizer every 6 hours PRN Bronchospasm  haloperidol     Tablet 1 milliGRAM(s) Oral four times a day PRN agitation  haloperidol    Injectable 1 milliGRAM(s) IntraMuscular every 6 hours PRN Agitation  haloperidol    Injectable 1 milliGRAM(s) IV Push every 6 hours PRN Agitation  oxyCODONE    5 mG/acetaminophen 325 mG 1 Tablet(s) Oral every 4 hours PRN Moderate Pain (4 - 6)  oxyCODONE    5 mG/acetaminophen 325 mG 2 Tablet(s) Oral every 4 hours PRN Severe Pain (7 - 10)          Vital Signs Last 24 Hrs  T(C): 36.6 (18 Apr 2018 04:55), Max: 36.8 (17 Apr 2018 16:55)  T(F): 97.9 (18 Apr 2018 04:55), Max: 98.3 (18 Apr 2018 00:55)  HR: 64 (18 Apr 2018 04:55) (54 - 64)  BP: 141/68 (18 Apr 2018 04:55) (111/56 - 160/73)  BP(mean): --  RR: 18 (18 Apr 2018 04:55) (18 - 18)  SpO2: 97% (18 Apr 2018 04:55) (97% - 100%)  I&O's Detail    17 Apr 2018 07:01  -  18 Apr 2018 07:00  --------------------------------------------------------  IN:    Oral Fluid: 50 mL  Total IN: 50 mL    OUT:    Voided: 1975 mL  Total OUT: 1975 mL    Total NET: -1925 mL      PHYSICAL EXAM  GEN: NAD, skin W & D  RESP: CTA B/L  CV: nl S1S2  GI: soft, NT/ND  EXT: no C/C/E, L toe dry gangrene unchanged  NEURO: A & O X 3  PSYCH: remains calm, cooperative    EKG/ TELEM: NSR with 1st degree AVHB and 2vd degree Mobitz I as noted above    LABS: 4/18/2018 BMP pending                          12.1   5.35  )-----------( 207      ( 18 Apr 2018 07:02 )             37.2     PT/INR - ( 17 Apr 2018 06:49 )   PT: 12.4 SEC;   INR: 1.11          PTT - ( 17 Apr 2018 06:49 )  PTT:37.3 SEC  17 Apr 2018 06:49    142    |  109<H>  |  27<H>  ----------------------------<  88     4.4     |  23     |  1.90<H>    Ca    8.3<L>      17 Apr 2018 06:49  Phos  2.8       17 Apr 2018 06:49  Mg     2.0       17 Apr 2018 06:49 Subjective/Objective: Patient feels better today, no SOB, diuresed well after Lasix yesterday. No overnight events except for a few episodes of 2nd degree AVHB, Mobitz I noted on telemetry.    MEDICATIONS  (STANDING):  amLODIPine   Tablet 10 milliGRAM(s) Oral daily  artificial tears (preservative free) Ophthalmic Solution 1 Drop(s) Right EYE two times a day  aspirin enteric coated 81 milliGRAM(s) Oral daily  atorvastatin 80 milliGRAM(s) Oral at bedtime  buDESOnide 160 MICROgram(s)/formoterol 4.5 MICROgram(s) Inhaler 2 Puff(s) Inhalation two times a day  carvedilol 25 milliGRAM(s) Oral every 12 hours  docusate sodium 100 milliGRAM(s) Oral two times a day  heparin  Injectable 5000 Unit(s) SubCutaneous every 8 hours  hydrALAZINE 100 milliGRAM(s) Oral three times a day  isosorbide   dinitrate Tablet (ISORDIL) 20 milliGRAM(s) Oral three times a day  montelukast 10 milliGRAM(s) Oral daily  pantoprazole    Tablet 40 milliGRAM(s) Oral before breakfast  polyethylene glycol 3350 17 Gram(s) Oral daily  QUEtiapine 25 milliGRAM(s) Oral at bedtime  senna 2 Tablet(s) Oral at bedtime  tamsulosin 0.4 milliGRAM(s) Oral daily  tiotropium 18 MICROgram(s) Capsule 1 Capsule(s) Inhalation daily    MEDICATIONS  (PRN):  ALBUTerol/ipratropium for Nebulization 3 milliLiter(s) Nebulizer every 6 hours PRN Bronchospasm  haloperidol     Tablet 1 milliGRAM(s) Oral four times a day PRN agitation  haloperidol    Injectable 1 milliGRAM(s) IntraMuscular every 6 hours PRN Agitation  haloperidol    Injectable 1 milliGRAM(s) IV Push every 6 hours PRN Agitation  oxyCODONE    5 mG/acetaminophen 325 mG 1 Tablet(s) Oral every 4 hours PRN Moderate Pain (4 - 6)  oxyCODONE    5 mG/acetaminophen 325 mG 2 Tablet(s) Oral every 4 hours PRN Severe Pain (7 - 10)          Vital Signs Last 24 Hrs  T(C): 36.6 (18 Apr 2018 04:55), Max: 36.8 (17 Apr 2018 16:55)  T(F): 97.9 (18 Apr 2018 04:55), Max: 98.3 (18 Apr 2018 00:55)  HR: 64 (18 Apr 2018 04:55) (54 - 64)  BP: 141/68 (18 Apr 2018 04:55) (111/56 - 160/73)  BP(mean): --  RR: 18 (18 Apr 2018 04:55) (18 - 18)  SpO2: 97% (18 Apr 2018 04:55) (97% - 100%)  I&O's Detail    17 Apr 2018 07:01  -  18 Apr 2018 07:00  --------------------------------------------------------  IN:    Oral Fluid: 50 mL  Total IN: 50 mL    OUT:    Voided: 1975 mL  Total OUT: 1975 mL    Total NET: -1925 mL      PHYSICAL EXAM  GEN: NAD, skin W & D  RESP: CTA B/L  CV: nl S1S2  GI: soft, NT/ND  EXT: no C/C/E, L toe dry gangrene unchanged  NEURO: A & O X 3  PSYCH: remains calm, cooperative    EKG/ TELEM: NSR with 1st degree AVHB and 2nd degree Mobitz I as noted above    LABS: 4/18/2018 BMP pending                          12.1   5.35  )-----------( 207      ( 18 Apr 2018 07:02 )             37.2     PT/INR - ( 17 Apr 2018 06:49 )   PT: 12.4 SEC;   INR: 1.11          PTT - ( 17 Apr 2018 06:49 )  PTT:37.3 SEC  17 Apr 2018 06:49    142    |  109<H>  |  27<H>  ----------------------------<  88     4.4     |  23     |  1.90<H>    Ca    8.3<L>      17 Apr 2018 06:49  Phos  2.8       17 Apr 2018 06:49  Mg     2.0       17 Apr 2018 06:49

## 2018-04-18 NOTE — PROGRESS NOTE ADULT - PROBLEM SELECTOR PLAN 5
Evidence of 2nd degree AVHB, Mobitz type I but asymptomatic. Continue to monitor at present, may need to reduce Coreg dose if arrhythmia becomes persistent or symptomatic.

## 2018-04-18 NOTE — PROGRESS NOTE ADULT - ATTENDING COMMENTS
Pt. was seen and examined. Agree with above. Plan d/w the team.  Pt. is stable, no acute events overnight.  awaiting cardiac cath today for risk stratification for LE bypass

## 2018-04-18 NOTE — PROGRESS NOTE ADULT - PROBLEM SELECTOR PLAN 4
Mostly stable but with slight elevation yesterday with mild fluid overload, today's results pending.

## 2018-04-18 NOTE — PROGRESS NOTE ADULT - ATTENDING COMMENTS
Patient seen and examined. Urine output improved after 20 mg IV lasix yesterday. Cr stable. Able to lay flat and breathing improved.  For [facility] today  Possible vascular surgery tomorrow

## 2018-04-18 NOTE — PROGRESS NOTE ADULT - SUBJECTIVE AND OBJECTIVE BOX
VASCULAR SURGERY PROGRESS NOTE 32933  S: No acute events overnight, pt seen and examined.  O:  Vital Signs Last 24 Hrs  T(C): 36.6 (18 Apr 2018 04:55), Max: 36.8 (17 Apr 2018 16:55)  T(F): 97.9 (18 Apr 2018 04:55), Max: 98.3 (18 Apr 2018 00:55)  HR: 64 (18 Apr 2018 04:55) (54 - 64)  BP: 141/68 (18 Apr 2018 04:55) (111/56 - 160/73)  BP(mean): --  RR: 18 (18 Apr 2018 04:55) (18 - 18)  SpO2: 97% (18 Apr 2018 04:55) (97% - 100%)      Gen: NAD  Abdomen: LLQ hernia  RLE: PT and DP signals present  LLE: PT signal present, DP absent       LABS:                         12.1   5.35  )-----------( 207      ( 18 Apr 2018 07:02 )             37.2     04-17    142  |  109<H>  |  27<H>  ----------------------------<  88  4.4   |  23  |  1.90<H>    Ca    8.3<L>      17 Apr 2018 06:49  Phos  2.8     04-17  Mg     2.0     04-17      PT/INR - ( 17 Apr 2018 06:49 )   PT: 12.4 SEC;   INR: 1.11          PTT - ( 17 Apr 2018 06:49 )  PTT:37.3 SEC      MEDICATIONS  (STANDING):  amLODIPine   Tablet 10 milliGRAM(s) Oral daily  artificial tears (preservative free) Ophthalmic Solution 1 Drop(s) Right EYE two times a day  aspirin enteric coated 81 milliGRAM(s) Oral daily  atorvastatin 80 milliGRAM(s) Oral at bedtime  buDESOnide 160 MICROgram(s)/formoterol 4.5 MICROgram(s) Inhaler 2 Puff(s) Inhalation two times a day  carvedilol 25 milliGRAM(s) Oral every 12 hours  docusate sodium 100 milliGRAM(s) Oral two times a day  heparin  Injectable 5000 Unit(s) SubCutaneous every 8 hours  hydrALAZINE 100 milliGRAM(s) Oral three times a day  isosorbide   dinitrate Tablet (ISORDIL) 20 milliGRAM(s) Oral three times a day  montelukast 10 milliGRAM(s) Oral daily  pantoprazole    Tablet 40 milliGRAM(s) Oral before breakfast  polyethylene glycol 3350 17 Gram(s) Oral daily  QUEtiapine 25 milliGRAM(s) Oral at bedtime  senna 2 Tablet(s) Oral at bedtime  tamsulosin 0.4 milliGRAM(s) Oral daily  tiotropium 18 MICROgram(s) Capsule 1 Capsule(s) Inhalation daily    MEDICATIONS  (PRN):  ALBUTerol/ipratropium for Nebulization 3 milliLiter(s) Nebulizer every 6 hours PRN Bronchospasm  haloperidol     Tablet 1 milliGRAM(s) Oral four times a day PRN agitation  haloperidol    Injectable 1 milliGRAM(s) IntraMuscular every 6 hours PRN Agitation  haloperidol    Injectable 1 milliGRAM(s) IV Push every 6 hours PRN Agitation  oxyCODONE    5 mG/acetaminophen 325 mG 1 Tablet(s) Oral every 4 hours PRN Moderate Pain (4 - 6)  oxyCODONE    5 mG/acetaminophen 325 mG 2 Tablet(s) Oral every 4 hours PRN Severe Pain (7 - 10)

## 2018-04-19 DIAGNOSIS — I50.31 ACUTE DIASTOLIC (CONGESTIVE) HEART FAILURE: ICD-10-CM

## 2018-04-19 LAB
BUN SERPL-MCNC: 33 MG/DL — HIGH (ref 7–23)
CALCIUM SERPL-MCNC: 8.4 MG/DL — SIGNIFICANT CHANGE UP (ref 8.4–10.5)
CHLORIDE SERPL-SCNC: 103 MMOL/L — SIGNIFICANT CHANGE UP (ref 98–107)
CO2 SERPL-SCNC: 22 MMOL/L — SIGNIFICANT CHANGE UP (ref 22–31)
CREAT SERPL-MCNC: 1.79 MG/DL — HIGH (ref 0.5–1.3)
GLUCOSE SERPL-MCNC: 102 MG/DL — HIGH (ref 70–99)
HCT VFR BLD CALC: 39.5 % — SIGNIFICANT CHANGE UP (ref 39–50)
HGB BLD-MCNC: 13.2 G/DL — SIGNIFICANT CHANGE UP (ref 13–17)
MCHC RBC-ENTMCNC: 30 PG — SIGNIFICANT CHANGE UP (ref 27–34)
MCHC RBC-ENTMCNC: 33.4 % — SIGNIFICANT CHANGE UP (ref 32–36)
MCV RBC AUTO: 89.8 FL — SIGNIFICANT CHANGE UP (ref 80–100)
NRBC # FLD: 0 — SIGNIFICANT CHANGE UP
PLATELET # BLD AUTO: 236 K/UL — SIGNIFICANT CHANGE UP (ref 150–400)
PMV BLD: 12.1 FL — SIGNIFICANT CHANGE UP (ref 7–13)
POTASSIUM SERPL-MCNC: 4.2 MMOL/L — SIGNIFICANT CHANGE UP (ref 3.5–5.3)
POTASSIUM SERPL-SCNC: 4.2 MMOL/L — SIGNIFICANT CHANGE UP (ref 3.5–5.3)
RBC # BLD: 4.4 M/UL — SIGNIFICANT CHANGE UP (ref 4.2–5.8)
RBC # FLD: 14.6 % — HIGH (ref 10.3–14.5)
SODIUM SERPL-SCNC: 138 MMOL/L — SIGNIFICANT CHANGE UP (ref 135–145)
WBC # BLD: 6.27 K/UL — SIGNIFICANT CHANGE UP (ref 3.8–10.5)
WBC # FLD AUTO: 6.27 K/UL — SIGNIFICANT CHANGE UP (ref 3.8–10.5)

## 2018-04-19 PROCEDURE — 99233 SBSQ HOSP IP/OBS HIGH 50: CPT

## 2018-04-19 PROCEDURE — 99231 SBSQ HOSP IP/OBS SF/LOW 25: CPT

## 2018-04-19 PROCEDURE — 71045 X-RAY EXAM CHEST 1 VIEW: CPT | Mod: 26

## 2018-04-19 RX ADMIN — Medication 100 MILLIGRAM(S): at 21:55

## 2018-04-19 RX ADMIN — QUETIAPINE FUMARATE 25 MILLIGRAM(S): 200 TABLET, FILM COATED ORAL at 21:55

## 2018-04-19 RX ADMIN — TAMSULOSIN HYDROCHLORIDE 0.4 MILLIGRAM(S): 0.4 CAPSULE ORAL at 14:28

## 2018-04-19 RX ADMIN — HEPARIN SODIUM 5000 UNIT(S): 5000 INJECTION INTRAVENOUS; SUBCUTANEOUS at 14:29

## 2018-04-19 RX ADMIN — AMLODIPINE BESYLATE 10 MILLIGRAM(S): 2.5 TABLET ORAL at 06:19

## 2018-04-19 RX ADMIN — TIOTROPIUM BROMIDE 1 CAPSULE(S): 18 CAPSULE ORAL; RESPIRATORY (INHALATION) at 12:03

## 2018-04-19 RX ADMIN — OXYCODONE AND ACETAMINOPHEN 1 TABLET(S): 5; 325 TABLET ORAL at 21:54

## 2018-04-19 RX ADMIN — SENNA PLUS 2 TABLET(S): 8.6 TABLET ORAL at 21:55

## 2018-04-19 RX ADMIN — Medication 100 MILLIGRAM(S): at 18:01

## 2018-04-19 RX ADMIN — OXYCODONE AND ACETAMINOPHEN 1 TABLET(S): 5; 325 TABLET ORAL at 06:19

## 2018-04-19 RX ADMIN — OXYCODONE AND ACETAMINOPHEN 1 TABLET(S): 5; 325 TABLET ORAL at 07:19

## 2018-04-19 RX ADMIN — ISOSORBIDE DINITRATE 20 MILLIGRAM(S): 5 TABLET ORAL at 14:29

## 2018-04-19 RX ADMIN — OXYCODONE AND ACETAMINOPHEN 1 TABLET(S): 5; 325 TABLET ORAL at 22:54

## 2018-04-19 RX ADMIN — OXYCODONE AND ACETAMINOPHEN 2 TABLET(S): 5; 325 TABLET ORAL at 18:01

## 2018-04-19 RX ADMIN — ISOSORBIDE DINITRATE 20 MILLIGRAM(S): 5 TABLET ORAL at 06:20

## 2018-04-19 RX ADMIN — OXYCODONE AND ACETAMINOPHEN 2 TABLET(S): 5; 325 TABLET ORAL at 12:02

## 2018-04-19 RX ADMIN — BUDESONIDE AND FORMOTEROL FUMARATE DIHYDRATE 2 PUFF(S): 160; 4.5 AEROSOL RESPIRATORY (INHALATION) at 21:54

## 2018-04-19 RX ADMIN — Medication 100 MILLIGRAM(S): at 14:29

## 2018-04-19 RX ADMIN — Medication 100 MILLIGRAM(S): at 06:19

## 2018-04-19 RX ADMIN — Medication 100 MILLIGRAM(S): at 06:20

## 2018-04-19 RX ADMIN — POLYETHYLENE GLYCOL 3350 17 GRAM(S): 17 POWDER, FOR SOLUTION ORAL at 12:02

## 2018-04-19 RX ADMIN — OXYCODONE AND ACETAMINOPHEN 2 TABLET(S): 5; 325 TABLET ORAL at 12:32

## 2018-04-19 RX ADMIN — CARVEDILOL PHOSPHATE 25 MILLIGRAM(S): 80 CAPSULE, EXTENDED RELEASE ORAL at 18:01

## 2018-04-19 RX ADMIN — ISOSORBIDE DINITRATE 20 MILLIGRAM(S): 5 TABLET ORAL at 21:55

## 2018-04-19 RX ADMIN — OXYCODONE AND ACETAMINOPHEN 1 TABLET(S): 5; 325 TABLET ORAL at 23:35

## 2018-04-19 RX ADMIN — ATORVASTATIN CALCIUM 80 MILLIGRAM(S): 80 TABLET, FILM COATED ORAL at 21:55

## 2018-04-19 RX ADMIN — MONTELUKAST 10 MILLIGRAM(S): 4 TABLET, CHEWABLE ORAL at 12:02

## 2018-04-19 RX ADMIN — Medication 81 MILLIGRAM(S): at 14:28

## 2018-04-19 RX ADMIN — OXYCODONE AND ACETAMINOPHEN 2 TABLET(S): 5; 325 TABLET ORAL at 19:00

## 2018-04-19 RX ADMIN — PANTOPRAZOLE SODIUM 40 MILLIGRAM(S): 20 TABLET, DELAYED RELEASE ORAL at 06:20

## 2018-04-19 RX ADMIN — HEPARIN SODIUM 5000 UNIT(S): 5000 INJECTION INTRAVENOUS; SUBCUTANEOUS at 21:55

## 2018-04-19 RX ADMIN — HEPARIN SODIUM 5000 UNIT(S): 5000 INJECTION INTRAVENOUS; SUBCUTANEOUS at 06:19

## 2018-04-19 RX ADMIN — Medication 1 DROP(S): at 06:21

## 2018-04-19 NOTE — PROGRESS NOTE ADULT - PROBLEM SELECTOR PLAN 1
Patient hypertensive to 260s initially requiring nitro gtt; now off nitro gtt. BP remains elevated when patient is having pain  - Continue hydral, coreg, amlodipine, isordil   - Holding home cardizem 360 daily

## 2018-04-19 NOTE — PROGRESS NOTE ADULT - ASSESSMENT
77M with CFA and SFA occlusions found on angiogram. S/p L heart cath:  - Tolerating diet  - Pain control  - Cancelled planned for bypass given cardiac risk; will actively discuss w/ pt regarding getting BKA   - F/u gen surg consult re: hernia  - DVT ppx

## 2018-04-19 NOTE — PROGRESS NOTE ADULT - ATTENDING COMMENTS
Patient seen and examined. Agree with above assessment and plan. Patient with hypertensive emergency with flash pulmonary edema  Bp better controlled on current meds  Patient sp C yesterday with Dr. Duke with mid to distal LCX disease for medical mgmt.  For possible amputation by surgery if patient agreeable  Will continue to follow

## 2018-04-19 NOTE — PROGRESS NOTE ADULT - PROBLEM SELECTOR PLAN 1
s/p angiogram showing occluded left SFA and AK pop pending bypass and fem endarterectomy. No evidence of OM on Xray  Stress test done:  large, severe defects in inferior and inferoseptal walls that are  partially reversible, suggestive of infarct with mild partial ischemia. There was a severe defect in the diaphragmatic wall of the RV that was fixed, consistent  with RV infarct.   Developed NSVT on 4/16, s/p LHC by Cards yesterday, awaiting official cath report, per Cards note- mRCA total, no planned intervention  Percocet PRN for pain  per Vascular, bypass cancelled for now, possible BKA

## 2018-04-19 NOTE — PROGRESS NOTE ADULT - PROBLEM SELECTOR PLAN 2
Likely 2/2 flash pulmonary edema 2/2 hypertensive emergency. Initial CXR clear. Now resolved  - Continue current b/p meds   - Hold Lasix for now   - S/P cath with total RCA. no plan intervention

## 2018-04-19 NOTE — PROGRESS NOTE ADULT - ASSESSMENT
76 YO M DM, HTN, presents with L toe pain for last several weeks. Found in the ED to be hypertensive to 260s with suspected flash pulmonary edema and transferred to CCU on nitro gtt and BPAP for further management. S/P cath with RCA disease, no plan intervention

## 2018-04-19 NOTE — PROGRESS NOTE ADULT - ATTENDING COMMENTS
Pt. was seen and examined. Agree with above. Plan d/w the team.  Pt. is high risk for leg bypass based on cardiac eval  I discussed risks and benefits of medical management of rest pain and gangrene vs. BKA. At this time pt. does not want to proceed with BKA.   I explained to him that gangrene will not improve and it carries risk of infection and potential sepsis.   Pt. wants to try narcotics for rest pain at this point.

## 2018-04-19 NOTE — PROGRESS NOTE ADULT - SUBJECTIVE AND OBJECTIVE BOX
VASCULAR DAILY PROGRESS NOTE:     Subjective: Pt seen this AM. NAEO.  S/p cath with total RCA. Pain controlled. Tolerating PO intake. Remains afebrile.         Objective:    PE:  Gen: NAD  Resp: Respirations unlabored   CVS: RRR  Abd: soft, ND, NT, LLQ hernia  Ext/Vasc:  RLE: PT and DP signals present  LLE: PT signal present, DP absent      Vital Signs Last 24 Hrs  T(C): 36.8 (19 Apr 2018 06:03), Max: 36.8 (19 Apr 2018 00:46)  T(F): 98.3 (19 Apr 2018 06:03), Max: 98.3 (19 Apr 2018 06:03)  HR: 73 (19 Apr 2018 06:03) (57 - 73)  BP: 147/66 (19 Apr 2018 06:03) (126/51 - 162/55)  BP(mean): --  RR: 18 (19 Apr 2018 06:03) (16 - 18)  SpO2: 97% (19 Apr 2018 06:03) (97% - 100%)    I&O's Detail    18 Apr 2018 07:01  -  19 Apr 2018 07:00  --------------------------------------------------------  IN:  Total IN: 0 mL    OUT:    Voided: 200 mL  Total OUT: 200 mL    Total NET: -200 mL      19 Apr 2018 07:01  -  19 Apr 2018 09:15  --------------------------------------------------------  IN:  Total IN: 0 mL    OUT:    Voided: 250 mL  Total OUT: 250 mL    Total NET: -250 mL          Daily     Daily     MEDICATIONS  (STANDING):  amLODIPine   Tablet 10 milliGRAM(s) Oral daily  artificial tears (preservative free) Ophthalmic Solution 1 Drop(s) Right EYE two times a day  aspirin enteric coated 81 milliGRAM(s) Oral daily  atorvastatin 80 milliGRAM(s) Oral at bedtime  buDESOnide 160 MICROgram(s)/formoterol 4.5 MICROgram(s) Inhaler 2 Puff(s) Inhalation two times a day  carvedilol 25 milliGRAM(s) Oral every 12 hours  docusate sodium 100 milliGRAM(s) Oral two times a day  heparin  Injectable 5000 Unit(s) SubCutaneous every 8 hours  hydrALAZINE 100 milliGRAM(s) Oral three times a day  isosorbide   dinitrate Tablet (ISORDIL) 20 milliGRAM(s) Oral three times a day  montelukast 10 milliGRAM(s) Oral daily  pantoprazole    Tablet 40 milliGRAM(s) Oral before breakfast  polyethylene glycol 3350 17 Gram(s) Oral daily  QUEtiapine 25 milliGRAM(s) Oral at bedtime  senna 2 Tablet(s) Oral at bedtime  tamsulosin 0.4 milliGRAM(s) Oral daily  tiotropium 18 MICROgram(s) Capsule 1 Capsule(s) Inhalation daily    MEDICATIONS  (PRN):  ALBUTerol/ipratropium for Nebulization 3 milliLiter(s) Nebulizer every 6 hours PRN Bronchospasm  haloperidol     Tablet 1 milliGRAM(s) Oral four times a day PRN agitation  haloperidol    Injectable 1 milliGRAM(s) IntraMuscular every 6 hours PRN Agitation  haloperidol    Injectable 1 milliGRAM(s) IV Push every 6 hours PRN Agitation  oxyCODONE    5 mG/acetaminophen 325 mG 1 Tablet(s) Oral every 4 hours PRN Moderate Pain (4 - 6)  oxyCODONE    5 mG/acetaminophen 325 mG 2 Tablet(s) Oral every 4 hours PRN Severe Pain (7 - 10)      LABS:                        12.1   5.35  )-----------( 207      ( 18 Apr 2018 07:02 )             37.2     04-18    139  |  105  |  31<H>  ----------------------------<  84  4.2   |  23  |  1.92<H>    Ca    8.4      18 Apr 2018 07:02  Phos  3.1     04-18  Mg     1.9     04-18    TPro  5.8<L>  /  Alb  2.9<L>  /  TBili  0.4  /  DBili  x   /  AST  16  /  ALT  9   /  AlkPhos  66  04-18          RADIOLOGY & ADDITIONAL STUDIES:

## 2018-04-19 NOTE — PROGRESS NOTE ADULT - SUBJECTIVE AND OBJECTIVE BOX
Tele: NSR w/ 1 AVHB, transient type 1     Overnight: Toe pain, no CP or SOB    MEDICATIONS  (STANDING):  amLODIPine   Tablet 10 milliGRAM(s) Oral daily  artificial tears (preservative free) Ophthalmic Solution 1 Drop(s) Right EYE two times a day  aspirin enteric coated 81 milliGRAM(s) Oral daily  atorvastatin 80 milliGRAM(s) Oral at bedtime  buDESOnide 160 MICROgram(s)/formoterol 4.5 MICROgram(s) Inhaler 2 Puff(s) Inhalation two times a day  carvedilol 25 milliGRAM(s) Oral every 12 hours  docusate sodium 100 milliGRAM(s) Oral two times a day  heparin  Injectable 5000 Unit(s) SubCutaneous every 8 hours  hydrALAZINE 100 milliGRAM(s) Oral three times a day  isosorbide   dinitrate Tablet (ISORDIL) 20 milliGRAM(s) Oral three times a day  montelukast 10 milliGRAM(s) Oral daily  pantoprazole    Tablet 40 milliGRAM(s) Oral before breakfast  polyethylene glycol 3350 17 Gram(s) Oral daily  QUEtiapine 25 milliGRAM(s) Oral at bedtime  senna 2 Tablet(s) Oral at bedtime  tamsulosin 0.4 milliGRAM(s) Oral daily  tiotropium 18 MICROgram(s) Capsule 1 Capsule(s) Inhalation daily    MEDICATIONS  (PRN):  ALBUTerol/ipratropium for Nebulization 3 milliLiter(s) Nebulizer every 6 hours PRN Bronchospasm  haloperidol     Tablet 1 milliGRAM(s) Oral four times a day PRN agitation  haloperidol    Injectable 1 milliGRAM(s) IntraMuscular every 6 hours PRN Agitation  haloperidol    Injectable 1 milliGRAM(s) IV Push every 6 hours PRN Agitation  oxyCODONE    5 mG/acetaminophen 325 mG 1 Tablet(s) Oral every 4 hours PRN Moderate Pain (4 - 6)  oxyCODONE    5 mG/acetaminophen 325 mG 2 Tablet(s) Oral every 4 hours PRN Severe Pain (7 - 10)        Vital Signs Last 24 Hrs  T(C): 36.8 (19 Apr 2018 06:03), Max: 36.8 (18 Apr 2018 08:00)  T(F): 98.3 (19 Apr 2018 06:03), Max: 98.3 (19 Apr 2018 06:03)  HR: 73 (19 Apr 2018 06:03) (57 - 73)  BP: 147/66 (19 Apr 2018 06:03) (126/51 - 162/55)  RR: 18 (19 Apr 2018 06:03) (16 - 18)  SpO2: 97% (19 Apr 2018 06:03) (95% - 100%)  I&O's Detail    18 Apr 2018 07:01  -  19 Apr 2018 07:00  --------------------------------------------------------  IN:  Total IN: 0 mL    OUT:    Voided: 200 mL  Total OUT: 200 mL    Total NET: -200 mL      19 Apr 2018 07:01  -  19 Apr 2018 07:57  --------------------------------------------------------  IN:  Total IN: 0 mL    OUT:    Voided: 250 mL  Total OUT: 250 mL    Total NET: -250 mL    Physical exam:   Gen- NAD  Resp- Clear   CV- S1S2 RRR  ABD- + BS x4 ND/NT  EXT- No edema   Neuro- A&O x 2                             12.1   5.35  )-----------( 207      ( 18 Apr 2018 07:02 )             37.2       18 Apr 2018 07:02    139    |  105    |  31<H>  ----------------------------<  84     4.2     |  23     |  1.92<H>    Ca    8.4        18 Apr 2018 07:02  Phos  3.1       18 Apr 2018 07:02  Mg     1.9       18 Apr 2018 07:02    TPro  5.8<L>  /  Alb  2.9<L>  /  TBili  0.4    /  DBili  x      /  AST  16     /  ALT  9      /  AlkPhos  66     18 Apr 2018 07:02                    Diagnostic testing:    S/P cath pending official report   mRCA total, no plan intervention

## 2018-04-19 NOTE — PROGRESS NOTE ADULT - PROBLEM SELECTOR PLAN 3
Evaluated by podiatry in ED with suspected dry gangrene  - GRISEL with L common fem artery occlusion  - Vascular following   - Decrease Lipitor 40 mg, continue ASA 81  - Pain control by primary team

## 2018-04-19 NOTE — PROGRESS NOTE ADULT - SUBJECTIVE AND OBJECTIVE BOX
Patient is a 77y old  Male who presents with a chief complaint of Toe Pain (05 Apr 2018 10:42)        SUBJECTIVE / OVERNIGHT EVENTS:  no acute events o/n  pt c/o palpitations, denies chest pain or sob  foot pain controlled    MEDICATIONS  (STANDING):  amLODIPine   Tablet 10 milliGRAM(s) Oral daily  artificial tears (preservative free) Ophthalmic Solution 1 Drop(s) Right EYE two times a day  aspirin enteric coated 81 milliGRAM(s) Oral daily  atorvastatin 80 milliGRAM(s) Oral at bedtime  buDESOnide 160 MICROgram(s)/formoterol 4.5 MICROgram(s) Inhaler 2 Puff(s) Inhalation two times a day  carvedilol 25 milliGRAM(s) Oral every 12 hours  docusate sodium 100 milliGRAM(s) Oral two times a day  heparin  Injectable 5000 Unit(s) SubCutaneous every 8 hours  hydrALAZINE 100 milliGRAM(s) Oral three times a day  isosorbide   dinitrate Tablet (ISORDIL) 20 milliGRAM(s) Oral three times a day  montelukast 10 milliGRAM(s) Oral daily  pantoprazole    Tablet 40 milliGRAM(s) Oral before breakfast  polyethylene glycol 3350 17 Gram(s) Oral daily  QUEtiapine 25 milliGRAM(s) Oral at bedtime  senna 2 Tablet(s) Oral at bedtime  tamsulosin 0.4 milliGRAM(s) Oral daily  tiotropium 18 MICROgram(s) Capsule 1 Capsule(s) Inhalation daily    MEDICATIONS  (PRN):  ALBUTerol/ipratropium for Nebulization 3 milliLiter(s) Nebulizer every 6 hours PRN Bronchospasm  haloperidol     Tablet 1 milliGRAM(s) Oral four times a day PRN agitation  haloperidol    Injectable 1 milliGRAM(s) IntraMuscular every 6 hours PRN Agitation  haloperidol    Injectable 1 milliGRAM(s) IV Push every 6 hours PRN Agitation  oxyCODONE    5 mG/acetaminophen 325 mG 1 Tablet(s) Oral every 4 hours PRN Moderate Pain (4 - 6)  oxyCODONE    5 mG/acetaminophen 325 mG 2 Tablet(s) Oral every 4 hours PRN Severe Pain (7 - 10)      Vital Signs Last 24 Hrs  T(C): 36.4 (19 Apr 2018 10:35), Max: 36.8 (19 Apr 2018 00:46)  T(F): 97.6 (19 Apr 2018 10:35), Max: 98.3 (19 Apr 2018 06:03)  HR: 64 (19 Apr 2018 10:35) (57 - 73)  BP: 143/66 (19 Apr 2018 10:35) (126/51 - 162/55)  BP(mean): --  RR: 18 (19 Apr 2018 10:35) (16 - 18)  SpO2: 95% (19 Apr 2018 10:35) (95% - 100%)  CAPILLARY BLOOD GLUCOSE        I&O's Summary    18 Apr 2018 07:01  -  19 Apr 2018 07:00  --------------------------------------------------------  IN: 0 mL / OUT: 200 mL / NET: -200 mL    19 Apr 2018 07:01  -  19 Apr 2018 14:05  --------------------------------------------------------  IN: 0 mL / OUT: 250 mL / NET: -250 mL    PHYSICAL EXAM:  GENERAL: NAD  HEENT: soft, mobile, round mass over left TMJ nontender to palpation (likely lipoma)  CHEST/LUNG: Clear to auscultation bilaterally; No wheezing or crackles  HEART: s1/s2, no murmurs  ABDOMEN: Soft, Nontender, Nondistended; Bowel sounds present  EXTREMITIES:  legs warm to touch, no palpable pulses on Left, dusky appearance of toes on left foot with dry gangrene of 2nd digit of left foot. no peripheral edema      LABS:                        12.1   5.35  )-----------( 207      ( 18 Apr 2018 07:02 )             37.2     04-18    139  |  105  |  31<H>  ----------------------------<  84  4.2   |  23  |  1.92<H>    Ca    8.4      18 Apr 2018 07:02  Phos  3.1     04-18  Mg     1.9     04-18    TPro  5.8<L>  /  Alb  2.9<L>  /  TBili  0.4  /  DBili  x   /  AST  16  /  ALT  9   /  AlkPhos  66  04-18              RADIOLOGY & ADDITIONAL TESTS:    Imaging Personally Reviewed:  Consultant(s) Notes Reviewed:    Care Discussed with Consultants/Other Providers:

## 2018-04-19 NOTE — PROGRESS NOTE ADULT - PROBLEM SELECTOR PLAN 4
JINA on CKD; patient with unclear baseline Cr but chart history of CKD  - Likely 2/2 HTN  - Trend Cr   - I+O for UOP monitoring

## 2018-04-20 ENCOUNTER — TRANSCRIPTION ENCOUNTER (OUTPATIENT)
Age: 78
End: 2018-04-20

## 2018-04-20 LAB
BUN SERPL-MCNC: 30 MG/DL — HIGH (ref 7–23)
CALCIUM SERPL-MCNC: 8.5 MG/DL — SIGNIFICANT CHANGE UP (ref 8.4–10.5)
CHLORIDE SERPL-SCNC: 103 MMOL/L — SIGNIFICANT CHANGE UP (ref 98–107)
CO2 SERPL-SCNC: 23 MMOL/L — SIGNIFICANT CHANGE UP (ref 22–31)
CREAT SERPL-MCNC: 1.77 MG/DL — HIGH (ref 0.5–1.3)
GLUCOSE SERPL-MCNC: 81 MG/DL — SIGNIFICANT CHANGE UP (ref 70–99)
HCT VFR BLD CALC: 37.2 % — LOW (ref 39–50)
HGB BLD-MCNC: 12.6 G/DL — LOW (ref 13–17)
MAGNESIUM SERPL-MCNC: 2 MG/DL — SIGNIFICANT CHANGE UP (ref 1.6–2.6)
MCHC RBC-ENTMCNC: 30 PG — SIGNIFICANT CHANGE UP (ref 27–34)
MCHC RBC-ENTMCNC: 33.9 % — SIGNIFICANT CHANGE UP (ref 32–36)
MCV RBC AUTO: 88.6 FL — SIGNIFICANT CHANGE UP (ref 80–100)
NRBC # FLD: 0 — SIGNIFICANT CHANGE UP
PHOSPHATE SERPL-MCNC: 3.1 MG/DL — SIGNIFICANT CHANGE UP (ref 2.5–4.5)
PLATELET # BLD AUTO: 208 K/UL — SIGNIFICANT CHANGE UP (ref 150–400)
PMV BLD: 11.8 FL — SIGNIFICANT CHANGE UP (ref 7–13)
POTASSIUM SERPL-MCNC: 3.9 MMOL/L — SIGNIFICANT CHANGE UP (ref 3.5–5.3)
POTASSIUM SERPL-SCNC: 3.9 MMOL/L — SIGNIFICANT CHANGE UP (ref 3.5–5.3)
RBC # BLD: 4.2 M/UL — SIGNIFICANT CHANGE UP (ref 4.2–5.8)
RBC # FLD: 14.6 % — HIGH (ref 10.3–14.5)
SODIUM SERPL-SCNC: 138 MMOL/L — SIGNIFICANT CHANGE UP (ref 135–145)
WBC # BLD: 4.85 K/UL — SIGNIFICANT CHANGE UP (ref 3.8–10.5)
WBC # FLD AUTO: 4.85 K/UL — SIGNIFICANT CHANGE UP (ref 3.8–10.5)

## 2018-04-20 PROCEDURE — 99231 SBSQ HOSP IP/OBS SF/LOW 25: CPT

## 2018-04-20 PROCEDURE — 93010 ELECTROCARDIOGRAM REPORT: CPT

## 2018-04-20 PROCEDURE — 99233 SBSQ HOSP IP/OBS HIGH 50: CPT

## 2018-04-20 RX ORDER — TIOTROPIUM BROMIDE 18 UG/1
1 CAPSULE ORAL; RESPIRATORY (INHALATION)
Qty: 0 | Refills: 0 | COMMUNITY
Start: 2018-04-20

## 2018-04-20 RX ORDER — CARVEDILOL PHOSPHATE 80 MG/1
1 CAPSULE, EXTENDED RELEASE ORAL
Qty: 0 | Refills: 0 | COMMUNITY
Start: 2018-04-20

## 2018-04-20 RX ORDER — CARVEDILOL PHOSPHATE 80 MG/1
12.5 CAPSULE, EXTENDED RELEASE ORAL EVERY 12 HOURS
Qty: 0 | Refills: 0 | Status: DISCONTINUED | OUTPATIENT
Start: 2018-04-20 | End: 2018-04-25

## 2018-04-20 RX ORDER — ASPIRIN/CALCIUM CARB/MAGNESIUM 324 MG
1 TABLET ORAL
Qty: 0 | Refills: 0 | COMMUNITY
Start: 2018-04-20

## 2018-04-20 RX ORDER — SENNA PLUS 8.6 MG/1
2 TABLET ORAL
Qty: 0 | Refills: 0 | COMMUNITY
Start: 2018-04-20

## 2018-04-20 RX ORDER — ISOSORBIDE DINITRATE 5 MG/1
1 TABLET ORAL
Qty: 0 | Refills: 0 | COMMUNITY
Start: 2018-04-20

## 2018-04-20 RX ORDER — HYDRALAZINE HCL 50 MG
1 TABLET ORAL
Qty: 0 | Refills: 0 | COMMUNITY
Start: 2018-04-20

## 2018-04-20 RX ORDER — ATORVASTATIN CALCIUM 80 MG/1
1 TABLET, FILM COATED ORAL
Qty: 0 | Refills: 0 | COMMUNITY
Start: 2018-04-20

## 2018-04-20 RX ORDER — DOCUSATE SODIUM 100 MG
1 CAPSULE ORAL
Qty: 0 | Refills: 0 | COMMUNITY
Start: 2018-04-20

## 2018-04-20 RX ORDER — QUETIAPINE FUMARATE 200 MG/1
1 TABLET, FILM COATED ORAL
Qty: 0 | Refills: 0 | COMMUNITY
Start: 2018-04-20

## 2018-04-20 RX ORDER — AMLODIPINE BESYLATE 2.5 MG/1
1 TABLET ORAL
Qty: 0 | Refills: 0 | COMMUNITY
Start: 2018-04-20

## 2018-04-20 RX ORDER — POLYETHYLENE GLYCOL 3350 17 G/17G
17 POWDER, FOR SOLUTION ORAL
Qty: 0 | Refills: 0 | COMMUNITY
Start: 2018-04-20

## 2018-04-20 RX ADMIN — ATORVASTATIN CALCIUM 80 MILLIGRAM(S): 80 TABLET, FILM COATED ORAL at 21:40

## 2018-04-20 RX ADMIN — OXYCODONE AND ACETAMINOPHEN 1 TABLET(S): 5; 325 TABLET ORAL at 06:38

## 2018-04-20 RX ADMIN — AMLODIPINE BESYLATE 10 MILLIGRAM(S): 2.5 TABLET ORAL at 05:30

## 2018-04-20 RX ADMIN — BUDESONIDE AND FORMOTEROL FUMARATE DIHYDRATE 2 PUFF(S): 160; 4.5 AEROSOL RESPIRATORY (INHALATION) at 09:51

## 2018-04-20 RX ADMIN — Medication 100 MILLIGRAM(S): at 05:30

## 2018-04-20 RX ADMIN — Medication 1 DROP(S): at 05:29

## 2018-04-20 RX ADMIN — ISOSORBIDE DINITRATE 20 MILLIGRAM(S): 5 TABLET ORAL at 13:49

## 2018-04-20 RX ADMIN — ISOSORBIDE DINITRATE 20 MILLIGRAM(S): 5 TABLET ORAL at 05:31

## 2018-04-20 RX ADMIN — HEPARIN SODIUM 5000 UNIT(S): 5000 INJECTION INTRAVENOUS; SUBCUTANEOUS at 21:41

## 2018-04-20 RX ADMIN — HEPARIN SODIUM 5000 UNIT(S): 5000 INJECTION INTRAVENOUS; SUBCUTANEOUS at 05:29

## 2018-04-20 RX ADMIN — Medication 1 DROP(S): at 17:14

## 2018-04-20 RX ADMIN — OXYCODONE AND ACETAMINOPHEN 2 TABLET(S): 5; 325 TABLET ORAL at 14:24

## 2018-04-20 RX ADMIN — TIOTROPIUM BROMIDE 1 CAPSULE(S): 18 CAPSULE ORAL; RESPIRATORY (INHALATION) at 09:51

## 2018-04-20 RX ADMIN — OXYCODONE AND ACETAMINOPHEN 2 TABLET(S): 5; 325 TABLET ORAL at 13:54

## 2018-04-20 RX ADMIN — OXYCODONE AND ACETAMINOPHEN 1 TABLET(S): 5; 325 TABLET ORAL at 05:38

## 2018-04-20 RX ADMIN — CARVEDILOL PHOSPHATE 12.5 MILLIGRAM(S): 80 CAPSULE, EXTENDED RELEASE ORAL at 17:14

## 2018-04-20 RX ADMIN — Medication 81 MILLIGRAM(S): at 13:49

## 2018-04-20 RX ADMIN — QUETIAPINE FUMARATE 25 MILLIGRAM(S): 200 TABLET, FILM COATED ORAL at 21:40

## 2018-04-20 RX ADMIN — BUDESONIDE AND FORMOTEROL FUMARATE DIHYDRATE 2 PUFF(S): 160; 4.5 AEROSOL RESPIRATORY (INHALATION) at 21:41

## 2018-04-20 RX ADMIN — Medication 100 MILLIGRAM(S): at 13:49

## 2018-04-20 RX ADMIN — MONTELUKAST 10 MILLIGRAM(S): 4 TABLET, CHEWABLE ORAL at 13:49

## 2018-04-20 RX ADMIN — PANTOPRAZOLE SODIUM 40 MILLIGRAM(S): 20 TABLET, DELAYED RELEASE ORAL at 05:31

## 2018-04-20 RX ADMIN — Medication 100 MILLIGRAM(S): at 21:40

## 2018-04-20 RX ADMIN — TAMSULOSIN HYDROCHLORIDE 0.4 MILLIGRAM(S): 0.4 CAPSULE ORAL at 16:06

## 2018-04-20 RX ADMIN — HEPARIN SODIUM 5000 UNIT(S): 5000 INJECTION INTRAVENOUS; SUBCUTANEOUS at 13:49

## 2018-04-20 RX ADMIN — Medication 100 MILLIGRAM(S): at 05:31

## 2018-04-20 RX ADMIN — ISOSORBIDE DINITRATE 20 MILLIGRAM(S): 5 TABLET ORAL at 21:40

## 2018-04-20 NOTE — DISCHARGE NOTE ADULT - CARE PLAN
Goal:	preventing infection to left 2nd toe  Assessment and plan of treatment:	Change dressing daily w/ betadine  Weight bearing as tolerated to left foot  Follow up w/ Dr. Tripp within 1 week of discharge, call [phone] Principal Discharge DX:	Gangrene of toe  Goal:	preventing infection to left 2nd toe  Assessment and plan of treatment:	- Change dressing daily w/ betadine  - Weight bearing as tolerated to left foot  - Follow up w/ Dr. Tripp within 1 week of discharge, call [phone]  Secondary Diagnosis:	Hypertensive emergency  Assessment and plan of treatment:	- Continue coreg, carvedilol, norvasc, hydralazine, coreg as prescribed.   - Do not continue cardizem  - Follow up with Dr. Putnam for further management  Secondary Diagnosis:	Asthma  Assessment and plan of treatment:	Continue singulair, ventolin, symbicort, spiriva as prescribed. Follow up with Dr. Putnam for further management.  Secondary Diagnosis:	CKD (chronic kidney disease)  Assessment and plan of treatment:	Stable.

## 2018-04-20 NOTE — PROGRESS NOTE ADULT - SUBJECTIVE AND OBJECTIVE BOX
Subjective/Objective:  Patient resting in bed, feels well.    MEDICATIONS  (STANDING):  amLODIPine   Tablet 10 milliGRAM(s) Oral daily  artificial tears (preservative free) Ophthalmic Solution 1 Drop(s) Right EYE two times a day  aspirin enteric coated 81 milliGRAM(s) Oral daily  atorvastatin 80 milliGRAM(s) Oral at bedtime  buDESOnide 160 MICROgram(s)/formoterol 4.5 MICROgram(s) Inhaler 2 Puff(s) Inhalation two times a day  carvedilol 25 milliGRAM(s) Oral every 12 hours  docusate sodium 100 milliGRAM(s) Oral two times a day  heparin  Injectable 5000 Unit(s) SubCutaneous every 8 hours  hydrALAZINE 100 milliGRAM(s) Oral three times a day  isosorbide   dinitrate Tablet (ISORDIL) 20 milliGRAM(s) Oral three times a day  montelukast 10 milliGRAM(s) Oral daily  pantoprazole    Tablet 40 milliGRAM(s) Oral before breakfast  polyethylene glycol 3350 17 Gram(s) Oral daily  QUEtiapine 25 milliGRAM(s) Oral at bedtime  senna 2 Tablet(s) Oral at bedtime  tamsulosin 0.4 milliGRAM(s) Oral daily  tiotropium 18 MICROgram(s) Capsule 1 Capsule(s) Inhalation daily    MEDICATIONS  (PRN):  ALBUTerol/ipratropium for Nebulization 3 milliLiter(s) Nebulizer every 6 hours PRN Bronchospasm  haloperidol     Tablet 1 milliGRAM(s) Oral four times a day PRN agitation  haloperidol    Injectable 1 milliGRAM(s) IntraMuscular every 6 hours PRN Agitation  haloperidol    Injectable 1 milliGRAM(s) IV Push every 6 hours PRN Agitation  oxyCODONE    5 mG/acetaminophen 325 mG 1 Tablet(s) Oral every 4 hours PRN Moderate Pain (4 - 6)  oxyCODONE    5 mG/acetaminophen 325 mG 2 Tablet(s) Oral every 4 hours PRN Severe Pain (7 - 10)          Vital Signs Last 24 Hrs  T(C): 36.7 (20 Apr 2018 05:27), Max: 36.9 (19 Apr 2018 21:48)  T(F): 98 (20 Apr 2018 05:27), Max: 98.4 (19 Apr 2018 21:48)  HR: 74 (20 Apr 2018 05:27) (56 - 74)  BP: 146/71 (20 Apr 2018 05:27) (118/53 - 165/74)  BP(mean): --  RR: 18 (20 Apr 2018 05:27) (17 - 18)  SpO2: 97% (20 Apr 2018 05:27) (94% - 97%)  I&O's Detail    19 Apr 2018 07:01  -  20 Apr 2018 07:00  --------------------------------------------------------  IN:  Total IN: 0 mL    OUT:    Voided: 980 mL  Total OUT: 980 mL    Total NET: -980 mL    PHYSICAL EXAM  GEN: NAD< skin W & D  RESP: CTA B/L  CV: nl S1S2, no M/R/C  GI: soft, NT/ND  EXT: no C/C/E, L toe dry gangrene unchanged  NEURO: awake, responsive  PSYCH: calm, cooperative    EKG/ TELEM: NSR with 1st degree AVHB, telemetry reviewed, although strips noted with NSVT, it is clearly artifact. Other tele review notes some blocked APCs and intermittent 2nd degree AVHB, Mobitz I and II. Asymptomatic.    LABS:                          12.6   4.85  )-----------( 208      ( 20 Apr 2018 06:30 )             37.2       20 Apr 2018 06:30    138    |  103    |  30<H>  ----------------------------<  81     3.9     |  23     |  1.77<H>    19 Apr 2018 14:25    138    |  103    |  33<H>  ----------------------------<  102<H>  4.2     |  22     |  1.79<H>    Ca    8.5        20 Apr 2018 06:30  Ca    8.4        19 Apr 2018 14:25  Phos  3.1       20 Apr 2018 06:30  Mg     2.0       20 Apr 2018 06:30

## 2018-04-20 NOTE — DISCHARGE NOTE ADULT - MEDICATION SUMMARY - MEDICATIONS TO STOP TAKING
I will STOP taking the medications listed below when I get home from the hospital:    Cardizem CD  -- 360 milligram(s) by mouth once a day

## 2018-04-20 NOTE — PROGRESS NOTE ADULT - PROBLEM SELECTOR PLAN 1
EF 45% on nuclear stress, remains euvolemic. Continue hydralazine, isordil, and coreg. 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

## 2018-04-20 NOTE — PROGRESS NOTE ADULT - ASSESSMENT
77M with CFA and SFA occlusions found on angiogram. S/p L heart cath:  - Tolerating diet  - Pain control  - Cancelled planned for bypass given cardiac risk  - Discussed with pt the option of BKA, but pt actively refusing; as such, no interventions at this point   - DVT ppx  - Dispo planning pending final PT reccs

## 2018-04-20 NOTE — DISCHARGE NOTE ADULT - MEDICATION SUMMARY - MEDICATIONS TO TAKE
I will START or STAY ON the medications listed below when I get home from the hospital:    aspirin 81 mg oral delayed release tablet  -- 1 tab(s) by mouth once a day  -- Indication: For Antiplatelet    oxyCODONE-acetaminophen 5 mg-325 mg oral tablet  -- 1 tab(s) by mouth every 4 hours, As needed, Moderate Pain (4 - 6)  -- Indication: For moderate-severe pain    Flomax 0.4 mg oral capsule  -- 1 cap(s) by mouth once a day  -- Indication: For BPH    isosorbide dinitrate 20 mg oral tablet  -- 1 tab(s) by mouth 3 times a day  -- Indication: For Antianginal    atorvastatin 80 mg oral tablet  -- 1 tab(s) by mouth once a day (at bedtime)  -- Indication: For Hyperlipidemia    QUEtiapine 25 mg oral tablet  -- 1 tab(s) by mouth once a day (at bedtime)  -- Indication: For  Psychotherapeutic    carvedilol 12.5 mg oral tablet  -- 1 tab(s) by mouth every 12 hours  -- Indication: For Hypertension    Symbicort 160 mcg-4.5 mcg/inh inhalation aerosol  -- 2 puff(s) inhaled 2 times a day  -- Indication: For bronchodilator    Ventolin 90 mcg/inh inhalation aerosol with adapter  -- Indication: For bronchodilator    tiotropium 18 mcg inhalation capsule  -- 1 cap(s) inhaled once a day  -- Indication: For bronchodilator    amLODIPine 10 mg oral tablet  -- 1 tab(s) by mouth once a day  -- Indication: For Hypertension    senna oral tablet  -- 2 tab(s) by mouth once a day (at bedtime)  -- Indication: For Constipation    polyethylene glycol 3350 oral powder for reconstitution  -- 17 gram(s) by mouth once a day  -- Indication: For Constipation    docusate sodium 100 mg oral capsule  -- 1 cap(s) by mouth 2 times a day  -- Indication: For Constipation    Singulair  -- 10 milligram(s) by mouth once a day  -- Indication: For Respiratory agent    potassium chloride 10 mEq oral tablet, extended release  -- 1 tab(s) by mouth 2 times a day  -- Indication: For Supplement    NexIUM 40 mg oral delayed release capsule  -- 1 cap(s) by mouth once a day  -- Indication: For Acid reflux    hydrALAZINE 100 mg oral tablet  -- 1 tab(s) by mouth 3 times a day  -- Indication: For Hypertension I will START or STAY ON the medications listed below when I get home from the hospital:    aspirin 81 mg oral delayed release tablet  -- 1 tab(s) by mouth once a day  -- Indication: For Antiplatelet    oxyCODONE-acetaminophen 5 mg-325 mg oral tablet  -- 2 tab(s) by mouth every 4 hours, As needed, Severe Pain (7 - 10)  -- Indication: For Pain management    Flomax 0.4 mg oral capsule  -- 1 cap(s) by mouth once a day  -- Indication: For BPH    isosorbide dinitrate 20 mg oral tablet  -- 1 tab(s) by mouth 3 times a day  -- Indication: For Antianginal    atorvastatin 80 mg oral tablet  -- 1 tab(s) by mouth once a day (at bedtime)  -- Indication: For Hyperlipidemia    QUEtiapine 25 mg oral tablet  -- 1 tab(s) by mouth once a day (at bedtime)  -- Indication: For  Psychotherapeutic    carvedilol 12.5 mg oral tablet  -- 1 tab(s) by mouth every 12 hours  -- Indication: For Hypertension    Symbicort 160 mcg-4.5 mcg/inh inhalation aerosol  -- 2 puff(s) inhaled 2 times a day  -- Indication: For bronchodilator    Ventolin 90 mcg/inh inhalation aerosol with adapter  -- Indication: For bronchodilator    tiotropium 18 mcg inhalation capsule  -- 1 cap(s) inhaled once a day  -- Indication: For bronchodilator    amLODIPine 10 mg oral tablet  -- 1 tab(s) by mouth once a day  -- Indication: For Hypertension    senna oral tablet  -- 2 tab(s) by mouth once a day (at bedtime)  -- Indication: For Constipation    polyethylene glycol 3350 oral powder for reconstitution  -- 17 gram(s) by mouth once a day  -- Indication: For Constipation    docusate sodium 100 mg oral capsule  -- 1 cap(s) by mouth 2 times a day  -- Indication: For Constipation    Singulair  -- 10 milligram(s) by mouth once a day  -- Indication: For Respiratory agent    potassium chloride 10 mEq oral tablet, extended release  -- 1 tab(s) by mouth 2 times a day  -- Indication: For Supplement    NexIUM 40 mg oral delayed release capsule  -- 1 cap(s) by mouth once a day  -- Indication: For Acid reflux    hydrALAZINE 100 mg oral tablet  -- 1 tab(s) by mouth 3 times a day  -- Indication: For Hypertension

## 2018-04-20 NOTE — PROGRESS NOTE ADULT - SUBJECTIVE AND OBJECTIVE BOX
Patient is a 77y old  Male who presents with a chief complaint of Toe Pain (05 Apr 2018 10:42)       INTERVAL HPI/OVERNIGHT EVENTS:  Patient seen and evaluated at bedside.  Pt is resting comfortable in NAD. Denies N/V/F/C.  Pain rated at X/10    Allergies    No Known Allergies    Intolerances        Vital Signs Last 24 Hrs  T(C): 36.7 (20 Apr 2018 05:27), Max: 36.9 (19 Apr 2018 21:48)  T(F): 98 (20 Apr 2018 05:27), Max: 98.4 (19 Apr 2018 21:48)  HR: 74 (20 Apr 2018 05:27) (56 - 74)  BP: 146/71 (20 Apr 2018 05:27) (118/53 - 165/74)  BP(mean): --  RR: 18 (20 Apr 2018 05:27) (17 - 18)  SpO2: 97% (20 Apr 2018 05:27) (94% - 97%)    LABS:                        12.6   4.85  )-----------( 208      ( 20 Apr 2018 06:30 )             37.2     04-20    138  |  103  |  30<H>  ----------------------------<  81  3.9   |  23  |  1.77<H>    Ca    8.5      20 Apr 2018 06:30  Phos  3.1     04-20  Mg     2.0     04-20          CAPILLARY BLOOD GLUCOSE          Lower Extremity Physical Exam:  Vascular: DP/PT 0/4, B/L, CFT <3 seconds B/L w/ exception to LF 2nd toe no CFT, Temperature gradient WNL, B/L.   Neuro: Epicritic sensation intact to the level of foot, B/L based on pain on palpation, difficult to otherwise assess  Skin: dry gangrenous distal tip LF 2nd toe w/ some dependent edema noted nonpitting, no SOI no purulence no drainage, no malodor, no erythema nor streaking, slight opening distal 2nd toe clinically correlates w/ XR subq     RADIOLOGY & ADDITIONAL TESTS:

## 2018-04-20 NOTE — PROGRESS NOTE ADULT - PROBLEM SELECTOR PLAN 5
Intermittent Mobitz I and II 2nd degree AVHB persists, will decrease coreg to 12.5 mg BID and monitor.

## 2018-04-20 NOTE — PROGRESS NOTE ADULT - PROBLEM SELECTOR PLAN 1
s/p angiogram showing occluded left SFA and AK pop pending bypass and fem endarterectomy. No evidence of OM on Xray  Stress test done:  large, severe defects in inferior and inferoseptal walls that are  partially reversible, suggestive of infarct with mild partial ischemia. There was a severe defect in the diaphragmatic wall of the RV that was fixed, consistent  with RV infarct.   Initially cleared by Cards for bypass-  Developed NSVT on 4/16, s/p LHC by Cards 4/18- mRCA total, no planned intervention  Percocet PRN for pain  per Vascular, no longer bypass candidate, pt declining BKA at this time, plan for continued local wound care and dc planning

## 2018-04-20 NOTE — DISCHARGE NOTE ADULT - CARE PROVIDER_API CALL
Rebecca Putnam), Internal Medicine  [address] [place] [address]  Phone: [phone]  Fax: [phone]    Nimisha Ro), Surgery  [address] [place] [address]  [address]  [place] [address]  Phone: [phone]  Fax: [phone]    Suni Lutz), Cardiovascular Disease  [facility]  [address] [place] [address] [address] [id number]  [place] [address]  Phone: [phone]  Fax: [phone]

## 2018-04-20 NOTE — DISCHARGE NOTE ADULT - CARE PROVIDERS DIRECT ADDRESSES
[email or web address]

## 2018-04-20 NOTE — DISCHARGE NOTE ADULT - MEDICATION SUMMARY - MEDICATIONS TO CHANGE
I will SWITCH the dose or number of times a day I take the medications listed below when I get home from the hospital:  None WDL

## 2018-04-20 NOTE — DISCHARGE NOTE ADULT - PATIENT PORTAL LINK FT
You can access the [facility] Patient Portal, offered by [facility], by registering with the following website: [email or web address]

## 2018-04-20 NOTE — PROGRESS NOTE ADULT - PROBLEM SELECTOR PLAN 6
No wheezing or SOB  -continue symbicort, spiriva, singulair, duonebs prn   CXR clear and without acute pulmonary disease

## 2018-04-20 NOTE — PROVIDER CONTACT NOTE (OTHER) - SITUATION
EKG strip from ER noted with bundle branch block, Pt appears to currently have a first degree block  EKG in progress

## 2018-04-20 NOTE — DISCHARGE NOTE ADULT - HOSPITAL COURSE
78 YO M DM, HTN, CKD, BPH, gastritis asthma presents with L toe pain for last several weeks. Patient is poor historian and does not recall many details of his medical history. Reports he has had pain, swelling, discoloration of L toes, pain worse with walking. Denies CP over past few days. Unable to provide details on cardiac history, first time at [facility]. States he ran out of medications and has not taken them in at least a month. Reports cold symptoms over a month ago but no recent cough/fever. Patient's BP in the ED on presentation was 237/118, unclear if patient is on BP meds at home or if has been compliant. Patient became acutely SOB, went into hypoxic resp distress with concern for pulm edema. Patient was placed on Bipap and nitro gtt. BP dropped to 70-80's on 200 of nitro gtt and was adjusted to 20 and BP went up to 200's/90's subsequently nitro gtt was uptitrated with improvement in BP. Cardiac enzymes were checked and negative in the ED. ProBNP 3746. Patient with ECG showing sinus irwin, PVCs, RBBB, ST depressions in anterolateral leads. Patient given lasix 40mg IV and hydralazine IV x2. 77 year old male with PMH DM, HTN, CKD, BPH, gastritis and asthma presented to [facility] ED on 4/5/18 for left toe pain x several weeks. He reported pain, swelling, and discoloration of left toes, worse with walking. Poor historian, unable to recall details of medical history but states he ran out of medications and has not taken them in at least 1 month.  Patient's BP in the ED was 237/118, and he shortly after became acutely SOB, went into hypoxic respiratory distress with concern for pulmonary edema. Patient was placed on Bipap and nitro gtt.  Cardiac enzymes were checked and negative in the ED. ProBNP 3746. Patient with ECG showing sinus irwin, PVCs, RBBB, ST depressions in anterolateral leads. Patient given lasix 40mg IV and hydralazine IV x2. Arterial duplex showed left SFA occlusion. Patient admitted to CCU for close management and cardiac optimization.     While in CCU, patient medically optimized. BP controlled on PO regimen. On 4/10 patient underwent peripheral angiogram which showed occluded L SFA and CFA. Operative planning for fem-pop bypass and femoral endarterectomy. Patient had  nuclear stress test on 4/11 which was abnormal (hypertrophied LV, large severe defects in inferior and inferoseptal walls suggestive of infarct with mild partial ischemia. Severe defect in RV diaphragmatic wall, consistent with RV infarct), LH cath with cardiology on 4/18.     Cancelled planned for bypass given cardiac risk, planned for BKA instead, however patient did not want to proceed with BKA. Patient advised of risk of infection, potential sepsis. Patient understands, however wants to try narcotics for rest pain. Patient tolerating regular diet, pain controlled with PO medication, ambulating, and voiding. Discussed with attending, patient discharged on 4/20/18 with outpatient follow up.

## 2018-04-20 NOTE — DISCHARGE NOTE ADULT - PLAN OF CARE
preventing infection to left 2nd toe Change dressing daily w/ betadine  Weight bearing as tolerated to left foot  Follow up w/ Dr. Tripp within 1 week of discharge, call [phone] - Change dressing daily w/ betadine  - Weight bearing as tolerated to left foot  - Follow up w/ Dr. Tripp within 1 week of discharge, call [phone] - Continue coreg, carvedilol, norvasc, hydralazine, coreg as prescribed.   - Do not continue cardizem  - Follow up with Dr. Putnam for further management Continue singulair, ventolin, symbicort, spiriva as prescribed. Follow up with Dr. Putnam for further management. Stable.

## 2018-04-20 NOTE — PROGRESS NOTE ADULT - SUBJECTIVE AND OBJECTIVE BOX
VASCULAR DAILY PROGRESS NOTE:     Subjective: Pt seen this AM. NAEO. Pain controlled. Remains afebrile.         Objective:    PE:  Gen: NAD  Resp: Respirations unlabored   CVS: RRR  Abd: soft, ND, NT, LLQ hernia  Ext/Vasc:  RLE: PT and DP signals present  LLE: PT signal present, DP absent      Vital Signs Last 24 Hrs  T(C): 36.3 (20 Apr 2018 13:48), Max: 36.9 (19 Apr 2018 21:48)  T(F): 97.4 (20 Apr 2018 13:48), Max: 98.4 (19 Apr 2018 21:48)  HR: 77 (20 Apr 2018 13:48) (56 - 77)  BP: 152/77 (20 Apr 2018 13:48) (118/53 - 152/77)  BP(mean): --  RR: 19 (20 Apr 2018 13:48) (17 - 19)  SpO2: 97% (20 Apr 2018 13:48) (94% - 98%)    I&O's Detail    19 Apr 2018 07:01  -  20 Apr 2018 07:00  --------------------------------------------------------  IN:  Total IN: 0 mL    OUT:    Voided: 980 mL  Total OUT: 980 mL    Total NET: -980 mL          Daily     Daily     MEDICATIONS  (STANDING):  amLODIPine   Tablet 10 milliGRAM(s) Oral daily  artificial tears (preservative free) Ophthalmic Solution 1 Drop(s) Right EYE two times a day  aspirin enteric coated 81 milliGRAM(s) Oral daily  atorvastatin 80 milliGRAM(s) Oral at bedtime  buDESOnide 160 MICROgram(s)/formoterol 4.5 MICROgram(s) Inhaler 2 Puff(s) Inhalation two times a day  carvedilol 12.5 milliGRAM(s) Oral every 12 hours  docusate sodium 100 milliGRAM(s) Oral two times a day  heparin  Injectable 5000 Unit(s) SubCutaneous every 8 hours  hydrALAZINE 100 milliGRAM(s) Oral three times a day  isosorbide   dinitrate Tablet (ISORDIL) 20 milliGRAM(s) Oral three times a day  montelukast 10 milliGRAM(s) Oral daily  pantoprazole    Tablet 40 milliGRAM(s) Oral before breakfast  polyethylene glycol 3350 17 Gram(s) Oral daily  QUEtiapine 25 milliGRAM(s) Oral at bedtime  senna 2 Tablet(s) Oral at bedtime  tamsulosin 0.4 milliGRAM(s) Oral daily  tiotropium 18 MICROgram(s) Capsule 1 Capsule(s) Inhalation daily    MEDICATIONS  (PRN):  ALBUTerol/ipratropium for Nebulization 3 milliLiter(s) Nebulizer every 6 hours PRN Bronchospasm  haloperidol     Tablet 1 milliGRAM(s) Oral four times a day PRN agitation  haloperidol    Injectable 1 milliGRAM(s) IntraMuscular every 6 hours PRN Agitation  haloperidol    Injectable 1 milliGRAM(s) IV Push every 6 hours PRN Agitation  oxyCODONE    5 mG/acetaminophen 325 mG 1 Tablet(s) Oral every 4 hours PRN Moderate Pain (4 - 6)  oxyCODONE    5 mG/acetaminophen 325 mG 2 Tablet(s) Oral every 4 hours PRN Severe Pain (7 - 10)      LABS:                        12.6   4.85  )-----------( 208      ( 20 Apr 2018 06:30 )             37.2     04-20    138  |  103  |  30<H>  ----------------------------<  81  3.9   |  23  |  1.77<H>    Ca    8.5      20 Apr 2018 06:30  Phos  3.1     04-20  Mg     2.0     04-20            RADIOLOGY & ADDITIONAL STUDIES:

## 2018-04-20 NOTE — PROGRESS NOTE ADULT - SUBJECTIVE AND OBJECTIVE BOX
Patient is a 77y old  Male who presents with a chief complaint of Toe Pain (05 Apr 2018 10:42)        SUBJECTIVE / OVERNIGHT EVENTS:    pt denies cp/sob  c/o L foot pain       MEDICATIONS  (STANDING):  amLODIPine   Tablet 10 milliGRAM(s) Oral daily  artificial tears (preservative free) Ophthalmic Solution 1 Drop(s) Right EYE two times a day  aspirin enteric coated 81 milliGRAM(s) Oral daily  atorvastatin 80 milliGRAM(s) Oral at bedtime  buDESOnide 160 MICROgram(s)/formoterol 4.5 MICROgram(s) Inhaler 2 Puff(s) Inhalation two times a day  carvedilol 12.5 milliGRAM(s) Oral every 12 hours  docusate sodium 100 milliGRAM(s) Oral two times a day  heparin  Injectable 5000 Unit(s) SubCutaneous every 8 hours  hydrALAZINE 100 milliGRAM(s) Oral three times a day  isosorbide   dinitrate Tablet (ISORDIL) 20 milliGRAM(s) Oral three times a day  montelukast 10 milliGRAM(s) Oral daily  pantoprazole    Tablet 40 milliGRAM(s) Oral before breakfast  polyethylene glycol 3350 17 Gram(s) Oral daily  QUEtiapine 25 milliGRAM(s) Oral at bedtime  senna 2 Tablet(s) Oral at bedtime  tamsulosin 0.4 milliGRAM(s) Oral daily  tiotropium 18 MICROgram(s) Capsule 1 Capsule(s) Inhalation daily    MEDICATIONS  (PRN):  ALBUTerol/ipratropium for Nebulization 3 milliLiter(s) Nebulizer every 6 hours PRN Bronchospasm  haloperidol     Tablet 1 milliGRAM(s) Oral four times a day PRN agitation  haloperidol    Injectable 1 milliGRAM(s) IntraMuscular every 6 hours PRN Agitation  haloperidol    Injectable 1 milliGRAM(s) IV Push every 6 hours PRN Agitation  oxyCODONE    5 mG/acetaminophen 325 mG 1 Tablet(s) Oral every 4 hours PRN Moderate Pain (4 - 6)  oxyCODONE    5 mG/acetaminophen 325 mG 2 Tablet(s) Oral every 4 hours PRN Severe Pain (7 - 10)      Vital Signs Last 24 Hrs  T(C): 36.3 (20 Apr 2018 10:07), Max: 36.9 (19 Apr 2018 21:48)  T(F): 97.4 (20 Apr 2018 10:07), Max: 98.4 (19 Apr 2018 21:48)  HR: 66 (20 Apr 2018 10:07) (56 - 74)  BP: 143/68 (20 Apr 2018 10:07) (118/53 - 165/74)  BP(mean): --  RR: 18 (20 Apr 2018 10:07) (17 - 18)  SpO2: 98% (20 Apr 2018 10:07) (94% - 98%)  CAPILLARY BLOOD GLUCOSE        I&O's Summary    19 Apr 2018 07:01  -  20 Apr 2018 07:00  --------------------------------------------------------  IN: 0 mL / OUT: 980 mL / NET: -980 mL      PHYSICAL EXAM:  GENERAL: NAD  HEENT: soft, mobile, round mass over left TMJ nontender to palpation (likely lipoma)  CHEST/LUNG: Clear to auscultation bilaterally; No wheezing or crackles  HEART: s1/s2, no murmurs  ABDOMEN: Soft, Nontender, Nondistended; Bowel sounds present  EXTREMITIES:  legs warm to touch, no palpable pulses on Left, dusky appearance of toes on left foot with dry gangrene of 2nd digit of left foot            LABS:                        12.6   4.85  )-----------( 208      ( 20 Apr 2018 06:30 )             37.2     04-20    138  |  103  |  30<H>  ----------------------------<  81  3.9   |  23  |  1.77<H>    Ca    8.5      20 Apr 2018 06:30  Phos  3.1     04-20  Mg     2.0     04-20                RADIOLOGY & ADDITIONAL TESTS:    Imaging Personally Reviewed:  Consultant(s) Notes Reviewed:    Care Discussed with Consultants/Other Providers:

## 2018-04-20 NOTE — DISCHARGE NOTE ADULT - ADDITIONAL INSTRUCTIONS
Follow up with Dr. Ro in 1-2 weeks (vascular surgery)  Follow up with Dr. Putnam in 1-2 weeks (internal medicine)  Follow up with Dr. Lutz (cardiologist) in 1-2 weeks  Follow up with Dr. Tripp in 1 week

## 2018-04-20 NOTE — PROGRESS NOTE ADULT - ASSESSMENT
78yo M w/ LF 2nd toe tip dry gangrene  ·	Pt seen evaluated  ·	No acute SOI at this time, stable dry gangrene, labs and vitals stable  ·	Pt is not bypass candidate and is left with BKA vs local wound care option, no podiatric intervention planned as result will recommend continuing local wound care if pt is not amenable to BKA  ·	Please reconsult podiatry as needed  ·	No pod surgical intervention warranted at this point by podiatry  ·	Daily betadine dressing to left foot 2nd digit  ·	D/w attending

## 2018-04-20 NOTE — PROGRESS NOTE ADULT - ATTENDING COMMENTS
Pt. was seen and examined. Agree with above. Plan d/w the team.  pt. is refusing BKA  dispo planning pending PT buffyal

## 2018-04-20 NOTE — PROGRESS NOTE ADULT - ASSESSMENT
77M with HTN, CKD, BPH, asthma, and gastritis presents with L toe pain and acute SOB -- acute pulmonary edema in setting of HTN emergency requiring IV NTG and BiPaP now improved with better BP control, course c/b left toe dry gangrene s/p angiogram of left leg but no longer candidate for bypass or endarterectomy given CAD on LHC, possible BKA if patient agreeable.                      Problem/Plan - 1:  ·  Problem: Acute systolic HF (heart failure).       Problem/Plan - 2:  ·  Problem: Hypertensive emergency.  Plan: BP remains well controlled on current regimen.      Problem/Plan - 3:  ·  Problem: Gangrene of toe.  Plan: Awaiting peripheral bypass and endarterectomy. For pre op LHC today pending Scr.      Problem/Plan - 4:  ·  Problem: JINA (acute kidney injury).  Plan: Mostly stable but with slight elevation yesterday with mild fluid overload, today's results pending.      Problem/Plan - 5:  ·  Problem: Second-degree heart block.  Plan: Evidence of 2nd degree AVHB, Mobitz type I but asymptomatic. Continue to monitor at present, may need to reduce Coreg dose if arrhythmia becomes persistent or symptomatic. 77M with HTN, CKD, BPH, asthma, and gastritis presents with L toe pain and acute SOB -- acute pulmonary edema in setting of HTN emergency requiring IV NTG and BiPaP now improved with better BP control, course c/b left toe dry gangrene s/p angiogram of left leg but no longer candidate for bypass or endarterectomy given CAD on LHC, possible BKA if patient agreeable. 77M with HTN, CKD, BPH, asthma, and gastritis presents with L toe pain and acute SOB -- acute systolic HF/ pulmonary edema in setting of HTN emergency requiring IV NTG and BiPaP now improved with better BP control. Course c/b left toe dry gangrene s/p angiogram of left leg but no longer candidate for bypass or endarterectomy given CAD on LHC, possible BKA if patient agreeable.

## 2018-04-21 PROCEDURE — 99233 SBSQ HOSP IP/OBS HIGH 50: CPT

## 2018-04-21 RX ADMIN — POLYETHYLENE GLYCOL 3350 17 GRAM(S): 17 POWDER, FOR SOLUTION ORAL at 11:24

## 2018-04-21 RX ADMIN — BUDESONIDE AND FORMOTEROL FUMARATE DIHYDRATE 2 PUFF(S): 160; 4.5 AEROSOL RESPIRATORY (INHALATION) at 22:48

## 2018-04-21 RX ADMIN — OXYCODONE AND ACETAMINOPHEN 1 TABLET(S): 5; 325 TABLET ORAL at 05:48

## 2018-04-21 RX ADMIN — Medication 100 MILLIGRAM(S): at 13:37

## 2018-04-21 RX ADMIN — OXYCODONE AND ACETAMINOPHEN 2 TABLET(S): 5; 325 TABLET ORAL at 23:29

## 2018-04-21 RX ADMIN — AMLODIPINE BESYLATE 10 MILLIGRAM(S): 2.5 TABLET ORAL at 05:48

## 2018-04-21 RX ADMIN — ATORVASTATIN CALCIUM 80 MILLIGRAM(S): 80 TABLET, FILM COATED ORAL at 22:50

## 2018-04-21 RX ADMIN — PANTOPRAZOLE SODIUM 40 MILLIGRAM(S): 20 TABLET, DELAYED RELEASE ORAL at 05:48

## 2018-04-21 RX ADMIN — TIOTROPIUM BROMIDE 1 CAPSULE(S): 18 CAPSULE ORAL; RESPIRATORY (INHALATION) at 11:24

## 2018-04-21 RX ADMIN — Medication 100 MILLIGRAM(S): at 22:50

## 2018-04-21 RX ADMIN — OXYCODONE AND ACETAMINOPHEN 1 TABLET(S): 5; 325 TABLET ORAL at 12:38

## 2018-04-21 RX ADMIN — BUDESONIDE AND FORMOTEROL FUMARATE DIHYDRATE 2 PUFF(S): 160; 4.5 AEROSOL RESPIRATORY (INHALATION) at 11:24

## 2018-04-21 RX ADMIN — HEPARIN SODIUM 5000 UNIT(S): 5000 INJECTION INTRAVENOUS; SUBCUTANEOUS at 22:49

## 2018-04-21 RX ADMIN — MONTELUKAST 10 MILLIGRAM(S): 4 TABLET, CHEWABLE ORAL at 11:24

## 2018-04-21 RX ADMIN — OXYCODONE AND ACETAMINOPHEN 1 TABLET(S): 5; 325 TABLET ORAL at 13:08

## 2018-04-21 RX ADMIN — OXYCODONE AND ACETAMINOPHEN 2 TABLET(S): 5; 325 TABLET ORAL at 16:00

## 2018-04-21 RX ADMIN — TAMSULOSIN HYDROCHLORIDE 0.4 MILLIGRAM(S): 0.4 CAPSULE ORAL at 11:24

## 2018-04-21 RX ADMIN — Medication 81 MILLIGRAM(S): at 11:24

## 2018-04-21 RX ADMIN — HEPARIN SODIUM 5000 UNIT(S): 5000 INJECTION INTRAVENOUS; SUBCUTANEOUS at 05:49

## 2018-04-21 RX ADMIN — SENNA PLUS 2 TABLET(S): 8.6 TABLET ORAL at 22:50

## 2018-04-21 RX ADMIN — OXYCODONE AND ACETAMINOPHEN 2 TABLET(S): 5; 325 TABLET ORAL at 15:32

## 2018-04-21 RX ADMIN — OXYCODONE AND ACETAMINOPHEN 2 TABLET(S): 5; 325 TABLET ORAL at 22:59

## 2018-04-21 RX ADMIN — HEPARIN SODIUM 5000 UNIT(S): 5000 INJECTION INTRAVENOUS; SUBCUTANEOUS at 13:37

## 2018-04-21 RX ADMIN — Medication 1 DROP(S): at 17:47

## 2018-04-21 RX ADMIN — OXYCODONE AND ACETAMINOPHEN 2 TABLET(S): 5; 325 TABLET ORAL at 09:10

## 2018-04-21 RX ADMIN — CARVEDILOL PHOSPHATE 12.5 MILLIGRAM(S): 80 CAPSULE, EXTENDED RELEASE ORAL at 05:53

## 2018-04-21 RX ADMIN — QUETIAPINE FUMARATE 25 MILLIGRAM(S): 200 TABLET, FILM COATED ORAL at 22:50

## 2018-04-21 RX ADMIN — Medication 100 MILLIGRAM(S): at 05:47

## 2018-04-21 RX ADMIN — ISOSORBIDE DINITRATE 20 MILLIGRAM(S): 5 TABLET ORAL at 05:49

## 2018-04-21 RX ADMIN — Medication 1 DROP(S): at 05:49

## 2018-04-21 RX ADMIN — OXYCODONE AND ACETAMINOPHEN 2 TABLET(S): 5; 325 TABLET ORAL at 09:40

## 2018-04-21 NOTE — PROGRESS NOTE ADULT - ASSESSMENT
77M with CFA and SFA occlusions found on angiogram. S/p L heart cath:  - Tolerating diet  - Pain control  - No vascular intervention at this time given pt refusing BKA  - DVT ppx  - Dispo planning pending final PT reccs

## 2018-04-21 NOTE — PROGRESS NOTE ADULT - SUBJECTIVE AND OBJECTIVE BOX
Patient is a 77y old  Male who presents with a chief complaint of Left toe pain (20 Apr 2018 08:17)        SUBJECTIVE / OVERNIGHT EVENTS:  no acute events o/n  denies cp/sob  c/o foot pain, needs pain med      MEDICATIONS  (STANDING):  amLODIPine   Tablet 10 milliGRAM(s) Oral daily  artificial tears (preservative free) Ophthalmic Solution 1 Drop(s) Right EYE two times a day  aspirin enteric coated 81 milliGRAM(s) Oral daily  atorvastatin 80 milliGRAM(s) Oral at bedtime  buDESOnide 160 MICROgram(s)/formoterol 4.5 MICROgram(s) Inhaler 2 Puff(s) Inhalation two times a day  carvedilol 12.5 milliGRAM(s) Oral every 12 hours  docusate sodium 100 milliGRAM(s) Oral two times a day  heparin  Injectable 5000 Unit(s) SubCutaneous every 8 hours  hydrALAZINE 100 milliGRAM(s) Oral three times a day  isosorbide   dinitrate Tablet (ISORDIL) 20 milliGRAM(s) Oral three times a day  montelukast 10 milliGRAM(s) Oral daily  pantoprazole    Tablet 40 milliGRAM(s) Oral before breakfast  polyethylene glycol 3350 17 Gram(s) Oral daily  QUEtiapine 25 milliGRAM(s) Oral at bedtime  senna 2 Tablet(s) Oral at bedtime  tamsulosin 0.4 milliGRAM(s) Oral daily  tiotropium 18 MICROgram(s) Capsule 1 Capsule(s) Inhalation daily    MEDICATIONS  (PRN):  ALBUTerol/ipratropium for Nebulization 3 milliLiter(s) Nebulizer every 6 hours PRN Bronchospasm  haloperidol     Tablet 1 milliGRAM(s) Oral four times a day PRN agitation  haloperidol    Injectable 1 milliGRAM(s) IntraMuscular every 6 hours PRN Agitation  haloperidol    Injectable 1 milliGRAM(s) IV Push every 6 hours PRN Agitation  oxyCODONE    5 mG/acetaminophen 325 mG 1 Tablet(s) Oral every 4 hours PRN Moderate Pain (4 - 6)  oxyCODONE    5 mG/acetaminophen 325 mG 2 Tablet(s) Oral every 4 hours PRN Severe Pain (7 - 10)      Vital Signs Last 24 Hrs  T(C): 36.6 (21 Apr 2018 13:34), Max: 37.2 (21 Apr 2018 02:01)  T(F): 97.8 (21 Apr 2018 13:34), Max: 98.9 (21 Apr 2018 02:01)  HR: 57 (21 Apr 2018 13:34) (57 - 82)  BP: 127/63 (21 Apr 2018 13:34) (123/62 - 158/76)  BP(mean): --  RR: 20 (21 Apr 2018 13:34) (18 - 20)  SpO2: 98% (21 Apr 2018 13:34) (94% - 98%)  CAPILLARY BLOOD GLUCOSE        I&O's Summary    20 Apr 2018 07:01  -  21 Apr 2018 07:00  --------------------------------------------------------  IN: 600 mL / OUT: 1520 mL / NET: -920 mL    21 Apr 2018 07:01  -  21 Apr 2018 13:53  --------------------------------------------------------  IN: 300 mL / OUT: 0 mL / NET: 300 mL      PHYSICAL EXAM:  GENERAL: NAD  HEENT: soft, mobile, round mass over left TMJ nontender to palpation (likely lipoma)  CHEST/LUNG: Clear to auscultation bilaterally; No wheezing or crackles  HEART: s1/s2, no murmurs  ABDOMEN: Soft, Nontender, Nondistended; Bowel sounds present  EXTREMITIES:  legs warm to touch, no palpable pulses on Left, dusky appearance of toes on left foot with dry gangrene of 2nd digit of left foot      LABS:                        12.6   4.85  )-----------( 208      ( 20 Apr 2018 06:30 )             37.2     04-20    138  |  103  |  30<H>  ----------------------------<  81  3.9   |  23  |  1.77<H>    Ca    8.5      20 Apr 2018 06:30  Phos  3.1     04-20  Mg     2.0     04-20                RADIOLOGY & ADDITIONAL TESTS:    Imaging Personally Reviewed:  Consultant(s) Notes Reviewed:    Care Discussed with Consultants/Other Providers:

## 2018-04-21 NOTE — PROGRESS NOTE ADULT - SUBJECTIVE AND OBJECTIVE BOX
VASCULAR DAILY PROGRESS NOTE:     Subjective: Pt seen this AM. Tele showed possible 1st degree heart block. EKG showed same thing. Pt asymptomatic. Cards made aware; NTD. Pain controlled. Remains afebrile.         Objective:    PE:  Gen: NAD  Resp: Respirations unlabored   CVS: RRR  Abd: soft, ND, NT, LLQ hernia  Ext/Vasc:  RLE: PT and DP signals present  LLE: PT signal present, DP absent      Vital Signs Last 24 Hrs  T(C): 37.2 (21 Apr 2018 02:01), Max: 37.2 (21 Apr 2018 02:01)  T(F): 98.9 (21 Apr 2018 02:01), Max: 98.9 (21 Apr 2018 02:01)  HR: 82 (21 Apr 2018 02:01) (66 - 82)  BP: 146/78 (21 Apr 2018 02:01) (123/62 - 152/77)  BP(mean): --  RR: 18 (21 Apr 2018 02:01) (18 - 19)  SpO2: 97% (21 Apr 2018 02:01) (94% - 98%)    I&O's Detail    19 Apr 2018 07:01  -  20 Apr 2018 07:00  --------------------------------------------------------  IN:  Total IN: 0 mL    OUT:    Voided: 980 mL  Total OUT: 980 mL    Total NET: -980 mL      20 Apr 2018 07:01  -  21 Apr 2018 05:27  --------------------------------------------------------  IN:    Oral Fluid: 600 mL  Total IN: 600 mL    OUT:    Voided: 1100 mL  Total OUT: 1100 mL    Total NET: -500 mL          Daily     Daily     MEDICATIONS  (STANDING):  amLODIPine   Tablet 10 milliGRAM(s) Oral daily  artificial tears (preservative free) Ophthalmic Solution 1 Drop(s) Right EYE two times a day  aspirin enteric coated 81 milliGRAM(s) Oral daily  atorvastatin 80 milliGRAM(s) Oral at bedtime  buDESOnide 160 MICROgram(s)/formoterol 4.5 MICROgram(s) Inhaler 2 Puff(s) Inhalation two times a day  carvedilol 12.5 milliGRAM(s) Oral every 12 hours  docusate sodium 100 milliGRAM(s) Oral two times a day  heparin  Injectable 5000 Unit(s) SubCutaneous every 8 hours  hydrALAZINE 100 milliGRAM(s) Oral three times a day  isosorbide   dinitrate Tablet (ISORDIL) 20 milliGRAM(s) Oral three times a day  montelukast 10 milliGRAM(s) Oral daily  pantoprazole    Tablet 40 milliGRAM(s) Oral before breakfast  polyethylene glycol 3350 17 Gram(s) Oral daily  QUEtiapine 25 milliGRAM(s) Oral at bedtime  senna 2 Tablet(s) Oral at bedtime  tamsulosin 0.4 milliGRAM(s) Oral daily  tiotropium 18 MICROgram(s) Capsule 1 Capsule(s) Inhalation daily    MEDICATIONS  (PRN):  ALBUTerol/ipratropium for Nebulization 3 milliLiter(s) Nebulizer every 6 hours PRN Bronchospasm  haloperidol     Tablet 1 milliGRAM(s) Oral four times a day PRN agitation  haloperidol    Injectable 1 milliGRAM(s) IntraMuscular every 6 hours PRN Agitation  haloperidol    Injectable 1 milliGRAM(s) IV Push every 6 hours PRN Agitation  oxyCODONE    5 mG/acetaminophen 325 mG 1 Tablet(s) Oral every 4 hours PRN Moderate Pain (4 - 6)  oxyCODONE    5 mG/acetaminophen 325 mG 2 Tablet(s) Oral every 4 hours PRN Severe Pain (7 - 10)      LABS:                        12.6   4.85  )-----------( 208      ( 20 Apr 2018 06:30 )             37.2     04-20    138  |  103  |  30<H>  ----------------------------<  81  3.9   |  23  |  1.77<H>    Ca    8.5      20 Apr 2018 06:30  Phos  3.1     04-20  Mg     2.0     04-20            RADIOLOGY & ADDITIONAL STUDIES:

## 2018-04-22 LAB
BUN SERPL-MCNC: 29 MG/DL — HIGH (ref 7–23)
CALCIUM SERPL-MCNC: 8.7 MG/DL — SIGNIFICANT CHANGE UP (ref 8.4–10.5)
CHLORIDE SERPL-SCNC: 103 MMOL/L — SIGNIFICANT CHANGE UP (ref 98–107)
CO2 SERPL-SCNC: 23 MMOL/L — SIGNIFICANT CHANGE UP (ref 22–31)
CREAT SERPL-MCNC: 1.73 MG/DL — HIGH (ref 0.5–1.3)
GLUCOSE SERPL-MCNC: 90 MG/DL — SIGNIFICANT CHANGE UP (ref 70–99)
POTASSIUM SERPL-MCNC: 4.4 MMOL/L — SIGNIFICANT CHANGE UP (ref 3.5–5.3)
POTASSIUM SERPL-SCNC: 4.4 MMOL/L — SIGNIFICANT CHANGE UP (ref 3.5–5.3)
SODIUM SERPL-SCNC: 139 MMOL/L — SIGNIFICANT CHANGE UP (ref 135–145)

## 2018-04-22 PROCEDURE — 99233 SBSQ HOSP IP/OBS HIGH 50: CPT

## 2018-04-22 RX ORDER — ACETAMINOPHEN 500 MG
975 TABLET ORAL ONCE
Qty: 0 | Refills: 0 | Status: COMPLETED | OUTPATIENT
Start: 2018-04-22 | End: 2018-04-22

## 2018-04-22 RX ADMIN — TAMSULOSIN HYDROCHLORIDE 0.4 MILLIGRAM(S): 0.4 CAPSULE ORAL at 14:39

## 2018-04-22 RX ADMIN — CARVEDILOL PHOSPHATE 12.5 MILLIGRAM(S): 80 CAPSULE, EXTENDED RELEASE ORAL at 05:04

## 2018-04-22 RX ADMIN — OXYCODONE AND ACETAMINOPHEN 2 TABLET(S): 5; 325 TABLET ORAL at 10:25

## 2018-04-22 RX ADMIN — HEPARIN SODIUM 5000 UNIT(S): 5000 INJECTION INTRAVENOUS; SUBCUTANEOUS at 22:05

## 2018-04-22 RX ADMIN — CARVEDILOL PHOSPHATE 12.5 MILLIGRAM(S): 80 CAPSULE, EXTENDED RELEASE ORAL at 18:58

## 2018-04-22 RX ADMIN — ISOSORBIDE DINITRATE 20 MILLIGRAM(S): 5 TABLET ORAL at 22:08

## 2018-04-22 RX ADMIN — AMLODIPINE BESYLATE 10 MILLIGRAM(S): 2.5 TABLET ORAL at 05:04

## 2018-04-22 RX ADMIN — Medication 100 MILLIGRAM(S): at 22:06

## 2018-04-22 RX ADMIN — OXYCODONE AND ACETAMINOPHEN 1 TABLET(S): 5; 325 TABLET ORAL at 22:05

## 2018-04-22 RX ADMIN — Medication 100 MILLIGRAM(S): at 14:39

## 2018-04-22 RX ADMIN — ATORVASTATIN CALCIUM 80 MILLIGRAM(S): 80 TABLET, FILM COATED ORAL at 22:06

## 2018-04-22 RX ADMIN — ISOSORBIDE DINITRATE 20 MILLIGRAM(S): 5 TABLET ORAL at 14:39

## 2018-04-22 RX ADMIN — OXYCODONE AND ACETAMINOPHEN 2 TABLET(S): 5; 325 TABLET ORAL at 14:40

## 2018-04-22 RX ADMIN — OXYCODONE AND ACETAMINOPHEN 2 TABLET(S): 5; 325 TABLET ORAL at 05:02

## 2018-04-22 RX ADMIN — ISOSORBIDE DINITRATE 20 MILLIGRAM(S): 5 TABLET ORAL at 05:04

## 2018-04-22 RX ADMIN — QUETIAPINE FUMARATE 25 MILLIGRAM(S): 200 TABLET, FILM COATED ORAL at 22:06

## 2018-04-22 RX ADMIN — MONTELUKAST 10 MILLIGRAM(S): 4 TABLET, CHEWABLE ORAL at 14:39

## 2018-04-22 RX ADMIN — BUDESONIDE AND FORMOTEROL FUMARATE DIHYDRATE 2 PUFF(S): 160; 4.5 AEROSOL RESPIRATORY (INHALATION) at 09:35

## 2018-04-22 RX ADMIN — TIOTROPIUM BROMIDE 1 CAPSULE(S): 18 CAPSULE ORAL; RESPIRATORY (INHALATION) at 09:35

## 2018-04-22 RX ADMIN — Medication 1 DROP(S): at 18:58

## 2018-04-22 RX ADMIN — Medication 100 MILLIGRAM(S): at 05:04

## 2018-04-22 RX ADMIN — PANTOPRAZOLE SODIUM 40 MILLIGRAM(S): 20 TABLET, DELAYED RELEASE ORAL at 05:04

## 2018-04-22 RX ADMIN — OXYCODONE AND ACETAMINOPHEN 2 TABLET(S): 5; 325 TABLET ORAL at 15:10

## 2018-04-22 RX ADMIN — HEPARIN SODIUM 5000 UNIT(S): 5000 INJECTION INTRAVENOUS; SUBCUTANEOUS at 05:05

## 2018-04-22 RX ADMIN — OXYCODONE AND ACETAMINOPHEN 2 TABLET(S): 5; 325 TABLET ORAL at 09:56

## 2018-04-22 RX ADMIN — BUDESONIDE AND FORMOTEROL FUMARATE DIHYDRATE 2 PUFF(S): 160; 4.5 AEROSOL RESPIRATORY (INHALATION) at 22:05

## 2018-04-22 RX ADMIN — Medication 81 MILLIGRAM(S): at 14:39

## 2018-04-22 RX ADMIN — Medication 100 MILLIGRAM(S): at 05:05

## 2018-04-22 RX ADMIN — Medication 1 DROP(S): at 05:04

## 2018-04-22 RX ADMIN — OXYCODONE AND ACETAMINOPHEN 2 TABLET(S): 5; 325 TABLET ORAL at 06:01

## 2018-04-22 RX ADMIN — OXYCODONE AND ACETAMINOPHEN 2 TABLET(S): 5; 325 TABLET ORAL at 18:59

## 2018-04-22 RX ADMIN — OXYCODONE AND ACETAMINOPHEN 1 TABLET(S): 5; 325 TABLET ORAL at 23:05

## 2018-04-22 NOTE — PROGRESS NOTE ADULT - ASSESSMENT
77M with CFA and SFA occlusions found on angiogram. S/p L heart cath:  - Tolerating diet  - Pain control  - No vascular intervention at this time given pt refusing BKA  - f/u cards  - DVT ppx  - Dispo planning

## 2018-04-22 NOTE — PROGRESS NOTE ADULT - SUBJECTIVE AND OBJECTIVE BOX
Patient is a 77y old  Male who presents with a chief complaint of Left toe pain (20 Apr 2018 08:17)        SUBJECTIVE / OVERNIGHT EVENTS:  no acute events o/n  ?short run of asymptomatic NSVT early this am, but pt was moving, may have been artifact  denies cp/sob/palpitations  foot pain is controlled    MEDICATIONS  (STANDING):  amLODIPine   Tablet 10 milliGRAM(s) Oral daily  artificial tears (preservative free) Ophthalmic Solution 1 Drop(s) Right EYE two times a day  aspirin enteric coated 81 milliGRAM(s) Oral daily  atorvastatin 80 milliGRAM(s) Oral at bedtime  buDESOnide 160 MICROgram(s)/formoterol 4.5 MICROgram(s) Inhaler 2 Puff(s) Inhalation two times a day  carvedilol 12.5 milliGRAM(s) Oral every 12 hours  docusate sodium 100 milliGRAM(s) Oral two times a day  heparin  Injectable 5000 Unit(s) SubCutaneous every 8 hours  hydrALAZINE 100 milliGRAM(s) Oral three times a day  isosorbide   dinitrate Tablet (ISORDIL) 20 milliGRAM(s) Oral three times a day  montelukast 10 milliGRAM(s) Oral daily  pantoprazole    Tablet 40 milliGRAM(s) Oral before breakfast  polyethylene glycol 3350 17 Gram(s) Oral daily  QUEtiapine 25 milliGRAM(s) Oral at bedtime  senna 2 Tablet(s) Oral at bedtime  tamsulosin 0.4 milliGRAM(s) Oral daily  tiotropium 18 MICROgram(s) Capsule 1 Capsule(s) Inhalation daily    MEDICATIONS  (PRN):  ALBUTerol/ipratropium for Nebulization 3 milliLiter(s) Nebulizer every 6 hours PRN Bronchospasm  haloperidol     Tablet 1 milliGRAM(s) Oral four times a day PRN agitation  haloperidol    Injectable 1 milliGRAM(s) IntraMuscular every 6 hours PRN Agitation  haloperidol    Injectable 1 milliGRAM(s) IV Push every 6 hours PRN Agitation  oxyCODONE    5 mG/acetaminophen 325 mG 1 Tablet(s) Oral every 4 hours PRN Moderate Pain (4 - 6)  oxyCODONE    5 mG/acetaminophen 325 mG 2 Tablet(s) Oral every 4 hours PRN Severe Pain (7 - 10)      Vital Signs Last 24 Hrs  T(C): 34.8 (22 Apr 2018 10:15), Max: 36.8 (21 Apr 2018 22:45)  T(F): 94.6 (22 Apr 2018 10:15), Max: 98.3 (21 Apr 2018 22:45)  HR: 67 (22 Apr 2018 10:15) (54 - 81)  BP: 123/51 (22 Apr 2018 10:15) (123/51 - 169/72)  BP(mean): --  RR: 25 (22 Apr 2018 10:15) (19 - 25)  SpO2: 95% (22 Apr 2018 10:15) (95% - 98%)  CAPILLARY BLOOD GLUCOSE        I&O's Summary    21 Apr 2018 07:01  -  22 Apr 2018 07:00  --------------------------------------------------------  IN: 580 mL / OUT: 1245 mL / NET: -665 mL    22 Apr 2018 07:01  -  22 Apr 2018 13:33  --------------------------------------------------------  IN: 0 mL / OUT: 450 mL / NET: -450 mL        PHYSICAL EXAM:  GENERAL: NAD  HEENT: soft, mobile, round mass over left TMJ nontender to palpation (likely lipoma)  CHEST/LUNG: Clear to auscultation bilaterally; No wheezing or crackles  HEART: s1/s2, no murmurs  ABDOMEN: Soft, Nontender, Nondistended; Bowel sounds present  EXTREMITIES:  legs warm to touch, no palpable pulses on Left, dusky appearance of toes on left foot with dry gangrene of 2nd digit of left foot    LABS:                    RADIOLOGY & ADDITIONAL TESTS:    Imaging Personally Reviewed:  Consultant(s) Notes Reviewed:    Care Discussed with Consultants/Other Providers:

## 2018-04-22 NOTE — PROVIDER CONTACT NOTE (OTHER) - ACTION/TREATMENT ORDERED:
Provider notified and will attempt to re draw labs
pa aware. pt refusing.
pa aware. pt refusing.
As per Surg. C, will continue to monitor.
Patient asymptomatic at this time. V/S being done, will continue to monitor.

## 2018-04-22 NOTE — PROGRESS NOTE ADULT - ASSESSMENT
77M with HTN, CKD, BPH, asthma, and gastritis presents with L toe pain and acute SOB. Found to be in acute pulmonary edema in setting of HTN emergency requiring IV NTG and BiPaP now improved with better BP control. Course c/b left toe dry gangrene s/p angiogram of left leg, originally planned for bypass, but too high cardiac risk, pt declines BKA

## 2018-04-22 NOTE — PROGRESS NOTE ADULT - SUBJECTIVE AND OBJECTIVE BOX
VASCULAR DAILY PROGRESS NOTE:     Subjective: Pt seen this AM. Tele with a short self limited run of ventricular tachycardia, patient asymptomatic during episode. Remains afebrile.     Objective:  T(C): 36.5 (04-22-18 @ 01:45), Max: 36.8 (04-21-18 @ 22:45)  HR: 56 (04-22-18 @ 01:45) (54 - 80)  BP: 134/62 (04-22-18 @ 01:45) (127/63 - 158/76)  RR: 19 (04-22-18 @ 01:45) (18 - 20)  SpO2: 97% (04-22-18 @ 01:45) (96% - 98%)      PE:  Gen: NAD  Resp: Respirations unlabored   CVS: RRR  Abd: soft, ND, NT, LLQ hernia  Ext/Vasc:  RLE: PT and DP signals present  LLE: PT signal present, DP absent    MEDICATIONS  (STANDING):  amLODIPine   Tablet 10 milliGRAM(s) Oral daily  artificial tears (preservative free) Ophthalmic Solution 1 Drop(s) Right EYE two times a day  aspirin enteric coated 81 milliGRAM(s) Oral daily  atorvastatin 80 milliGRAM(s) Oral at bedtime  buDESOnide 160 MICROgram(s)/formoterol 4.5 MICROgram(s) Inhaler 2 Puff(s) Inhalation two times a day  carvedilol 12.5 milliGRAM(s) Oral every 12 hours  docusate sodium 100 milliGRAM(s) Oral two times a day  heparin  Injectable 5000 Unit(s) SubCutaneous every 8 hours  hydrALAZINE 100 milliGRAM(s) Oral three times a day  isosorbide   dinitrate Tablet (ISORDIL) 20 milliGRAM(s) Oral three times a day  montelukast 10 milliGRAM(s) Oral daily  pantoprazole    Tablet 40 milliGRAM(s) Oral before breakfast  polyethylene glycol 3350 17 Gram(s) Oral daily  QUEtiapine 25 milliGRAM(s) Oral at bedtime  senna 2 Tablet(s) Oral at bedtime  tamsulosin 0.4 milliGRAM(s) Oral daily  tiotropium 18 MICROgram(s) Capsule 1 Capsule(s) Inhalation daily    MEDICATIONS  (PRN):  ALBUTerol/ipratropium for Nebulization 3 milliLiter(s) Nebulizer every 6 hours PRN Bronchospasm  haloperidol     Tablet 1 milliGRAM(s) Oral four times a day PRN agitation  haloperidol    Injectable 1 milliGRAM(s) IntraMuscular every 6 hours PRN Agitation  haloperidol    Injectable 1 milliGRAM(s) IV Push every 6 hours PRN Agitation  oxyCODONE    5 mG/acetaminophen 325 mG 1 Tablet(s) Oral every 4 hours PRN Moderate Pain (4 - 6)  oxyCODONE    5 mG/acetaminophen 325 mG 2 Tablet(s) Oral every 4 hours PRN Severe Pain (7 - 10)        LABS:        RADIOLOGY & ADDITIONAL STUDIES:

## 2018-04-22 NOTE — PROVIDER CONTACT NOTE (OTHER) - BACKGROUND
pt admitted with respiratory bath
Dx: Left foot wound.
Patient admitted for acute respiratory distress
Pt admitted for acute respiratory distress.

## 2018-04-23 PROCEDURE — 99233 SBSQ HOSP IP/OBS HIGH 50: CPT

## 2018-04-23 RX ORDER — SENNA PLUS 8.6 MG/1
1 TABLET ORAL ONCE
Qty: 0 | Refills: 0 | Status: COMPLETED | OUTPATIENT
Start: 2018-04-23 | End: 2018-04-23

## 2018-04-23 RX ORDER — OXYCODONE AND ACETAMINOPHEN 5; 325 MG/1; MG/1
2 TABLET ORAL EVERY 4 HOURS
Qty: 0 | Refills: 0 | Status: DISCONTINUED | OUTPATIENT
Start: 2018-04-23 | End: 2018-04-25

## 2018-04-23 RX ADMIN — AMLODIPINE BESYLATE 10 MILLIGRAM(S): 2.5 TABLET ORAL at 12:09

## 2018-04-23 RX ADMIN — ISOSORBIDE DINITRATE 20 MILLIGRAM(S): 5 TABLET ORAL at 21:16

## 2018-04-23 RX ADMIN — BUDESONIDE AND FORMOTEROL FUMARATE DIHYDRATE 2 PUFF(S): 160; 4.5 AEROSOL RESPIRATORY (INHALATION) at 21:16

## 2018-04-23 RX ADMIN — HALOPERIDOL DECANOATE 1 MILLIGRAM(S): 100 INJECTION INTRAMUSCULAR at 16:47

## 2018-04-23 RX ADMIN — HEPARIN SODIUM 5000 UNIT(S): 5000 INJECTION INTRAVENOUS; SUBCUTANEOUS at 21:16

## 2018-04-23 RX ADMIN — ISOSORBIDE DINITRATE 20 MILLIGRAM(S): 5 TABLET ORAL at 12:09

## 2018-04-23 RX ADMIN — SENNA PLUS 2 TABLET(S): 8.6 TABLET ORAL at 21:18

## 2018-04-23 RX ADMIN — Medication 100 MILLIGRAM(S): at 21:16

## 2018-04-23 RX ADMIN — MONTELUKAST 10 MILLIGRAM(S): 4 TABLET, CHEWABLE ORAL at 12:09

## 2018-04-23 RX ADMIN — BUDESONIDE AND FORMOTEROL FUMARATE DIHYDRATE 2 PUFF(S): 160; 4.5 AEROSOL RESPIRATORY (INHALATION) at 08:47

## 2018-04-23 RX ADMIN — HEPARIN SODIUM 5000 UNIT(S): 5000 INJECTION INTRAVENOUS; SUBCUTANEOUS at 12:10

## 2018-04-23 RX ADMIN — ATORVASTATIN CALCIUM 80 MILLIGRAM(S): 80 TABLET, FILM COATED ORAL at 21:16

## 2018-04-23 RX ADMIN — CARVEDILOL PHOSPHATE 12.5 MILLIGRAM(S): 80 CAPSULE, EXTENDED RELEASE ORAL at 12:09

## 2018-04-23 RX ADMIN — Medication 100 MILLIGRAM(S): at 12:08

## 2018-04-23 RX ADMIN — TAMSULOSIN HYDROCHLORIDE 0.4 MILLIGRAM(S): 0.4 CAPSULE ORAL at 12:09

## 2018-04-23 RX ADMIN — SENNA PLUS 1 TABLET(S): 8.6 TABLET ORAL at 00:37

## 2018-04-23 RX ADMIN — HALOPERIDOL DECANOATE 1 MILLIGRAM(S): 100 INJECTION INTRAMUSCULAR at 07:42

## 2018-04-23 RX ADMIN — TIOTROPIUM BROMIDE 1 CAPSULE(S): 18 CAPSULE ORAL; RESPIRATORY (INHALATION) at 16:59

## 2018-04-23 RX ADMIN — QUETIAPINE FUMARATE 25 MILLIGRAM(S): 200 TABLET, FILM COATED ORAL at 21:16

## 2018-04-23 RX ADMIN — Medication 81 MILLIGRAM(S): at 12:09

## 2018-04-23 NOTE — PROGRESS NOTE ADULT - SUBJECTIVE AND OBJECTIVE BOX
Patient is a 77y old  Male who presents with a chief complaint of Left toe pain (20 Apr 2018 08:17)      SUBJECTIVE / OVERNIGHT EVENTS:  Pt seen and examined. Chart reviewed. C/O SOB but sat 98% on RA.     MEDICATIONS  (STANDING):  amLODIPine   Tablet 10 milliGRAM(s) Oral daily  artificial tears (preservative free) Ophthalmic Solution 1 Drop(s) Right EYE two times a day  aspirin enteric coated 81 milliGRAM(s) Oral daily  atorvastatin 80 milliGRAM(s) Oral at bedtime  buDESOnide 160 MICROgram(s)/formoterol 4.5 MICROgram(s) Inhaler 2 Puff(s) Inhalation two times a day  carvedilol 12.5 milliGRAM(s) Oral every 12 hours  docusate sodium 100 milliGRAM(s) Oral two times a day  heparin  Injectable 5000 Unit(s) SubCutaneous every 8 hours  hydrALAZINE 100 milliGRAM(s) Oral three times a day  isosorbide   dinitrate Tablet (ISORDIL) 20 milliGRAM(s) Oral three times a day  montelukast 10 milliGRAM(s) Oral daily  pantoprazole    Tablet 40 milliGRAM(s) Oral before breakfast  polyethylene glycol 3350 17 Gram(s) Oral daily  QUEtiapine 25 milliGRAM(s) Oral at bedtime  senna 2 Tablet(s) Oral at bedtime  tamsulosin 0.4 milliGRAM(s) Oral daily  tiotropium 18 MICROgram(s) Capsule 1 Capsule(s) Inhalation daily    MEDICATIONS  (PRN):  ALBUTerol/ipratropium for Nebulization 3 milliLiter(s) Nebulizer every 6 hours PRN Bronchospasm  haloperidol     Tablet 1 milliGRAM(s) Oral four times a day PRN agitation  haloperidol    Injectable 1 milliGRAM(s) IntraMuscular every 6 hours PRN Agitation  haloperidol    Injectable 1 milliGRAM(s) IV Push every 6 hours PRN Agitation  oxyCODONE    5 mG/acetaminophen 325 mG 2 Tablet(s) Oral every 4 hours PRN Severe Pain (7 - 10)      Vital Signs Last 24 Hrs  T(C): 36.7 (23 Apr 2018 12:06), Max: 36.8 (22 Apr 2018 17:33)  T(F): 98 (23 Apr 2018 12:06), Max: 98.2 (22 Apr 2018 17:33)  HR: 68 (23 Apr 2018 12:06) (63 - 77)  BP: 137/80 (23 Apr 2018 12:06) (114/57 - 153/71)  BP(mean): --  RR: 18 (23 Apr 2018 12:06) (18 - 20)  SpO2: 98% (23 Apr 2018 12:06) (95% - 98%)  CAPILLARY BLOOD GLUCOSE        I&O's Summary    22 Apr 2018 07:01  -  23 Apr 2018 07:00  --------------------------------------------------------  IN: 0 mL / OUT: 1060 mL / NET: -1060 mL        PHYSICAL EXAM:  GENERAL: NAD, well-developed  HEAD:  Atraumatic, Normocephalic  EYES: EOMI, PERRLA, conjunctiva and sclera clear  NECK: Supple, No JVD  CHEST/LUNG: Decreased BS, bilaterally; No wheeze, no crackles  HEART: Regular rate and rhythm; No murmurs, rubs, or gallops  ABDOMEN: Soft, Nontender, Nondistended; Bowel sounds present  EXTREMITIES:   No clubbing,  or edema,   PSYCH: AAOx3  NEUROLOGY: non-focal  SKIN: No rashes or lesions    LABS:    04-22    139  |  103  |  29<H>  ----------------------------<  90  4.4   |  23  |  1.73<H>    Ca    8.7      22 Apr 2018 15:15                RADIOLOGY & ADDITIONAL TESTS:    Imaging Personally Reviewed:    Consultant(s) Notes Reviewed:  Vascular, Cardiology    Care Discussed with Consultants/Other Providers: Patient is a 77y old  Male who presents with a chief complaint of Left toe pain (20 Apr 2018 08:17)      SUBJECTIVE / OVERNIGHT EVENTS:  Pt seen and examined. Chart reviewed. C/O SOB but sat 98% on RA.     MEDICATIONS  (STANDING):  amLODIPine   Tablet 10 milliGRAM(s) Oral daily  artificial tears (preservative free) Ophthalmic Solution 1 Drop(s) Right EYE two times a day  aspirin enteric coated 81 milliGRAM(s) Oral daily  atorvastatin 80 milliGRAM(s) Oral at bedtime  buDESOnide 160 MICROgram(s)/formoterol 4.5 MICROgram(s) Inhaler 2 Puff(s) Inhalation two times a day  carvedilol 12.5 milliGRAM(s) Oral every 12 hours  docusate sodium 100 milliGRAM(s) Oral two times a day  heparin  Injectable 5000 Unit(s) SubCutaneous every 8 hours  hydrALAZINE 100 milliGRAM(s) Oral three times a day  isosorbide   dinitrate Tablet (ISORDIL) 20 milliGRAM(s) Oral three times a day  montelukast 10 milliGRAM(s) Oral daily  pantoprazole    Tablet 40 milliGRAM(s) Oral before breakfast  polyethylene glycol 3350 17 Gram(s) Oral daily  QUEtiapine 25 milliGRAM(s) Oral at bedtime  senna 2 Tablet(s) Oral at bedtime  tamsulosin 0.4 milliGRAM(s) Oral daily  tiotropium 18 MICROgram(s) Capsule 1 Capsule(s) Inhalation daily    MEDICATIONS  (PRN):  ALBUTerol/ipratropium for Nebulization 3 milliLiter(s) Nebulizer every 6 hours PRN Bronchospasm  haloperidol     Tablet 1 milliGRAM(s) Oral four times a day PRN agitation  haloperidol    Injectable 1 milliGRAM(s) IntraMuscular every 6 hours PRN Agitation  haloperidol    Injectable 1 milliGRAM(s) IV Push every 6 hours PRN Agitation  oxyCODONE    5 mG/acetaminophen 325 mG 2 Tablet(s) Oral every 4 hours PRN Severe Pain (7 - 10)      Vital Signs Last 24 Hrs  T(C): 36.7 (23 Apr 2018 12:06), Max: 36.8 (22 Apr 2018 17:33)  T(F): 98 (23 Apr 2018 12:06), Max: 98.2 (22 Apr 2018 17:33)  HR: 68 (23 Apr 2018 12:06) (63 - 77)  BP: 137/80 (23 Apr 2018 12:06) (114/57 - 153/71)  BP(mean): --  RR: 18 (23 Apr 2018 12:06) (18 - 20)  SpO2: 98% (23 Apr 2018 12:06) (95% - 98%)  CAPILLARY BLOOD GLUCOSE        I&O's Summary    22 Apr 2018 07:01  -  23 Apr 2018 07:00  --------------------------------------------------------  IN: 0 mL / OUT: 1060 mL / NET: -1060 mL        PHYSICAL EXAM:  GENERAL: NAD, well-developed  HEAD:  Atraumatic, Normocephalic  EYES: EOMI, PERRLA, conjunctiva and sclera clear  NECK: Supple, No JVD  CHEST/LUNG: Decreased BS, bilaterally; No wheeze, no crackles  HEART: Regular rate and rhythm; No murmurs, rubs, or gallops  ABDOMEN: Soft, Nontender, Nondistended; Bowel sounds present  EXTREMITIES:   No clubbing,  or edema, (+) toe gangrene  PSYCH: AAOx3  NEUROLOGY: non-focal  SKIN: No rashes or lesions    LABS:    04-22    139  |  103  |  29<H>  ----------------------------<  90  4.4   |  23  |  1.73<H>    Ca    8.7      22 Apr 2018 15:15                RADIOLOGY & ADDITIONAL TESTS:    Imaging Personally Reviewed:    Consultant(s) Notes Reviewed:  Vascular, Cardiology    Care Discussed with Consultants/Other Providers:

## 2018-04-23 NOTE — PROGRESS NOTE ADULT - ASSESSMENT
77M with CFA and SFA occlusions found on angiogram. Developed NSVT on 4/16, s/p LHC by Cards 4/18- mRCA total, no planned intervention, high risk for bypass, patient refusing BKA   - Tolerating diet  - Pain control PRN, well controlled on current regimen  - No vascular intervention at this time given pt refusing BKA  - Continue local wound care   - Follow up cardiology  - Patient transferred back to telemetry, hand off communication provided to Chidi Villa for 7S  - Care per medicine

## 2018-04-23 NOTE — PROGRESS NOTE ADULT - SUBJECTIVE AND OBJECTIVE BOX
C TEAM SURGERY PROGRESS NOTE    Subjective: Pt seen this morning on rounds. Patient agitated, verbally aggressive toward staff & refusing labs/AM meds. Wants to be discharged home.     Vital Signs Last 24 Hrs  T(C): 36.7 (23 Apr 2018 00:53), Max: 37.1 (22 Apr 2018 14:00)  T(F): 98 (23 Apr 2018 00:53), Max: 98.7 (22 Apr 2018 14:00)  HR: 69 (23 Apr 2018 00:53) (63 - 82)  BP: 153/71 (23 Apr 2018 00:53) (114/57 - 153/71)  BP(mean): --  RR: 19 (23 Apr 2018 00:53) (19 - 22)  SpO2: 95% (23 Apr 2018 00:53) (95% - 97%)  I&O's Detail    22 Apr 2018 07:01  -  23 Apr 2018 07:00  --------------------------------------------------------  IN:  Total IN: 0 mL    OUT:    Voided: 1060 mL  Total OUT: 1060 mL    Total NET: -1060 mL        MEDICATIONS  (STANDING):  amLODIPine   Tablet 10 milliGRAM(s) Oral daily  artificial tears (preservative free) Ophthalmic Solution 1 Drop(s) Right EYE two times a day  aspirin enteric coated 81 milliGRAM(s) Oral daily  atorvastatin 80 milliGRAM(s) Oral at bedtime  buDESOnide 160 MICROgram(s)/formoterol 4.5 MICROgram(s) Inhaler 2 Puff(s) Inhalation two times a day  carvedilol 12.5 milliGRAM(s) Oral every 12 hours  docusate sodium 100 milliGRAM(s) Oral two times a day  heparin  Injectable 5000 Unit(s) SubCutaneous every 8 hours  hydrALAZINE 100 milliGRAM(s) Oral three times a day  isosorbide   dinitrate Tablet (ISORDIL) 20 milliGRAM(s) Oral three times a day  montelukast 10 milliGRAM(s) Oral daily  pantoprazole    Tablet 40 milliGRAM(s) Oral before breakfast  polyethylene glycol 3350 17 Gram(s) Oral daily  QUEtiapine 25 milliGRAM(s) Oral at bedtime  senna 2 Tablet(s) Oral at bedtime  tamsulosin 0.4 milliGRAM(s) Oral daily  tiotropium 18 MICROgram(s) Capsule 1 Capsule(s) Inhalation daily    MEDICATIONS  (PRN):  ALBUTerol/ipratropium for Nebulization 3 milliLiter(s) Nebulizer every 6 hours PRN Bronchospasm  haloperidol     Tablet 1 milliGRAM(s) Oral four times a day PRN agitation  haloperidol    Injectable 1 milliGRAM(s) IntraMuscular every 6 hours PRN Agitation  haloperidol    Injectable 1 milliGRAM(s) IV Push every 6 hours PRN Agitation  oxyCODONE    5 mG/acetaminophen 325 mG 2 Tablet(s) Oral every 4 hours PRN Severe Pain (7 - 10)      Physical Exam  Gen: NAD, agitated   Resp: Respirations unlabored   CVS: RRR  Abd: soft, ND, NT, LLQ hernia  Ext/Vasc:  RLE: PT and DP signals present  LLE: PT signal present, DP absent    LABS:    04-22    139  |  103  |  29<H>  ----------------------------<  90  4.4   |  23  |  1.73<H>    Ca    8.7      22 Apr 2018 15:15

## 2018-04-23 NOTE — PROGRESS NOTE ADULT - PROBLEM SELECTOR PLAN 1
s/p angiogram showing occluded left SFA and AK pop pending bypass and fem endarterectomy. No evidence of OM on Xray  Stress test done:  large, severe defects in inferior and inferoseptal walls that are  partially reversible, suggestive of infarct with mild partial ischemia. There was a severe defect in the diaphragmatic wall of the RV that was fixed, consistent  with RV infarct.   Initially cleared by Cards for bypass-  Developed NSVT on 4/16, s/p LHC by Cards 4/18- mRCA total, no planned intervention  Percocet PRN for pain  per Vascular, no longer bypass candidate, pt declining BKA at this time, plan for continued local wound care and dc planning.

## 2018-04-23 NOTE — PROGRESS NOTE ADULT - ASSESSMENT
77M with HTN, CKD, BPH, asthma, and gastritis presents with L toe pain and acute SOB. Found to be in acute pulmonary edema in setting of HTN emergency requiring IV NTG and BiPaP now improved with better BP control. Course c/b left toe dry gangrene s/p angiogram of left leg, originally planned for bypass, but too high cardiac risk, pt declines BKA.

## 2018-04-24 DIAGNOSIS — G30.9 ALZHEIMER'S DISEASE, UNSPECIFIED: ICD-10-CM

## 2018-04-24 LAB
BASOPHILS # BLD AUTO: 0.01 K/UL — SIGNIFICANT CHANGE UP (ref 0–0.2)
BASOPHILS NFR BLD AUTO: 0.2 % — SIGNIFICANT CHANGE UP (ref 0–2)
BUN SERPL-MCNC: 28 MG/DL — HIGH (ref 7–23)
CALCIUM SERPL-MCNC: 8.2 MG/DL — LOW (ref 8.4–10.5)
CHLORIDE SERPL-SCNC: 102 MMOL/L — SIGNIFICANT CHANGE UP (ref 98–107)
CO2 SERPL-SCNC: 22 MMOL/L — SIGNIFICANT CHANGE UP (ref 22–31)
CREAT SERPL-MCNC: 1.6 MG/DL — HIGH (ref 0.5–1.3)
EOSINOPHIL # BLD AUTO: 0.1 K/UL — SIGNIFICANT CHANGE UP (ref 0–0.5)
EOSINOPHIL NFR BLD AUTO: 1.5 % — SIGNIFICANT CHANGE UP (ref 0–6)
GLUCOSE SERPL-MCNC: 106 MG/DL — HIGH (ref 70–99)
HCT VFR BLD CALC: 37.3 % — LOW (ref 39–50)
HGB BLD-MCNC: 12.3 G/DL — LOW (ref 13–17)
IMM GRANULOCYTES # BLD AUTO: 0.02 # — SIGNIFICANT CHANGE UP
IMM GRANULOCYTES NFR BLD AUTO: 0.3 % — SIGNIFICANT CHANGE UP (ref 0–1.5)
LYMPHOCYTES # BLD AUTO: 1.01 K/UL — SIGNIFICANT CHANGE UP (ref 1–3.3)
LYMPHOCYTES # BLD AUTO: 15.6 % — SIGNIFICANT CHANGE UP (ref 13–44)
MCHC RBC-ENTMCNC: 30.1 PG — SIGNIFICANT CHANGE UP (ref 27–34)
MCHC RBC-ENTMCNC: 33 % — SIGNIFICANT CHANGE UP (ref 32–36)
MCV RBC AUTO: 91.2 FL — SIGNIFICANT CHANGE UP (ref 80–100)
MONOCYTES # BLD AUTO: 0.67 K/UL — SIGNIFICANT CHANGE UP (ref 0–0.9)
MONOCYTES NFR BLD AUTO: 10.3 % — SIGNIFICANT CHANGE UP (ref 2–14)
NEUTROPHILS # BLD AUTO: 4.67 K/UL — SIGNIFICANT CHANGE UP (ref 1.8–7.4)
NEUTROPHILS NFR BLD AUTO: 72.1 % — SIGNIFICANT CHANGE UP (ref 43–77)
NRBC # FLD: 0 — SIGNIFICANT CHANGE UP
PLATELET # BLD AUTO: 197 K/UL — SIGNIFICANT CHANGE UP (ref 150–400)
PMV BLD: 13 FL — SIGNIFICANT CHANGE UP (ref 7–13)
POTASSIUM SERPL-MCNC: 3.7 MMOL/L — SIGNIFICANT CHANGE UP (ref 3.5–5.3)
POTASSIUM SERPL-SCNC: 3.7 MMOL/L — SIGNIFICANT CHANGE UP (ref 3.5–5.3)
RBC # BLD: 4.09 M/UL — LOW (ref 4.2–5.8)
RBC # FLD: 14.6 % — HIGH (ref 10.3–14.5)
SODIUM SERPL-SCNC: 137 MMOL/L — SIGNIFICANT CHANGE UP (ref 135–145)
WBC # BLD: 6.48 K/UL — SIGNIFICANT CHANGE UP (ref 3.8–10.5)
WBC # FLD AUTO: 6.48 K/UL — SIGNIFICANT CHANGE UP (ref 3.8–10.5)

## 2018-04-24 PROCEDURE — 99232 SBSQ HOSP IP/OBS MODERATE 35: CPT

## 2018-04-24 PROCEDURE — 99233 SBSQ HOSP IP/OBS HIGH 50: CPT

## 2018-04-24 RX ADMIN — CARVEDILOL PHOSPHATE 12.5 MILLIGRAM(S): 80 CAPSULE, EXTENDED RELEASE ORAL at 05:12

## 2018-04-24 RX ADMIN — MONTELUKAST 10 MILLIGRAM(S): 4 TABLET, CHEWABLE ORAL at 13:45

## 2018-04-24 RX ADMIN — AMLODIPINE BESYLATE 10 MILLIGRAM(S): 2.5 TABLET ORAL at 05:12

## 2018-04-24 RX ADMIN — POLYETHYLENE GLYCOL 3350 17 GRAM(S): 17 POWDER, FOR SOLUTION ORAL at 13:44

## 2018-04-24 RX ADMIN — ATORVASTATIN CALCIUM 80 MILLIGRAM(S): 80 TABLET, FILM COATED ORAL at 22:38

## 2018-04-24 RX ADMIN — BUDESONIDE AND FORMOTEROL FUMARATE DIHYDRATE 2 PUFF(S): 160; 4.5 AEROSOL RESPIRATORY (INHALATION) at 09:09

## 2018-04-24 RX ADMIN — QUETIAPINE FUMARATE 25 MILLIGRAM(S): 200 TABLET, FILM COATED ORAL at 22:38

## 2018-04-24 RX ADMIN — OXYCODONE AND ACETAMINOPHEN 2 TABLET(S): 5; 325 TABLET ORAL at 03:04

## 2018-04-24 RX ADMIN — ISOSORBIDE DINITRATE 20 MILLIGRAM(S): 5 TABLET ORAL at 13:45

## 2018-04-24 RX ADMIN — Medication 100 MILLIGRAM(S): at 13:45

## 2018-04-24 RX ADMIN — Medication 100 MILLIGRAM(S): at 05:12

## 2018-04-24 RX ADMIN — OXYCODONE AND ACETAMINOPHEN 2 TABLET(S): 5; 325 TABLET ORAL at 03:31

## 2018-04-24 RX ADMIN — HEPARIN SODIUM 5000 UNIT(S): 5000 INJECTION INTRAVENOUS; SUBCUTANEOUS at 22:39

## 2018-04-24 RX ADMIN — CARVEDILOL PHOSPHATE 12.5 MILLIGRAM(S): 80 CAPSULE, EXTENDED RELEASE ORAL at 17:35

## 2018-04-24 RX ADMIN — Medication 100 MILLIGRAM(S): at 22:39

## 2018-04-24 RX ADMIN — OXYCODONE AND ACETAMINOPHEN 2 TABLET(S): 5; 325 TABLET ORAL at 18:33

## 2018-04-24 RX ADMIN — SENNA PLUS 2 TABLET(S): 8.6 TABLET ORAL at 22:38

## 2018-04-24 RX ADMIN — Medication 1 DROP(S): at 05:15

## 2018-04-24 RX ADMIN — Medication 100 MILLIGRAM(S): at 05:14

## 2018-04-24 RX ADMIN — HEPARIN SODIUM 5000 UNIT(S): 5000 INJECTION INTRAVENOUS; SUBCUTANEOUS at 05:12

## 2018-04-24 RX ADMIN — HALOPERIDOL DECANOATE 1 MILLIGRAM(S): 100 INJECTION INTRAMUSCULAR at 17:35

## 2018-04-24 RX ADMIN — Medication 1 DROP(S): at 17:34

## 2018-04-24 RX ADMIN — HEPARIN SODIUM 5000 UNIT(S): 5000 INJECTION INTRAVENOUS; SUBCUTANEOUS at 13:46

## 2018-04-24 RX ADMIN — OXYCODONE AND ACETAMINOPHEN 2 TABLET(S): 5; 325 TABLET ORAL at 17:33

## 2018-04-24 RX ADMIN — Medication 81 MILLIGRAM(S): at 13:46

## 2018-04-24 RX ADMIN — ISOSORBIDE DINITRATE 20 MILLIGRAM(S): 5 TABLET ORAL at 05:14

## 2018-04-24 RX ADMIN — PANTOPRAZOLE SODIUM 40 MILLIGRAM(S): 20 TABLET, DELAYED RELEASE ORAL at 05:14

## 2018-04-24 RX ADMIN — TAMSULOSIN HYDROCHLORIDE 0.4 MILLIGRAM(S): 0.4 CAPSULE ORAL at 13:46

## 2018-04-24 RX ADMIN — BUDESONIDE AND FORMOTEROL FUMARATE DIHYDRATE 2 PUFF(S): 160; 4.5 AEROSOL RESPIRATORY (INHALATION) at 22:38

## 2018-04-24 RX ADMIN — ISOSORBIDE DINITRATE 20 MILLIGRAM(S): 5 TABLET ORAL at 22:39

## 2018-04-24 NOTE — PROGRESS NOTE BEHAVIORAL HEALTH - NSBHCHARTREVIEWLAB_PSY_A_CORE FT
12.3   6.48  )-----------( 197      ( 24 Apr 2018 05:30 )             37.3
04-11    143  |  109<H>  |  43<H>  ----------------------------<  76  3.6   |  21<L>  |  1.87<H>    Ca    8.7      11 Apr 2018 06:30  Phos  2.9     04-11  Mg     2.1     04-11    CBC Full  -  ( 11 Apr 2018 06:30 )  WBC Count : 6.04 K/uL  Hemoglobin : 14.3 g/dL  Hematocrit : 44.1 %  Platelet Count - Automated : 180 K/uL  Mean Cell Volume : 91.3 fL  Mean Cell Hemoglobin : 29.6 pg  Mean Cell Hemoglobin Concentration : 32.4 %  Auto Neutrophil # : x  Auto Lymphocyte # : x  Auto Monocyte # : x  Auto Eosinophil # : x  Auto Basophil # : x  Auto Neutrophil % : x  Auto Lymphocyte % : x  Auto Monocyte % : x  Auto Eosinophil % : x  Auto Basophil % : x
04-16    139  |  107  |  26<H>  ----------------------------<  77  3.6   |  21<L>  |  1.63<H>    Ca    8.0<L>      16 Apr 2018 06:48  Phos  2.9     04-16  Mg     2.0     04-16                          12.5   5.18  )-----------( 205      ( 16 Apr 2018 06:48 )             38.0
04-10    144  |  109<H>  |  42<H>  ----------------------------<  85  3.9   |  22  |  2.06<H>    Ca    8.8      10 Apr 2018 06:42  Phos  2.6     04-09  Mg     2.1     04-10                          13.8   6.34  )-----------( 184      ( 10 Apr 2018 06:42 )             41.6

## 2018-04-24 NOTE — PROGRESS NOTE BEHAVIORAL HEALTH - PRIMARY DX
Dementia in Alzheimer's disease
Delirium due to another medical condition

## 2018-04-24 NOTE — PROGRESS NOTE ADULT - PROBLEM SELECTOR PLAN 6
No wheezing or SOB today  -continue symbicort, spiriva, singulair, duonebs prn   CXR clear and without acute pulmonary disease

## 2018-04-24 NOTE — PROGRESS NOTE BEHAVIORAL HEALTH - NSBHCONSFOLLOWNEEDS_PSY_A_CORE
Patient needs further psychiatric safety assessment prior to discharge
Patient needs further psychiatric safety assessment prior to discharge
no psychiatric contraindications to discharge
Patient needs further psychiatric safety assessment prior to discharge

## 2018-04-24 NOTE — PROGRESS NOTE BEHAVIORAL HEALTH - NSBHFUPINTERVALHXFT_PSY_A_CORE
77 year old BM admitted with leg infection, currently resting in bed , foul smelling, with fair to poor eye contact. Pt wants to go home, a bit frustrated and at times gets loud but has not been agitated or combative, he did receive a prn for yelling at staff but today is very calm.
Chart reviewed.  No acute events overnight.  No PRNs. As per staff pt less irritable, Pt calm and pleasant. Seen and examined at bedside. Pt reports being in good sprits despite chronic pain. , Pt is calm and cooperative. Pt reports poor sleep. . Pt continues to deny SI/ HI/ audio/ visual/ tactile/ hallucinations. No psychotic or mood symptoms elicited during interview.
Chart reviewed.  No acute events overnight.  No PRNs. As per staff pt less irritable, Pt calm and pleasant. Seen and examined at bedside. Pt reports feeling better, Pt is calm and cooperative. Pt reports restful sleep. . Pt continues to deny SI/ HI/ audio/ visual/ tactile/ hallucinations. No psychotic or mood symptoms elicited during interview.
Chart reviewed.  No acute events overnight.  No PRNs. As per staff pt less irritable, Pt calm and pleasant. Seen and examined at bedside. Pt reports feeling better, Pt is calm and cooperative. Pt reports restful sleep. Pt states that he looks forward to going home after the procedure. Pt continues to deny SI/ HI/ audio/ visual/ tactile/ hallucinations. No psychotic or mood symptoms elicited during interview.

## 2018-04-24 NOTE — PROGRESS NOTE BEHAVIORAL HEALTH - SUMMARY
77M domiciled alone in public 8 housing, No known  past psychiatric history, treatment, hospitalization, suicide attempt, or substance use. 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 No signs of active infection as this time. Has scheduled stress test angiogram on Tuesday 4/10. Psychiatry consulted for agitation/ capacity   Chart reviewed.  No acute events overnight.  No PRNs. As per staff pt less irritable, Pt calm and pleasant. Seen and examined at bedside. Pt reports feeling better, Pt is calm and cooperative. Pt reports restful sleep. Pt states that he looks forward to going home after the procedure. Pt continues to deny SI/ HI/ audio/ visual/ tactile/ hallucinations. No psychotic or mood symptoms elicited during interview.  At present patient has capacity to consent for  cardiac stress test  Recommend PRN agitation. as written above, monitor EKG for qtc,   Pt has capacity to make medical decisions   No psychiatric indications for inpatient hospitalization , Pt psychiatrically cleared.
78 year old BM admitted for foot ulceration who was not able to take care of self, currently awaiting placement
77M domiciled alone in public 8 housing, No known  past psychiatric of schizophrenia, unknown  treatment, hospitalization, suicide attempt, or substance use. 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 No signs of active infection as this time. Has scheduled stress test angiogram on Tuesday 4/10. Psychiatry consulted for agitation/ capacity   Seen and examined, No psychotic or mood symptoms elicited during interview.  Pt continues to deny SI, HI, audio/ visual/ tactile/ hallucinations.  Recommend Seroquel 25mg qhs to aid sleep  Recommend continue  PRN agitation. as written above, monitor EKG for qtc,   No psychiatric indications for inpatient hospitalization
77M domiciled alone in public 8 housing, No known  past psychiatric history, treatment, hospitalization, suicide attempt, or substance use. 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 No signs of active infection as this time. Has scheduled stress test angiogram on Tuesday 4/10. Psychiatry consulted for agitation/ capacity   . No psychotic or mood symptoms elicited during interview.  At present patient has capacity to consent for  cardiac stress test  Recommend PRN agitation. as written above, monitor EKG for qtc,   Chart reviewed.  No acute events overnight.  No PRNs. As per staff pt less irritable, Pt calm and pleasant. Seen and examined at bedside. Pt reports feeling better, Pt is calm and cooperative. Pt reports restful sleep. . Pt continues to deny SI/ HI/ audio/ visual/ tactile/ hallucinations. No psychotic or mood symptoms elicited during interview.  Pt has capacity to make medical decisions   No psychiatric indications for inpatient hospitalization

## 2018-04-24 NOTE — PROGRESS NOTE ADULT - SUBJECTIVE AND OBJECTIVE BOX
Patient is a 77y old  Male who presents with a chief complaint of Left toe pain (20 Apr 2018 08:17)      SUBJECTIVE / OVERNIGHT EVENTS: No complaints. Calm and cooperative.    MEDICATIONS  (STANDING):  amLODIPine   Tablet 10 milliGRAM(s) Oral daily  artificial tears (preservative free) Ophthalmic Solution 1 Drop(s) Right EYE two times a day  aspirin enteric coated 81 milliGRAM(s) Oral daily  atorvastatin 80 milliGRAM(s) Oral at bedtime  buDESOnide 160 MICROgram(s)/formoterol 4.5 MICROgram(s) Inhaler 2 Puff(s) Inhalation two times a day  carvedilol 12.5 milliGRAM(s) Oral every 12 hours  docusate sodium 100 milliGRAM(s) Oral two times a day  heparin  Injectable 5000 Unit(s) SubCutaneous every 8 hours  hydrALAZINE 100 milliGRAM(s) Oral three times a day  isosorbide   dinitrate Tablet (ISORDIL) 20 milliGRAM(s) Oral three times a day  montelukast 10 milliGRAM(s) Oral daily  pantoprazole    Tablet 40 milliGRAM(s) Oral before breakfast  polyethylene glycol 3350 17 Gram(s) Oral daily  QUEtiapine 25 milliGRAM(s) Oral at bedtime  senna 2 Tablet(s) Oral at bedtime  tamsulosin 0.4 milliGRAM(s) Oral daily  tiotropium 18 MICROgram(s) Capsule 1 Capsule(s) Inhalation daily    MEDICATIONS  (PRN):  ALBUTerol/ipratropium for Nebulization 3 milliLiter(s) Nebulizer every 6 hours PRN Bronchospasm  haloperidol     Tablet 1 milliGRAM(s) Oral four times a day PRN agitation  haloperidol    Injectable 1 milliGRAM(s) IntraMuscular every 6 hours PRN Agitation  haloperidol    Injectable 1 milliGRAM(s) IV Push every 6 hours PRN Agitation  oxyCODONE    5 mG/acetaminophen 325 mG 2 Tablet(s) Oral every 4 hours PRN Severe Pain (7 - 10)      Vital Signs Last 24 Hrs  T(C): 36.7 (24 Apr 2018 05:09), Max: 36.7 (23 Apr 2018 12:06)  T(F): 98.1 (24 Apr 2018 05:09), Max: 98.1 (24 Apr 2018 05:09)  HR: 60 (24 Apr 2018 05:09) (60 - 83)  BP: 153/57 (24 Apr 2018 05:09) (137/80 - 167/78)  BP(mean): --  RR: 18 (24 Apr 2018 05:09) (17 - 18)  SpO2: 98% (24 Apr 2018 05:09) (92% - 99%)  CAPILLARY BLOOD GLUCOSE        I&O's Summary    23 Apr 2018 07:01  -  24 Apr 2018 07:00  --------------------------------------------------------  IN: 500 mL / OUT: 350 mL / NET: 150 mL    24 Apr 2018 07:01  -  24 Apr 2018 09:25  --------------------------------------------------------  IN: 0 mL / OUT: 200 mL / NET: -200 mL        PHYSICAL EXAM:  GENERAL: NAD, well-developed  HEAD:  Atraumatic, Normocephalic  EYES: EOMI, PERRLA, conjunctiva and sclera clear  NECK: Supple, No JVD  CHEST/LUNG: Clear to auscultation bilaterally; No wheeze  HEART: Regular rate and rhythm; No murmurs, rubs, or gallops  ABDOMEN: Soft, Nontender, Nondistended; Bowel sounds present  EXTREMITIES:  2+ Peripheral Pulses, No clubbing, cyanosis, or edema, (+) dry gangrene at left 2nd toe  PSYCH: AAOx3  NEUROLOGY: non-focal  SKIN: No rashes or lesions    LABS:                        12.3   6.48  )-----------( 197      ( 24 Apr 2018 05:30 )             37.3     04-24    137  |  102  |  28<H>  ----------------------------<  106<H>  3.7   |  22  |  1.60<H>    Ca    8.2<L>      24 Apr 2018 05:30                RADIOLOGY & ADDITIONAL TESTS:    Imaging Personally Reviewed:    Consultant(s) Notes Reviewed:      Care Discussed with Consultants/Other Providers:

## 2018-04-24 NOTE — PROGRESS NOTE BEHAVIORAL HEALTH - NSBHCHARTREVIEWVS_PSY_A_CORE FT
Vital Signs Last 24 Hrs  T(C): 36.5 (24 Apr 2018 09:55), Max: 36.7 (23 Apr 2018 21:04)  T(F): 97.7 (24 Apr 2018 09:55), Max: 98.1 (24 Apr 2018 05:09)  HR: 60 (24 Apr 2018 09:55) (60 - 83)  BP: 132/62 (24 Apr 2018 09:55) (132/62 - 167/78)  BP(mean): --  RR: 18 (24 Apr 2018 09:55) (17 - 18)  SpO2: 97% (24 Apr 2018 09:55) (92% - 99%)
Vital Signs Last 24 Hrs  T(C): 36.7 (11 Apr 2018 05:00), Max: 36.7 (11 Apr 2018 05:00)  T(F): 98 (11 Apr 2018 05:00), Max: 98 (11 Apr 2018 05:00)  HR: 67 (11 Apr 2018 05:00) (67 - 74)  BP: 149/77 (11 Apr 2018 05:00) (149/77 - 152/80)  BP(mean): --  RR: 17 (11 Apr 2018 05:00) (16 - 17)  SpO2: 100% (11 Apr 2018 05:00) (94% - 100%)
Vital Signs Last 24 Hrs  T(C): 37.1 (16 Apr 2018 12:46), Max: 37.1 (16 Apr 2018 12:46)  T(F): 98.7 (16 Apr 2018 12:46), Max: 98.7 (16 Apr 2018 12:46)  HR: 55 (16 Apr 2018 12:46) (55 - 69)  BP: 139/71 (16 Apr 2018 12:46) (114/57 - 169/88)  BP(mean): --  RR: 16 (16 Apr 2018 12:46) (16 - 20)  SpO2: 100% (16 Apr 2018 12:46) (97% - 100%)
Vital Signs Last 24 Hrs  T(C): 36.3 (10 Apr 2018 05:56), Max: 36.4 (09 Apr 2018 13:36)  T(F): 97.3 (10 Apr 2018 05:56), Max: 97.6 (09 Apr 2018 21:53)  HR: 74 (10 Apr 2018 05:56) (72 - 74)  BP: 156/65 (10 Apr 2018 05:56) (122/73 - 174/71)  BP(mean): --  RR: 18 (10 Apr 2018 05:56) (18 - 18)  SpO2: 100% (10 Apr 2018 05:56) (98% - 100%)

## 2018-04-24 NOTE — PROGRESS NOTE ADULT - PROBLEM SELECTOR PLAN 4
currently at baseline, calm and pleasant  Has capacity for medical decision making per psych  f/u Psych rec

## 2018-04-25 VITALS
SYSTOLIC BLOOD PRESSURE: 133 MMHG | HEART RATE: 70 BPM | OXYGEN SATURATION: 98 % | RESPIRATION RATE: 18 BRPM | TEMPERATURE: 97 F | DIASTOLIC BLOOD PRESSURE: 77 MMHG

## 2018-04-25 LAB
BUN SERPL-MCNC: 32 MG/DL — HIGH (ref 7–23)
CALCIUM SERPL-MCNC: 8.5 MG/DL — SIGNIFICANT CHANGE UP (ref 8.4–10.5)
CHLORIDE SERPL-SCNC: 104 MMOL/L — SIGNIFICANT CHANGE UP (ref 98–107)
CO2 SERPL-SCNC: 23 MMOL/L — SIGNIFICANT CHANGE UP (ref 22–31)
CREAT SERPL-MCNC: 1.72 MG/DL — HIGH (ref 0.5–1.3)
GLUCOSE SERPL-MCNC: 81 MG/DL — SIGNIFICANT CHANGE UP (ref 70–99)
POTASSIUM SERPL-MCNC: 4.1 MMOL/L — SIGNIFICANT CHANGE UP (ref 3.5–5.3)
POTASSIUM SERPL-SCNC: 4.1 MMOL/L — SIGNIFICANT CHANGE UP (ref 3.5–5.3)
SODIUM SERPL-SCNC: 140 MMOL/L — SIGNIFICANT CHANGE UP (ref 135–145)

## 2018-04-25 PROCEDURE — 99239 HOSP IP/OBS DSCHRG MGMT >30: CPT

## 2018-04-25 RX ADMIN — HEPARIN SODIUM 5000 UNIT(S): 5000 INJECTION INTRAVENOUS; SUBCUTANEOUS at 13:01

## 2018-04-25 RX ADMIN — Medication 81 MILLIGRAM(S): at 13:01

## 2018-04-25 RX ADMIN — TIOTROPIUM BROMIDE 1 CAPSULE(S): 18 CAPSULE ORAL; RESPIRATORY (INHALATION) at 13:02

## 2018-04-25 RX ADMIN — OXYCODONE AND ACETAMINOPHEN 2 TABLET(S): 5; 325 TABLET ORAL at 16:34

## 2018-04-25 RX ADMIN — Medication 100 MILLIGRAM(S): at 05:55

## 2018-04-25 RX ADMIN — BUDESONIDE AND FORMOTEROL FUMARATE DIHYDRATE 2 PUFF(S): 160; 4.5 AEROSOL RESPIRATORY (INHALATION) at 13:02

## 2018-04-25 RX ADMIN — Medication 1 DROP(S): at 17:02

## 2018-04-25 RX ADMIN — Medication 100 MILLIGRAM(S): at 13:02

## 2018-04-25 RX ADMIN — PANTOPRAZOLE SODIUM 40 MILLIGRAM(S): 20 TABLET, DELAYED RELEASE ORAL at 05:55

## 2018-04-25 RX ADMIN — Medication 1 DROP(S): at 05:56

## 2018-04-25 RX ADMIN — MONTELUKAST 10 MILLIGRAM(S): 4 TABLET, CHEWABLE ORAL at 13:03

## 2018-04-25 RX ADMIN — ISOSORBIDE DINITRATE 20 MILLIGRAM(S): 5 TABLET ORAL at 13:02

## 2018-04-25 RX ADMIN — OXYCODONE AND ACETAMINOPHEN 2 TABLET(S): 5; 325 TABLET ORAL at 06:15

## 2018-04-25 RX ADMIN — OXYCODONE AND ACETAMINOPHEN 2 TABLET(S): 5; 325 TABLET ORAL at 11:35

## 2018-04-25 RX ADMIN — TAMSULOSIN HYDROCHLORIDE 0.4 MILLIGRAM(S): 0.4 CAPSULE ORAL at 13:02

## 2018-04-25 RX ADMIN — AMLODIPINE BESYLATE 10 MILLIGRAM(S): 2.5 TABLET ORAL at 05:57

## 2018-04-25 RX ADMIN — OXYCODONE AND ACETAMINOPHEN 2 TABLET(S): 5; 325 TABLET ORAL at 06:45

## 2018-04-25 RX ADMIN — ISOSORBIDE DINITRATE 20 MILLIGRAM(S): 5 TABLET ORAL at 05:55

## 2018-04-25 RX ADMIN — CARVEDILOL PHOSPHATE 12.5 MILLIGRAM(S): 80 CAPSULE, EXTENDED RELEASE ORAL at 17:03

## 2018-04-25 RX ADMIN — HEPARIN SODIUM 5000 UNIT(S): 5000 INJECTION INTRAVENOUS; SUBCUTANEOUS at 05:54

## 2018-04-25 RX ADMIN — OXYCODONE AND ACETAMINOPHEN 2 TABLET(S): 5; 325 TABLET ORAL at 15:34

## 2018-04-25 RX ADMIN — CARVEDILOL PHOSPHATE 12.5 MILLIGRAM(S): 80 CAPSULE, EXTENDED RELEASE ORAL at 05:56

## 2018-04-25 RX ADMIN — OXYCODONE AND ACETAMINOPHEN 2 TABLET(S): 5; 325 TABLET ORAL at 10:35

## 2018-04-25 NOTE — PROGRESS NOTE ADULT - PROBLEM SELECTOR PROBLEM 1
Respiratory distress, acute
Acute systolic HF (heart failure)
Acute pulmonary edema
Acute systolic HF (heart failure)
Acute systolic HF (heart failure)
Cardiology follow-up encounter
Gangrene of toe
Hypertensive emergency
Respiratory distress, acute
Acute pulmonary edema
Gangrene of toe

## 2018-04-25 NOTE — PROGRESS NOTE ADULT - PROBLEM SELECTOR PLAN 4
currently at baseline, calm and pleasant  Has capacity for medical decision making per psych  Psych input appreciated.  Pt can go home from Psych point of view.

## 2018-04-25 NOTE — PROGRESS NOTE ADULT - PROBLEM SELECTOR PROBLEM 2
Hypertensive emergency
Gangrene of toe
Acute diastolic heart failure
Encephalopathy
Gangrene of toe
Hypertensive emergency
NSVT (nonsustained ventricular tachycardia)
Respiratory distress, acute
Hypertensive emergency
NSVT (nonsustained ventricular tachycardia)
NSVT (nonsustained ventricular tachycardia)

## 2018-04-25 NOTE — PROGRESS NOTE ADULT - NSHPATTENDINGPLANDISCUSS_GEN_ALL_CORE
NP
Vascular Surgery
Mimi Madera, NP
PA, patient
PA, patient
PA, patient, RN
NP
Vascular Surgery
PEGGY Mckinney, patient, psych team
Vascular Surgery
Vascular Surgery- as no longer operative candidate, and originally on medicine service with active medical issues, to be transferred back to Medicine in AM - signout to be given to Tele PA tomorrow AM
NP
Vascular Surgery
Vascular Surgery
Dr. Elizabeth Ornelas
patient, RN, PA

## 2018-04-25 NOTE — PROGRESS NOTE ADULT - PROBLEM SELECTOR PLAN 7
HSQ
No wheezing or SOB  -continue symbicort, spiriva, singulair, duonebs prn   CXR clear and without acute pulmonary disease
HSQ tid for prevention of VTE during hospitalization   ASA 81mg given risk of cardiac disease with uncontrolled hypertension
HSQ
HSQ tid for prevention of VTE during hospitalization   ASA 81mg given risk of cardiac disease with uncontrolled hypertension
HSQ tid for prevention of VTE during hospitalization   ASA 81mg given risk of cardiac disease with uncontrolled hypertension    Spike Rodrigues M.D.  Medicine Attending  [phone] ([place]) [phone] ([facility])
No wheezing or SOB  -continue symbicort, spiriva, singulair, duonebs prn   CXR clear and without acute pulmonary disease
No wheezing or SOB  -continue symbicort, spiriva, singulair, duonebs prn   CXR clear and without acute pulmonary disease
Per outpatient provider; patient remains asymptomatic  - continue protonix 40 daily  - Avoid NSAIDs
Per outpatient provider; patient remains asymptomatic  - continue protonix 40 daily  - Avoid NSAIDs
HSQ tid for prevention of VTE during hospitalization   ASA 81mg given risk of cardiac disease with uncontrolled hypertension  OOB to chair daily, PT reeval

## 2018-04-25 NOTE — PROGRESS NOTE ADULT - SUBJECTIVE AND OBJECTIVE BOX
Patient is a 77y old  Male who presents with a chief complaint of Left toe pain (20 Apr 2018 08:17)      SUBJECTIVE / OVERNIGHT EVENTS: No complaints. Calm and cooperative    MEDICATIONS  (STANDING):  amLODIPine   Tablet 10 milliGRAM(s) Oral daily  artificial tears (preservative free) Ophthalmic Solution 1 Drop(s) Right EYE two times a day  aspirin enteric coated 81 milliGRAM(s) Oral daily  atorvastatin 80 milliGRAM(s) Oral at bedtime  buDESOnide 160 MICROgram(s)/formoterol 4.5 MICROgram(s) Inhaler 2 Puff(s) Inhalation two times a day  carvedilol 12.5 milliGRAM(s) Oral every 12 hours  docusate sodium 100 milliGRAM(s) Oral two times a day  heparin  Injectable 5000 Unit(s) SubCutaneous every 8 hours  hydrALAZINE 100 milliGRAM(s) Oral three times a day  isosorbide   dinitrate Tablet (ISORDIL) 20 milliGRAM(s) Oral three times a day  montelukast 10 milliGRAM(s) Oral daily  pantoprazole    Tablet 40 milliGRAM(s) Oral before breakfast  polyethylene glycol 3350 17 Gram(s) Oral daily  QUEtiapine 25 milliGRAM(s) Oral at bedtime  senna 2 Tablet(s) Oral at bedtime  tamsulosin 0.4 milliGRAM(s) Oral daily  tiotropium 18 MICROgram(s) Capsule 1 Capsule(s) Inhalation daily    MEDICATIONS  (PRN):  ALBUTerol/ipratropium for Nebulization 3 milliLiter(s) Nebulizer every 6 hours PRN Bronchospasm  haloperidol     Tablet 1 milliGRAM(s) Oral four times a day PRN agitation  haloperidol    Injectable 1 milliGRAM(s) IntraMuscular every 6 hours PRN Agitation  haloperidol    Injectable 1 milliGRAM(s) IV Push every 6 hours PRN Agitation  oxyCODONE    5 mG/acetaminophen 325 mG 2 Tablet(s) Oral every 4 hours PRN Severe Pain (7 - 10)      Vital Signs Last 24 Hrs  T(C): 37 (25 Apr 2018 05:51), Max: 37 (25 Apr 2018 05:51)  T(F): 98.6 (25 Apr 2018 05:51), Max: 98.6 (25 Apr 2018 05:51)  HR: 82 (25 Apr 2018 05:51) (57 - 82)  BP: 131/74 (25 Apr 2018 05:51) (123/50 - 146/60)  BP(mean): --  RR: 18 (25 Apr 2018 05:51) (18 - 18)  SpO2: 98% (25 Apr 2018 05:51) (95% - 98%)  CAPILLARY BLOOD GLUCOSE        I&O's Summary    24 Apr 2018 07:01  -  25 Apr 2018 07:00  --------------------------------------------------------  IN: 250 mL / OUT: 940 mL / NET: -690 mL        PHYSICAL EXAM:  GENERAL: NAD, well-developed  HEAD:  Atraumatic, Normocephalic  EYES: EOMI, PERRLA, conjunctiva and sclera clear  NECK: Supple, No JVD  CHEST/LUNG: Clear to auscultation bilaterally; No wheeze  HEART: Regular rate and rhythm; No murmurs, rubs, or gallops  ABDOMEN: Soft, Nontender, Nondistended; Bowel sounds present  EXTREMITIES:  2+ Peripheral Pulses, No clubbing, cyanosis, or edema, (+) dry gangrene at left 2nd toe  PSYCH: AAOx3  NEUROLOGY: non-focal  SKIN: No rashes or lesions    LABS:                        12.3   6.48  )-----------( 197      ( 24 Apr 2018 05:30 )             37.3     04-25    140  |  104  |  32<H>  ----------------------------<  81  4.1   |  23  |  1.72<H>    Ca    8.5      25 Apr 2018 06:00                RADIOLOGY & ADDITIONAL TESTS:    Imaging Personally Reviewed:    Consultant(s) Notes Reviewed:      Care Discussed with Consultants/Other Providers:

## 2018-04-25 NOTE — PROGRESS NOTE ADULT - PROBLEM SELECTOR PROBLEM 7
Preventive measure
Asthma
Preventive measure
Asthma
Asthma
Gastritis
Gastritis
Preventive measure

## 2018-04-25 NOTE — PROGRESS NOTE ADULT - PROVIDER SPECIALTY LIST ADULT
CCU
Cardiology
Hospitalist
Podiatry
Surgery
Vascular Surgery
Surgery
Vascular Surgery
CCU
Hospitalist
CCU
CCU
Hospitalist

## 2018-05-20 ENCOUNTER — INPATIENT (INPATIENT)
Facility: HOSPITAL | Age: 78
LOS: 10 days | Discharge: HOME | DRG: 240 | End: 2018-05-31
Attending: HOSPITALIST | Admitting: HOSPITALIST
Payer: MEDICARE

## 2018-05-20 VITALS
TEMPERATURE: 98 F | RESPIRATION RATE: 18 BRPM | HEIGHT: 68 IN | SYSTOLIC BLOOD PRESSURE: 135 MMHG | OXYGEN SATURATION: 96 % | DIASTOLIC BLOOD PRESSURE: 82 MMHG | HEART RATE: 64 BPM

## 2018-05-20 DIAGNOSIS — R94.39 ABNORMAL RESULT OF OTHER CARDIOVASCULAR FUNCTION STUDY: ICD-10-CM

## 2018-05-20 DIAGNOSIS — Z29.9 ENCOUNTER FOR PROPHYLACTIC MEASURES, UNSPECIFIED: ICD-10-CM

## 2018-05-20 DIAGNOSIS — E11.9 TYPE 2 DIABETES MELLITUS WITHOUT COMPLICATIONS: ICD-10-CM

## 2018-05-20 DIAGNOSIS — I73.9 PERIPHERAL VASCULAR DISEASE, UNSPECIFIED: ICD-10-CM

## 2018-05-20 DIAGNOSIS — I10 ESSENTIAL (PRIMARY) HYPERTENSION: ICD-10-CM

## 2018-05-20 DIAGNOSIS — I96 GANGRENE, NOT ELSEWHERE CLASSIFIED: ICD-10-CM

## 2018-05-20 DIAGNOSIS — K29.70 GASTRITIS, UNSPECIFIED, WITHOUT BLEEDING: ICD-10-CM

## 2018-05-20 DIAGNOSIS — N18.9 CHRONIC KIDNEY DISEASE, UNSPECIFIED: ICD-10-CM

## 2018-05-20 DIAGNOSIS — E78.5 HYPERLIPIDEMIA, UNSPECIFIED: ICD-10-CM

## 2018-05-20 LAB
APPEARANCE UR: CLEAR — SIGNIFICANT CHANGE UP
BILIRUB UR-MCNC: NEGATIVE — SIGNIFICANT CHANGE UP
COLOR SPEC: YELLOW — SIGNIFICANT CHANGE UP
DIFF PNL FLD: NEGATIVE — SIGNIFICANT CHANGE UP
GLUCOSE UR QL: NEGATIVE — SIGNIFICANT CHANGE UP
KETONES UR-MCNC: NEGATIVE — SIGNIFICANT CHANGE UP
LEUKOCYTE ESTERASE UR-ACNC: NEGATIVE — SIGNIFICANT CHANGE UP
NITRITE UR-MCNC: NEGATIVE — SIGNIFICANT CHANGE UP
PH UR: 6 — SIGNIFICANT CHANGE UP (ref 5–8)
PROT UR-MCNC: NEGATIVE — SIGNIFICANT CHANGE UP
SP GR SPEC: 1.01 — SIGNIFICANT CHANGE UP (ref 1.01–1.02)
UROBILINOGEN FLD QL: NEGATIVE — SIGNIFICANT CHANGE UP

## 2018-05-20 PROCEDURE — 71045 X-RAY EXAM CHEST 1 VIEW: CPT | Mod: 26

## 2018-05-20 RX ORDER — DEXTROSE 50 % IN WATER 50 %
15 SYRINGE (ML) INTRAVENOUS ONCE
Qty: 0 | Refills: 0 | Status: DISCONTINUED | OUTPATIENT
Start: 2018-05-20 | End: 2018-05-23

## 2018-05-20 RX ORDER — BUDESONIDE AND FORMOTEROL FUMARATE DIHYDRATE 160; 4.5 UG/1; UG/1
2 AEROSOL RESPIRATORY (INHALATION)
Qty: 0 | Refills: 0 | Status: DISCONTINUED | OUTPATIENT
Start: 2018-05-20 | End: 2018-05-31

## 2018-05-20 RX ORDER — DEXTROSE 50 % IN WATER 50 %
25 SYRINGE (ML) INTRAVENOUS ONCE
Qty: 0 | Refills: 0 | Status: DISCONTINUED | OUTPATIENT
Start: 2018-05-20 | End: 2018-05-23

## 2018-05-20 RX ORDER — OXYCODONE AND ACETAMINOPHEN 5; 325 MG/1; MG/1
1 TABLET ORAL EVERY 6 HOURS
Qty: 0 | Refills: 0 | Status: DISCONTINUED | OUTPATIENT
Start: 2018-05-20 | End: 2018-05-25

## 2018-05-20 RX ORDER — HEPARIN SODIUM 5000 [USP'U]/ML
5000 INJECTION INTRAVENOUS; SUBCUTANEOUS EVERY 8 HOURS
Qty: 0 | Refills: 0 | Status: DISCONTINUED | OUTPATIENT
Start: 2018-05-20 | End: 2018-05-22

## 2018-05-20 RX ORDER — OMEPRAZOLE 10 MG/1
1 CAPSULE, DELAYED RELEASE ORAL
Qty: 0 | Refills: 0 | COMMUNITY

## 2018-05-20 RX ORDER — CARVEDILOL PHOSPHATE 80 MG/1
12.5 CAPSULE, EXTENDED RELEASE ORAL EVERY 12 HOURS
Qty: 0 | Refills: 0 | Status: DISCONTINUED | OUTPATIENT
Start: 2018-05-20 | End: 2018-05-23

## 2018-05-20 RX ORDER — HYDRALAZINE HCL 50 MG
100 TABLET ORAL EVERY 8 HOURS
Qty: 0 | Refills: 0 | Status: DISCONTINUED | OUTPATIENT
Start: 2018-05-20 | End: 2018-05-21

## 2018-05-20 RX ORDER — AMLODIPINE BESYLATE 2.5 MG/1
10 TABLET ORAL DAILY
Qty: 0 | Refills: 0 | Status: DISCONTINUED | OUTPATIENT
Start: 2018-05-20 | End: 2018-05-21

## 2018-05-20 RX ORDER — ASPIRIN/CALCIUM CARB/MAGNESIUM 324 MG
81 TABLET ORAL DAILY
Qty: 0 | Refills: 0 | Status: DISCONTINUED | OUTPATIENT
Start: 2018-05-21 | End: 2018-05-31

## 2018-05-20 RX ORDER — TAMSULOSIN HYDROCHLORIDE 0.4 MG/1
0.4 CAPSULE ORAL DAILY
Qty: 0 | Refills: 0 | Status: DISCONTINUED | OUTPATIENT
Start: 2018-05-21 | End: 2018-05-31

## 2018-05-20 RX ORDER — GLUCAGON INJECTION, SOLUTION 0.5 MG/.1ML
1 INJECTION, SOLUTION SUBCUTANEOUS ONCE
Qty: 0 | Refills: 0 | Status: DISCONTINUED | OUTPATIENT
Start: 2018-05-20 | End: 2018-05-23

## 2018-05-20 RX ORDER — DOCUSATE SODIUM 100 MG
100 CAPSULE ORAL
Qty: 0 | Refills: 0 | Status: DISCONTINUED | OUTPATIENT
Start: 2018-05-20 | End: 2018-05-24

## 2018-05-20 RX ORDER — DEXTROSE 50 % IN WATER 50 %
12.5 SYRINGE (ML) INTRAVENOUS ONCE
Qty: 0 | Refills: 0 | Status: DISCONTINUED | OUTPATIENT
Start: 2018-05-20 | End: 2018-05-23

## 2018-05-20 RX ORDER — QUETIAPINE FUMARATE 200 MG/1
25 TABLET, FILM COATED ORAL AT BEDTIME
Qty: 0 | Refills: 0 | Status: DISCONTINUED | OUTPATIENT
Start: 2018-05-20 | End: 2018-05-31

## 2018-05-20 RX ORDER — ATORVASTATIN CALCIUM 80 MG/1
80 TABLET, FILM COATED ORAL AT BEDTIME
Qty: 0 | Refills: 0 | Status: DISCONTINUED | OUTPATIENT
Start: 2018-05-20 | End: 2018-05-31

## 2018-05-20 RX ORDER — ESOMEPRAZOLE MAGNESIUM 40 MG/1
1 CAPSULE, DELAYED RELEASE ORAL
Qty: 0 | Refills: 0 | COMMUNITY

## 2018-05-20 RX ORDER — TIOTROPIUM BROMIDE 18 UG/1
1 CAPSULE ORAL; RESPIRATORY (INHALATION) DAILY
Qty: 0 | Refills: 0 | Status: DISCONTINUED | OUTPATIENT
Start: 2018-05-20 | End: 2018-05-31

## 2018-05-20 RX ORDER — POLYETHYLENE GLYCOL 3350 17 G/17G
17 POWDER, FOR SOLUTION ORAL DAILY
Qty: 0 | Refills: 0 | Status: DISCONTINUED | OUTPATIENT
Start: 2018-05-20 | End: 2018-05-31

## 2018-05-20 RX ORDER — ALBUTEROL 90 UG/1
1 AEROSOL, METERED ORAL EVERY 6 HOURS
Qty: 0 | Refills: 0 | Status: DISCONTINUED | OUTPATIENT
Start: 2018-05-20 | End: 2018-05-31

## 2018-05-20 RX ORDER — SODIUM CHLORIDE 9 MG/ML
1000 INJECTION, SOLUTION INTRAVENOUS
Qty: 0 | Refills: 0 | Status: DISCONTINUED | OUTPATIENT
Start: 2018-05-20 | End: 2018-05-24

## 2018-05-20 RX ORDER — ISOSORBIDE DINITRATE 5 MG/1
20 TABLET ORAL THREE TIMES A DAY
Qty: 0 | Refills: 0 | Status: DISCONTINUED | OUTPATIENT
Start: 2018-05-21 | End: 2018-05-21

## 2018-05-20 RX ORDER — PANTOPRAZOLE SODIUM 20 MG/1
40 TABLET, DELAYED RELEASE ORAL
Qty: 0 | Refills: 0 | Status: DISCONTINUED | OUTPATIENT
Start: 2018-05-20 | End: 2018-05-31

## 2018-05-20 RX ORDER — POTASSIUM CHLORIDE 20 MEQ
1 PACKET (EA) ORAL
Qty: 0 | Refills: 0 | COMMUNITY

## 2018-05-20 RX ORDER — MONTELUKAST 4 MG/1
10 TABLET, CHEWABLE ORAL DAILY
Qty: 0 | Refills: 0 | Status: DISCONTINUED | OUTPATIENT
Start: 2018-05-20 | End: 2018-05-31

## 2018-05-20 RX ORDER — SENNA PLUS 8.6 MG/1
2 TABLET ORAL AT BEDTIME
Qty: 0 | Refills: 0 | Status: DISCONTINUED | OUTPATIENT
Start: 2018-05-20 | End: 2018-05-31

## 2018-05-20 RX ADMIN — QUETIAPINE FUMARATE 25 MILLIGRAM(S): 200 TABLET, FILM COATED ORAL at 21:56

## 2018-05-20 RX ADMIN — Medication 100 MILLIGRAM(S): at 21:56

## 2018-05-20 RX ADMIN — HEPARIN SODIUM 5000 UNIT(S): 5000 INJECTION INTRAVENOUS; SUBCUTANEOUS at 21:57

## 2018-05-20 RX ADMIN — BUDESONIDE AND FORMOTEROL FUMARATE DIHYDRATE 2 PUFF(S): 160; 4.5 AEROSOL RESPIRATORY (INHALATION) at 22:01

## 2018-05-20 RX ADMIN — ATORVASTATIN CALCIUM 80 MILLIGRAM(S): 80 TABLET, FILM COATED ORAL at 21:57

## 2018-05-20 RX ADMIN — OXYCODONE AND ACETAMINOPHEN 1 TABLET(S): 5; 325 TABLET ORAL at 23:00

## 2018-05-20 RX ADMIN — OXYCODONE AND ACETAMINOPHEN 1 TABLET(S): 5; 325 TABLET ORAL at 22:09

## 2018-05-20 NOTE — PATIENT PROFILE ADULT. - ALCOHOL USE HISTORY SINGLE SELECT
How Severe Is Your Skin Lesion?: moderate
Have Your Skin Lesions Been Treated?: not been treated
Is This A New Presentation, Or A Follow-Up?: Skin Lesions
never

## 2018-05-20 NOTE — H&P ADULT - PMH
Asthma    CKD (chronic kidney disease)    DM (diabetes mellitus)    Dry gangrene    Gastritis    HLD (hyperlipidemia)    HTN (hypertension)    Peripheral arterial disease    Prostate cancer

## 2018-05-20 NOTE — H&P ADULT - PROBLEM SELECTOR PLAN 1
Admit to telemetry  Continue with ASA, Carvedilol, Lipitor.  Patient accepted by Dr. Carlos Kirby of cardiology, primary team to consult in AM. Admit to telemetry  Continue with ASA, Carvedilol, Lipitor.  Patient accepted by Dr. Carlos Kirby of cardiology- consult pending.

## 2018-05-20 NOTE — PATIENT PROFILE ADULT. - NS PRO ABUSE SCREEN AFRAID ANYONE YN
no Localized Dermabrasion Text: The patient was draped in routine manner.  Localized dermabrasion using 3 x 17 mm wire brush was performed in routine manner to papillary dermis. This spot dermabrasion is being performed to complete skin cancer reconstruction. It also will eliminate the other sun damaged precancerous cells that are known to be part of the regional effect of a lifetime's worth of sun exposure. This localized dermabrasion is therapeutic and should not be considered cosmetic in any regard. Localized Dermabrasion With Wire Brush Text: The patient was draped in routine manner.  Localized dermabrasion using 3 x 17 mm wire brush was performed in routine manner to papillary dermis. This spot dermabrasion is being performed to complete skin cancer reconstruction. It also will eliminate the other sun damaged precancerous cells that are known to be part of the regional effect of a lifetime's worth of sun exposure. This localized dermabrasion is therapeutic and should not be considered cosmetic in any regard.

## 2018-05-20 NOTE — H&P ADULT - HISTORY OF PRESENT ILLNESS
History taken from chart and patient, as patient is a poor historian.    The patient is a 77 yo M PMH of DM2, HTN, HLD, PAD, CKD, BPH, Prostate cancer, psychosis, GERD, COPD, previously homeless who presents as a transfer for L foot dry gangrene. The patient states that 2 months ago, he was walking around and cut his foot on some glass and the wound never healed. The patient denies any CP, SOB, NVD, F, chills, rash, HA, dysuria. While at the outside hospital, the patient was evaluated by Vascular Surgery, who performed a CTA of the lower extremities and was found to have bilateral superficial femoral artery occlusions. The plan was for the patient to have a femoral popliteal bypass, however prior to that, vascular surgery requested Cards clearance. The patient was noted to have t wave inversions in V4-V6. The patient underwent a a stress test which was abnormal with a partially reversible defect involving the anterior, mid anterior and apical anterior segments consistent with probable ischemia of the LAD and possible infarct of the RCA. Global systolic function is moderately reduced with an EF of 36%. Stress test shows 2 mm downsloping ST depressions in V2-V6. X ray showed no evidence of osteomyelitis. Patient currently complaining of mild pain in his L infected toe. The patient was intially treated with broad spectrum ABX, however ID was consulted and the patient was monitored off IV ABX i nthe setting of dry gangrene and did not have any fevers. Blood cultures did not grow any organisms. History taken from chart and patient, as patient is a poor historian.    The patient is a 77 yo M PMH of DM2, HTN, HLD, PAD, CKD, BPH, Prostate cancer, psychosis, GERD, COPD, previously homeless who presents as a transfer for L foot dry gangrene. The patient states that 2 months ago, he was walking around and cut his foot on some glass and the wound never healed. The patient denies any CP, SOB, NVD, F, chills, rash, HA, dysuria. While at the outside hospital, the patient was evaluated by Vascular Surgery, who performed a CTA of the lower extremities and was found to have bilateral superficial femoral artery occlusions. The plan was for the patient to have a femoral popliteal bypass, however prior to that, vascular surgery requested Cards clearance. The patient was noted to have t wave inversions in V4-V6. The patient underwent a a stress test which was abnormal with a partially reversible defect involving the anterior, mid anterior and apical anterior segments consistent with probable ischemia of the LAD and possible infarct of the RCA. Global systolic function is moderately reduced with an EF of 36%. Stress test shows 2 mm downsloping ST depressions in V2-V6. X ray showed no evidence of osteomyelitis. Patient currently complaining of mild pain in his L infected toe. The patient was intially treated with broad spectrum ABX, however ID was consulted and the patient was monitored off IV ABX i nthe setting of dry gangrene and did not have any fevers. Blood cultures did not grow any organisms.    PAtient was recently at [facility] for SOB in the setting of pulmonary edema and had a LHC:  Mid RCA: The distal vessel was supplied by collaterals from  septal branches of LAD. There was a 100 % stenosis.     Left leg angiography showed  CFA:occluded  Profunda: patent  SFA: occluded  AK pop: occluded  BK pop: patent  TP trunk: patent  PT: patent to the foot  Peroneal: patent to the foot  AT: occluded  DP: occluded Kfdob-ycxx-lakwuqyfu angiography. A catheter was positioned.  WENCESLAO: patent bl  Ext IA: patent bl

## 2018-05-20 NOTE — H&P ADULT - ASSESSMENT
75 yo M PMH of DM2, HTN, HLD, PAD, CKD, BPH, Prostate cancer, psychosis, GERD, COPD, previously homeless who presents as a transfer for L foot dry gangrene found to have an abnormal stress test, transferred for possible heart cath.

## 2018-05-20 NOTE — H&P ADULT - NSHPPHYSICALEXAM_GEN_ALL_CORE
Vital Signs Last 24 Hrs  T(C): 36.5 (20 May 2018 20:04), Max: 36.5 (20 May 2018 15:08)  T(F): 97.7 (20 May 2018 20:04), Max: 97.7 (20 May 2018 15:08)  HR: 63 (20 May 2018 20:04) (63 - 64)  BP: 169/80 (20 May 2018 20:04) (135/82 - 169/80)  BP(mean): --  RR: 18 (20 May 2018 20:04) (18 - 18)  SpO2: 97% (20 May 2018 20:04) (96% - 97%)    PHYSICAL EXAM:    GENERAL: Comfortable, no acute distress   HEAD:  Normocephalic, atraumatic  EYES: EOMI, PERRLA  HEENT: Moist mucous membranes  NECK: Supple, No JVD  NERVOUS SYSTEM:  CN 2-12 intact b/l. Alert & Oriented X3, Motor Strength 5/5 B/L upper and lower extremities. Sensation intact B/L  CHEST/LUNG: Clear to auscultation bilaterally  HEART: Regular rate and rhythm, no murmur   ABDOMEN: Soft, Nontender, Nondistended, Bowel sounds present  EXTREMITIES:   No clubbing, cyanosis, or edema  MUSCULOSKELETAL: No muscle tenderness, no joint tenderness  SKIN: warm and dry, no rash. Skin over the L foot 2nd toe with dry gangrene

## 2018-05-20 NOTE — H&P ADULT - NSHPREVIEWOFSYSTEMS_GEN_ALL_CORE
REVIEW OF SYSTEMS    CONSTITUTIONAL:  Denies f nvd chills  HEENT:  Eyes:  Denies visual loss, blurred vision, double vision or scleral icterus. Ears, Nose, Throat:  Denies hearing loss, sneezing, congestion, runny nose or sore throat.  SKIN:  Denies rash or itching.  CARDIOVASCULAR:  Denies chest pain, SHAY  RESPIRATORY:  Denies shortness of breath, cough or sputum.  GASTROINTESTINAL:  Denies anorexia, nausea, vomiting or diarrhea. No abdominal pain or blood.  GENITOURINARY:  Denies any hematuria, dysuria  NEUROLOGICAL:  Denies headache, dizziness, syncope, paralysis, ataxia, numbness or tingling in the extremities. No change in bowel or bladder control.  MUSCULOSKELETAL:  see hpi  HEMATOLOGIC:  Denies anemia, bleeding or bruising.  LYMPHATICS:  Denies enlarged nodes.   PSYCHIATRIC:  Denies history of depression or anxiety.  ENDOCRINOLOGIC:  Denies reports of sweating, cold or heat intolerance. No polyuria or polydipsia.  ALLERGIES:  Denies history of asthma, hives, eczema or rhinitis.

## 2018-05-20 NOTE — H&P ADULT - ATTENDING COMMENTS
NIGHT HOSPITALIST:  Patient UNKNOWN to me previously, assigned to me at this point and by the CARDIOLOGY service to accept transfer for this 78 y/o M--transferred from [facility] in [place], [place]--patient with a history of type 2 DM, essential HTN, severe PAD, COPD, reported prostate CA presumed to be in remission, COPD, past unspecified psychosis, CAD with an admission to [facility] last month with patient transferred to [place] upon request by the cardiology division in this patient with >2 months of LEFT 2nd toe gangrene.  Patient reportedly stepped on some glass previously--patient was previously without domicile but recently is a SNF resident in [place].  Patient has no family in attendance or could be reached.  Patient at [facility] with vascular assessment with B/L superficial femoral artery occlusions with reported planned bypass but was seen by Dr. ELLIS Mcdaniel at [facility] with a recommended chemical EST with multiple areas of ischemia.  Patient was previously on antibiotics but was discontinued following ID evaluation at [facility].  Patient presently denies foot pain.  NO chest pain/pressure.  No dyspnea.  NO HA, no focal weakness.  No abdominal pain, no red blood per rectum or melena.  NO back pain, no tearing back pain.  NO dysuria.  Remaining review of systems not contributory.  Former tobacco, quit when he became a SNF resident.  Unable to obtain family history. NIGHT HOSPITALIST:  Patient UNKNOWN to me previously, assigned to me at this point and by the CARDIOLOGY service to accept transfer for this 78 y/o M--transferred from [facility] in [place], [place]--patient with a history of type 2 DM, essential HTN, severe PAD, COPD, reported prostate CA presumed to be in remission, COPD, past unspecified psychosis, CAD with an admission to [facility] last month with patient transferred to [place] upon request by the cardiology division in this patient with >2 months of LEFT 2nd toe gangrene.  Patient reportedly stepped on some glass previously--patient was previously without domicile but recently is a SNF resident in [place].  Patient has no family in attendance or could be reached.  Patient at [facility] with vascular assessment with B/L superficial femoral artery occlusions with reported planned bypass but was seen by Dr. ELLIS Mcdaniel at [facility] with a recommended chemical EST with multiple areas of ischemia.  Patient was previously on antibiotics but was discontinued following ID evaluation at [facility].  Patient presently denies foot pain.  NO chest pain/pressure.  No dyspnea.  NO HA, no focal weakness.  No abdominal pain, no red blood per rectum or melena.  NO back pain, no tearing back pain.  NO dysuria.  Remaining review of systems not contributory.  Former tobacco, quit when he became a SNF resident.  Unable to obtain family history.  Physical exam with an elderly, emaciated, chronically ill appearing M.  Afebrile.  HR  66  RR 14.  BP  168/62  99% on O2.  HEENT< PERRL< EOMI, bitemporal wasting,  neck supple, chest with bony rib cage, poor inspiratory effort but grossly clear.  Cor s1 s2, abdomen scaphoid, soft nontender, no rebound.  Ext with LEFT foot 2nd toe with DRY GANGRENE.  Poor nail hygiene with diffuse severe sarcopenia.  Patient was hiding his change and 's ID in a sock in his RIGHT foot>>no ulcers RIGHT foot>  patient agrees to have security with the floor RN to secure his belongings and not in the sock.  Labs reordered.  5/18/18 WBC from [facility] with 3.5.  Hgb 12.7.  Platelets of 182K.  Cr 1.29.  Random glucose of 80.  EKG tracing reviewed with sinus irwin with T inversions I, L, V3- V6. NIGHT HOSPITALIST:  Patient UNKNOWN to me previously, assigned to me at this point and by the CARDIOLOGY service to accept transfer for this 76 y/o M--transferred from [facility] in [place], [place]--patient with a history of type 2 DM, essential HTN, severe PAD, COPD, reported prostate CA presumed to be in remission, COPD, past unspecified psychosis, CAD with an admission to [facility] last month with patient transferred to [place] upon request by the cardiology division in this patient with >2 months of LEFT 2nd toe gangrene.  Patient reportedly stepped on some glass previously--patient was previously without domicile but recently is a SNF resident in [place].  Patient has no family in attendance or could be reached.  Patient at [facility] with vascular assessment with B/L superficial femoral artery occlusions with reported planned bypass but was seen by Dr. ELLIS Mcdaniel at [facility] with a recommended chemical EST with multiple areas of ischemia.  Patient was previously on antibiotics but was discontinued following ID evaluation at [facility].  Patient presently denies foot pain.  NO chest pain/pressure.  No dyspnea.  NO HA, no focal weakness.  No abdominal pain, no red blood per rectum or melena.  NO back pain, no tearing back pain.  NO dysuria.  Remaining review of systems not contributory.  Former tobacco, quit when he became a SNF resident.  Unable to obtain family history.  Physical exam with an elderly, emaciated, chronically ill appearing M.  Afebrile.  HR  66  RR 14.  BP  168/62  99% on O2.  HEENT< PERRL< EOMI, bitemporal wasting,  neck supple, chest with bony rib cage, poor inspiratory effort but grossly clear.  Cor s1 s2, abdomen scaphoid, soft nontender, no rebound.  Ext with LEFT foot 2nd toe with DRY GANGRENE.  Poor nail hygiene with diffuse severe sarcopenia.  Patient was hiding his change and 's ID in a sock in his RIGHT foot>>no ulcers RIGHT foot>  patient agrees to have security with the floor RN to secure his belongings and not in the sock.  Labs reordered.  5/18/18 WBC from [facility] with 3.5.  Hgb 12.7.  Platelets of 182K.  Cr 1.29.  Random glucose of 80.  EKG tracing reviewed with sinus irwin with T inversions I, L, V3- V6.  Chest radiograph reviewed with rotated portable study with hyperinflated lung fields and no gross infiltrate or effusion.  Seen by cardiology as above.  Would have vascular and podiatry, ID formally see patient.  Unclear if the plans for surgical intervention are still in play in the setting of patient with dry gangrene of the LEFT 2nd toe and multiple cardiac risk factors as above.  Prognosis is poor.   Emotional support provided to patient.  Care reviewed with DR. Blankenship.  Care assumed by the DAY HOSPITALIST.    Moreno Bonilla MD  [phone]

## 2018-05-20 NOTE — H&P ADULT - PROBLEM SELECTOR PLAN 2
Afebrile. STAT CBC to eval for leukocytosis. No evidence of OM on xray. BCx negative. Hold off on Abx. Dr. Carlos Kirby to evaluate possibility of vascular intervention. Afebrile. STAT CBC to eval for leukocytosis. No evidence of OM on xray. BCx negative. Hold off on Abx. Dr. Carlos Kirby to evaluate possibility of vascular intervention.  ID consult in AM Afebrile. STAT CBC to eval for leukocytosis. No evidence of OM on xray. BCx negative. Hold off on Abx. Dr. Carlos Kirby to evaluate possibility of vascular intervention.  ID consult in AM  Podiatry c/s in AM

## 2018-05-21 DIAGNOSIS — J44.9 CHRONIC OBSTRUCTIVE PULMONARY DISEASE, UNSPECIFIED: ICD-10-CM

## 2018-05-21 DIAGNOSIS — N18.3 CHRONIC KIDNEY DISEASE, STAGE 3 (MODERATE): ICD-10-CM

## 2018-05-21 DIAGNOSIS — I73.9 PERIPHERAL VASCULAR DISEASE, UNSPECIFIED: ICD-10-CM

## 2018-05-21 LAB
ALBUMIN SERPL ELPH-MCNC: 3.5 G/DL — SIGNIFICANT CHANGE UP (ref 3.3–5)
ALP SERPL-CCNC: 172 U/L — HIGH (ref 40–120)
ALT FLD-CCNC: 125 U/L — HIGH (ref 10–45)
ANION GAP SERPL CALC-SCNC: 11 MMOL/L — SIGNIFICANT CHANGE UP (ref 5–17)
AST SERPL-CCNC: 61 U/L — HIGH (ref 10–40)
BILIRUB SERPL-MCNC: 0.5 MG/DL — SIGNIFICANT CHANGE UP (ref 0.2–1.2)
BUN SERPL-MCNC: 17 MG/DL — SIGNIFICANT CHANGE UP (ref 7–23)
CALCIUM SERPL-MCNC: 9 MG/DL — SIGNIFICANT CHANGE UP (ref 8.4–10.5)
CHLORIDE SERPL-SCNC: 105 MMOL/L — SIGNIFICANT CHANGE UP (ref 96–108)
CK MB BLD-MCNC: 4.4 % — HIGH (ref 0–3.5)
CK MB CFR SERPL CALC: 3 NG/ML — SIGNIFICANT CHANGE UP (ref 0–6.7)
CK SERPL-CCNC: 68 U/L — SIGNIFICANT CHANGE UP (ref 30–200)
CO2 SERPL-SCNC: 25 MMOL/L — SIGNIFICANT CHANGE UP (ref 22–31)
CREAT ?TM UR-MCNC: 71 MG/DL — SIGNIFICANT CHANGE UP
CREAT SERPL-MCNC: 1.47 MG/DL — HIGH (ref 0.5–1.3)
GLUCOSE BLDC GLUCOMTR-MCNC: 125 MG/DL — HIGH (ref 70–99)
GLUCOSE BLDC GLUCOMTR-MCNC: 134 MG/DL — HIGH (ref 70–99)
GLUCOSE BLDC GLUCOMTR-MCNC: 76 MG/DL — SIGNIFICANT CHANGE UP (ref 70–99)
GLUCOSE BLDC GLUCOMTR-MCNC: 87 MG/DL — SIGNIFICANT CHANGE UP (ref 70–99)
GLUCOSE BLDC GLUCOMTR-MCNC: 95 MG/DL — SIGNIFICANT CHANGE UP (ref 70–99)
GLUCOSE SERPL-MCNC: 132 MG/DL — HIGH (ref 70–99)
HCT VFR BLD CALC: 39.8 % — SIGNIFICANT CHANGE UP (ref 39–50)
HGB BLD-MCNC: 13 G/DL — SIGNIFICANT CHANGE UP (ref 13–17)
MAGNESIUM SERPL-MCNC: 2.1 MG/DL — SIGNIFICANT CHANGE UP (ref 1.6–2.6)
MCHC RBC-ENTMCNC: 30.3 PG — SIGNIFICANT CHANGE UP (ref 27–34)
MCHC RBC-ENTMCNC: 32.7 GM/DL — SIGNIFICANT CHANGE UP (ref 32–36)
MCV RBC AUTO: 92.7 FL — SIGNIFICANT CHANGE UP (ref 80–100)
PHOSPHATE SERPL-MCNC: 3.2 MG/DL — SIGNIFICANT CHANGE UP (ref 2.5–4.5)
PLATELET # BLD AUTO: 174 K/UL — SIGNIFICANT CHANGE UP (ref 150–400)
POTASSIUM SERPL-MCNC: 3.2 MMOL/L — LOW (ref 3.5–5.3)
POTASSIUM SERPL-SCNC: 3.2 MMOL/L — LOW (ref 3.5–5.3)
PROT ?TM UR-MCNC: 8 MG/DL — SIGNIFICANT CHANGE UP (ref 0–12)
PROT SERPL-MCNC: 6.3 G/DL — SIGNIFICANT CHANGE UP (ref 6–8.3)
PROT/CREAT UR-RTO: 0.1 RATIO — SIGNIFICANT CHANGE UP (ref 0–0.2)
RBC # BLD: 4.29 M/UL — SIGNIFICANT CHANGE UP (ref 4.2–5.8)
RBC # FLD: 12.9 % — SIGNIFICANT CHANGE UP (ref 10.3–14.5)
SODIUM SERPL-SCNC: 141 MMOL/L — SIGNIFICANT CHANGE UP (ref 135–145)
TROPONIN T SERPL-MCNC: <0.01 NG/ML — SIGNIFICANT CHANGE UP (ref 0–0.06)
WBC # BLD: 2.9 K/UL — LOW (ref 3.8–10.5)
WBC # FLD AUTO: 2.9 K/UL — LOW (ref 3.8–10.5)

## 2018-05-21 PROCEDURE — 99223 1ST HOSP IP/OBS HIGH 75: CPT | Mod: GC

## 2018-05-21 PROCEDURE — 99223 1ST HOSP IP/OBS HIGH 75: CPT

## 2018-05-21 PROCEDURE — 93923 UPR/LXTR ART STDY 3+ LVLS: CPT | Mod: 26

## 2018-05-21 PROCEDURE — 76775 US EXAM ABDO BACK WALL LIM: CPT | Mod: 26

## 2018-05-21 PROCEDURE — 12345: CPT | Mod: NC

## 2018-05-21 PROCEDURE — 93925 LOWER EXTREMITY STUDY: CPT | Mod: 26

## 2018-05-21 RX ORDER — SODIUM CHLORIDE 9 MG/ML
1000 INJECTION INTRAMUSCULAR; INTRAVENOUS; SUBCUTANEOUS
Qty: 0 | Refills: 0 | Status: DISCONTINUED | OUTPATIENT
Start: 2018-05-22 | End: 2018-05-24

## 2018-05-21 RX ORDER — POTASSIUM CHLORIDE 20 MEQ
40 PACKET (EA) ORAL EVERY 4 HOURS
Qty: 0 | Refills: 0 | Status: COMPLETED | OUTPATIENT
Start: 2018-05-21 | End: 2018-05-22

## 2018-05-21 RX ORDER — ACETYLCYSTEINE 200 MG/ML
1200 VIAL (ML) MISCELLANEOUS EVERY 12 HOURS
Qty: 0 | Refills: 0 | Status: COMPLETED | OUTPATIENT
Start: 2018-05-21 | End: 2018-05-23

## 2018-05-21 RX ORDER — HYDRALAZINE HCL 50 MG
100 TABLET ORAL EVERY 8 HOURS
Qty: 0 | Refills: 0 | Status: DISCONTINUED | OUTPATIENT
Start: 2018-05-21 | End: 2018-05-31

## 2018-05-21 RX ORDER — AMLODIPINE BESYLATE 2.5 MG/1
10 TABLET ORAL DAILY
Qty: 0 | Refills: 0 | Status: DISCONTINUED | OUTPATIENT
Start: 2018-05-21 | End: 2018-05-31

## 2018-05-21 RX ORDER — ISOSORBIDE DINITRATE 5 MG/1
20 TABLET ORAL THREE TIMES A DAY
Qty: 0 | Refills: 0 | Status: DISCONTINUED | OUTPATIENT
Start: 2018-05-21 | End: 2018-05-23

## 2018-05-21 RX ADMIN — HEPARIN SODIUM 5000 UNIT(S): 5000 INJECTION INTRAVENOUS; SUBCUTANEOUS at 05:09

## 2018-05-21 RX ADMIN — QUETIAPINE FUMARATE 25 MILLIGRAM(S): 200 TABLET, FILM COATED ORAL at 22:22

## 2018-05-21 RX ADMIN — HEPARIN SODIUM 5000 UNIT(S): 5000 INJECTION INTRAVENOUS; SUBCUTANEOUS at 13:41

## 2018-05-21 RX ADMIN — ATORVASTATIN CALCIUM 80 MILLIGRAM(S): 80 TABLET, FILM COATED ORAL at 22:22

## 2018-05-21 RX ADMIN — BUDESONIDE AND FORMOTEROL FUMARATE DIHYDRATE 2 PUFF(S): 160; 4.5 AEROSOL RESPIRATORY (INHALATION) at 17:30

## 2018-05-21 RX ADMIN — AMLODIPINE BESYLATE 10 MILLIGRAM(S): 2.5 TABLET ORAL at 05:08

## 2018-05-21 RX ADMIN — HEPARIN SODIUM 5000 UNIT(S): 5000 INJECTION INTRAVENOUS; SUBCUTANEOUS at 22:21

## 2018-05-21 RX ADMIN — Medication 81 MILLIGRAM(S): at 12:36

## 2018-05-21 RX ADMIN — OXYCODONE AND ACETAMINOPHEN 1 TABLET(S): 5; 325 TABLET ORAL at 21:43

## 2018-05-21 RX ADMIN — Medication 100 MILLIGRAM(S): at 05:09

## 2018-05-21 RX ADMIN — Medication 100 MILLIGRAM(S): at 22:21

## 2018-05-21 RX ADMIN — OXYCODONE AND ACETAMINOPHEN 1 TABLET(S): 5; 325 TABLET ORAL at 06:30

## 2018-05-21 RX ADMIN — OXYCODONE AND ACETAMINOPHEN 1 TABLET(S): 5; 325 TABLET ORAL at 14:35

## 2018-05-21 RX ADMIN — Medication 100 MILLIGRAM(S): at 13:41

## 2018-05-21 RX ADMIN — OXYCODONE AND ACETAMINOPHEN 1 TABLET(S): 5; 325 TABLET ORAL at 20:42

## 2018-05-21 RX ADMIN — OXYCODONE AND ACETAMINOPHEN 1 TABLET(S): 5; 325 TABLET ORAL at 05:09

## 2018-05-21 RX ADMIN — OXYCODONE AND ACETAMINOPHEN 1 TABLET(S): 5; 325 TABLET ORAL at 14:02

## 2018-05-21 RX ADMIN — TIOTROPIUM BROMIDE 1 CAPSULE(S): 18 CAPSULE ORAL; RESPIRATORY (INHALATION) at 12:35

## 2018-05-21 RX ADMIN — CARVEDILOL PHOSPHATE 12.5 MILLIGRAM(S): 80 CAPSULE, EXTENDED RELEASE ORAL at 17:30

## 2018-05-21 RX ADMIN — MONTELUKAST 10 MILLIGRAM(S): 4 TABLET, CHEWABLE ORAL at 12:37

## 2018-05-21 RX ADMIN — Medication 40 MILLIEQUIVALENT(S): at 20:42

## 2018-05-21 RX ADMIN — ISOSORBIDE DINITRATE 20 MILLIGRAM(S): 5 TABLET ORAL at 22:22

## 2018-05-21 RX ADMIN — BUDESONIDE AND FORMOTEROL FUMARATE DIHYDRATE 2 PUFF(S): 160; 4.5 AEROSOL RESPIRATORY (INHALATION) at 05:09

## 2018-05-21 RX ADMIN — ISOSORBIDE DINITRATE 20 MILLIGRAM(S): 5 TABLET ORAL at 13:41

## 2018-05-21 RX ADMIN — PANTOPRAZOLE SODIUM 40 MILLIGRAM(S): 20 TABLET, DELAYED RELEASE ORAL at 05:09

## 2018-05-21 RX ADMIN — SENNA PLUS 2 TABLET(S): 8.6 TABLET ORAL at 22:22

## 2018-05-21 RX ADMIN — Medication 1200 MILLIGRAM(S): at 17:31

## 2018-05-21 RX ADMIN — TAMSULOSIN HYDROCHLORIDE 0.4 MILLIGRAM(S): 0.4 CAPSULE ORAL at 12:36

## 2018-05-21 RX ADMIN — ISOSORBIDE DINITRATE 20 MILLIGRAM(S): 5 TABLET ORAL at 06:07

## 2018-05-21 RX ADMIN — POLYETHYLENE GLYCOL 3350 17 GRAM(S): 17 POWDER, FOR SOLUTION ORAL at 12:37

## 2018-05-21 NOTE — CONSULT NOTE ADULT - SUBJECTIVE AND OBJECTIVE BOX
PAGER:  [phone]               [facility] [address]              EMAIL [email or web address]   OFFICE [phone]                              ********VASCULAR MEDICINE CONSULT NOTE********                        CC:  Left 1st digit nonhealing ulcer    INTERVAL HISTORY:  Patient transferred from OSH for vascular medicine evaluation for endovascular strategies for treatment of critical limb ischemia.  Reports a long history of left 2nd digit toe ulceration and gangrene that is not healing.  He denies any chest pain or shortness of breath.  He was initially recomended a vascular bypass and a stress test was performed with LAD and RCA ischemia.  no prior coronary or peripheral stents per history.  EF was 36%.        HISTORY OF PRESENT ILLNESS:  HPI:  History taken from chart and patient, as patient is a poor historian.    The patient is a 77 yo M PMH of DM2, HTN, HLD, PAD, CKD, BPH, Prostate cancer, psychosis, GERD, COPD, previously homeless who presents as a transfer for L foot dry gangrene. The patient states that 2 months ago, he was walking around and cut his foot on some glass and the wound never healed. The patient denies any CP, SOB, NVD, F, chills, rash, HA, dysuria. While at the outside hospital, the patient was evaluated by Vascular Surgery, who performed a CTA of the lower extremities and was found to have bilateral superficial femoral artery occlusions. The plan was for the patient to have a femoral popliteal bypass, however prior to that, vascular surgery requested Cards clearance. The patient was noted to have t wave inversions in V4-V6. The patient underwent a a stress test which was abnormal with a partially reversible defect involving the anterior, mid anterior and apical anterior segments consistent with probable ischemia of the LAD and possible infarct of the RCA. Global systolic function is moderately reduced with an EF of 36%. Stress test shows 2 mm downsloping ST depressions in V2-V6. X ray showed no evidence of osteomyelitis. Patient currently complaining of mild pain in his L infected toe. The patient was intially treated with broad spectrum ABX, however ID was consulted and the patient was monitored off IV ABX i nthe setting of dry gangrene and did not have any fevers. Blood cultures did not grow any organisms.    PAtient was recently at [facility] for SOB in the setting of pulmonary edema and had a LHC:  Mid RCA: The distal vessel was supplied by collaterals from  septal branches of LAD. There was a 100 % stenosis.     Left leg angiography showed  CFA:occluded  Profunda: patent  SFA: occluded  AK pop: occluded  BK pop: patent  TP trunk: patent  PT: patent to the foot  Peroneal: patent to the foot  AT: occluded  DP: occluded Acnxa-cmul-zpmszmsin angiography. A catheter was positioned.  WENCESLAO: patent bl  Ext IA: patent bl (20 May 2018 20:23)          Allergies    No Known Allergies    Intolerances    	    MEDICATIONS:  amLODIPine   Tablet 10 milliGRAM(s) Oral daily  aspirin enteric coated 81 milliGRAM(s) Oral daily  carvedilol 12.5 milliGRAM(s) Oral every 12 hours  heparin  Injectable 5000 Unit(s) SubCutaneous every 8 hours  hydrALAZINE 100 milliGRAM(s) Oral every 8 hours  isosorbide   dinitrate Tablet (ISORDIL) 20 milliGRAM(s) Oral three times a day  tamsulosin 0.4 milliGRAM(s) Oral daily      ALBUTerol    90 MICROgram(s) HFA Inhaler 1 Puff(s) Inhalation every 6 hours PRN  buDESOnide 160 MICROgram(s)/formoterol 4.5 MICROgram(s) Inhaler 2 Puff(s) Inhalation two times a day  montelukast 10 milliGRAM(s) Oral daily  tiotropium 18 MICROgram(s) Capsule 1 Capsule(s) Inhalation daily    oxyCODONE    5 mG/acetaminophen 325 mG 1 Tablet(s) Oral every 6 hours PRN  QUEtiapine 25 milliGRAM(s) Oral at bedtime    docusate sodium 100 milliGRAM(s) Oral two times a day  pantoprazole    Tablet 40 milliGRAM(s) Oral before breakfast  polyethylene glycol 3350 17 Gram(s) Oral daily  senna 2 Tablet(s) Oral at bedtime    atorvastatin 80 milliGRAM(s) Oral at bedtime  dextrose 40% Gel 15 Gram(s) Oral once PRN  dextrose 50% Injectable 12.5 Gram(s) IV Push once  dextrose 50% Injectable 25 Gram(s) IV Push once  dextrose 50% Injectable 25 Gram(s) IV Push once  glucagon  Injectable 1 milliGRAM(s) IntraMuscular once PRN    dextrose 5%. 1000 milliLiter(s) IV Continuous <Continuous>      PAST MEDICAL & SURGICAL HISTORY:  Prostate cancer  Peripheral arterial disease  Dry gangrene  HLD (hyperlipidemia)  Gastritis  CKD (chronic kidney disease)  Asthma  DM (diabetes mellitus)  HTN (hypertension)  No significant past surgical history      FAMILY HISTORY:  No pertinent family history in first degree relatives      SOCIAL HISTORY:  unchanged    REVIEW OF SYSTEMS:  CONSTITUTIONAL: No fever, weight loss, or fatigue  EYES: No eye pain, visual disturbances, or discharge  ENMT:  No difficulty hearing, tinnitus, vertigo; No sinus or throat pain  NECK: No pain or stiffness  RESPIRATORY: No cough, wheezing, chills or hemoptysis; No Shortness of Breath  CARDIOVASCULAR: No chest pain, palpitations, passing out, dizziness, or leg swelling  GASTROINTESTINAL: No abdominal or epigastric pain. No nausea, vomiting, or hematemesis; No diarrhea or constipation. No melena or hematochezia.  GENITOURINARY: No dysuria, frequency, hematuria, or incontinence  NEUROLOGICAL: No headaches, memory loss, loss of strength, numbness, or tremors  SKIN: No itching, burning, rashes, or lesions   LYMPH Nodes: No enlarged glands  ENDOCRINE: No heat or cold intolerance; No hair loss  MUSCULOSKELETAL: Non healing L 2nd digit gangrene.   PSYCHIATRIC: No depression, anxiety, mood swings, or difficulty sleeping  HEME/LYMPH: No easy bruising, or bleeding gums  ALLERY AND IMMUNOLOGIC: No hives or eczema	    [ x] All others negative	  [ ] Unable to obtain    PHYSICAL EXAM:  T(C): 36.4 (05-21-18 @ 09:29), Max: 36.9 (05-21-18 @ 04:35)  HR: 74 (05-21-18 @ 09:29) (61 - 74)  BP: 141/68 (05-21-18 @ 09:29) (135/82 - 180/92)  RR: 18 (05-21-18 @ 09:29) (18 - 18)  SpO2: 95% (05-21-18 @ 09:29) (95% - 99%)  Wt(kg): --  I&O's Summary    20 May 2018 07:01  -  21 May 2018 07:00  --------------------------------------------------------  IN: 240 mL / OUT: 1100 mL / NET: -860 mL    21 May 2018 07:01  -  21 May 2018 12:08  --------------------------------------------------------  IN: 240 mL / OUT: 350 mL / NET: -110 mL        Appearance: Normal	  HEENT:   Normal oral mucosa, PERRL, EOMI	  Lymphatic: No lymphadenopathy  Cardiovascular: Normal S1 S2   Respiratory: Lungs clear    Psychiatry: A & O x 3, Mood & affect appropriate  Gastrointestinal:  Soft, Non-tender, + BS	  Skin: No rashes, No ecchymoses, No cyanosis	  Neurologic: Non-focal  Extremities: Normal range of motion, No clubbing, cyanosis or edema  Vascular: Peripheral pulses non palpable bilaterally.  The 2nd digit of the left foot has gangrene.        LABS:	 	    CBC Full  -  ( 21 May 2018 11:18 )  WBC Count : 2.9 K/uL  Hemoglobin : 13.0 g/dL  Hematocrit : 39.8 %  Platelet Count - Automated : 174 K/uL  Mean Cell Volume : 92.7 fl  Mean Cell Hemoglobin : 30.3 pg  Mean Cell Hemoglobin Concentration : 32.7 gm/dL  Auto Neutrophil # : x  Auto Lymphocyte # : x  Auto Monocyte # : x  Auto Eosinophil # : x  Auto Basophil # : x  Auto Neutrophil % : x  Auto Lymphocyte % : x  Auto Monocyte % : x  Auto Eosinophil % : x  Auto Basophil % : x    05-21    141  |  105  |  17  ----------------------------<  132<H>  3.2<L>   |  25  |  1.47<H>    Ca    9.0      21 May 2018 10:42  Phos  3.2     05-21  Mg     2.1     05-21    TPro  6.3  /  Alb  3.5  /  TBili  0.5  /  DBili  x   /  AST  61<H>  /  ALT  125<H>  /  AlkPhos  172<H>  05-21

## 2018-05-21 NOTE — PROGRESS NOTE ADULT - PROBLEM SELECTOR PLAN 3
Afebrile, no leukocytosis, noninfected  appreciate podiatry and vascular recs Afebrile, no leukocytosis, noninfected, no need for antibiotics at this time  appreciate podiatry and vascular recs  percocet prn pain

## 2018-05-21 NOTE — CONSULT NOTE ADULT - ASSESSMENT
Assessment:  1.  Critical Limb Ischemia  - [place] 4  - 2nd digit gangrene  - occluded CFA, SFA and Pop on the left  2.  Coronary Artery disease  - abnormal stress test  - EF 36%  3.  HTN  4.  HLD    Plan  1.  Continue with current medications  2.  Please perform GRISEL/PVR and arterial duplex (will be done today)  3.  Will ask Lisa/Kirsten podiatry to see patient  4.  Will tentatively plan for angiogram tomorrow with Dr. Kirby 5/22/2018  5.  Please give mucomyst tomorrow morning.      Thanks    Mars Matos  Vascular Medicine [id number]
78 y/o M w/ L foot 2nd toe dry gangrene  - Pt seen and examined  - Stable dry gangrene, no need for abx per podiatry at this time  - Betadine paint applied  - Will f/u angio tomorrow and GRISEL/PVR  - If pt is revasc candidate, will consider partial 2nd ray amputation. If no vasc intervention, will continue local wound care  - Weight bear as tolerated in surgical shoe to left foot  - Will cont to monitor  - D/w attending who is in agreement
patient for CT angiogram in AM tomorrow    would hydrate pre and post, no history of CHF, to start NS @ 75 cc/hr starting at 6 am x 12 hours, also to receive Mucomyst    repeat echo

## 2018-05-21 NOTE — CHART NOTE - NSCHARTNOTEFT_GEN_A_CORE
Patient will be evaluated by the Vascular Medicine Service later today    Thank you    Mars Matos [id number]

## 2018-05-21 NOTE — PROGRESS NOTE ADULT - ASSESSMENT
77 yo M PMH of DM2, HTN, HLD, PAD, CKD, BPH, Prostate cancer, psychosis, GERD, COPD, previously homeless who presents as a transfer for L foot dry gangrene found to have an abnormal stress test, transferred for possible heart cath and LE angiogram

## 2018-05-21 NOTE — CONSULT NOTE ADULT - SUBJECTIVE AND OBJECTIVE BOX
Podiatry pager #: [phone]    Patient is a 77y old  Male who presents with a chief complaint of Positive Stress Test ahead of Preop evaluation (20 May 2018 20:23)    HPI:  History taken from chart and patient, as patient is a poor historian.    The patient is a 75 yo M PMH of DM2, HTN, HLD, PAD, CKD, BPH, Prostate cancer, psychosis, GERD, COPD, previously homeless who presents as a transfer for L foot dry gangrene. The patient states that a few months ago, he was walking around and cut his foot on some glass and the wound never healed. The patient denies any CP, SOB, NVD, F, chills, rash, HA, dysuria. While at the outside hospital, the patient was evaluated by Vascular Surgery, who performed a CTA of the lower extremities and was found to have bilateral superficial femoral artery occlusions. The plan was for the patient to have a femoral popliteal bypass, however prior to that, vascular surgery requested Cards clearance. The patient was noted to have t wave inversions in V4-V6. The patient underwent a a stress test which was abnormal with a partially reversible defect involving the anterior, mid anterior and apical anterior segments consistent with probable ischemia of the LAD and possible infarct of the RCA. Global systolic function is moderately reduced with an EF of 36%. Stress test shows 2 mm downsloping ST depressions in V2-V6. X ray showed no evidence of osteomyelitis. Patient currently complaining of mild pain in his L infected toe. The patient was intially treated with broad spectrum ABX, however ID was consulted and the patient was monitored off IV ABX in the setting of dry gangrene and did not have any fevers. Blood cultures did not grow any organisms.    PAtient was recently at [facility] 1 month ago for SOB in the setting of pulmonary edema and had a LHC:  Mid RCA: The distal vessel was supplied by collaterals fromseptal branches of LAD. There was a 100 % stenosis. Pt was told he was too high of a cardiac risk for a bypass, recommended BKA, patient refused.     Left leg angiography showed  CFA:occluded  Profunda: patent  SFA: occluded  AK pop: occluded  BK pop: patent  TP trunk: patent  PT: patent to the foot  Peroneal: patent to the foot  AT: occluded  DP: occluded Kfdec-bkfa-wiznyctee angiography. A catheter was positioned.  WENCESLAO: patent bl  Ext IA: patent bl (20 May 2018 20:23)    PAST MEDICAL & SURGICAL HISTORY:  Prostate cancer  Peripheral arterial disease  Dry gangrene  HLD (hyperlipidemia)  Gastritis  CKD (chronic kidney disease)  Asthma  DM (diabetes mellitus)  HTN (hypertension)  No significant past surgical history    MEDICATIONS  (STANDING):  acetylcysteine  Oral Solution 1200 milliGRAM(s) Oral every 12 hours  amLODIPine   Tablet 10 milliGRAM(s) Oral daily  aspirin enteric coated 81 milliGRAM(s) Oral daily  atorvastatin 80 milliGRAM(s) Oral at bedtime  buDESOnide 160 MICROgram(s)/formoterol 4.5 MICROgram(s) Inhaler 2 Puff(s) Inhalation two times a day  carvedilol 12.5 milliGRAM(s) Oral every 12 hours  dextrose 5%. 1000 milliLiter(s) (50 mL/Hr) IV Continuous <Continuous>  dextrose 50% Injectable 12.5 Gram(s) IV Push once  dextrose 50% Injectable 25 Gram(s) IV Push once  dextrose 50% Injectable 25 Gram(s) IV Push once  docusate sodium 100 milliGRAM(s) Oral two times a day  heparin  Injectable 5000 Unit(s) SubCutaneous every 8 hours  hydrALAZINE 100 milliGRAM(s) Oral every 8 hours  isosorbide   dinitrate Tablet (ISORDIL) 20 milliGRAM(s) Oral three times a day  montelukast 10 milliGRAM(s) Oral daily  pantoprazole    Tablet 40 milliGRAM(s) Oral before breakfast  polyethylene glycol 3350 17 Gram(s) Oral daily  QUEtiapine 25 milliGRAM(s) Oral at bedtime  senna 2 Tablet(s) Oral at bedtime  tamsulosin 0.4 milliGRAM(s) Oral daily  tiotropium 18 MICROgram(s) Capsule 1 Capsule(s) Inhalation daily    MEDICATIONS  (PRN):  ALBUTerol    90 MICROgram(s) HFA Inhaler 1 Puff(s) Inhalation every 6 hours PRN Shortness of Breath and/or Wheezing  dextrose 40% Gel 15 Gram(s) Oral once PRN Blood Glucose LESS THAN 70 milliGRAM(s)/deciliter  glucagon  Injectable 1 milliGRAM(s) IntraMuscular once PRN Glucose LESS THAN 70 milligrams/deciliter  oxyCODONE    5 mG/acetaminophen 325 mG 1 Tablet(s) Oral every 6 hours PRN Severe Pain (7 - 10)    Allergies  No Known Allergies    Intolerances    VITALS:    Vital Signs Last 24 Hrs  T(C): 36.4 (21 May 2018 09:29), Max: 36.9 (21 May 2018 04:35)  T(F): 97.5 (21 May 2018 09:29), Max: 98.4 (21 May 2018 04:35)  HR: 74 (21 May 2018 09:29) (61 - 74)  BP: 141/68 (21 May 2018 09:29) (135/82 - 180/92)  BP(mean): --  RR: 18 (21 May 2018 09:29) (18 - 18)  SpO2: 95% (21 May 2018 09:29) (95% - 99%)    LABS:                      13.0   2.9   )-----------( 174      ( 21 May 2018 11:18 )             39.8     05-21    141  |  105  |  17  ----------------------------<  132<H>  3.2<L>   |  25  |  1.47<H>    Ca    9.0      21 May 2018 10:42  Phos  3.2     05-21  Mg     2.1     05-21    TPro  6.3  /  Alb  3.5  /  TBili  0.5  /  DBili  x   /  AST  61<H>  /  ALT  125<H>  /  AlkPhos  172<H>  05-21    CAPILLARY BLOOD GLUCOSE  POCT Blood Glucose.: 125 mg/dL (21 May 2018 11:50)  POCT Blood Glucose.: 134 mg/dL (21 May 2018 09:37)  POCT Blood Glucose.: 76 mg/dL (21 May 2018 08:05)    LOWER EXTREMITY PHYSICAL EXAM:  Vascular: DP/PT 0/4, B/L, CFT <3 seconds B/L w/ exception to LF 2nd toe no CFT, Temperature gradient WNL, B/L.   Neuro: Epicritic sensation intact to the level of foot, B/L Tender to palpation to left 2nd toe. Severe HAV deformity, bilateral, R>L, with overlapping 2nd toes. palpable bony prominence lateral 5th met base left foot. Cavus foot type bilateral.  Skin: dry gangrenous distal tip LF 2nd toe , no SOI no purulence no drainage, no malodor, no erythema nor streaking, no open lesions, no periwound edema     RADIOLOGY & ADDITIONAL STUDIES:  < from: Xray Foot AP + Lateral + Oblique, Left (04.05.18 @ 07:09) >  EXAM:  RAD FOOT MIN 3 VIEWS LT      PROCEDURE DATE:  Apr 5 2018     INTERPRETATION:  CLINICAL INFORMATION: Gangrene 2-4 toes. Rule out   subcutaneous air, bony erosion.    TECHNIQUE: 3 views of the left foot     COMPARISON: None     FINDINGS:   Soft tissue ulceration of the distal second toe.  There is no tracking soft tissue gas collection or periosteal reaction to   suggest osteomyelitis. There is overlying soft tissue swelling.     Hallux valgus deformity.   There is arthrosis of the midfoot and first metatarsal phalangeal joint.   Small plantar calcaneal spur.    IMPRESSION:   No radiographic evidence of osteomyelitis.    CHIQUI ARCOS M.D., RADIOLOGY RESIDENT  This document has been electronically signed.  AYLIN STERLING; ATTENDING RADIOLOGIST  This document has been electronically signed. Apr 5 2018  8:32AM        < end of copied text >

## 2018-05-21 NOTE — PROGRESS NOTE ADULT - PROBLEM SELECTOR PLAN 4
creatinine at baseline  mucocyst and IVF prior to angiogram sedrick  monitor creatinine  avoid nephrotoxic meds

## 2018-05-21 NOTE — CONSULT NOTE ADULT - SUBJECTIVE AND OBJECTIVE BOX
MRN-[id number]    CHIEF COMPLAINT:  Patient is a 77y old  Male who presents with a chief complaint of Positive Stress Test ahead of Preop evaluation (20 May 2018 20:23)      HISTORY OF PRESENT ILLNESS:  RAYMOND SANTIAGO is a 77y Male patient with past medical history of *** presenting with ***.     Allergies    No Known Allergies    Intolerances    	    PAST MEDICAL & SURGICAL HISTORY:  Prostate cancer  Peripheral arterial disease  Dry gangrene  HLD (hyperlipidemia)  Gastritis  CKD (chronic kidney disease)  Asthma  DM (diabetes mellitus)  HTN (hypertension)  No significant past surgical history      FAMILY HISTORY:  No pertinent family history in first degree relatives      SOCIAL HISTORY:    [ ] Non-smoker  [ ] Smoker  [ ] Alcohol    REVIEW OF SYSTEMS:  CONSTITUTIONAL: No fever, weight loss, or fatigue  EYES: No eye pain, visual disturbances, or discharge  ENMT:  No difficulty hearing, tinnitus, vertigo; No sinus or throat pain  NECK: No pain or stiffness  RESPIRATORY: No cough, wheezing, chills or hemoptysis; No Shortness of Breath  CARDIOVASCULAR: No chest pain, palpitations, passing out, dizziness, or leg swelling  GASTROINTESTINAL: No abdominal or epigastric pain. No nausea, vomiting, or hematemesis; No diarrhea or constipation. No melena or hematochezia.  GENITOURINARY: No dysuria, frequency, hematuria, or incontinence  NEUROLOGICAL: No headaches, memory loss, loss of strength, numbness, or tremors  SKIN: No itching, burning, rashes, or lesions   LYMPH Nodes: No enlarged glands  ENDOCRINE: No heat or cold intolerance; No hair loss  MUSCULOSKELETAL: No joint pain or swelling; No muscle, back, or extremity pain  PSYCHIATRIC: No depression, anxiety, mood swings, or difficulty sleeping  HEME/LYMPH: No easy bruising, or bleeding gums  ALLERY AND IMMUNOLOGIC: No hives or eczema	    [ ] All others negative	  [ ] Unable to obtain    I&O's Summary    20 May 2018 07:01  -  21 May 2018 01:06  --------------------------------------------------------  IN: 240 mL / OUT: 600 mL / NET: -360 mL        PHYSICAL EXAM:  Vital Signs Last 24 Hrs  T(C): 36.7 (20 May 2018 23:12), Max: 36.7 (20 May 2018 23:12)  T(F): 98 (20 May 2018 23:12), Max: 98 (20 May 2018 23:12)  HR: 67 (20 May 2018 23:12) (63 - 67)  BP: 165/72 (20 May 2018 23:12) (135/82 - 175/98)  BP(mean): --  RR: 18 (20 May 2018 23:12) (18 - 18)  SpO2: 99% (20 May 2018 23:12) (96% - 99%)  Appearance: Normal	  HEENT:   Normal oral mucosa, PERRL, EOMI	  Lymphatic: No lymphadenopathy  Cardiovascular: Normal S1 S2, No JVD, No murmurs, No edema  Respiratory: Lungs clear to auscultation	  Psychiatry: A & O x 3, Mood & affect appropriate  Gastrointestinal:  Soft, Non-tender, + BS	  Skin: No rashes, No ecchymoses, No cyanosis	  Neurologic: Non-focal  Extremities: Normal range of motion, No clubbing, cyanosis or edema  Vascular: Peripheral pulses palpable 2+ bilaterally    MEDICATIONS:  MEDICATIONS  (STANDING):  amLODIPine   Tablet 10 milliGRAM(s) Oral daily  aspirin enteric coated 81 milliGRAM(s) Oral daily  atorvastatin 80 milliGRAM(s) Oral at bedtime  buDESOnide 160 MICROgram(s)/formoterol 4.5 MICROgram(s) Inhaler 2 Puff(s) Inhalation two times a day  carvedilol 12.5 milliGRAM(s) Oral every 12 hours  dextrose 5%. 1000 milliLiter(s) (50 mL/Hr) IV Continuous <Continuous>  dextrose 50% Injectable 12.5 Gram(s) IV Push once  dextrose 50% Injectable 25 Gram(s) IV Push once  dextrose 50% Injectable 25 Gram(s) IV Push once  docusate sodium 100 milliGRAM(s) Oral two times a day  heparin  Injectable 5000 Unit(s) SubCutaneous every 8 hours  hydrALAZINE 100 milliGRAM(s) Oral every 8 hours  isosorbide   dinitrate Tablet (ISORDIL) 20 milliGRAM(s) Oral three times a day  montelukast 10 milliGRAM(s) Oral daily  pantoprazole    Tablet 40 milliGRAM(s) Oral before breakfast  polyethylene glycol 3350 17 Gram(s) Oral daily  QUEtiapine 25 milliGRAM(s) Oral at bedtime  senna 2 Tablet(s) Oral at bedtime  tamsulosin 0.4 milliGRAM(s) Oral daily  tiotropium 18 MICROgram(s) Capsule 1 Capsule(s) Inhalation daily    MEDICATIONS  (PRN):  ALBUTerol    90 MICROgram(s) HFA Inhaler 1 Puff(s) Inhalation every 6 hours PRN Shortness of Breath and/or Wheezing  dextrose 40% Gel 15 Gram(s) Oral once PRN Blood Glucose LESS THAN 70 milliGRAM(s)/deciliter  glucagon  Injectable 1 milliGRAM(s) IntraMuscular once PRN Glucose LESS THAN 70 milligrams/deciliter  oxyCODONE    5 mG/acetaminophen 325 mG 1 Tablet(s) Oral every 6 hours PRN Severe Pain (7 - 10)      LABS:	 	                  proBNP:   Lipid Profile:   HgA1c:   TSH:     TELEMETRY: 	    ECG:  	  RADIOLOGY:  OTHER: 	    CARDIAC TESTING/STUDIES:    [ ] Echocardiogram:  [ ]  Catheterization:  [ ] Stress Test:  	  	  ASSESSMENT/PLAN: 	      Deandre Carrera MD, MPH, LUZ MARINA  Cardiovascular Specialist Attending  [facility]  C: [phone]  E: [email or web address]  (Cardiology Nocturnist cell number available 7 pm - 7 am every night; available daytime week days for follow-up only; daytime weekends covered by general cardiology consult service) MRN-[id number]    CHIEF COMPLAINT:  Abnormal Stress Test/Pre-operative Optimization    HISTORY OF PRESENT ILLNESS:  RAYMOND SANTIAGO is a 77y Male patient with past medical history of DM2, HTN, HLD, PAD, CKD, BPH, Prostate cancer, psychosis, GERD, COPD, presenting with left foot dry gangrene and lower extremity limb ischemia. CTA of the lower extremities and was found to have extensive artery occlusions with plans for femoral popliteal bypass and toe amputation for dry gangrene. Cardiac clearance was requested while he was at [facility] and had a pharmacologic MPI which was positive demonstrating downsloping STD in anterior leads and partially reversible defect involving the anterior, mid anterior and apical anterior segments consistent with probable ischemia of the LAD and possible infarct of the RCA. Global systolic function is moderately reduced with an EF of 36%. ECHO performed at the outside facility with preserved EF 55-60% with severe LVH. Due to the complexity of the case and need for lower extremity bypass, he was transferred to [facility].     With comprehensive chart review, he has in fact had a cardiac workup here with a positive stress test 4/2018 and subsequent cath demonstrating 70% stenosis of LCx covering a small myocardial territory, and 100%mRCA stenosis with collaterals from LAD supplying the dRCA. Cardiology consulted for further management.     Allergies  No Known Allergies    PAST MEDICAL & SURGICAL HISTORY:  Prostate cancer  Peripheral arterial disease  Dry gangrene  HLD (hyperlipidemia)  Gastritis  CKD (chronic kidney disease)  Asthma  DM (diabetes mellitus)  HTN (hypertension)  No significant past surgical history    FAMILY HISTORY:  No pertinent family history in first degree relatives    SOCIAL HISTORY:    Non-smoker, currently, but prior history of occasional use.     REVIEW OF SYSTEMS:  CONSTITUTIONAL: No fever, weight loss, or fatigue  EYES: No eye pain  ENMT:  No difficulty hearing  NECK: No pain or stiffness  RESPIRATORY: No cough, wheezing. No Shortness of Breath  CARDIOVASCULAR: No chest pain, palpitations, passing out, dizziness, or leg swelling  GASTROINTESTINAL: No abdominal or epigastric pain. No nausea, vomiting  GENITOURINARY: No dysuria, frequency  NEUROLOGICAL: No headaches, memory loss  SKIN: No itching, burning, rashes, or lesions   LYMPH Nodes: No enlarged glands  MUSCULOSKELETAL: No joint pain or swelling; Positive lower extremity pain  PSYCHIATRIC: No depression, anxiety  HEME/LYMPH: No easy bruising, or bleeding gums  ALLERY AND IMMUNOLOGIC: No hives or eczema	    I&O's Summary    20 May 2018 07:01  -  21 May 2018 01:06  --------------------------------------------------------  IN: 240 mL / OUT: 600 mL / NET: -360 mL    PHYSICAL EXAM:  Vital Signs Last 24 Hrs  T(C): 36.7 (20 May 2018 23:12), Max: 36.7 (20 May 2018 23:12)  T(F): 98 (20 May 2018 23:12), Max: 98 (20 May 2018 23:12)  HR: 67 (20 May 2018 23:12) (63 - 67)  BP: 165/72 (20 May 2018 23:12) (135/82 - 175/98)  RR: 18 (20 May 2018 23:12) (18 - 18)  SpO2: 99% (20 May 2018 23:12) (96% - 99%)    Appearance: cachectic   HEENT:   Normal oral mucosa	  Lymphatic: No lymphadenopathy  Cardiovascular: Normal S1 S2, No edema  Respiratory: Lungs clear to auscultation	  Psychiatry: A & O x 2-3  Gastrointestinal:  Soft, Non-tender  Skin: No rashes, No ecchymoses, No cyanosis	  Neurologic: Non-focal  Extremities: No clubbing, or edema. Positive dry left foot gangrene  Vascular: Peripheral pulses palpable 2+ bilaterally    MEDICATIONS:  MEDICATIONS  (STANDING):  amLODIPine   Tablet 10 milliGRAM(s) Oral daily  aspirin enteric coated 81 milliGRAM(s) Oral daily  atorvastatin 80 milliGRAM(s) Oral at bedtime  buDESOnide 160 MICROgram(s)/formoterol 4.5 MICROgram(s) Inhaler 2 Puff(s) Inhalation two times a day  carvedilol 12.5 milliGRAM(s) Oral every 12 hours  dextrose 5%. 1000 milliLiter(s) (50 mL/Hr) IV Continuous <Continuous>  dextrose 50% Injectable 12.5 Gram(s) IV Push once  dextrose 50% Injectable 25 Gram(s) IV Push once  dextrose 50% Injectable 25 Gram(s) IV Push once  docusate sodium 100 milliGRAM(s) Oral two times a day  heparin  Injectable 5000 Unit(s) SubCutaneous every 8 hours  hydrALAZINE 100 milliGRAM(s) Oral every 8 hours  isosorbide   dinitrate Tablet (ISORDIL) 20 milliGRAM(s) Oral three times a day  montelukast 10 milliGRAM(s) Oral daily  pantoprazole    Tablet 40 milliGRAM(s) Oral before breakfast  polyethylene glycol 3350 17 Gram(s) Oral daily  QUEtiapine 25 milliGRAM(s) Oral at bedtime  senna 2 Tablet(s) Oral at bedtime  tamsulosin 0.4 milliGRAM(s) Oral daily  tiotropium 18 MICROgram(s) Capsule 1 Capsule(s) Inhalation daily    MEDICATIONS  (PRN):  ALBUTerol    90 MICROgram(s) HFA Inhaler 1 Puff(s) Inhalation every 6 hours PRN Shortness of Breath and/or Wheezing  dextrose 40% Gel 15 Gram(s) Oral once PRN Blood Glucose LESS THAN 70 milliGRAM(s)/deciliter  glucagon  Injectable 1 milliGRAM(s) IntraMuscular once PRN Glucose LESS THAN 70 milligrams/deciliter  oxyCODONE    5 mG/acetaminophen 325 mG 1 Tablet(s) Oral every 6 hours PRN Severe Pain (7 - 10)    TELEMETRY: sinus bradycardia     ECG:    Sinus bradycardia, RBBB, good r wave progression.     RADIOLOGY:  CT Angio lower extremity 4/10/2018  Left leg angiography showed  CFA: occluded  Profunda: patent  SFA: occluded  AK pop: occluded  BK pop: patent  TP trunk: patent  PT: patent to the foot  Peroneal: patent to the foot  AT: occluded  DP: occluded Oojzl-uara-qjarqtjws angiography. A catheter was positioned.  WENCESLAO: patent bl  Ext IA: patent bl    CARDIAC TESTING/STUDIES:    Echocardiogram 4/5/2018  CONCLUSIONS:  1. Mitral annular calcification, otherwise normal mitral  valve. Mild mitral regurgitation.  2. Concentric left ventricular hypertrophy.  3. Mild segmental left ventricular systolic dysfunction.  Hypokinesis of the basal inferior wall.  4. Normal right ventricular size and function.    Catheterization 4/18/2018  PROCEDURE:  --  Left coronary angiography.  --  Right coronary angiography.    CORONARY VESSELS: The coronary circulation is right dominant.  LM:   --  LM: Normal.  LAD:   --  LAD: Angiography showed minor luminal irregularities with no  flow limiting lesions.  --  Proximal LAD: There was a discrete 20 % stenosis.  --  Mid LAD: Angiography showed minor luminal irregularities with no flow  limiting lesions.  --  Distal LAD: There was a discrete 20 % stenosis in the proximal third of  the vessel segment.  CX:   --  Mid circumflex: There was a discrete 70 % stenosis. There was  CHUCK grade 3 flow through the vessel (brisk flow) and a small vascular  territory distal to the lesion.  --  OM1: Angiography showed minor luminal irregularities with no flow  limiting lesions.  RCA:   --  Mid RCA: The distal vessel was supplied by collaterals from  septal branches of LAD. There was a 100 % stenosis. There was CHUCK grade 0  flow through the vessel (no flow).    COMPLICATIONS: There were no complications.    DIAGNOSTIC RECOMMENDATIONS: Medical management is recommended.    Stress Test 4/11/2018  IMPRESSIONS:Abnormal Study  * Myocardial Perfusion SPECT results are abnormal.  * Review of raw data shows: The study is of good technical  quality.  * The left ventricle was hypertrophied. There are large,  severe defects in inferior and inferoseptal walls that are  partially reversible, suggestive of infarct with mild  partial ischemia. There was a severe defect in the  diaphragmatic wall of the RV that was fixed, consistent  with RV infarct  * Post-stress gated wall motion analysis was performed  (LVEF = 45 %;LVEDV = 135 ml.), revealing mild overall  hypokinesis by [employer] There was basal inferior and  inferoseptal akinesis. There was severe mid inferior and  inferoseptal hypokinesis. The remaining segments  contracted well. The diaphragmatic wall of the RV was  akinetic.    ASSESSMENT/PLAN: 	  77y Male patient with past medical history of DM2, HTN, HLD, PAD, CKD, BPH, Prostate cancer, psychosis, GERD, COPD, presenting with left foot dry gangrene and lower extremity limb ischemia with planned femoral popliteal bypass.     1) Pre-operative Optimization  RCRI 3; 11% risk of perioperative major adverse cardiac event  High risk operation; vascular  No absolute contraindications without chest pain, decompensated heart failure, or malignant arrythmia   Recent MPI and cardiac cath demonstrate CAD; medically managed. The mild LAD ischemia seen on MPI is likely due to the large myocardial territory supplied including typical LAD territory in addition to dRCA via collaterals. Lateral segments not ischemic despite 70% LCx lesion.   Preserved EF 55-60%.     ·	Very complex case but given limited benefit with percutaneous intervention, can proceed with surgical intervention with some reservation although he is as optimized as he can given clinical picture.   ·	Continue medical management with aspirin 81 mg daily, atorvastatin 80 mg nightly, in the perioperative period. I would hold the following medications morning of surgery: isosorbide dinitrate 20 mg TID, amlodipine 10 mg daily, hydralazine 100 mg TID, carvedilol 12.5 mg BID. His blood pressure will likely be elevated without these medications the morning of and can be treated with IV medications.  ·	Maintain adequate blood pressure and volume status perioperatively.   ·	Post operatively, I would strongly consider DAPT therapy given degree of CAD being medically management.     2) HTN   Elevated  Multiple medications  LVH    ·	Continue medical management prior to surgery, hold morning of since he is undergoing anesthesia;  isosorbide dinitrate 20 mg TID, amlodipine 10 mg daily, hydralazine 100 mg TID, carvedilol 12.5 mg BID.    3) CAD     ·	Discussed above.     Deandre Carrera MD, MPH, LUZ MARINA  Cardiovascular Specialist Attending  [facility]  C: [phone]  E: [email or web address]  (Cardiology Nocturnist cell number available 7 pm - 7 am every night; available daytime week days for follow-up only; daytime weekends covered by general cardiology consult service) MRN-[id number]    CHIEF COMPLAINT:  Abnormal Stress Test/Pre-operative Optimization    HISTORY OF PRESENT ILLNESS:  RAYMOND SANTIAGO is a 77y Male patient with past medical history of DM2, HTN, HLD, PAD, CKD, BPH, Prostate cancer, psychosis, GERD, COPD, presenting with left foot dry gangrene and lower extremity limb ischemia. CTA of the lower extremities and was found to have extensive artery occlusions with plans for femoral popliteal bypass and toe amputation for dry gangrene. Cardiac clearance was requested while he was at [facility] and had a pharmacologic MPI which was positive demonstrating downsloping STD in anterior leads and partially reversible defect involving the anterior, mid anterior and apical anterior segments consistent with probable ischemia of the LAD and possible infarct of the RCA. Global systolic function is moderately reduced with an EF of 36%. ECHO performed at the outside facility with preserved EF 55-60% with severe LVH. Due to the complexity of the case and need for lower extremity bypass, he was transferred to [facility].     With comprehensive chart review, he has in fact had a cardiac workup here with a positive stress test 4/2018 and subsequent cath demonstrating 70% stenosis of LCx covering a small myocardial territory, and 100%mRCA stenosis with collaterals from LAD supplying the dRCA. Cardiology consulted for further management.     Allergies  No Known Allergies    PAST MEDICAL & SURGICAL HISTORY:  Prostate cancer  Peripheral arterial disease  Dry gangrene  HLD (hyperlipidemia)  Gastritis  CKD (chronic kidney disease)  Asthma  DM (diabetes mellitus)  HTN (hypertension)  No significant past surgical history    FAMILY HISTORY:  No pertinent family history in first degree relatives    SOCIAL HISTORY:    Non-smoker, currently, but prior history of occasional use.     REVIEW OF SYSTEMS:  CONSTITUTIONAL: No fever, weight loss, or fatigue  EYES: No eye pain  ENMT:  No difficulty hearing  NECK: No pain or stiffness  RESPIRATORY: No cough, wheezing. No Shortness of Breath  CARDIOVASCULAR: No chest pain, palpitations, passing out, dizziness, or leg swelling  GASTROINTESTINAL: No abdominal or epigastric pain. No nausea, vomiting  GENITOURINARY: No dysuria, frequency  NEUROLOGICAL: No headaches, memory loss  SKIN: No itching, burning, rashes, or lesions   LYMPH Nodes: No enlarged glands  MUSCULOSKELETAL: No joint pain or swelling; Positive lower extremity pain  PSYCHIATRIC: No depression, anxiety  HEME/LYMPH: No easy bruising, or bleeding gums  ALLERY AND IMMUNOLOGIC: No hives or eczema	    I&O's Summary    20 May 2018 07:01  -  21 May 2018 01:06  --------------------------------------------------------  IN: 240 mL / OUT: 600 mL / NET: -360 mL    PHYSICAL EXAM:  Vital Signs Last 24 Hrs  T(C): 36.7 (20 May 2018 23:12), Max: 36.7 (20 May 2018 23:12)  T(F): 98 (20 May 2018 23:12), Max: 98 (20 May 2018 23:12)  HR: 67 (20 May 2018 23:12) (63 - 67)  BP: 165/72 (20 May 2018 23:12) (135/82 - 175/98)  RR: 18 (20 May 2018 23:12) (18 - 18)  SpO2: 99% (20 May 2018 23:12) (96% - 99%)    Appearance: cachectic   HEENT:   Normal oral mucosa	  Lymphatic: No lymphadenopathy  Cardiovascular: Normal S1 S2, No edema  Respiratory: Lungs clear to auscultation	  Psychiatry: A & O x 2-3  Gastrointestinal:  Soft, Non-tender  Skin: No rashes, No ecchymoses, No cyanosis	  Neurologic: Non-focal  Extremities: No clubbing, or edema. Positive dry left foot gangrene  Vascular: Peripheral pulses palpable 2+ bilaterally    MEDICATIONS:  MEDICATIONS  (STANDING):  amLODIPine   Tablet 10 milliGRAM(s) Oral daily  aspirin enteric coated 81 milliGRAM(s) Oral daily  atorvastatin 80 milliGRAM(s) Oral at bedtime  buDESOnide 160 MICROgram(s)/formoterol 4.5 MICROgram(s) Inhaler 2 Puff(s) Inhalation two times a day  carvedilol 12.5 milliGRAM(s) Oral every 12 hours  dextrose 5%. 1000 milliLiter(s) (50 mL/Hr) IV Continuous <Continuous>  dextrose 50% Injectable 12.5 Gram(s) IV Push once  dextrose 50% Injectable 25 Gram(s) IV Push once  dextrose 50% Injectable 25 Gram(s) IV Push once  docusate sodium 100 milliGRAM(s) Oral two times a day  heparin  Injectable 5000 Unit(s) SubCutaneous every 8 hours  hydrALAZINE 100 milliGRAM(s) Oral every 8 hours  isosorbide   dinitrate Tablet (ISORDIL) 20 milliGRAM(s) Oral three times a day  montelukast 10 milliGRAM(s) Oral daily  pantoprazole    Tablet 40 milliGRAM(s) Oral before breakfast  polyethylene glycol 3350 17 Gram(s) Oral daily  QUEtiapine 25 milliGRAM(s) Oral at bedtime  senna 2 Tablet(s) Oral at bedtime  tamsulosin 0.4 milliGRAM(s) Oral daily  tiotropium 18 MICROgram(s) Capsule 1 Capsule(s) Inhalation daily    MEDICATIONS  (PRN):  ALBUTerol    90 MICROgram(s) HFA Inhaler 1 Puff(s) Inhalation every 6 hours PRN Shortness of Breath and/or Wheezing  dextrose 40% Gel 15 Gram(s) Oral once PRN Blood Glucose LESS THAN 70 milliGRAM(s)/deciliter  glucagon  Injectable 1 milliGRAM(s) IntraMuscular once PRN Glucose LESS THAN 70 milligrams/deciliter  oxyCODONE    5 mG/acetaminophen 325 mG 1 Tablet(s) Oral every 6 hours PRN Severe Pain (7 - 10)    TELEMETRY: sinus bradycardia     ECG:    Sinus bradycardia, RBBB, good r wave progression.     RADIOLOGY:  CT Angio lower extremity 4/10/2018  Left leg angiography showed  CFA: occluded  Profunda: patent  SFA: occluded  AK pop: occluded  BK pop: patent  TP trunk: patent  PT: patent to the foot  Peroneal: patent to the foot  AT: occluded  DP: occluded Dnywu-lrob-pwlnyxjcn angiography. A catheter was positioned.  WENCESLAO: patent bl  Ext IA: patent bl    CARDIAC TESTING/STUDIES:    Echocardiogram 4/5/2018  CONCLUSIONS:  1. Mitral annular calcification, otherwise normal mitral  valve. Mild mitral regurgitation.  2. Concentric left ventricular hypertrophy.  3. Mild segmental left ventricular systolic dysfunction.  Hypokinesis of the basal inferior wall.  4. Normal right ventricular size and function.    Catheterization 4/18/2018  PROCEDURE:  --  Left coronary angiography.  --  Right coronary angiography.    CORONARY VESSELS: The coronary circulation is right dominant.  LM:   --  LM: Normal.  LAD:   --  LAD: Angiography showed minor luminal irregularities with no  flow limiting lesions.  --  Proximal LAD: There was a discrete 20 % stenosis.  --  Mid LAD: Angiography showed minor luminal irregularities with no flow  limiting lesions.  --  Distal LAD: There was a discrete 20 % stenosis in the proximal third of  the vessel segment.  CX:   --  Mid circumflex: There was a discrete 70 % stenosis. There was  CHUCK grade 3 flow through the vessel (brisk flow) and a small vascular  territory distal to the lesion.  --  OM1: Angiography showed minor luminal irregularities with no flow  limiting lesions.  RCA:   --  Mid RCA: The distal vessel was supplied by collaterals from  septal branches of LAD. There was a 100 % stenosis. There was CHUCK grade 0  flow through the vessel (no flow).    COMPLICATIONS: There were no complications.    DIAGNOSTIC RECOMMENDATIONS: Medical management is recommended.    Stress Test 4/11/2018  IMPRESSIONS:Abnormal Study  * Myocardial Perfusion SPECT results are abnormal.  * Review of raw data shows: The study is of good technical  quality.  * The left ventricle was hypertrophied. There are large,  severe defects in inferior and inferoseptal walls that are  partially reversible, suggestive of infarct with mild  partial ischemia. There was a severe defect in the  diaphragmatic wall of the RV that was fixed, consistent  with RV infarct  * Post-stress gated wall motion analysis was performed  (LVEF = 45 %;LVEDV = 135 ml.), revealing mild overall  hypokinesis by [employer] There was basal inferior and  inferoseptal akinesis. There was severe mid inferior and  inferoseptal hypokinesis. The remaining segments  contracted well. The diaphragmatic wall of the RV was  akinetic.    ASSESSMENT/PLAN: 	  77y Male patient with past medical history of DM2, HTN, HLD, PAD, CKD, BPH, Prostate cancer, psychosis, GERD, COPD, presenting with left foot dry gangrene and lower extremity limb ischemia with planned femoral popliteal bypass.     1) Pre-operative Optimization  RCRI 3; 11% risk of perioperative major adverse cardiac event  High risk operation; vascular  No absolute contraindications without chest pain, decompensated heart failure, or malignant arrythmia   Recent MPI and cardiac cath demonstrate CAD; medically managed. The mild LAD ischemia seen on MPI is likely due to the large myocardial territory supplied including typical LAD territory in addition to dRCA via collaterals. Lateral segments not ischemic despite 70% LCx lesion.   Preserved EF 55-60% per OSH ECHO.    ·	Very complex case but given limited benefit with percutaneous intervention, can proceed with surgical intervention with some reservation although he is as optimized as he can be given his clinical picture. High risk for cardiac events.  ·	Continue medical management with aspirin 81 mg daily, atorvastatin 80 mg nightly, in the perioperative period. I would hold the following medications morning of surgery: isosorbide dinitrate 20 mg TID, amlodipine 10 mg daily, hydralazine 100 mg TID, carvedilol 12.5 mg BID. His blood pressure will likely be elevated without these medications the morning of and can be treated with IV medications.  ·	Maintain adequate blood pressure and volume status perioperatively.   ·	Post operatively, I would strongly consider DAPT therapy given degree of CAD being medically management.     2) HTN   Elevated  Multiple medications  LVH    ·	Continue medical management prior to surgery, hold morning of since he is undergoing anesthesia;  isosorbide dinitrate 20 mg TID, amlodipine 10 mg daily, hydralazine 100 mg TID, carvedilol 12.5 mg BID.    3) CAD     ·	Discussed above.     Deandre Carrera MD, MPH, [facility]  Cardiovascular Specialist Attending  [facility]  C: [phone]  E: [email or web address]  (Cardiology Nocturnist cell number available 7 pm - 7 am every night; available daytime week days for follow-up only; daytime weekends covered by general cardiology consult service)

## 2018-05-21 NOTE — CONSULT NOTE ADULT - SUBJECTIVE AND OBJECTIVE BOX
CHIEF COMPLAINT: 77 year male with history of HTN DM ? prior MI with worsening left toe gangrene forund to have severe left lower extremity gangrene    sent from [facility] for peripheral intervention after positive stress test    no history of CHF, ECHO at [facility] read as normal but by nuclear scan EF appeared reduced    creatinine 1-1.2, increased up to approx 1,6  post CT angiogram last week but was then better at 1.3- 1.5      PAST MEDICAL & SURGICAL HISTORY:  Prostate cancer  Peripheral arterial disease  Dry gangrene  HLD (hyperlipidemia)  Gastritis  CKD (chronic kidney disease)  Asthma  DM (diabetes mellitus)  HTN (hypertension)  No significant past surgical history      HPI:                PREVIOUS DIAGNOSTIC TESTING:      ECHO  FINDINGS:    STRESS  FINDINGS:    CATHETERIZATION  FINDINGS:    ELECTROPHYSIOLOGY STUDY  FINDINGS:    CAROTID ULTRASOUND:  FINDINGS    VENOUS DUPLEX SCAN:  FINDINGS:    CHEST CT PULMONARY ANGIO with IV Contrast:  FINDINGS:  MEDICATIONS  (STANDING):  acetylcysteine  Oral Solution 1200 milliGRAM(s) Oral every 12 hours  amLODIPine   Tablet 10 milliGRAM(s) Oral daily  aspirin enteric coated 81 milliGRAM(s) Oral daily  atorvastatin 80 milliGRAM(s) Oral at bedtime  buDESOnide 160 MICROgram(s)/formoterol 4.5 MICROgram(s) Inhaler 2 Puff(s) Inhalation two times a day  carvedilol 12.5 milliGRAM(s) Oral every 12 hours  dextrose 5%. 1000 milliLiter(s) (50 mL/Hr) IV Continuous <Continuous>  dextrose 50% Injectable 12.5 Gram(s) IV Push once  dextrose 50% Injectable 25 Gram(s) IV Push once  dextrose 50% Injectable 25 Gram(s) IV Push once  docusate sodium 100 milliGRAM(s) Oral two times a day  heparin  Injectable 5000 Unit(s) SubCutaneous every 8 hours  hydrALAZINE 100 milliGRAM(s) Oral every 8 hours  isosorbide   dinitrate Tablet (ISORDIL) 20 milliGRAM(s) Oral three times a day  montelukast 10 milliGRAM(s) Oral daily  pantoprazole    Tablet 40 milliGRAM(s) Oral before breakfast  polyethylene glycol 3350 17 Gram(s) Oral daily  potassium chloride    Tablet ER 40 milliEquivalent(s) Oral every 4 hours  QUEtiapine 25 milliGRAM(s) Oral at bedtime  senna 2 Tablet(s) Oral at bedtime  tamsulosin 0.4 milliGRAM(s) Oral daily  tiotropium 18 MICROgram(s) Capsule 1 Capsule(s) Inhalation daily    MEDICATIONS  (PRN):  ALBUTerol    90 MICROgram(s) HFA Inhaler 1 Puff(s) Inhalation every 6 hours PRN Shortness of Breath and/or Wheezing  dextrose 40% Gel 15 Gram(s) Oral once PRN Blood Glucose LESS THAN 70 milliGRAM(s)/deciliter  glucagon  Injectable 1 milliGRAM(s) IntraMuscular once PRN Glucose LESS THAN 70 milligrams/deciliter  oxyCODONE    5 mG/acetaminophen 325 mG 1 Tablet(s) Oral every 6 hours PRN Severe Pain (7 - 10)      FAMILY HISTORY:  No pertinent family history in first degree relatives      SOCIAL HISTORY:    CIGARETTES:    ALCOHOL:    REVIEW OF SYSTEMS:    CONSTITUTIONAL: No fever, weight loss, chills, shakes, or fatigue  EYES: No eye pain, visual disturbances, or discharge  ENMT:  No difficulty hearing, tinnitus, vertigo; No sinus or throat pain  NECK: No pain or stiffness  BREASTS: No pain, masses, or nipple discharge  RESPIRATORY: No cough, wheezing, hemoptysis, or shortness of breath  CARDIOVASCULAR: No chest pain, dyspnea, palpitations, dizziness, syncope, paroxysmal nocturnal dyspnea, orthopnea, or arm or leg swelling  GASTROINTESTINAL: No abdominal  or epigastric pain, nausea, vomiting, hematemesis, diarrhea, constipation, melena or bright red blood.  GENITOURINARY: No dysuria, nocturia, hematuria, or urinary incontinence  NEUROLOGICAL: No headaches, memory loss, slurred speech, limb weakness, loss of strength, numbness, or tremors  SKIN: No itching, burning, rashes, or lesions   LYMPH NODES: No enlarged glands  ENDOCRINE: No heat or cold intolerance, or hair loss  MUSCULOSKELETAL: No joint pain or swelling, muscle, back, or extremity pain  PSYCHIATRIC: No depression, anxiety, or difficulty sleeping  HEME/LYMPH: No easy bruising or bleeding gums  ALLERY AND IMMUNOLOGIC: No hives or rash.      Vital Signs Last 24 Hrs  T(C): 36.4 (21 May 2018 20:09), Max: 36.9 (21 May 2018 04:35)  T(F): 97.5 (21 May 2018 20:09), Max: 98.4 (21 May 2018 04:35)  HR: 65 (21 May 2018 22:07) (61 - 84)  BP: 170/79 (21 May 2018 22:07) (141/68 - 180/92)  BP(mean): --  RR: 18 (21 May 2018 20:40) (18 - 20)  SpO2: 98% (21 May 2018 20:40) (95% - 99%)  Daily     Daily Weight in k.4 (21 May 2018 09:29)     @ 07:  -   @ 07:00  --------------------------------------------------------  IN: 240 mL / OUT: 1100 mL / NET: -860 mL     @ : @ 23:46  --------------------------------------------------------  IN: 840 mL / OUT: 1770 mL / NET: -930 mL          PHYSICAL EXAM:    GENERAL: In no apparent distress, well nourished, and hydrated.  HEAD:  Atraumatic, Normocephalic  EYES: EOMI, PERRLA, conjunctiva and sclera clear  ENMT: No tonsillar erythema, exudates, or enlargement; Moist mucous membranes, Good dentition, No lesions  NECK: Supple and normal thyroid.  No JVD or carotid bruit.  Carotid pulse is 2+ bilaterally.  HEART: Regular rate and rhythm; No murmurs, rubs, or gallops.  PULMONARY: Clear to auscultation and perfusion.  No rales, wheezing, or rhonchi bilaterally.  ABDOMEN: Soft, Nontender, Nondistended; Bowel sounds present  EXTREMITIES:  2+ Peripheral Pulses, No clubbing, cyanosis, or edema  LYMPH: No lymphadenopathy noted  NEUROLOGICAL: Grossly nonfocal          INTERPRETATION OF TELEMETRY:    ECG:    I&O's Detail    20 May 2018 07:01  -  21 May 2018 07:00  --------------------------------------------------------  IN:    Oral Fluid: 240 mL  Total IN: 240 mL    OUT:    Voided: 1100 mL  Total OUT: 1100 mL    Total NET: -860 mL      21 May 2018 07:01  -  21 May 2018 23:46  --------------------------------------------------------  IN:    Oral Fluid: 840 mL  Total IN: 840 mL    OUT:    Voided: 1770 mL  Total OUT: 1770 mL    Total NET: -930 mL          LABS:                        13.0   2.9   )-----------( 174      ( 21 May 2018 11:18 )             39.8         141  |  105  |  17  ----------------------------<  132<H>  3.2<L>   |  25  |  1.47<H>    Ca    9.0      21 May 2018 10:42  Phos  3.2       Mg     2.1         TPro  6.3  /  Alb  3.5  /  TBili  0.5  /  DBili  x   /  AST  61<H>  /  ALT  125<H>  /  AlkPhos  172<H>      CARDIAC MARKERS ( 21 May 2018 10:42 )  x     / <0.01 ng/mL / 68 U/L / x     / 3.0 ng/mL        Urinalysis Basic - ( 20 May 2018 22:51 )    Color: Yellow / Appearance: Clear / S.013 / pH: x  Gluc: x / Ketone: Negative  / Bili: Negative / Urobili: Negative   Blood: x / Protein: Negative / Nitrite: Negative   Leuk Esterase: Negative / RBC: x / WBC x   Sq Epi: x / Non Sq Epi: x / Bacteria: x      BNP  I&O's Detail    20 May 2018 07:01  -  21 May 2018 07:00  --------------------------------------------------------  IN:    Oral Fluid: 240 mL  Total IN: 240 mL    OUT:    Voided: 1100 mL  Total OUT: 1100 mL    Total NET: -860 mL      21 May 2018 07:01  -  21 May 2018 23:46  --------------------------------------------------------  IN:    Oral Fluid: 840 mL  Total IN: 840 mL    OUT:    Voided: 1770 mL  Total OUT: 1770 mL    Total NET: -930 mL        Daily     Daily Weight in k.4 (21 May 2018 09:29)    RADIOLOGY & ADDITIONAL STUDIES:

## 2018-05-21 NOTE — PROGRESS NOTE ADULT - SUBJECTIVE AND OBJECTIVE BOX
Patient is a 77y old  Male who presents with a chief complaint of Positive Stress Test ahead of Preop evaluation (20 May 2018 20:23)      SUBJECTIVE / OVERNIGHT EVENTS: No overnight events. Feels well denies chest pain sob.    MEDICATIONS  (STANDING):  acetylcysteine  Oral Solution 1200 milliGRAM(s) Oral every 12 hours  amLODIPine   Tablet 10 milliGRAM(s) Oral daily  aspirin enteric coated 81 milliGRAM(s) Oral daily  atorvastatin 80 milliGRAM(s) Oral at bedtime  buDESOnide 160 MICROgram(s)/formoterol 4.5 MICROgram(s) Inhaler 2 Puff(s) Inhalation two times a day  carvedilol 12.5 milliGRAM(s) Oral every 12 hours  dextrose 5%. 1000 milliLiter(s) (50 mL/Hr) IV Continuous <Continuous>  dextrose 50% Injectable 12.5 Gram(s) IV Push once  dextrose 50% Injectable 25 Gram(s) IV Push once  dextrose 50% Injectable 25 Gram(s) IV Push once  docusate sodium 100 milliGRAM(s) Oral two times a day  heparin  Injectable 5000 Unit(s) SubCutaneous every 8 hours  hydrALAZINE 100 milliGRAM(s) Oral every 8 hours  isosorbide   dinitrate Tablet (ISORDIL) 20 milliGRAM(s) Oral three times a day  montelukast 10 milliGRAM(s) Oral daily  pantoprazole    Tablet 40 milliGRAM(s) Oral before breakfast  polyethylene glycol 3350 17 Gram(s) Oral daily  QUEtiapine 25 milliGRAM(s) Oral at bedtime  senna 2 Tablet(s) Oral at bedtime  tamsulosin 0.4 milliGRAM(s) Oral daily  tiotropium 18 MICROgram(s) Capsule 1 Capsule(s) Inhalation daily    MEDICATIONS  (PRN):  ALBUTerol    90 MICROgram(s) HFA Inhaler 1 Puff(s) Inhalation every 6 hours PRN Shortness of Breath and/or Wheezing  dextrose 40% Gel 15 Gram(s) Oral once PRN Blood Glucose LESS THAN 70 milliGRAM(s)/deciliter  glucagon  Injectable 1 milliGRAM(s) IntraMuscular once PRN Glucose LESS THAN 70 milligrams/deciliter  oxyCODONE    5 mG/acetaminophen 325 mG 1 Tablet(s) Oral every 6 hours PRN Severe Pain (7 - 10)      Vital Signs Last 24 Hrs  T(C): 36.6 (21 May 2018 13:18), Max: 36.9 (21 May 2018 04:35)  T(F): 97.9 (21 May 2018 13:18), Max: 98.4 (21 May 2018 04:35)  HR: 81 (21 May 2018 14:02) (61 - 84)  BP: 158/73 (21 May 2018 14:02) (141/68 - 180/92)  BP(mean): --  RR: 20 (21 May 2018 13:18) (18 - 20)  SpO2: 97% (21 May 2018 13:18) (95% - 99%)  CAPILLARY BLOOD GLUCOSE      POCT Blood Glucose.: 125 mg/dL (21 May 2018 11:50)  POCT Blood Glucose.: 134 mg/dL (21 May 2018 09:37)  POCT Blood Glucose.: 76 mg/dL (21 May 2018 08:05)    I&O's Summary    20 May 2018 07:  -  21 May 2018 07:00  --------------------------------------------------------  IN: 240 mL / OUT: 1100 mL / NET: -860 mL    21 May 2018 07:01  -  21 May 2018 16:36  --------------------------------------------------------  IN: 480 mL / OUT: 770 mL / NET: -290 mL        PHYSICAL EXAM:  GENERAL: NAD, well-developed  HEAD:  Atraumatic, Normocephalic  EYES: EOMI, PERRLA, conjunctiva and sclera clear  NECK: Supple, No JVD  CHEST/LUNG: Clear to auscultation bilaterally; No wheeze  HEART: Regular rate and rhythm; No murmurs, rubs, or gallops  ABDOMEN: Soft, Nontender, Nondistended; Bowel sounds present  EXTREMITIES:  2+ Peripheral Pulses, No clubbing, cyanosis, or edema  PSYCH: AAOx3  NEUROLOGY: non-focal  SKIN: No rashes or lesions    LABS:                        13.0   2.9   )-----------( 174      ( 21 May 2018 11:18 )             39.8         141  |  105  |  17  ----------------------------<  132<H>  3.2<L>   |  25  |  1.47<H>    Ca    9.0      21 May 2018 10:42  Phos  3.2       Mg     2.1         TPro  6.3  /  Alb  3.5  /  TBili  0.5  /  DBili  x   /  AST  61<H>  /  ALT  125<H>  /  AlkPhos  172<H>        CARDIAC MARKERS ( 21 May 2018 10:42 )  x     / <0.01 ng/mL / 68 U/L / x     / 3.0 ng/mL      Urinalysis Basic - ( 20 May 2018 22:51 )    Color: Yellow / Appearance: Clear / S.013 / pH: x  Gluc: x / Ketone: Negative  / Bili: Negative / Urobili: Negative   Blood: x / Protein: Negative / Nitrite: Negative   Leuk Esterase: Negative / RBC: x / WBC x   Sq Epi: x / Non Sq Epi: x / Bacteria: x            RADIOLOGY & ADDITIONAL TESTS:    tele reviewed: sinus 50-70s, 1st degree, pvcs, couplets,     Imaging Personally Reviewed: cXR: clear lungs    Consultant(s) Notes Reviewed:  cards, podiatry    Care Discussed with Consultants/Other Providers: cards Dr Matos as below Patient is a 77y old  Male who presents with a chief complaint of Positive Stress Test ahead of Preop evaluation (20 May 2018 20:23)      SUBJECTIVE / OVERNIGHT EVENTS: No overnight events. Feels well denies chest pain sob. reports LE pain is controlled    MEDICATIONS  (STANDING):  acetylcysteine  Oral Solution 1200 milliGRAM(s) Oral every 12 hours  amLODIPine   Tablet 10 milliGRAM(s) Oral daily  aspirin enteric coated 81 milliGRAM(s) Oral daily  atorvastatin 80 milliGRAM(s) Oral at bedtime  buDESOnide 160 MICROgram(s)/formoterol 4.5 MICROgram(s) Inhaler 2 Puff(s) Inhalation two times a day  carvedilol 12.5 milliGRAM(s) Oral every 12 hours  dextrose 5%. 1000 milliLiter(s) (50 mL/Hr) IV Continuous <Continuous>  dextrose 50% Injectable 12.5 Gram(s) IV Push once  dextrose 50% Injectable 25 Gram(s) IV Push once  dextrose 50% Injectable 25 Gram(s) IV Push once  docusate sodium 100 milliGRAM(s) Oral two times a day  heparin  Injectable 5000 Unit(s) SubCutaneous every 8 hours  hydrALAZINE 100 milliGRAM(s) Oral every 8 hours  isosorbide   dinitrate Tablet (ISORDIL) 20 milliGRAM(s) Oral three times a day  montelukast 10 milliGRAM(s) Oral daily  pantoprazole    Tablet 40 milliGRAM(s) Oral before breakfast  polyethylene glycol 3350 17 Gram(s) Oral daily  QUEtiapine 25 milliGRAM(s) Oral at bedtime  senna 2 Tablet(s) Oral at bedtime  tamsulosin 0.4 milliGRAM(s) Oral daily  tiotropium 18 MICROgram(s) Capsule 1 Capsule(s) Inhalation daily    MEDICATIONS  (PRN):  ALBUTerol    90 MICROgram(s) HFA Inhaler 1 Puff(s) Inhalation every 6 hours PRN Shortness of Breath and/or Wheezing  dextrose 40% Gel 15 Gram(s) Oral once PRN Blood Glucose LESS THAN 70 milliGRAM(s)/deciliter  glucagon  Injectable 1 milliGRAM(s) IntraMuscular once PRN Glucose LESS THAN 70 milligrams/deciliter  oxyCODONE    5 mG/acetaminophen 325 mG 1 Tablet(s) Oral every 6 hours PRN Severe Pain (7 - 10)      Vital Signs Last 24 Hrs  T(C): 36.6 (21 May 2018 13:18), Max: 36.9 (21 May 2018 04:35)  T(F): 97.9 (21 May 2018 13:18), Max: 98.4 (21 May 2018 04:35)  HR: 81 (21 May 2018 14:02) (61 - 84)  BP: 158/73 (21 May 2018 14:02) (141/68 - 180/92)  BP(mean): --  RR: 20 (21 May 2018 13:18) (18 - 20)  SpO2: 97% (21 May 2018 13:18) (95% - 99%)  CAPILLARY BLOOD GLUCOSE      POCT Blood Glucose.: 125 mg/dL (21 May 2018 11:50)  POCT Blood Glucose.: 134 mg/dL (21 May 2018 09:37)  POCT Blood Glucose.: 76 mg/dL (21 May 2018 08:05)    I&O's Summary    20 May 2018 07:  -  21 May 2018 07:00  --------------------------------------------------------  IN: 240 mL / OUT: 1100 mL / NET: -860 mL    21 May 2018 07:01  -  21 May 2018 16:36  --------------------------------------------------------  IN: 480 mL / OUT: 770 mL / NET: -290 mL        PHYSICAL EXAM:  GENERAL: NAD, well-developed  HEAD:  Atraumatic, Normocephalic  NECK: Supple, No JVD  CHEST/LUNG: Clear to auscultation bilaterally; No wheeze  HEART: Regular rate and rhythm;   ABDOMEN: Soft, Nontender, Nondistended; Bowel sounds present  NEUROLOGY: non-focal  SKIN: dry gangrene of 2nd toe left foot, no pus, odor, discharge, tenderness    LABS:                        13.0   2.9   )-----------( 174      ( 21 May 2018 11:18 )             39.8         141  |  105  |  17  ----------------------------<  132<H>  3.2<L>   |  25  |  1.47<H>    Ca    9.0      21 May 2018 10:42  Phos  3.2       Mg     2.1         TPro  6.3  /  Alb  3.5  /  TBili  0.5  /  DBili  x   /  AST  61<H>  /  ALT  125<H>  /  AlkPhos  172<H>        CARDIAC MARKERS ( 21 May 2018 10:42 )  x     / <0.01 ng/mL / 68 U/L / x     / 3.0 ng/mL      Urinalysis Basic - ( 20 May 2018 22:51 )    Color: Yellow / Appearance: Clear / S.013 / pH: x  Gluc: x / Ketone: Negative  / Bili: Negative / Urobili: Negative   Blood: x / Protein: Negative / Nitrite: Negative   Leuk Esterase: Negative / RBC: x / WBC x   Sq Epi: x / Non Sq Epi: x / Bacteria: x            RADIOLOGY & ADDITIONAL TESTS:    tele reviewed: sinus 50-70s, 1st degree, pvcs, couplets,     Imaging Personally Reviewed: cXR: clear lungs    Consultant(s) Notes Reviewed:  cards, podiatry    Care Discussed with Consultants/Other Providers: cards Dr Matos as below

## 2018-05-21 NOTE — PROGRESS NOTE ADULT - PROBLEM SELECTOR PLAN 6
BP borderline uncontrolled, if stays persistently elevated will increase isosorbide or coreg  c/w amlodipine 10mg daily  c/w hydralazine 100mg TID  c/w isosorbide 20mg TID  c/w coreg 12.5mg bid  monitor BP

## 2018-05-22 LAB
ANION GAP SERPL CALC-SCNC: 12 MMOL/L — SIGNIFICANT CHANGE UP (ref 5–17)
BUN SERPL-MCNC: 15 MG/DL — SIGNIFICANT CHANGE UP (ref 7–23)
CALCIUM SERPL-MCNC: 9.3 MG/DL — SIGNIFICANT CHANGE UP (ref 8.4–10.5)
CHLORIDE SERPL-SCNC: 106 MMOL/L — SIGNIFICANT CHANGE UP (ref 96–108)
CO2 SERPL-SCNC: 23 MMOL/L — SIGNIFICANT CHANGE UP (ref 22–31)
CREAT SERPL-MCNC: 1.47 MG/DL — HIGH (ref 0.5–1.3)
GLUCOSE BLDC GLUCOMTR-MCNC: 159 MG/DL — HIGH (ref 70–99)
GLUCOSE BLDC GLUCOMTR-MCNC: 75 MG/DL — SIGNIFICANT CHANGE UP (ref 70–99)
GLUCOSE SERPL-MCNC: 102 MG/DL — HIGH (ref 70–99)
HCT VFR BLD CALC: 39.9 % — SIGNIFICANT CHANGE UP (ref 39–50)
HGB BLD-MCNC: 13.6 G/DL — SIGNIFICANT CHANGE UP (ref 13–17)
MCHC RBC-ENTMCNC: 31.7 PG — SIGNIFICANT CHANGE UP (ref 27–34)
MCHC RBC-ENTMCNC: 34.1 GM/DL — SIGNIFICANT CHANGE UP (ref 32–36)
MCV RBC AUTO: 93 FL — SIGNIFICANT CHANGE UP (ref 80–100)
PLATELET # BLD AUTO: 180 K/UL — SIGNIFICANT CHANGE UP (ref 150–400)
POTASSIUM SERPL-MCNC: 4.5 MMOL/L — SIGNIFICANT CHANGE UP (ref 3.5–5.3)
POTASSIUM SERPL-SCNC: 4.5 MMOL/L — SIGNIFICANT CHANGE UP (ref 3.5–5.3)
RBC # BLD: 4.29 M/UL — SIGNIFICANT CHANGE UP (ref 4.2–5.8)
RBC # FLD: 13 % — SIGNIFICANT CHANGE UP (ref 10.3–14.5)
SODIUM SERPL-SCNC: 141 MMOL/L — SIGNIFICANT CHANGE UP (ref 135–145)
WBC # BLD: 3.7 K/UL — LOW (ref 3.8–10.5)
WBC # FLD AUTO: 3.7 K/UL — LOW (ref 3.8–10.5)

## 2018-05-22 PROCEDURE — 99233 SBSQ HOSP IP/OBS HIGH 50: CPT

## 2018-05-22 PROCEDURE — 37224: CPT

## 2018-05-22 PROCEDURE — 75716 ARTERY X-RAYS ARMS/LEGS: CPT | Mod: 26,XU

## 2018-05-22 RX ORDER — CLOPIDOGREL BISULFATE 75 MG/1
75 TABLET, FILM COATED ORAL DAILY
Qty: 0 | Refills: 0 | Status: DISCONTINUED | OUTPATIENT
Start: 2018-05-22 | End: 2018-05-31

## 2018-05-22 RX ORDER — HEPARIN SODIUM 5000 [USP'U]/ML
5000 INJECTION INTRAVENOUS; SUBCUTANEOUS EVERY 8 HOURS
Qty: 0 | Refills: 0 | Status: DISCONTINUED | OUTPATIENT
Start: 2018-05-23 | End: 2018-05-31

## 2018-05-22 RX ADMIN — ISOSORBIDE DINITRATE 20 MILLIGRAM(S): 5 TABLET ORAL at 14:24

## 2018-05-22 RX ADMIN — ATORVASTATIN CALCIUM 80 MILLIGRAM(S): 80 TABLET, FILM COATED ORAL at 21:30

## 2018-05-22 RX ADMIN — Medication 81 MILLIGRAM(S): at 11:58

## 2018-05-22 RX ADMIN — ISOSORBIDE DINITRATE 20 MILLIGRAM(S): 5 TABLET ORAL at 05:02

## 2018-05-22 RX ADMIN — HEPARIN SODIUM 5000 UNIT(S): 5000 INJECTION INTRAVENOUS; SUBCUTANEOUS at 05:02

## 2018-05-22 RX ADMIN — OXYCODONE AND ACETAMINOPHEN 1 TABLET(S): 5; 325 TABLET ORAL at 19:23

## 2018-05-22 RX ADMIN — ISOSORBIDE DINITRATE 20 MILLIGRAM(S): 5 TABLET ORAL at 21:30

## 2018-05-22 RX ADMIN — Medication 100 MILLIGRAM(S): at 21:30

## 2018-05-22 RX ADMIN — Medication 100 MILLIGRAM(S): at 14:24

## 2018-05-22 RX ADMIN — MONTELUKAST 10 MILLIGRAM(S): 4 TABLET, CHEWABLE ORAL at 11:58

## 2018-05-22 RX ADMIN — OXYCODONE AND ACETAMINOPHEN 1 TABLET(S): 5; 325 TABLET ORAL at 06:31

## 2018-05-22 RX ADMIN — Medication 1200 MILLIGRAM(S): at 17:59

## 2018-05-22 RX ADMIN — TIOTROPIUM BROMIDE 1 CAPSULE(S): 18 CAPSULE ORAL; RESPIRATORY (INHALATION) at 11:58

## 2018-05-22 RX ADMIN — SENNA PLUS 2 TABLET(S): 8.6 TABLET ORAL at 21:30

## 2018-05-22 RX ADMIN — AMLODIPINE BESYLATE 10 MILLIGRAM(S): 2.5 TABLET ORAL at 05:02

## 2018-05-22 RX ADMIN — Medication 100 MILLIGRAM(S): at 05:02

## 2018-05-22 RX ADMIN — Medication 40 MILLIEQUIVALENT(S): at 00:36

## 2018-05-22 RX ADMIN — TAMSULOSIN HYDROCHLORIDE 0.4 MILLIGRAM(S): 0.4 CAPSULE ORAL at 11:59

## 2018-05-22 RX ADMIN — Medication 100 MILLIGRAM(S): at 17:58

## 2018-05-22 RX ADMIN — CARVEDILOL PHOSPHATE 12.5 MILLIGRAM(S): 80 CAPSULE, EXTENDED RELEASE ORAL at 17:58

## 2018-05-22 RX ADMIN — OXYCODONE AND ACETAMINOPHEN 1 TABLET(S): 5; 325 TABLET ORAL at 20:19

## 2018-05-22 RX ADMIN — OXYCODONE AND ACETAMINOPHEN 1 TABLET(S): 5; 325 TABLET ORAL at 05:01

## 2018-05-22 RX ADMIN — BUDESONIDE AND FORMOTEROL FUMARATE DIHYDRATE 2 PUFF(S): 160; 4.5 AEROSOL RESPIRATORY (INHALATION) at 05:02

## 2018-05-22 RX ADMIN — POLYETHYLENE GLYCOL 3350 17 GRAM(S): 17 POWDER, FOR SOLUTION ORAL at 11:59

## 2018-05-22 RX ADMIN — CARVEDILOL PHOSPHATE 12.5 MILLIGRAM(S): 80 CAPSULE, EXTENDED RELEASE ORAL at 05:02

## 2018-05-22 RX ADMIN — PANTOPRAZOLE SODIUM 40 MILLIGRAM(S): 20 TABLET, DELAYED RELEASE ORAL at 05:02

## 2018-05-22 RX ADMIN — SODIUM CHLORIDE 75 MILLILITER(S): 9 INJECTION INTRAMUSCULAR; INTRAVENOUS; SUBCUTANEOUS at 06:01

## 2018-05-22 RX ADMIN — QUETIAPINE FUMARATE 25 MILLIGRAM(S): 200 TABLET, FILM COATED ORAL at 21:30

## 2018-05-22 RX ADMIN — Medication 1200 MILLIGRAM(S): at 05:01

## 2018-05-22 NOTE — PROGRESS NOTE ADULT - PROBLEM SELECTOR PLAN 5
BP borderline elevated  c/w amlodipine 10mg daily  c/w hydralazine 100mg TID  c/w isosorbide 20mg TID  c/w coreg 12.5mg bid  monitor BP, adjust meds as needed

## 2018-05-22 NOTE — PROGRESS NOTE ADULT - SUBJECTIVE AND OBJECTIVE BOX
PAGER:  [phone]               [facility] [address]              EMAIL [email or web address]   OFFICE [phone]                              ********VASCULAR MEDICINE & CARDIOLOGY PROGRESS NOTE********                            CC:  L 2nd toe gangrene    INTERVAL HISTORY: Pt feels well with no complaints.         HISTORY OF PRESENT ILLNESS:  HPI:  History taken from chart and patient, as patient is a poor historian.    The patient is a 77 yo M PMH of DM2, HTN, HLD, PAD, CKD, BPH, Prostate cancer, psychosis, GERD, COPD, previously homeless who presents as a transfer for L foot dry gangrene. The patient states that 2 months ago, he was walking around and cut his foot on some glass and the wound never healed. The patient denies any CP, SOB, NVD, F, chills, rash, HA, dysuria. While at the outside hospital, the patient was evaluated by Vascular Surgery, who performed a CTA of the lower extremities and was found to have bilateral superficial femoral artery occlusions. The plan was for the patient to have a femoral popliteal bypass, however prior to that, vascular surgery requested Cards clearance. The patient was noted to have t wave inversions in V4-V6. The patient underwent a a stress test which was abnormal with a partially reversible defect involving the anterior, mid anterior and apical anterior segments consistent with probable ischemia of the LAD and possible infarct of the RCA. Global systolic function is moderately reduced with an EF of 36%. Stress test shows 2 mm downsloping ST depressions in V2-V6. X ray showed no evidence of osteomyelitis. Patient currently complaining of mild pain in his L infected toe. The patient was intially treated with broad spectrum ABX, however ID was consulted and the patient was monitored off IV ABX i nthe setting of dry gangrene and did not have any fevers. Blood cultures did not grow any organisms.    PAtient was recently at [facility] for SOB in the setting of pulmonary edema and had a LHC:  Mid RCA: The distal vessel was supplied by collaterals from  septal branches of LAD. There was a 100 % stenosis.     Left leg angiography showed  CFA:occluded  Profunda: patent  SFA: occluded  AK pop: occluded  BK pop: patent  TP trunk: patent  PT: patent to the foot  Peroneal: patent to the foot  AT: occluded  DP: occluded [id number] angiography. A catheter was positioned.  WENCESLAO: patent bl  Ext IA: patent bl (20 May 2018 20:23)          Allergies    No Known Allergies    Intolerances    	    MEDICATIONS:  amLODIPine   Tablet 10 milliGRAM(s) Oral daily  aspirin enteric coated 81 milliGRAM(s) Oral daily  carvedilol 12.5 milliGRAM(s) Oral every 12 hours  heparin  Injectable 5000 Unit(s) SubCutaneous every 8 hours  hydrALAZINE 100 milliGRAM(s) Oral every 8 hours  isosorbide   dinitrate Tablet (ISORDIL) 20 milliGRAM(s) Oral three times a day  tamsulosin 0.4 milliGRAM(s) Oral daily      ALBUTerol    90 MICROgram(s) HFA Inhaler 1 Puff(s) Inhalation every 6 hours PRN  buDESOnide 160 MICROgram(s)/formoterol 4.5 MICROgram(s) Inhaler 2 Puff(s) Inhalation two times a day  montelukast 10 milliGRAM(s) Oral daily  tiotropium 18 MICROgram(s) Capsule 1 Capsule(s) Inhalation daily    oxyCODONE    5 mG/acetaminophen 325 mG 1 Tablet(s) Oral every 6 hours PRN  QUEtiapine 25 milliGRAM(s) Oral at bedtime    docusate sodium 100 milliGRAM(s) Oral two times a day  pantoprazole    Tablet 40 milliGRAM(s) Oral before breakfast  polyethylene glycol 3350 17 Gram(s) Oral daily  senna 2 Tablet(s) Oral at bedtime    atorvastatin 80 milliGRAM(s) Oral at bedtime  dextrose 40% Gel 15 Gram(s) Oral once PRN  dextrose 50% Injectable 12.5 Gram(s) IV Push once  dextrose 50% Injectable 25 Gram(s) IV Push once  dextrose 50% Injectable 25 Gram(s) IV Push once  glucagon  Injectable 1 milliGRAM(s) IntraMuscular once PRN    dextrose 5%. 1000 milliLiter(s) IV Continuous <Continuous>  sodium chloride 0.9%. 1000 milliLiter(s) IV Continuous <Continuous>      PAST MEDICAL & SURGICAL HISTORY:  Prostate cancer  Peripheral arterial disease  Dry gangrene  HLD (hyperlipidemia)  Gastritis  CKD (chronic kidney disease)  Asthma  DM (diabetes mellitus)  HTN (hypertension)  No significant past surgical history      FAMILY HISTORY:  No pertinent family history in first degree relatives      SOCIAL HISTORY:  unchanged    REVIEW OF SYSTEMS:  CONSTITUTIONAL: No fever, weight loss, or fatigue  EYES: No eye pain, visual disturbances, or discharge  ENMT:  No difficulty hearing, tinnitus, vertigo; No sinus or throat pain  NECK: No pain or stiffness  RESPIRATORY: No cough, wheezing, chills or hemoptysis; No Shortness of Breath  CARDIOVASCULAR: No chest pain, palpitations, passing out, dizziness, or leg swelling  GASTROINTESTINAL: No abdominal or epigastric pain. No nausea, vomiting, or hematemesis; No diarrhea or constipation. No melena or hematochezia.  GENITOURINARY: No dysuria, frequency, hematuria, or incontinence  NEUROLOGICAL: No headaches, memory loss, loss of strength, numbness, or tremors  SKIN: No itching, burning, rashes, or lesions   LYMPH Nodes: No enlarged glands  ENDOCRINE: No heat or cold intolerance; No hair loss  MUSCULOSKELETAL: L foot pain, difficulty walking  PSYCHIATRIC: No depression, anxiety, mood swings, or difficulty sleeping  HEME/LYMPH: No easy bruising, or bleeding gums  ALLERY AND IMMUNOLOGIC: No hives or eczema	    [ ] All others negative	  [ ] Unable to obtain    PHYSICAL EXAM:  T(C): 36.5 (05-22-18 @ 07:43), Max: 36.7 (05-22-18 @ 04:38)  HR: 63 (05-22-18 @ 07:43) (59 - 84)  BP: 143/64 (05-22-18 @ 07:43) (143/64 - 170/79)  RR: 18 (05-22-18 @ 07:43) (17 - 20)  SpO2: 98% (05-22-18 @ 07:43) (97% - 100%)  Wt(kg): --  I&O's Summary    21 May 2018 07:01  -  22 May 2018 07:00  --------------------------------------------------------  IN: 840 mL / OUT: 2250 mL / NET: -1410 mL        Appearance: Normal	  HEENT:   Normal oral mucosa, PERRL, EOMI	  Lymphatic: No lymphadenopathy  Cardiovascular: Normal S1 S2, No JVD, No murmurs, No edema  Respiratory: Lungs clear to auscultation	  Psychiatry: A & O x 3, Mood & affect appropriate  Gastrointestinal:  Soft, Non-tender, + BS	  Skin: No rashes, No ecchymoses, No cyanosis	  Neurologic: Non-focal  Extremities: Normal range of motion, No clubbing, cyanosis or edema  Vascular: left second toe gangrene.  Absent DP/PT pulses bilateral LE      LABS:	 	    CBC Full  -  ( 22 May 2018 10:23 )  WBC Count : 3.7 K/uL  Hemoglobin : 13.6 g/dL  Hematocrit : 39.9 %  Platelet Count - Automated : 180 K/uL  Mean Cell Volume : 93.0 fl  Mean Cell Hemoglobin : 31.7 pg  Mean Cell Hemoglobin Concentration : 34.1 gm/dL      05-22    141  |  106  |  15  ----------------------------<  102<H>  4.5   |  23  |  1.47<H>  05-21    141  |  105  |  17  ----------------------------<  132<H>  3.2<L>   |  25  |  1.47<H>    Ca    9.3      22 May 2018 10:23  Ca    9.0      21 May 2018 10:42  Phos  3.2     05-21  Mg     2.1     05-21    TPro  6.3  /  Alb  3.5  /  TBili  0.5  /  DBili  x   /  AST  61<H>  /  ALT  125<H>  /  AlkPhos  172<H>  05-21

## 2018-05-22 NOTE — PROGRESS NOTE ADULT - PROBLEM SELECTOR PLAN 3
Afebrile, no leukocytosis, noninfected, no need for antibiotics at this time  appreciate podiatry and vascular recs  percocet prn pain

## 2018-05-22 NOTE — PROGRESS NOTE ADULT - ASSESSMENT
75 yo M PMH of DM2, HTN, HLD, PAD, CKD, BPH, Prostate cancer, psychosis, GERD, COPD, previously homeless who presents as a transfer for L foot dry gangrene found to have an abnormal stress test, transferred for possible heart cath and LE angiogram

## 2018-05-22 NOTE — PROGRESS NOTE ADULT - ASSESSMENT
76M with Alesia 4 critical limb ischemia with L 2nd digit gangrene, noted with CFA, SFA and pop occlusions on L, also with R sided femoral disease    - plan for LE angiogram today  -Mucomyst pre cath

## 2018-05-22 NOTE — PROGRESS NOTE ADULT - SUBJECTIVE AND OBJECTIVE BOX
Patient is a 77y old  Male who presents with a chief complaint of Positive Stress Test ahead of Preop evaluation (20 May 2018 20:23)      SUBJECTIVE / OVERNIGHT EVENTS: No overnight events, feels well. Denies chest pain, sob, LE pain.    MEDICATIONS  (STANDING):  acetylcysteine  Oral Solution 1200 milliGRAM(s) Oral every 12 hours  amLODIPine   Tablet 10 milliGRAM(s) Oral daily  aspirin enteric coated 81 milliGRAM(s) Oral daily  atorvastatin 80 milliGRAM(s) Oral at bedtime  buDESOnide 160 MICROgram(s)/formoterol 4.5 MICROgram(s) Inhaler 2 Puff(s) Inhalation two times a day  carvedilol 12.5 milliGRAM(s) Oral every 12 hours  dextrose 5%. 1000 milliLiter(s) (50 mL/Hr) IV Continuous <Continuous>  dextrose 50% Injectable 12.5 Gram(s) IV Push once  dextrose 50% Injectable 25 Gram(s) IV Push once  dextrose 50% Injectable 25 Gram(s) IV Push once  docusate sodium 100 milliGRAM(s) Oral two times a day  heparin  Injectable 5000 Unit(s) SubCutaneous every 8 hours  hydrALAZINE 100 milliGRAM(s) Oral every 8 hours  isosorbide   dinitrate Tablet (ISORDIL) 20 milliGRAM(s) Oral three times a day  montelukast 10 milliGRAM(s) Oral daily  pantoprazole    Tablet 40 milliGRAM(s) Oral before breakfast  polyethylene glycol 3350 17 Gram(s) Oral daily  QUEtiapine 25 milliGRAM(s) Oral at bedtime  senna 2 Tablet(s) Oral at bedtime  sodium chloride 0.9%. 1000 milliLiter(s) (75 mL/Hr) IV Continuous <Continuous>  tamsulosin 0.4 milliGRAM(s) Oral daily  tiotropium 18 MICROgram(s) Capsule 1 Capsule(s) Inhalation daily    MEDICATIONS  (PRN):  ALBUTerol    90 MICROgram(s) HFA Inhaler 1 Puff(s) Inhalation every 6 hours PRN Shortness of Breath and/or Wheezing  dextrose 40% Gel 15 Gram(s) Oral once PRN Blood Glucose LESS THAN 70 milliGRAM(s)/deciliter  glucagon  Injectable 1 milliGRAM(s) IntraMuscular once PRN Glucose LESS THAN 70 milligrams/deciliter  oxyCODONE    5 mG/acetaminophen 325 mG 1 Tablet(s) Oral every 6 hours PRN Severe Pain (7 - 10)      Vital Signs Last 24 Hrs  T(C): 36.5 (22 May 2018 07:43), Max: 36.7 (22 May 2018 04:38)  T(F): 97.7 (22 May 2018 07:43), Max: 98 (22 May 2018 04:38)  HR: 63 (22 May 2018 07:43) (59 - 84)  BP: 143/64 (22 May 2018 07:43) (143/64 - 170/79)  BP(mean): --  RR: 18 (22 May 2018 07:43) (17 - 20)  SpO2: 98% (22 May 2018 07:43) (97% - 100%)  CAPILLARY BLOOD GLUCOSE      POCT Blood Glucose.: 75 mg/dL (22 May 2018 07:34)  POCT Blood Glucose.: 95 mg/dL (21 May 2018 21:07)  POCT Blood Glucose.: 87 mg/dL (21 May 2018 17:12)  POCT Blood Glucose.: 125 mg/dL (21 May 2018 11:50)    I&O's Summary    21 May 2018 07:01  -  22 May 2018 07:00  --------------------------------------------------------  IN: 840 mL / OUT: 2250 mL / NET: -1410 mL    22 May 2018 07:01  -  22 May 2018 11:15  --------------------------------------------------------  IN: 240 mL / OUT: 400 mL / NET: -160 mL        PHYSICAL EXAM:  GENERAL: NAD, well-developed  HEAD:  Atraumatic, Normocephalic  NECK: Supple, No JVD  CHEST/LUNG: Clear to auscultation bilaterally; No wheeze  HEART: Regular rate and rhythm;   ABDOMEN: Soft, Nontender, Nondistended; Bowel sounds present  NEUROLOGY: non-focal  SKIN: dry gangrene of 2nd toe left foot, no pus, odor, discharge, tenderness    LABS:                        13.6   3.7   )-----------( 180      ( 22 May 2018 10:23 )             39.9     05-    141  |  106  |  15  ----------------------------<  102<H>  4.5   |  23  |  1.47<H>    Ca    9.3      22 May 2018 10:23  Phos  3.2       Mg     2.1         TPro  6.3  /  Alb  3.5  /  TBili  0.5  /  DBili  x   /  AST  61<H>  /  ALT  125<H>  /  AlkPhos  172<H>        CARDIAC MARKERS ( 21 May 2018 10:42 )  x     / <0.01 ng/mL / 68 U/L / x     / 3.0 ng/mL      Urinalysis Basic - ( 20 May 2018 22:51 )    Color: Yellow / Appearance: Clear / S.013 / pH: x  Gluc: x / Ketone: Negative  / Bili: Negative / Urobili: Negative   Blood: x / Protein: Negative / Nitrite: Negative   Leuk Esterase: Negative / RBC: x / WBC x   Sq Epi: x / Non Sq Epi: x / Bacteria: x            RADIOLOGY & ADDITIONAL TESTS:    tele reviewed sinus 50-70s 1st degree block, sinus irwin to 40 while sleeping    Imaging Personally Reviewed: KRISS Arterial Duplex: Impression: There is severe right lower extremity arterial vascular   disease and even more severe disease on the left.    On the right, there is an occluded superficial femoral artery in the   proximal thigh. There is a mild flow-limiting stenosis of the right deep   femoral artery.    On the left there is a high grade flow-limiting stenosis of the common   femoral artery and an occluded superficial femoral artery in the proximal   and mid thigh. There is an occluded left peroneal artery and the dorsal   pedis artery is occluded in the left foot.      GRISEL/PVR: Impression: There is bilateral lower extremity arterial disease more   severe on the left.  There is bilateral aorto iliac segment disease.  The bilateral femoral-popliteal segment disease evident on the arterial   Doppler this same day, is not apparent on this examination.  There is trifurcation artery disease on the left.          Consultant(s) Notes Reviewed:  cards, EP    Care Discussed with Consultants/Other Providers:

## 2018-05-23 DIAGNOSIS — I25.10 ATHEROSCLEROTIC HEART DISEASE OF NATIVE CORONARY ARTERY WITHOUT ANGINA PECTORIS: ICD-10-CM

## 2018-05-23 DIAGNOSIS — I10 ESSENTIAL (PRIMARY) HYPERTENSION: ICD-10-CM

## 2018-05-23 LAB
ANION GAP SERPL CALC-SCNC: 13 MMOL/L — SIGNIFICANT CHANGE UP (ref 5–17)
BUN SERPL-MCNC: 16 MG/DL — SIGNIFICANT CHANGE UP (ref 7–23)
CALCIUM SERPL-MCNC: 9.6 MG/DL — SIGNIFICANT CHANGE UP (ref 8.4–10.5)
CHLORIDE SERPL-SCNC: 104 MMOL/L — SIGNIFICANT CHANGE UP (ref 96–108)
CO2 SERPL-SCNC: 24 MMOL/L — SIGNIFICANT CHANGE UP (ref 22–31)
CREAT SERPL-MCNC: 1.48 MG/DL — HIGH (ref 0.5–1.3)
GLUCOSE BLDC GLUCOMTR-MCNC: 105 MG/DL — HIGH (ref 70–99)
GLUCOSE SERPL-MCNC: 122 MG/DL — HIGH (ref 70–99)
HBA1C BLD-MCNC: 5.3 % — SIGNIFICANT CHANGE UP (ref 4–5.6)
HCT VFR BLD CALC: 42.1 % — SIGNIFICANT CHANGE UP (ref 39–50)
HGB BLD-MCNC: 14.2 G/DL — SIGNIFICANT CHANGE UP (ref 13–17)
MCHC RBC-ENTMCNC: 31.2 PG — SIGNIFICANT CHANGE UP (ref 27–34)
MCHC RBC-ENTMCNC: 33.6 GM/DL — SIGNIFICANT CHANGE UP (ref 32–36)
MCV RBC AUTO: 92.8 FL — SIGNIFICANT CHANGE UP (ref 80–100)
PLATELET # BLD AUTO: 186 K/UL — SIGNIFICANT CHANGE UP (ref 150–400)
POTASSIUM SERPL-MCNC: 3.7 MMOL/L — SIGNIFICANT CHANGE UP (ref 3.5–5.3)
POTASSIUM SERPL-SCNC: 3.7 MMOL/L — SIGNIFICANT CHANGE UP (ref 3.5–5.3)
RBC # BLD: 4.54 M/UL — SIGNIFICANT CHANGE UP (ref 4.2–5.8)
RBC # FLD: 13 % — SIGNIFICANT CHANGE UP (ref 10.3–14.5)
SODIUM SERPL-SCNC: 141 MMOL/L — SIGNIFICANT CHANGE UP (ref 135–145)
WBC # BLD: 4 K/UL — SIGNIFICANT CHANGE UP (ref 3.8–10.5)
WBC # FLD AUTO: 4 K/UL — SIGNIFICANT CHANGE UP (ref 3.8–10.5)

## 2018-05-23 PROCEDURE — 99233 SBSQ HOSP IP/OBS HIGH 50: CPT

## 2018-05-23 RX ORDER — OXYCODONE AND ACETAMINOPHEN 5; 325 MG/1; MG/1
1 TABLET ORAL ONCE
Qty: 0 | Refills: 0 | Status: DISCONTINUED | OUTPATIENT
Start: 2018-05-23 | End: 2018-05-24

## 2018-05-23 RX ORDER — ISOSORBIDE DINITRATE 5 MG/1
40 TABLET ORAL THREE TIMES A DAY
Qty: 0 | Refills: 0 | Status: DISCONTINUED | OUTPATIENT
Start: 2018-05-23 | End: 2018-05-31

## 2018-05-23 RX ORDER — CARVEDILOL PHOSPHATE 80 MG/1
25 CAPSULE, EXTENDED RELEASE ORAL EVERY 12 HOURS
Qty: 0 | Refills: 0 | Status: DISCONTINUED | OUTPATIENT
Start: 2018-05-23 | End: 2018-05-23

## 2018-05-23 RX ORDER — OXYCODONE AND ACETAMINOPHEN 5; 325 MG/1; MG/1
1 TABLET ORAL EVERY 4 HOURS
Qty: 0 | Refills: 0 | Status: DISCONTINUED | OUTPATIENT
Start: 2018-05-23 | End: 2018-05-23

## 2018-05-23 RX ADMIN — TAMSULOSIN HYDROCHLORIDE 0.4 MILLIGRAM(S): 0.4 CAPSULE ORAL at 12:07

## 2018-05-23 RX ADMIN — Medication 100 MILLIGRAM(S): at 18:01

## 2018-05-23 RX ADMIN — CARVEDILOL PHOSPHATE 12.5 MILLIGRAM(S): 80 CAPSULE, EXTENDED RELEASE ORAL at 08:18

## 2018-05-23 RX ADMIN — TIOTROPIUM BROMIDE 1 CAPSULE(S): 18 CAPSULE ORAL; RESPIRATORY (INHALATION) at 12:08

## 2018-05-23 RX ADMIN — ISOSORBIDE DINITRATE 40 MILLIGRAM(S): 5 TABLET ORAL at 15:37

## 2018-05-23 RX ADMIN — BUDESONIDE AND FORMOTEROL FUMARATE DIHYDRATE 2 PUFF(S): 160; 4.5 AEROSOL RESPIRATORY (INHALATION) at 08:17

## 2018-05-23 RX ADMIN — Medication 100 MILLIGRAM(S): at 15:37

## 2018-05-23 RX ADMIN — QUETIAPINE FUMARATE 25 MILLIGRAM(S): 200 TABLET, FILM COATED ORAL at 21:49

## 2018-05-23 RX ADMIN — ISOSORBIDE DINITRATE 20 MILLIGRAM(S): 5 TABLET ORAL at 08:17

## 2018-05-23 RX ADMIN — BUDESONIDE AND FORMOTEROL FUMARATE DIHYDRATE 2 PUFF(S): 160; 4.5 AEROSOL RESPIRATORY (INHALATION) at 18:01

## 2018-05-23 RX ADMIN — Medication 1200 MILLIGRAM(S): at 08:19

## 2018-05-23 RX ADMIN — MONTELUKAST 10 MILLIGRAM(S): 4 TABLET, CHEWABLE ORAL at 12:08

## 2018-05-23 RX ADMIN — Medication 100 MILLIGRAM(S): at 08:18

## 2018-05-23 RX ADMIN — OXYCODONE AND ACETAMINOPHEN 1 TABLET(S): 5; 325 TABLET ORAL at 08:42

## 2018-05-23 RX ADMIN — OXYCODONE AND ACETAMINOPHEN 1 TABLET(S): 5; 325 TABLET ORAL at 18:01

## 2018-05-23 RX ADMIN — AMLODIPINE BESYLATE 10 MILLIGRAM(S): 2.5 TABLET ORAL at 08:18

## 2018-05-23 RX ADMIN — CLOPIDOGREL BISULFATE 75 MILLIGRAM(S): 75 TABLET, FILM COATED ORAL at 08:18

## 2018-05-23 RX ADMIN — ISOSORBIDE DINITRATE 40 MILLIGRAM(S): 5 TABLET ORAL at 21:49

## 2018-05-23 RX ADMIN — ATORVASTATIN CALCIUM 80 MILLIGRAM(S): 80 TABLET, FILM COATED ORAL at 21:15

## 2018-05-23 RX ADMIN — PANTOPRAZOLE SODIUM 40 MILLIGRAM(S): 20 TABLET, DELAYED RELEASE ORAL at 08:17

## 2018-05-23 RX ADMIN — OXYCODONE AND ACETAMINOPHEN 1 TABLET(S): 5; 325 TABLET ORAL at 08:14

## 2018-05-23 RX ADMIN — POLYETHYLENE GLYCOL 3350 17 GRAM(S): 17 POWDER, FOR SOLUTION ORAL at 12:07

## 2018-05-23 RX ADMIN — Medication 100 MILLIGRAM(S): at 21:15

## 2018-05-23 RX ADMIN — Medication 81 MILLIGRAM(S): at 08:18

## 2018-05-23 RX ADMIN — HEPARIN SODIUM 5000 UNIT(S): 5000 INJECTION INTRAVENOUS; SUBCUTANEOUS at 21:15

## 2018-05-23 RX ADMIN — HEPARIN SODIUM 5000 UNIT(S): 5000 INJECTION INTRAVENOUS; SUBCUTANEOUS at 15:36

## 2018-05-23 NOTE — PROGRESS NOTE ADULT - PROBLEM SELECTOR PLAN 5
HTN uncontrolled  increased coreg to 25mg bid by EP  increased isosorbide to 40mg TID by EP  c/w amlodipine 10mg daily  c/w hydralazine 100mg TID  monitor BP, adjust meds as needed

## 2018-05-23 NOTE — PROGRESS NOTE ADULT - ASSESSMENT
76M with Alesia 4 critical limb ischemia with L 2nd digit gangrene, noted with CFA, SFA and pop occlusions on L, also with R sided femoral disease. S/p LSFA PTCA    -Cont asa/plavix, statin   -Partial ray amputation planned by podiatry  -Monitor R groin site 76M with Alesia 4 critical limb ischemia with L 2nd digit gangrene, noted with CFA, SFA and pop occlusions on L, also with R sided femoral disease. S/p LSFA PTCA    Doing well post intervention.  Groin access site now stable  Await GRISEL/PVR today to assess distal perfusion - if it is adequate, then will proceed with amp with podiatry

## 2018-05-23 NOTE — PROGRESS NOTE ADULT - ASSESSMENT
77 yo M PMH of DM2, HTN, HLD, PAD, CKD, BPH, Prostate cancer, psychosis, GERD, COPD, previously homeless who presents as a transfer for L foot dry gangrene found to have an abnormal stress test, transferred LE angiogram now s/p angioplasty to r/o LLE.

## 2018-05-23 NOTE — PROGRESS NOTE ADULT - ASSESSMENT
77 year male with DM HTN PVD ischemic CM. with left toe gangrene, post angioplasty and stent of Left SFA, mild oozing BP out of control.

## 2018-05-23 NOTE — PROGRESS NOTE ADULT - PROBLEM SELECTOR PLAN 3
outside hospital stress showed partially reversible defect involving the anterior, mid anterior and apical anterior segments consistent with probable ischemia of the LAD and possible infarct of the RCA.   -nuclear stress at OSH showed Global systolic function is moderately reduced with an EF of 36% and 2 mm downsloping ST depressions in V2-V6.   -discussed with cards patient recently had negative cardiac cath, no need for repeat cath despite   -Continue with ASA, Carvedilol, Lipitor

## 2018-05-23 NOTE — PROGRESS NOTE ADULT - ASSESSMENT
76 y/o M w/ L foot 2nd toe dry gangrene  - Pt seen and examined  - Stable dry gangrene, no need for abx per podiatry at this time  - Betadine paint applied  - consented for partial 2nd ray resection, tentatively planned for Friday pending angio results & possible further intervention  - Weight bear as tolerated in surgical shoe to left foot  - Will cont to monitor

## 2018-05-23 NOTE — PROGRESS NOTE ADULT - SUBJECTIVE AND OBJECTIVE BOX
INTERVAL HPI/OVERNIGHT EVENTS:patient noted hypertensive, received AM meds only recently, but event over past 2 days BP out of control. mild oozing from left groin. prior cardiac workup 4/2018  reveals that the patient has a mild - moderate ischemic CM with 100% RCA and 70 % LCx stenosis    MEDICATIONS  (STANDING):  amLODIPine   Tablet 10 milliGRAM(s) Oral daily  aspirin enteric coated 81 milliGRAM(s) Oral daily  atorvastatin 80 milliGRAM(s) Oral at bedtime  buDESOnide 160 MICROgram(s)/formoterol 4.5 MICROgram(s) Inhaler 2 Puff(s) Inhalation two times a day  carvedilol 25 milliGRAM(s) Oral every 12 hours  clopidogrel Tablet 75 milliGRAM(s) Oral daily  dextrose 5%. 1000 milliLiter(s) (50 mL/Hr) IV Continuous <Continuous>  dextrose 50% Injectable 12.5 Gram(s) IV Push once  dextrose 50% Injectable 25 Gram(s) IV Push once  dextrose 50% Injectable 25 Gram(s) IV Push once  docusate sodium 100 milliGRAM(s) Oral two times a day  heparin  Injectable 5000 Unit(s) SubCutaneous every 8 hours  hydrALAZINE 100 milliGRAM(s) Oral every 8 hours  isosorbide   dinitrate Tablet (ISORDIL) 40 milliGRAM(s) Oral three times a day  lidocaine 1%/EPINEPHrine 1:100,000 Inj 0.2 milliLiter(s) Local Injection once  montelukast 10 milliGRAM(s) Oral daily  pantoprazole    Tablet 40 milliGRAM(s) Oral before breakfast  polyethylene glycol 3350 17 Gram(s) Oral daily  QUEtiapine 25 milliGRAM(s) Oral at bedtime  senna 2 Tablet(s) Oral at bedtime  sodium chloride 0.9%. 1000 milliLiter(s) (75 mL/Hr) IV Continuous <Continuous>  tamsulosin 0.4 milliGRAM(s) Oral daily  tiotropium 18 MICROgram(s) Capsule 1 Capsule(s) Inhalation daily    MEDICATIONS  (PRN):  ALBUTerol    90 MICROgram(s) HFA Inhaler 1 Puff(s) Inhalation every 6 hours PRN Shortness of Breath and/or Wheezing  dextrose 40% Gel 15 Gram(s) Oral once PRN Blood Glucose LESS THAN 70 milliGRAM(s)/deciliter  glucagon  Injectable 1 milliGRAM(s) IntraMuscular once PRN Glucose LESS THAN 70 milligrams/deciliter  oxyCODONE    5 mG/acetaminophen 325 mG 1 Tablet(s) Oral every 6 hours PRN Severe Pain (7 - 10)  oxyCODONE    5 mG/acetaminophen 325 mG 1 Tablet(s) Oral once PRN Moderate Pain (4 - 6)      Allergies    No Known Allergies    Intolerances      ROS:  General: Pt denies recent weight loss/fever/chills    Neurological: denies numbness or  sensation loss    Cardiovascular: denies chest pain/palpitations/leg edema    Respiratory and Thorax: denies SOB/cough/wheezing    Gastrointestinal: denies abdominal pain/diarrhea/constipation/bloody stool    Genitourinary: denies urinary frequency/urgency/ dysuria    Musculoskeletal: mild discomfort at site of entry left lower extremity. mild foot pain    Hematologic: denies abnormal bleeding  	    	  	    		        	    	            Vital Signs Last 24 Hrs  T(C): 36.8 (23 May 2018 06:44), Max: 37 (22 May 2018 14:21)  T(F): 98.3 (23 May 2018 06:44), Max: 98.6 (22 May 2018 14:21)  HR: 76 (23 May 2018 08:12) (56 - 82)  BP: 179/96 (23 May 2018 08:12) (135/62 - 190/78)  BP(mean): --  RR: 18 (23 May 2018 08:12) (18 - 19)  SpO2: 96% (23 May 2018 08:12) (93% - 100%)  Daily     Daily     05-22 @ 07:01 - 05-23 @ 07:00  --------------------------------------------------------  IN: 240 mL / OUT: 1500 mL / NET: -1260 mL    05-23 @ 07:01  -  05-23 @ 08:58  --------------------------------------------------------  IN: 210 mL / OUT: 0 mL / NET: 210 mL      Physical Exam: wdwn male  no JVD  CORRRR  lung clear   abd soft  ext no edema , pedal pulses absent, scant ooze no hematoma on left groin      LABS:                        13.6   3.7   )-----------( 180      ( 22 May 2018 10:23 )             39.9     05-22    141  |  106  |  15  ----------------------------<  102<H>  4.5   |  23  |  1.47<H>    Ca    9.3      22 May 2018 10:23  Phos  3.2     05-21  Mg     2.1     05-21    TPro  6.3  /  Alb  3.5  /  TBili  0.5  /  DBili  x   /  AST  61<H>  /  ALT  125<H>  /  AlkPhos  172<H>  05-21          RADIOLOGY & ADDITIONAL TESTS:    TELE:    EKG:

## 2018-05-23 NOTE — PROGRESS NOTE ADULT - SUBJECTIVE AND OBJECTIVE BOX
Patient is a 77y old  Male who presents with a chief complaint of Positive Stress Test ahead of Preop evaluation (20 May 2018 20:23)      SUBJECTIVE / OVERNIGHT EVENTS: S/p Left SFA stent yesterday. Patient denies any pain feels fine    MEDICATIONS  (STANDING):  amLODIPine   Tablet 10 milliGRAM(s) Oral daily  aspirin enteric coated 81 milliGRAM(s) Oral daily  atorvastatin 80 milliGRAM(s) Oral at bedtime  buDESOnide 160 MICROgram(s)/formoterol 4.5 MICROgram(s) Inhaler 2 Puff(s) Inhalation two times a day  clopidogrel Tablet 75 milliGRAM(s) Oral daily  dextrose 5%. 1000 milliLiter(s) (50 mL/Hr) IV Continuous <Continuous>  dextrose 50% Injectable 12.5 Gram(s) IV Push once  dextrose 50% Injectable 25 Gram(s) IV Push once  dextrose 50% Injectable 25 Gram(s) IV Push once  docusate sodium 100 milliGRAM(s) Oral two times a day  heparin  Injectable 5000 Unit(s) SubCutaneous every 8 hours  hydrALAZINE 100 milliGRAM(s) Oral every 8 hours  isosorbide   dinitrate Tablet (ISORDIL) 40 milliGRAM(s) Oral three times a day  lidocaine 1%/EPINEPHrine 1:100,000 Inj 0.2 milliLiter(s) Local Injection once  montelukast 10 milliGRAM(s) Oral daily  pantoprazole    Tablet 40 milliGRAM(s) Oral before breakfast  polyethylene glycol 3350 17 Gram(s) Oral daily  QUEtiapine 25 milliGRAM(s) Oral at bedtime  senna 2 Tablet(s) Oral at bedtime  sodium chloride 0.9%. 1000 milliLiter(s) (75 mL/Hr) IV Continuous <Continuous>  tamsulosin 0.4 milliGRAM(s) Oral daily  tiotropium 18 MICROgram(s) Capsule 1 Capsule(s) Inhalation daily    MEDICATIONS  (PRN):  ALBUTerol    90 MICROgram(s) HFA Inhaler 1 Puff(s) Inhalation every 6 hours PRN Shortness of Breath and/or Wheezing  dextrose 40% Gel 15 Gram(s) Oral once PRN Blood Glucose LESS THAN 70 milliGRAM(s)/deciliter  glucagon  Injectable 1 milliGRAM(s) IntraMuscular once PRN Glucose LESS THAN 70 milligrams/deciliter  oxyCODONE    5 mG/acetaminophen 325 mG 1 Tablet(s) Oral every 6 hours PRN Severe Pain (7 - 10)  oxyCODONE    5 mG/acetaminophen 325 mG 1 Tablet(s) Oral once PRN Moderate Pain (4 - 6)      Vital Signs Last 24 Hrs  T(C): 37 (23 May 2018 13:16), Max: 37 (23 May 2018 13:16)  T(F): 98.6 (23 May 2018 13:16), Max: 98.6 (23 May 2018 13:16)  HR: 62 (23 May 2018 13:16) (56 - 93)  BP: 132/64 (23 May 2018 13:16) (132/64 - 190/78)  BP(mean): --  RR: 17 (23 May 2018 13:16) (17 - 19)  SpO2: 98% (23 May 2018 13:16) (93% - 100%)  CAPILLARY BLOOD GLUCOSE        I&O's Summary    22 May 2018 07:01  -  23 May 2018 07:00  --------------------------------------------------------  IN: 240 mL / OUT: 1500 mL / NET: -1260 mL    23 May 2018 07:01  -  23 May 2018 15:49  --------------------------------------------------------  IN: 210 mL / OUT: 0 mL / NET: 210 mL        PHYSICAL EXAM:  GENERAL: NAD, well-developed  HEAD:  Atraumatic, Normocephalic  CHEST/LUNG: Clear to auscultation bilaterally; No wheeze  HEART: Regular rate and rhythm; No murmurs, rubs, or gallops  ABDOMEN: Soft, Nontender, Nondistended; Bowel sounds present  EXTREMITIES:  2+ Peripheral Pulses, No clubbing, cyanosis, or edema. R groin site appears normal, no swelling. unchanged dry gangrene of 2nd digit on left  PSYCH: AAOx3  NEUROLOGY: non-focal      LABS:                        14.2   4.0   )-----------( 186      ( 23 May 2018 08:50 )             42.1     05-23    141  |  104  |  16  ----------------------------<  122<H>  3.7   |  24  |  1.48<H>    Ca    9.6      23 May 2018 08:50            RADIOLOGY & ADDITIONAL TESTS:    tele sinus 60-80s, 9 beats wct, blocked pac, trigemny    Imaging Personally Reviewed:    Consultant(s) Notes Reviewed:  podiatry, cards,     Care Discussed with Consultants/Other Providers: Dr jackson, s/p left Lower extremity stent, repeat GRISEL

## 2018-05-23 NOTE — PROGRESS NOTE ADULT - SUBJECTIVE AND OBJECTIVE BOX
PAGER:  [phone]               [facility] [id number]              EMAIL [email or web address]   OFFICE [phone]                              ********VASCULAR MEDICINE & CARDIOLOGY PROGRESS NOTE********                            CC:  CLI LLE     INTERVAL HISTORY: S/p PTCA to L SFA. Oozing from R groin overnight        HISTORY OF PRESENT ILLNESS:  HPI:  History taken from chart and patient, as patient is a poor historian.    The patient is a 75 yo M PMH of DM2, HTN, HLD, PAD, CKD, BPH, Prostate cancer, psychosis, GERD, COPD, previously homeless who presents as a transfer for L foot dry gangrene. The patient states that 2 months ago, he was walking around and cut his foot on some glass and the wound never healed. The patient denies any CP, SOB, NVD, F, chills, rash, HA, dysuria. While at the outside hospital, the patient was evaluated by Vascular Surgery, who performed a CTA of the lower extremities and was found to have bilateral superficial femoral artery occlusions. The plan was for the patient to have a femoral popliteal bypass, however prior to that, vascular surgery requested Cards clearance. The patient was noted to have t wave inversions in V4-V6. The patient underwent a a stress test which was abnormal with a partially reversible defect involving the anterior, mid anterior and apical anterior segments consistent with probable ischemia of the LAD and possible infarct of the RCA. Global systolic function is moderately reduced with an EF of 36%. Stress test shows 2 mm downsloping ST depressions in V2-V6. X ray showed no evidence of osteomyelitis. Patient currently complaining of mild pain in his L infected toe. The patient was intially treated with broad spectrum ABX, however ID was consulted and the patient was monitored off IV ABX i nthe setting of dry gangrene and did not have any fevers. Blood cultures did not grow any organisms.    PAtient was recently at [facility] for SOB in the setting of pulmonary edema and had a LHC:  Mid RCA: The distal vessel was supplied by collaterals from  septal branches of LAD. There was a 100 % stenosis.     Left leg angiography showed  CFA:occluded  Profunda: patent  SFA: occluded  AK pop: occluded  BK pop: patent  TP trunk: patent  PT: patent to the foot  Peroneal: patent to the foot  AT: occluded  DP: occluded Kgduf-wpam-ofryhndjn angiography. A catheter was positioned.  WENCESLAO: patent bl  Ext IA: patent bl (20 May 2018 20:23)          Allergies    No Known Allergies    Intolerances    	    MEDICATIONS:  amLODIPine   Tablet 10 milliGRAM(s) Oral daily  aspirin enteric coated 81 milliGRAM(s) Oral daily  clopidogrel Tablet 75 milliGRAM(s) Oral daily  heparin  Injectable 5000 Unit(s) SubCutaneous every 8 hours  hydrALAZINE 100 milliGRAM(s) Oral every 8 hours  isosorbide   dinitrate Tablet (ISORDIL) 40 milliGRAM(s) Oral three times a day  tamsulosin 0.4 milliGRAM(s) Oral daily      ALBUTerol    90 MICROgram(s) HFA Inhaler 1 Puff(s) Inhalation every 6 hours PRN  buDESOnide 160 MICROgram(s)/formoterol 4.5 MICROgram(s) Inhaler 2 Puff(s) Inhalation two times a day  montelukast 10 milliGRAM(s) Oral daily  tiotropium 18 MICROgram(s) Capsule 1 Capsule(s) Inhalation daily    oxyCODONE    5 mG/acetaminophen 325 mG 1 Tablet(s) Oral every 6 hours PRN  oxyCODONE    5 mG/acetaminophen 325 mG 1 Tablet(s) Oral once PRN  QUEtiapine 25 milliGRAM(s) Oral at bedtime    docusate sodium 100 milliGRAM(s) Oral two times a day  pantoprazole    Tablet 40 milliGRAM(s) Oral before breakfast  polyethylene glycol 3350 17 Gram(s) Oral daily  senna 2 Tablet(s) Oral at bedtime    atorvastatin 80 milliGRAM(s) Oral at bedtime  dextrose 40% Gel 15 Gram(s) Oral once PRN  dextrose 50% Injectable 12.5 Gram(s) IV Push once  dextrose 50% Injectable 25 Gram(s) IV Push once  dextrose 50% Injectable 25 Gram(s) IV Push once  glucagon  Injectable 1 milliGRAM(s) IntraMuscular once PRN    dextrose 5%. 1000 milliLiter(s) IV Continuous <Continuous>  sodium chloride 0.9%. 1000 milliLiter(s) IV Continuous <Continuous>      PAST MEDICAL & SURGICAL HISTORY:  Prostate cancer  Peripheral arterial disease  Dry gangrene  HLD (hyperlipidemia)  Gastritis  CKD (chronic kidney disease)  Asthma  DM (diabetes mellitus)  HTN (hypertension)  No significant past surgical history      FAMILY HISTORY:  No pertinent family history in first degree relatives      SOCIAL HISTORY:  unchanged    REVIEW OF SYSTEMS:  CONSTITUTIONAL: No fever, weight loss, or fatigue  EYES: No eye pain, visual disturbances, or discharge  ENMT:  No difficulty hearing, tinnitus, vertigo; No sinus or throat pain  NECK: No pain or stiffness  RESPIRATORY: No cough, wheezing, chills or hemoptysis; No Shortness of Breath  CARDIOVASCULAR: No chest pain, palpitations, passing out, dizziness, or leg swelling  GASTROINTESTINAL: No abdominal or epigastric pain. No nausea, vomiting, or hematemesis; No diarrhea or constipation. No melena or hematochezia.  GENITOURINARY: No dysuria, frequency, hematuria, or incontinence  NEUROLOGICAL: No headaches, memory loss, loss of strength, numbness, or tremors  SKIN: No itching, burning, rashes, or lesions   LYMPH Nodes: No enlarged glands  ENDOCRINE: No heat or cold intolerance; No hair loss  MUSCULOSKELETAL: L foot pain with walking  PSYCHIATRIC: No depression, anxiety, mood swings, or difficulty sleeping  HEME/LYMPH: No easy bruising, or bleeding gums  ALLERY AND IMMUNOLOGIC: No hives or eczema	    PHYSICAL EXAM:  T(C): 36.8 (05-23-18 @ 06:44), Max: 37 (05-22-18 @ 14:21)  HR: 93 (05-23-18 @ 10:45) (56 - 93)  BP: 161/64 (05-23-18 @ 10:45) (155/65 - 190/78)  RR: 18 (05-23-18 @ 10:45) (18 - 19)  SpO2: 96% (05-23-18 @ 10:45) (93% - 100%)  Wt(kg): --  I&O's Summary    22 May 2018 07:01  -  23 May 2018 07:00  --------------------------------------------------------  IN: 240 mL / OUT: 1500 mL / NET: -1260 mL    23 May 2018 07:01  -  23 May 2018 12:45  --------------------------------------------------------  IN: 210 mL / OUT: 0 mL / NET: 210 mL        Appearance: Normal	  HEENT:   Normal oral mucosa, PERRL, EOMI	  Lymphatic: No lymphadenopathy  Cardiovascular: Normal S1 S2, No JVD, No murmurs, No edema  Respiratory: Lungs clear to auscultation	  Psychiatry: A & O x 3, Mood & affect appropriate  Gastrointestinal:  Soft, Non-tender, + BS	  Skin: No rashes, No ecchymoses, No cyanosis	  Neurologic: Non-focal  Extremities: Normal range of motion, No clubbing, cyanosis or edema  Vascular: Nonpalpable pulses bilaterally. R groin soft, no hematoma, no bleeding.  L 2nd toe gangrene      LABS:	 	    CBC Full  -  ( 23 May 2018 08:50 )  WBC Count : 4.0 K/uL  Hemoglobin : 14.2 g/dL  Hematocrit : 42.1 %  Platelet Count - Automated : 186 K/uL  Mean Cell Volume : 92.8 fl  Mean Cell Hemoglobin : 31.2 pg  Mean Cell Hemoglobin Concentration : 33.6 gm/dL      05-23    141  |  104  |  16  ----------------------------<  122<H>  3.7   |  24  |  1.48<H>  05-22    141  |  106  |  15  ----------------------------<  102<H>  4.5   |  23  |  1.47<H>    Ca    9.6      23 May 2018 08:50  Ca    9.3      22 May 2018 10:23

## 2018-05-23 NOTE — PROGRESS NOTE ADULT - SUBJECTIVE AND OBJECTIVE BOX
Patient is a 77y old  Male who presents with a chief complaint of Positive Stress Test ahead of Preop evaluation (20 May 2018 20:23)       INTERVAL HPI/OVERNIGHT EVENTS:  Patient seen and evaluated at bedside.  Pt is resting comfortable in NAD. Denies N/V/F/C.      Allergies    No Known Allergies    Intolerances        Vital Signs Last 24 Hrs  T(C): 36.8 (23 May 2018 06:44), Max: 37 (22 May 2018 14:21)  T(F): 98.3 (23 May 2018 06:44), Max: 98.6 (22 May 2018 14:21)  HR: 93 (23 May 2018 10:45) (56 - 93)  BP: 161/64 (23 May 2018 10:45) (135/62 - 190/78)  BP(mean): --  RR: 18 (23 May 2018 10:45) (18 - 19)  SpO2: 96% (23 May 2018 10:45) (93% - 100%)    LABS:                        14.2   4.0   )-----------( 186      ( 23 May 2018 08:50 )             42.1     05-23    141  |  104  |  16  ----------------------------<  122<H>  3.7   |  24  |  1.48<H>    Ca    9.6      23 May 2018 08:50          CAPILLARY BLOOD GLUCOSE      POCT Blood Glucose.: 159 mg/dL (22 May 2018 12:10)      Lower Extremity Physical Exam:  Vascular: DP/PT 0/4, B/L, CFT <3 seconds B/L w/ exception to LF 2nd toe no CFT, Temperature gradient WNL, B/L.   Neuro: Epicritic sensation intact to the level of foot, B/L Tender to palpation to left 2nd toe. Severe HAV deformity, bilateral, R>L, with overlapping 2nd toes. palpable bony prominence lateral 5th met base left foot. Cavus foot type bilateral.  Skin: dry gangrenous distal tip LF 2nd toe , no SOI no purulence no drainage, no malodor, no erythema nor streaking, no open lesions, no periwound edema     RADIOLOGY & ADDITIONAL TESTS:

## 2018-05-24 ENCOUNTER — TRANSCRIPTION ENCOUNTER (OUTPATIENT)
Age: 78
End: 2018-05-24

## 2018-05-24 DIAGNOSIS — R74.0 NONSPECIFIC ELEVATION OF LEVELS OF TRANSAMINASE AND LACTIC ACID DEHYDROGENASE [LDH]: ICD-10-CM

## 2018-05-24 DIAGNOSIS — I50.20 UNSPECIFIED SYSTOLIC (CONGESTIVE) HEART FAILURE: ICD-10-CM

## 2018-05-24 LAB
ANION GAP SERPL CALC-SCNC: 12 MMOL/L — SIGNIFICANT CHANGE UP (ref 5–17)
BUN SERPL-MCNC: 19 MG/DL — SIGNIFICANT CHANGE UP (ref 7–23)
CALCIUM SERPL-MCNC: 8.7 MG/DL — SIGNIFICANT CHANGE UP (ref 8.4–10.5)
CHLORIDE SERPL-SCNC: 106 MMOL/L — SIGNIFICANT CHANGE UP (ref 96–108)
CO2 SERPL-SCNC: 23 MMOL/L — SIGNIFICANT CHANGE UP (ref 22–31)
CREAT SERPL-MCNC: 1.67 MG/DL — HIGH (ref 0.5–1.3)
GLUCOSE SERPL-MCNC: 77 MG/DL — SIGNIFICANT CHANGE UP (ref 70–99)
HCT VFR BLD CALC: 32.1 % — LOW (ref 39–50)
HGB BLD-MCNC: 11.1 G/DL — LOW (ref 13–17)
MAGNESIUM SERPL-MCNC: 1.9 MG/DL — SIGNIFICANT CHANGE UP (ref 1.6–2.6)
MCHC RBC-ENTMCNC: 29.8 PG — SIGNIFICANT CHANGE UP (ref 27–34)
MCHC RBC-ENTMCNC: 34.6 GM/DL — SIGNIFICANT CHANGE UP (ref 32–36)
MCV RBC AUTO: 86.1 FL — SIGNIFICANT CHANGE UP (ref 80–100)
PHOSPHATE SERPL-MCNC: 3.5 MG/DL — SIGNIFICANT CHANGE UP (ref 2.5–4.5)
PLATELET # BLD AUTO: 181 K/UL — SIGNIFICANT CHANGE UP (ref 150–400)
POTASSIUM SERPL-MCNC: 3.5 MMOL/L — SIGNIFICANT CHANGE UP (ref 3.5–5.3)
POTASSIUM SERPL-SCNC: 3.5 MMOL/L — SIGNIFICANT CHANGE UP (ref 3.5–5.3)
RBC # BLD: 3.73 M/UL — LOW (ref 4.2–5.8)
RBC # FLD: 14.6 % — HIGH (ref 10.3–14.5)
SODIUM SERPL-SCNC: 141 MMOL/L — SIGNIFICANT CHANGE UP (ref 135–145)
WBC # BLD: 4.42 K/UL — SIGNIFICANT CHANGE UP (ref 3.8–10.5)
WBC # FLD AUTO: 4.42 K/UL — SIGNIFICANT CHANGE UP (ref 3.8–10.5)

## 2018-05-24 PROCEDURE — 99233 SBSQ HOSP IP/OBS HIGH 50: CPT

## 2018-05-24 PROCEDURE — 93923 UPR/LXTR ART STDY 3+ LVLS: CPT | Mod: 26

## 2018-05-24 RX ORDER — POTASSIUM CHLORIDE 20 MEQ
40 PACKET (EA) ORAL ONCE
Qty: 0 | Refills: 0 | Status: COMPLETED | OUTPATIENT
Start: 2018-05-24 | End: 2018-05-24

## 2018-05-24 RX ORDER — CARVEDILOL PHOSPHATE 80 MG/1
25 CAPSULE, EXTENDED RELEASE ORAL EVERY 12 HOURS
Qty: 0 | Refills: 0 | Status: DISCONTINUED | OUTPATIENT
Start: 2018-05-24 | End: 2018-05-24

## 2018-05-24 RX ORDER — SODIUM CHLORIDE 9 MG/ML
1000 INJECTION INTRAMUSCULAR; INTRAVENOUS; SUBCUTANEOUS
Qty: 0 | Refills: 0 | Status: DISCONTINUED | OUTPATIENT
Start: 2018-05-24 | End: 2018-05-24

## 2018-05-24 RX ORDER — DOCUSATE SODIUM 100 MG
100 CAPSULE ORAL THREE TIMES A DAY
Qty: 0 | Refills: 0 | Status: DISCONTINUED | OUTPATIENT
Start: 2018-05-24 | End: 2018-05-31

## 2018-05-24 RX ORDER — CARVEDILOL PHOSPHATE 80 MG/1
25 CAPSULE, EXTENDED RELEASE ORAL EVERY 12 HOURS
Qty: 0 | Refills: 0 | Status: DISCONTINUED | OUTPATIENT
Start: 2018-05-24 | End: 2018-05-31

## 2018-05-24 RX ORDER — PETROLATUM,WHITE
1 JELLY (GRAM) TOPICAL
Qty: 0 | Refills: 0 | Status: DISCONTINUED | OUTPATIENT
Start: 2018-05-24 | End: 2018-05-31

## 2018-05-24 RX ORDER — MAGNESIUM SULFATE 500 MG/ML
1 VIAL (ML) INJECTION ONCE
Qty: 0 | Refills: 0 | Status: COMPLETED | OUTPATIENT
Start: 2018-05-24 | End: 2018-05-24

## 2018-05-24 RX ADMIN — MONTELUKAST 10 MILLIGRAM(S): 4 TABLET, CHEWABLE ORAL at 13:40

## 2018-05-24 RX ADMIN — Medication 100 MILLIGRAM(S): at 21:46

## 2018-05-24 RX ADMIN — HEPARIN SODIUM 5000 UNIT(S): 5000 INJECTION INTRAVENOUS; SUBCUTANEOUS at 05:30

## 2018-05-24 RX ADMIN — OXYCODONE AND ACETAMINOPHEN 1 TABLET(S): 5; 325 TABLET ORAL at 23:00

## 2018-05-24 RX ADMIN — Medication 40 MILLIEQUIVALENT(S): at 09:34

## 2018-05-24 RX ADMIN — ISOSORBIDE DINITRATE 40 MILLIGRAM(S): 5 TABLET ORAL at 21:46

## 2018-05-24 RX ADMIN — CARVEDILOL PHOSPHATE 25 MILLIGRAM(S): 80 CAPSULE, EXTENDED RELEASE ORAL at 17:20

## 2018-05-24 RX ADMIN — ATORVASTATIN CALCIUM 80 MILLIGRAM(S): 80 TABLET, FILM COATED ORAL at 21:46

## 2018-05-24 RX ADMIN — OXYCODONE AND ACETAMINOPHEN 1 TABLET(S): 5; 325 TABLET ORAL at 09:56

## 2018-05-24 RX ADMIN — ISOSORBIDE DINITRATE 40 MILLIGRAM(S): 5 TABLET ORAL at 13:41

## 2018-05-24 RX ADMIN — BUDESONIDE AND FORMOTEROL FUMARATE DIHYDRATE 2 PUFF(S): 160; 4.5 AEROSOL RESPIRATORY (INHALATION) at 17:20

## 2018-05-24 RX ADMIN — HEPARIN SODIUM 5000 UNIT(S): 5000 INJECTION INTRAVENOUS; SUBCUTANEOUS at 21:46

## 2018-05-24 RX ADMIN — BUDESONIDE AND FORMOTEROL FUMARATE DIHYDRATE 2 PUFF(S): 160; 4.5 AEROSOL RESPIRATORY (INHALATION) at 05:31

## 2018-05-24 RX ADMIN — ISOSORBIDE DINITRATE 40 MILLIGRAM(S): 5 TABLET ORAL at 05:31

## 2018-05-24 RX ADMIN — Medication 100 MILLIGRAM(S): at 13:40

## 2018-05-24 RX ADMIN — HEPARIN SODIUM 5000 UNIT(S): 5000 INJECTION INTRAVENOUS; SUBCUTANEOUS at 13:41

## 2018-05-24 RX ADMIN — TAMSULOSIN HYDROCHLORIDE 0.4 MILLIGRAM(S): 0.4 CAPSULE ORAL at 13:40

## 2018-05-24 RX ADMIN — PANTOPRAZOLE SODIUM 40 MILLIGRAM(S): 20 TABLET, DELAYED RELEASE ORAL at 05:31

## 2018-05-24 RX ADMIN — Medication 81 MILLIGRAM(S): at 13:40

## 2018-05-24 RX ADMIN — Medication 100 MILLIGRAM(S): at 05:31

## 2018-05-24 RX ADMIN — OXYCODONE AND ACETAMINOPHEN 1 TABLET(S): 5; 325 TABLET ORAL at 09:26

## 2018-05-24 RX ADMIN — QUETIAPINE FUMARATE 25 MILLIGRAM(S): 200 TABLET, FILM COATED ORAL at 21:46

## 2018-05-24 RX ADMIN — CLOPIDOGREL BISULFATE 75 MILLIGRAM(S): 75 TABLET, FILM COATED ORAL at 13:40

## 2018-05-24 RX ADMIN — OXYCODONE AND ACETAMINOPHEN 1 TABLET(S): 5; 325 TABLET ORAL at 22:17

## 2018-05-24 RX ADMIN — TIOTROPIUM BROMIDE 1 CAPSULE(S): 18 CAPSULE ORAL; RESPIRATORY (INHALATION) at 13:41

## 2018-05-24 RX ADMIN — CARVEDILOL PHOSPHATE 25 MILLIGRAM(S): 80 CAPSULE, EXTENDED RELEASE ORAL at 06:20

## 2018-05-24 RX ADMIN — AMLODIPINE BESYLATE 10 MILLIGRAM(S): 2.5 TABLET ORAL at 05:31

## 2018-05-24 RX ADMIN — Medication 100 GRAM(S): at 09:34

## 2018-05-24 NOTE — PROGRESS NOTE ADULT - PROBLEM SELECTOR PLAN 5
creatinine at baseline ~1.8  - monitor creatinine  - avoid nephrotoxic meds elevated LFTs on labs 2/21, unclear if lab error. pt asymptomatic  - repeat lfts

## 2018-05-24 NOTE — PROGRESS NOTE ADULT - PROBLEM SELECTOR PLAN 10
DVT: HSQ  Dispo pending amputation tomorrow 5/25 DVT: HSQ  Dispo pending amputation tomorrow 5/25  NPO MN

## 2018-05-24 NOTE — PROVIDER CONTACT NOTE (CHANGE IN STATUS NOTIFICATION) - ACTION/TREATMENT ORDERED:
Will Order CBC, BMP, Mg & Phos for AM labs. Will consider restarting pt. back on coreg.   Coreg 25mg po ordered and order carried out. Will continue to monitor pt.

## 2018-05-24 NOTE — PROGRESS NOTE ADULT - ASSESSMENT
75 yo M PMH of DMT2, HTN, HLD, PAD, CKD3 (BL Cr 1.8), BPH, Prostate cancer, psychosis, GERD, COPD not on home O2, previously homeless who presents as a transfer for L foot dry gangrene, found to have an abnormal stress test and EF 36%, transferred LE angiogram now s/p L SFA stent 5/22 pending L 2nd toe amputation tomorrow 5/25.

## 2018-05-24 NOTE — PROGRESS NOTE ADULT - ASSESSMENT
76M with Alesia 4 critical limb ischemia with L 2nd digit gangrene, noted with CFA, SFA and pop occlusions on L, also with R sided femoral disease. S/p LSFA PTCA    Doing well post intervention.  Groin access site now stable   GRISEL/PVR today with reasonable distal perfusion and much improved waveforms.   Plan for podiatric amputation tomorrow.    Will discuss with podiatry post op regarding how much bleeding there was intraoperatively.        Mras Matos [id number]

## 2018-05-24 NOTE — PROGRESS NOTE ADULT - SUBJECTIVE AND OBJECTIVE BOX
Patient is a 77y old  Male who presents with a chief complaint of Positive Stress Test ahead of Preop evaluation (20 May 2018 20:23)       INTERVAL HPI/OVERNIGHT EVENTS:  Patient seen and evaluated at bedside.  Pt is resting comfortable in NAD. Denies N/V/F/C.  Pain rated at 0/10    Allergies    No Known Allergies    Intolerances        Vital Signs Last 24 Hrs  T(C): 36.7 (24 May 2018 05:28), Max: 37 (23 May 2018 13:16)  T(F): 98.1 (24 May 2018 05:28), Max: 98.6 (23 May 2018 13:16)  HR: 61 (24 May 2018 09:26) (60 - 72)  BP: 122/51 (24 May 2018 09:26) (122/51 - 152/81)  BP(mean): --  RR: 18 (24 May 2018 05:28) (16 - 18)  SpO2: 97% (24 May 2018 05:28) (96% - 98%)    LABS:                        11.1   4.42  )-----------( 181      ( 24 May 2018 09:35 )             32.1     05-24    141  |  106  |  19  ----------------------------<  77  3.5   |  23  |  1.67<H>    Ca    8.7      24 May 2018 07:02  Phos  3.5     05-24  Mg     1.9     05-24          CAPILLARY BLOOD GLUCOSE      POCT Blood Glucose.: 105 mg/dL (23 May 2018 22:40)      Lower Extremity Physical Exam:  Vascular: DP/PT 0/4, B/L, CFT <3 seconds B/L w/ exception to LF 2nd toe no CFT, Temperature gradient WNL, B/L.   Neuro: Epicritic sensation intact to the level of foot, B/L Tender to palpation to left 2nd toe. Severe HAV deformity, bilateral, R>L, with overlapping 2nd toes. palpable bony prominence lateral 5th met base left foot. Cavus foot type bilateral.  Skin: dry gangrenous distal tip LF 2nd toe , no SOI no purulence no drainage, no malodor, no erythema nor streaking, no open lesions, no periwound edema

## 2018-05-24 NOTE — PROGRESS NOTE ADULT - SUBJECTIVE AND OBJECTIVE BOX
PAGER:  [phone]               [facility] [id number]              EMAIL [email or web address]   OFFICE [phone]                              ********VASCULAR MEDICINE & CARDIOLOGY PROGRESS NOTE********                            CC:  CLI LLE     INTERVAL HISTORY:   Doing well.  PVR waveforms improved on today's study.  No CP or SOB.  Groin is stable.       MEDICATIONS  (STANDING):  amLODIPine   Tablet 10 milliGRAM(s) Oral daily  aspirin enteric coated 81 milliGRAM(s) Oral daily  atorvastatin 80 milliGRAM(s) Oral at bedtime  buDESOnide 160 MICROgram(s)/formoterol 4.5 MICROgram(s) Inhaler 2 Puff(s) Inhalation two times a day  carvedilol 25 milliGRAM(s) Oral every 12 hours  clopidogrel Tablet 75 milliGRAM(s) Oral daily  docusate sodium 100 milliGRAM(s) Oral three times a day  heparin  Injectable 5000 Unit(s) SubCutaneous every 8 hours  hydrALAZINE 100 milliGRAM(s) Oral every 8 hours  isosorbide   dinitrate Tablet (ISORDIL) 40 milliGRAM(s) Oral three times a day  montelukast 10 milliGRAM(s) Oral daily  pantoprazole    Tablet 40 milliGRAM(s) Oral before breakfast  polyethylene glycol 3350 17 Gram(s) Oral daily  QUEtiapine 25 milliGRAM(s) Oral at bedtime  senna 2 Tablet(s) Oral at bedtime  sodium chloride 0.9%. 1000 milliLiter(s) (30 mL/Hr) IV Continuous <Continuous>  tamsulosin 0.4 milliGRAM(s) Oral daily  tiotropium 18 MICROgram(s) Capsule 1 Capsule(s) Inhalation daily    MEDICATIONS  (PRN):  ALBUTerol    90 MICROgram(s) HFA Inhaler 1 Puff(s) Inhalation every 6 hours PRN Shortness of Breath and/or Wheezing  oxyCODONE    5 mG/acetaminophen 325 mG 1 Tablet(s) Oral every 6 hours PRN Severe Pain (7 - 10)  PAST MEDICAL & SURGICAL HISTORY:  Prostate cancer  Peripheral arterial disease  Dry gangrene  HLD (hyperlipidemia)  Gastritis  CKD (chronic kidney disease)  Asthma  DM (diabetes mellitus)  HTN (hypertension)  No significant past surgical history      FAMILY HISTORY:  No pertinent family history in first degree relatives      SOCIAL HISTORY:  unchanged    REVIEW OF SYSTEMS:  CONSTITUTIONAL: No fever, weight loss, or fatigue  EYES: No eye pain, visual disturbances, or discharge  ENMT:  No difficulty hearing, tinnitus, vertigo; No sinus or throat pain  NECK: No pain or stiffness  RESPIRATORY: No cough, wheezing, chills or hemoptysis; No Shortness of Breath  CARDIOVASCULAR: No chest pain, palpitations, passing out, dizziness, or leg swelling  GASTROINTESTINAL: No abdominal or epigastric pain. No nausea, vomiting, or hematemesis; No diarrhea or constipation. No melena or hematochezia.  GENITOURINARY: No dysuria, frequency, hematuria, or incontinence  NEUROLOGICAL: No headaches, memory loss, loss of strength, numbness, or tremors  SKIN: No itching, burning, rashes, or lesions   LYMPH Nodes: No enlarged glands  ENDOCRINE: No heat or cold intolerance; No hair loss  MUSCULOSKELETAL: L foot pain with walking  PSYCHIATRIC: No depression, anxiety, mood swings, or difficulty sleeping  HEME/LYMPH: No easy bruising, or bleeding gums  ALLERY AND IMMUNOLOGIC: No hives or eczema	    ICU Vital Signs Last 24 Hrs  T(C): 36.6 (24 May 2018 13:34), Max: 36.9 (23 May 2018 21:05)  T(F): 97.9 (24 May 2018 13:34), Max: 98.5 (23 May 2018 21:05)  HR: 61 (24 May 2018 13:34) (60 - 72)  BP: 167/68 (24 May 2018 13:34) (122/51 - 167/68)  BP(mean): --  ABP: --  ABP(mean): --  RR: 16 (24 May 2018 13:34) (16 - 18)  SpO2: 96% (24 May 2018 13:34) (96% - 97%)        Appearance: Normal	  HEENT:   Normal oral mucosa, PERRL, EOMI	  Lymphatic: No lymphadenopathy  Cardiovascular: Normal S1 S2, No JVD, No murmurs, No edema  Respiratory: Lungs clear to auscultation	  Psychiatry: A & O x 3, Mood & affect appropriate  Gastrointestinal:  Soft, Non-tender, + BS	  Skin: No rashes, No ecchymoses, No cyanosis	  Neurologic: Non-focal  Extremities: Normal range of motion, No clubbing, cyanosis or edema  Vascular: Nonpalpable pulses bilaterally. R groin soft, no hematoma, no bleeding.  L 2nd toe gangrene                           11.1   4.42  )-----------( 181      ( 24 May 2018 09:35 )             32.1   05-24    141  |  106  |  19  ----------------------------<  77  3.5   |  23  |  1.67<H>    Ca    8.7      24 May 2018 07:02  Phos  3.5     05-24  Mg     1.9     05-24

## 2018-05-24 NOTE — PROGRESS NOTE ADULT - PROBLEM SELECTOR PLAN 6
moderate control   - c/w increased coreg to 25mg bid by EP  - c/w increased isosorbide to 40mg TID by EP  - c/w amlodipine 10mg daily  - c/w hydralazine 100mg TID  - monitor BP, adjust meds as needed creatinine at baseline ~1.8  - monitor creatinine  - avoid nephrotoxic meds

## 2018-05-24 NOTE — PROGRESS NOTE ADULT - PROBLEM SELECTOR PLAN 3
asymptomatic  outside hospital stress showed partially reversible defect involving the anterior, mid anterior and apical anterior segments consistent with probable ischemia of the LAD and possible infarct of the RCA.   - nuclear stress at OSH showed Global systolic function is moderately reduced with an EF of 36% and 2 mm downsloping ST depressions in V2-V6.   - prev d/w cards Dr Matos - patient recently had negative cardiac cath as outpatient, no need for repeat cath   - Continue with ASA, Carvedilol, Lipitor, BB

## 2018-05-24 NOTE — PROGRESS NOTE ADULT - PROBLEM SELECTOR PLAN 7
- C/w home atorvastatin moderate control   - c/w increased coreg to 25mg bid by EP  - c/w increased isosorbide to 40mg TID by EP  - c/w amlodipine 10mg daily  - c/w hydralazine 100mg TID  - monitor BP, adjust meds as needed

## 2018-05-24 NOTE — PROGRESS NOTE ADULT - SUBJECTIVE AND OBJECTIVE BOX
Patient is a 77y old  Male who presents with a chief complaint of Positive Stress Test ahead of Preop evaluation (20 May 2018 20:23)        SUBJECTIVE / OVERNIGHT EVENTS:  overnight, w/ 14b WCT however pt asymptomatic.  pt seen at bedside denies complaints, denies f/chills, abd pain, n/v, cp/sob.  reports feels sensation in legs and feet, no pain.    CAPILLARY BLOOD GLUCOSE      POCT Blood Glucose.: 105 mg/dL (23 May 2018 22:40)    I&O's Summary    23 May 2018 07:01  -  24 May 2018 07:00  --------------------------------------------------------  IN: 410 mL / OUT: 180 mL / NET: 230 mL    24 May 2018 07:01  -  24 May 2018 13:40  --------------------------------------------------------  IN: 120 mL / OUT: 0 mL / NET: 120 mL    Vital Signs Last 24 Hrs  T(C): 36.6 (24 May 2018 13:34), Max: 36.9 (23 May 2018 21:05)  T(F): 97.9 (24 May 2018 13:34), Max: 98.5 (23 May 2018 21:05)  HR: 61 (24 May 2018 13:34) (60 - 72)  BP: 167/68 (24 May 2018 13:34) (122/51 - 167/68)  BP(mean): --  RR: 16 (24 May 2018 13:34) (16 - 18)  SpO2: 96% (24 May 2018 13:34) (96% - 97%)    PHYSICAL EXAM:  GENERAL:  Chronically ill appearing cachectic M, lying in bed, in NAD  HEAD:  left sided cheek soft mass (chronic per pt)  ORAL: poor dentition, missing teeth  EYES: PERRLA  NECK: Supple, No JVD  CHEST/LUNG: CTA B/L. No w/r/r.  HEART: Reg rate. Normal S1, S2. No m/r/g.   ABDOMEN: SNTND. Bowel sounds present  EXTREMITIES: No clubbing, cyanosis.  left 2nd toe dry gangrene  PSYCH: AAOx2-3,odd affect    LABS:                        11.1   4.42  )-----------( 181      ( 24 May 2018 09:35 )             32.1     05-24    141  |  106  |  19  ----------------------------<  77  3.5   |  23  |  1.67<H>    Ca    8.7      24 May 2018 07:02  Phos  3.5     05-24  Mg     1.9     05-24      RADIOLOGY & ADDITIONAL TESTS:  Telemetry reviewed: sinus 1st def block, hr 55-70 w/ PVCs  Consultant(s) Notes Reviewed:  podiatry  Care Discussed with Consultants/Other Providers: Floor NP, Dr Matos    MEDICATIONS  (STANDING):  amLODIPine   Tablet 10 milliGRAM(s) Oral daily  aspirin enteric coated 81 milliGRAM(s) Oral daily  atorvastatin 80 milliGRAM(s) Oral at bedtime  buDESOnide 160 MICROgram(s)/formoterol 4.5 MICROgram(s) Inhaler 2 Puff(s) Inhalation two times a day  carvedilol 25 milliGRAM(s) Oral every 12 hours  clopidogrel Tablet 75 milliGRAM(s) Oral daily  heparin  Injectable 5000 Unit(s) SubCutaneous every 8 hours  hydrALAZINE 100 milliGRAM(s) Oral every 8 hours  isosorbide   dinitrate Tablet (ISORDIL) 40 milliGRAM(s) Oral three times a day  montelukast 10 milliGRAM(s) Oral daily  pantoprazole    Tablet 40 milliGRAM(s) Oral before breakfast  polyethylene glycol 3350 17 Gram(s) Oral daily  QUEtiapine 25 milliGRAM(s) Oral at bedtime  senna 2 Tablet(s) Oral at bedtime  sodium chloride 0.9%. 1000 milliLiter(s) (30 mL/Hr) IV Continuous <Continuous>  tamsulosin 0.4 milliGRAM(s) Oral daily  tiotropium 18 MICROgram(s) Capsule 1 Capsule(s) Inhalation daily    MEDICATIONS  (PRN):  ALBUTerol    90 MICROgram(s) HFA Inhaler 1 Puff(s) Inhalation every 6 hours PRN Shortness of Breath and/or Wheezing  oxyCODONE    5 mG/acetaminophen 325 mG 1 Tablet(s) Oral every 6 hours PRN Severe Pain (7 - 10)

## 2018-05-24 NOTE — PROGRESS NOTE ADULT - ASSESSMENT
78 y/o M w/ L foot 2nd toe dry gangrene  - Pt seen and examined  - Stable dry gangrene, no need for abx per podiatry at this time  - Betadine paint applied  - OR scheduled for tomorrow 5/25, pending angio results & possible further intervention  - Weight bear as tolerated in surgical shoe to left foot  - NPO at midnight  - D/w attending 78 y/o M w/ L foot 2nd toe dry gangrene  - Pt seen and examined  - Stable dry gangrene, no need for abx per podiatry at this time  - Betadine paint applied  - OR scheduled for tomorrow 5/25, pending angio results & possible further intervention  - Please document medical clearance/optimization for local anesthesia w/  sedation.   - Weight bear as tolerated in surgical shoe to left foot  - NPO at midnight  - D/w attending

## 2018-05-24 NOTE — PROVIDER CONTACT NOTE (CHANGE IN STATUS NOTIFICATION) - ASSESSMENT
Pt. is asymptomatic/stable, lying comfortably in bed. BP: 136/62 HR 60, T 98.1 F oral, RR 18, spo2 97% RA. Pt. denies any chest pain or palpitations.

## 2018-05-24 NOTE — PROVIDER CONTACT NOTE (CRITICAL VALUE NOTIFICATION) - BACKGROUND
On Heparin gtt for PE found on CT scan; a-fib/flutter on tele; [facility] PACs, breast ca, depression

## 2018-05-24 NOTE — CHART NOTE - NSCHARTNOTEFT_GEN_A_CORE
Notified by RN for pt with 14 beats of WCT this AM. Pt previously with episode of WCT for 9 beats. Pt seen at bedside, w/o complaints at this time. Of note, pt did not receive coreg last night and this AM. Coreg reordered at this time (25mg BID as per EPS recs). c/w telemetry monitoring. Will discuss with primary team in AM.    Ulices Crandall PA-C  Department of Medicine  [id number]

## 2018-05-25 ENCOUNTER — RESULT REVIEW (OUTPATIENT)
Age: 78
End: 2018-05-25

## 2018-05-25 PROBLEM — E11.9 TYPE 2 DIABETES MELLITUS WITHOUT COMPLICATIONS: Chronic | Status: INACTIVE | Noted: 2018-04-05 | Resolved: 2018-05-24

## 2018-05-25 LAB
ALBUMIN SERPL ELPH-MCNC: 3.4 G/DL — SIGNIFICANT CHANGE UP (ref 3.3–5)
ALP SERPL-CCNC: 175 U/L — HIGH (ref 40–120)
ALT FLD-CCNC: 73 U/L — HIGH (ref 10–45)
AST SERPL-CCNC: 42 U/L — HIGH (ref 10–40)
BILIRUB DIRECT SERPL-MCNC: <0.1 MG/DL — SIGNIFICANT CHANGE UP (ref 0–0.2)
BILIRUB INDIRECT FLD-MCNC: >0.3 MG/DL — SIGNIFICANT CHANGE UP (ref 0.2–1)
BILIRUB SERPL-MCNC: 0.4 MG/DL — SIGNIFICANT CHANGE UP (ref 0.2–1.2)
GLUCOSE BLDC GLUCOMTR-MCNC: 106 MG/DL — HIGH (ref 70–99)
GLUCOSE BLDC GLUCOMTR-MCNC: 90 MG/DL — SIGNIFICANT CHANGE UP (ref 70–99)
GLUCOSE BLDC GLUCOMTR-MCNC: 93 MG/DL — SIGNIFICANT CHANGE UP (ref 70–99)
HCT VFR BLD CALC: 35.9 % — LOW (ref 39–50)
HGB BLD-MCNC: 11.6 G/DL — LOW (ref 13–17)
MAGNESIUM SERPL-MCNC: 2.2 MG/DL — SIGNIFICANT CHANGE UP (ref 1.6–2.6)
MCHC RBC-ENTMCNC: 29.9 PG — SIGNIFICANT CHANGE UP (ref 27–34)
MCHC RBC-ENTMCNC: 32.3 GM/DL — SIGNIFICANT CHANGE UP (ref 32–36)
MCV RBC AUTO: 92.7 FL — SIGNIFICANT CHANGE UP (ref 80–100)
PHOSPHATE SERPL-MCNC: 3.6 MG/DL — SIGNIFICANT CHANGE UP (ref 2.5–4.5)
PLATELET # BLD AUTO: 167 K/UL — SIGNIFICANT CHANGE UP (ref 150–400)
PROT SERPL-MCNC: 5.9 G/DL — LOW (ref 6–8.3)
RBC # BLD: 3.87 M/UL — LOW (ref 4.2–5.8)
RBC # FLD: 13.1 % — SIGNIFICANT CHANGE UP (ref 10.3–14.5)
WBC # BLD: 5.2 K/UL — SIGNIFICANT CHANGE UP (ref 3.8–10.5)
WBC # FLD AUTO: 5.2 K/UL — SIGNIFICANT CHANGE UP (ref 3.8–10.5)

## 2018-05-25 PROCEDURE — 99233 SBSQ HOSP IP/OBS HIGH 50: CPT

## 2018-05-25 PROCEDURE — 73630 X-RAY EXAM OF FOOT: CPT | Mod: 26,LT

## 2018-05-25 PROCEDURE — 88305 TISSUE EXAM BY PATHOLOGIST: CPT | Mod: 26

## 2018-05-25 PROCEDURE — 88311 DECALCIFY TISSUE: CPT | Mod: 26

## 2018-05-25 RX ORDER — SODIUM CHLORIDE 9 MG/ML
1000 INJECTION INTRAMUSCULAR; INTRAVENOUS; SUBCUTANEOUS
Qty: 0 | Refills: 0 | Status: DISCONTINUED | OUTPATIENT
Start: 2018-05-25 | End: 2018-05-26

## 2018-05-25 RX ORDER — OXYCODONE AND ACETAMINOPHEN 5; 325 MG/1; MG/1
1 TABLET ORAL EVERY 6 HOURS
Qty: 0 | Refills: 0 | Status: DISCONTINUED | OUTPATIENT
Start: 2018-05-25 | End: 2018-05-27

## 2018-05-25 RX ORDER — ACETAMINOPHEN 500 MG
650 TABLET ORAL EVERY 6 HOURS
Qty: 0 | Refills: 0 | Status: DISCONTINUED | OUTPATIENT
Start: 2018-05-25 | End: 2018-05-31

## 2018-05-25 RX ADMIN — Medication 0.25 MILLIGRAM(S): at 18:43

## 2018-05-25 RX ADMIN — QUETIAPINE FUMARATE 25 MILLIGRAM(S): 200 TABLET, FILM COATED ORAL at 21:02

## 2018-05-25 RX ADMIN — CARVEDILOL PHOSPHATE 25 MILLIGRAM(S): 80 CAPSULE, EXTENDED RELEASE ORAL at 18:45

## 2018-05-25 RX ADMIN — AMLODIPINE BESYLATE 10 MILLIGRAM(S): 2.5 TABLET ORAL at 05:53

## 2018-05-25 RX ADMIN — OXYCODONE AND ACETAMINOPHEN 1 TABLET(S): 5; 325 TABLET ORAL at 21:39

## 2018-05-25 RX ADMIN — OXYCODONE AND ACETAMINOPHEN 1 TABLET(S): 5; 325 TABLET ORAL at 15:51

## 2018-05-25 RX ADMIN — HEPARIN SODIUM 5000 UNIT(S): 5000 INJECTION INTRAVENOUS; SUBCUTANEOUS at 21:02

## 2018-05-25 RX ADMIN — OXYCODONE AND ACETAMINOPHEN 1 TABLET(S): 5; 325 TABLET ORAL at 21:01

## 2018-05-25 RX ADMIN — SODIUM CHLORIDE 50 MILLILITER(S): 9 INJECTION INTRAMUSCULAR; INTRAVENOUS; SUBCUTANEOUS at 18:42

## 2018-05-25 RX ADMIN — TIOTROPIUM BROMIDE 1 CAPSULE(S): 18 CAPSULE ORAL; RESPIRATORY (INHALATION) at 18:43

## 2018-05-25 RX ADMIN — BUDESONIDE AND FORMOTEROL FUMARATE DIHYDRATE 2 PUFF(S): 160; 4.5 AEROSOL RESPIRATORY (INHALATION) at 18:42

## 2018-05-25 RX ADMIN — CLOPIDOGREL BISULFATE 75 MILLIGRAM(S): 75 TABLET, FILM COATED ORAL at 13:08

## 2018-05-25 RX ADMIN — Medication 100 MILLIGRAM(S): at 15:51

## 2018-05-25 RX ADMIN — Medication 1 APPLICATION(S): at 18:42

## 2018-05-25 RX ADMIN — SENNA PLUS 2 TABLET(S): 8.6 TABLET ORAL at 21:02

## 2018-05-25 RX ADMIN — Medication 81 MILLIGRAM(S): at 13:08

## 2018-05-25 RX ADMIN — TAMSULOSIN HYDROCHLORIDE 0.4 MILLIGRAM(S): 0.4 CAPSULE ORAL at 13:09

## 2018-05-25 RX ADMIN — ISOSORBIDE DINITRATE 40 MILLIGRAM(S): 5 TABLET ORAL at 05:53

## 2018-05-25 RX ADMIN — OXYCODONE AND ACETAMINOPHEN 1 TABLET(S): 5; 325 TABLET ORAL at 16:20

## 2018-05-25 RX ADMIN — Medication 1 APPLICATION(S): at 05:55

## 2018-05-25 RX ADMIN — Medication 100 MILLIGRAM(S): at 05:53

## 2018-05-25 RX ADMIN — ISOSORBIDE DINITRATE 40 MILLIGRAM(S): 5 TABLET ORAL at 21:02

## 2018-05-25 RX ADMIN — HEPARIN SODIUM 5000 UNIT(S): 5000 INJECTION INTRAVENOUS; SUBCUTANEOUS at 14:13

## 2018-05-25 RX ADMIN — SODIUM CHLORIDE 50 MILLILITER(S): 9 INJECTION INTRAMUSCULAR; INTRAVENOUS; SUBCUTANEOUS at 20:57

## 2018-05-25 RX ADMIN — Medication 100 MILLIGRAM(S): at 21:02

## 2018-05-25 RX ADMIN — MONTELUKAST 10 MILLIGRAM(S): 4 TABLET, CHEWABLE ORAL at 15:52

## 2018-05-25 RX ADMIN — ATORVASTATIN CALCIUM 80 MILLIGRAM(S): 80 TABLET, FILM COATED ORAL at 21:02

## 2018-05-25 RX ADMIN — BUDESONIDE AND FORMOTEROL FUMARATE DIHYDRATE 2 PUFF(S): 160; 4.5 AEROSOL RESPIRATORY (INHALATION) at 05:55

## 2018-05-25 RX ADMIN — PANTOPRAZOLE SODIUM 40 MILLIGRAM(S): 20 TABLET, DELAYED RELEASE ORAL at 05:54

## 2018-05-25 RX ADMIN — ISOSORBIDE DINITRATE 40 MILLIGRAM(S): 5 TABLET ORAL at 15:54

## 2018-05-25 RX ADMIN — CARVEDILOL PHOSPHATE 25 MILLIGRAM(S): 80 CAPSULE, EXTENDED RELEASE ORAL at 05:58

## 2018-05-25 NOTE — BRIEF OPERATIVE NOTE - PROCEDURE
<<-----Click on this checkbox to enter Procedure Amputation  05/25/2018  parital 2nd ray amputation  Active  [email or web address]

## 2018-05-25 NOTE — PROGRESS NOTE ADULT - SUBJECTIVE AND OBJECTIVE BOX
Patient is a 77y old  Male who presents with a chief complaint of Positive Stress Test ahead of Preop evaluation (20 May 2018 20:23)      INTERVAL HPI/OVERNIGHT EVENTS:   Pt is scheduled for left foot 2nd digit amputation with Dr. Curtis at 10:30 am. Patient is aware of procedure and is NPO since midnight.    MEDICATIONS  (STANDING):  amLODIPine   Tablet 10 milliGRAM(s) Oral daily  AQUAPHOR (petrolatum Ointment) 1 Application(s) Topical two times a day  aspirin enteric coated 81 milliGRAM(s) Oral daily  atorvastatin 80 milliGRAM(s) Oral at bedtime  buDESOnide 160 MICROgram(s)/formoterol 4.5 MICROgram(s) Inhaler 2 Puff(s) Inhalation two times a day  carvedilol 25 milliGRAM(s) Oral every 12 hours  clopidogrel Tablet 75 milliGRAM(s) Oral daily  docusate sodium 100 milliGRAM(s) Oral three times a day  heparin  Injectable 5000 Unit(s) SubCutaneous every 8 hours  hydrALAZINE 100 milliGRAM(s) Oral every 8 hours  isosorbide   dinitrate Tablet (ISORDIL) 40 milliGRAM(s) Oral three times a day  montelukast 10 milliGRAM(s) Oral daily  pantoprazole    Tablet 40 milliGRAM(s) Oral before breakfast  polyethylene glycol 3350 17 Gram(s) Oral daily  QUEtiapine 25 milliGRAM(s) Oral at bedtime  senna 2 Tablet(s) Oral at bedtime  tamsulosin 0.4 milliGRAM(s) Oral daily  tiotropium 18 MICROgram(s) Capsule 1 Capsule(s) Inhalation daily    MEDICATIONS  (PRN):  ALBUTerol    90 MICROgram(s) HFA Inhaler 1 Puff(s) Inhalation every 6 hours PRN Shortness of Breath and/or Wheezing  oxyCODONE    5 mG/acetaminophen 325 mG 1 Tablet(s) Oral every 6 hours PRN Severe Pain (7 - 10)      Allergies    No Known Allergies    Intolerances        Vital Signs Last 24 Hrs  T(C): 36.6 (25 May 2018 04:42), Max: 36.8 (24 May 2018 20:43)  T(F): 97.8 (25 May 2018 04:42), Max: 98.2 (24 May 2018 20:43)  HR: 68 (25 May 2018 04:42) (60 - 68)  BP: 150/75 (25 May 2018 04:42) (122/51 - 167/68)  BP(mean): --  RR: 16 (25 May 2018 04:42) (16 - 16)  SpO2: 98% (25 May 2018 04:42) (96% - 98%)    LABS:                        11.1   4.42  )-----------( 181      ( 24 May 2018 09:35 )             32.1     05-25    141  |  106  |  21  ----------------------------<  83  4.0   |  23  |  1.93<H>    Ca    9.1      25 May 2018 06:42  Phos  3.6     05-25  Mg     2.2     05-25    TPro  5.9<L>  /  Alb  3.4  /  TBili  0.4  /  DBili  <0.1  /  AST  42<H>  /  ALT  73<H>  /  AlkPhos  175<H>  05-25        CAPILLARY BLOOD GLUCOSE          RADIOLOGY & ADDITIONAL TESTS:    Plan:   To OR today at 10:30 am with Dr. Curtis for left foot 2nd digit amp.   CXR on sunrise.  EKG on sunrise.  Medical and Cardiac clearance since 5/24 and documented in chart.  Consent signed and in chart.  Procedure was explained to patient in detail. All alternatives, risks and complications were discussed. All questions answered.

## 2018-05-25 NOTE — PROGRESS NOTE ADULT - SUBJECTIVE AND OBJECTIVE BOX
INTERVAL HPI/OVERNIGHT EVENTS: remains cardiac stable, no chest pain or shortness of breath post Left SFA revascularization PTCA, awaiting toe amputation.    MEDICATIONS  (STANDING):  amLODIPine   Tablet 10 milliGRAM(s) Oral daily  AQUAPHOR (petrolatum Ointment) 1 Application(s) Topical two times a day  aspirin enteric coated 81 milliGRAM(s) Oral daily  atorvastatin 80 milliGRAM(s) Oral at bedtime  buDESOnide 160 MICROgram(s)/formoterol 4.5 MICROgram(s) Inhaler 2 Puff(s) Inhalation two times a day  carvedilol 25 milliGRAM(s) Oral every 12 hours  clopidogrel Tablet 75 milliGRAM(s) Oral daily  docusate sodium 100 milliGRAM(s) Oral three times a day  heparin  Injectable 5000 Unit(s) SubCutaneous every 8 hours  hydrALAZINE 100 milliGRAM(s) Oral every 8 hours  isosorbide   dinitrate Tablet (ISORDIL) 40 milliGRAM(s) Oral three times a day  montelukast 10 milliGRAM(s) Oral daily  pantoprazole    Tablet 40 milliGRAM(s) Oral before breakfast  polyethylene glycol 3350 17 Gram(s) Oral daily  QUEtiapine 25 milliGRAM(s) Oral at bedtime  senna 2 Tablet(s) Oral at bedtime  tamsulosin 0.4 milliGRAM(s) Oral daily  tiotropium 18 MICROgram(s) Capsule 1 Capsule(s) Inhalation daily    MEDICATIONS  (PRN):  ALBUTerol    90 MICROgram(s) HFA Inhaler 1 Puff(s) Inhalation every 6 hours PRN Shortness of Breath and/or Wheezing  oxyCODONE    5 mG/acetaminophen 325 mG 1 Tablet(s) Oral every 6 hours PRN Severe Pain (7 - 10)      Allergies    No Known Allergies    Intolerances      ROS:  General: Pt denies recent weight loss/fever/chills    Neurological: denies numbness or  sensation loss    Cardiovascular: denies chest pain/palpitations/leg edema    Respiratory and Thorax: denies SOB/cough/wheezing    Gastrointestinal: denies abdominal pain/diarrhea/constipation/bloody stool    Genitourinary: denies urinary frequency/urgency/ dysuria    Musculoskeletal: denies joint pain or swelling, denies restricted motion    Hematologic: denies abnormal bleeding  	    	  	    		        	    	            Vital Signs Last 24 Hrs  T(C): 36.6 (25 May 2018 04:42), Max: 36.8 (24 May 2018 20:43)  T(F): 97.8 (25 May 2018 04:42), Max: 98.2 (24 May 2018 20:43)  HR: 68 (25 May 2018 04:42) (60 - 68)  BP: 150/75 (25 May 2018 04:42) (122/51 - 167/68)  BP(mean): --  RR: 16 (25 May 2018 04:42) (16 - 16)  SpO2: 98% (25 May 2018 04:42) (96% - 98%)  Daily     Daily Weight in k.1 (25 May 2018 07:38)     @ 07:  -   @ 07:00  --------------------------------------------------------  IN: 420 mL / OUT: 400 mL / NET: 20 mL     @ 07: @ 08:59  --------------------------------------------------------  IN: 0 mL / OUT: 0 mL / NET: 0 mL      Physical Exam:    wdwn male  no jvd  cor rrr  lung clear  abd soft   left foot with 3rd digit dry gasgrene      LABS:                        11.0   5.21  )-----------( 185      ( 25 May 2018 07:46 )             32.7         141  |  106  |  21  ----------------------------<  83  4.0   |  23  |  1.93<H>    Ca    9.1      25 May 2018 06:42  Phos  3.6       Mg     2.2         TPro  5.9<L>  /  Alb  3.4  /  TBili  0.4  /  DBili  <0.1  /  AST  42<H>  /  ALT  73<H>  /  AlkPhos  175<H>            RADIOLOGY & ADDITIONAL TESTS:    TELE:    EKG:

## 2018-05-25 NOTE — PROGRESS NOTE ADULT - ASSESSMENT
77 yo M PMH of DMT2, HTN, HLD, PAD, CKD3 (BL Cr 1.8), BPH, Prostate cancer, psychosis, GERD, COPD not on home O2, previously homeless who presents as a transfer for L foot dry gangrene, found to have an abnormal stress test and EF 36%, transferred LE angiogram now s/p L SFA stent 5/22 pending L 2nd toe amputation today

## 2018-05-25 NOTE — PROGRESS NOTE ADULT - ASSESSMENT
77 year male with DM HTN PVD ischemic CM. with left toe gangrene, post angioplasty and stent of Left SFA,  mild drop in hemoglobin but no abdominal pain.

## 2018-05-25 NOTE — PROGRESS NOTE ADULT - PROBLEM SELECTOR PLAN 3
asymptomatic  outside hospital stress showed partially reversible defect involving the anterior, mid anterior and apical anterior segments consistent with probable ischemia of the LAD and possible infarct of the RCA.   - nuclear stress at OSH showed Global systolic function is moderately reduced with an EF of 36% and 2 mm downsloping ST depressions in V2-V6.   - prev d/w cards Dr Matos - patient recently had negative cardiac cath as outpatient, no need for repeat cath   - Continue with ASA, Carvedilol, Lipitor, BB, increased beta blocker as per patient's electrocardiologist.

## 2018-05-25 NOTE — PROGRESS NOTE ADULT - PROBLEM SELECTOR PLAN 7
moderate control   - c/w increased coreg to 25mg bid by EP  - c/w increased isosorbide to 40mg TID by EP  - c/w amlodipine 10mg daily  - c/w hydralazine 100mg TID  - monitor BP, adjust meds as needed

## 2018-05-25 NOTE — PROGRESS NOTE ADULT - PROBLEM SELECTOR PLAN 6
creatinine at baseline ~1.8  - monitor creatinine  - avoid nephrotoxic meds  -slight bump likely in setting of COCO.  Cotninue to monitor.

## 2018-05-25 NOTE — PROGRESS NOTE ADULT - SUBJECTIVE AND OBJECTIVE BOX
Patient is a 77y old  Male who presents with a chief complaint of Positive Stress Test ahead of Preop evaluation (20 May 2018 20:23)        SUBJECTIVE / OVERNIGHT EVENTS:  patient iwht no events overnight.  Patient offers no complaints.  Denies fever, chills, chest pain, SOB, abdominal pain, n/v/d/c.     CAPILLARY BLOOD GLUCOSE      POCT Blood Glucose.: 105 mg/dL (23 May 2018 22:40)    I&O's Summary    23 May 2018 07:01  -  24 May 2018 07:00  --------------------------------------------------------  IN: 410 mL / OUT: 180 mL / NET: 230 mL    24 May 2018 07:01  -  24 May 2018 13:40  --------------------------------------------------------  IN: 120 mL / OUT: 0 mL / NET: 120 mL    Vital Signs Last 24 Hrs  T(C): 36.6 (24 May 2018 13:34), Max: 36.9 (23 May 2018 21:05)  T(F): 97.9 (24 May 2018 13:34), Max: 98.5 (23 May 2018 21:05)  HR: 61 (24 May 2018 13:34) (60 - 72)  BP: 167/68 (24 May 2018 13:34) (122/51 - 167/68)  BP(mean): --  RR: 16 (24 May 2018 13:34) (16 - 18)  SpO2: 96% (24 May 2018 13:34) (96% - 97%)    PHYSICAL EXAM:  GENERAL:  Chronically ill appearing cachectic M, lying in bed, in NAD  HEAD:  left sided cheek soft mass (chronic per pt)  ORAL: poor dentition, missing teeth  EYES: PERRLA  NECK: Supple, No JVD  CHEST/LUNG: diffuse mild expiratory wheezing b/l.   HEART: Reg rate. Normal S1, S2. No m/r/g.   ABDOMEN: SNTND. Bowel sounds present  EXTREMITIES: No clubbing, cyanosis.  left 2nd toe dry gangrene  PSYCH: AAOx2-3,odd affect    LABS:                          11.0   5.21  )-----------( 185      ( 25 May 2018 07:46 )             32.7   Mg     1.9     05-24 05-25    141  |  106  |  21  ----------------------------<  83  4.0   |  23  |  1.93<H>    Ca    9.1      25 May 2018 06:42  Phos  3.6     05-25  Mg     2.2     05-25    TPro  5.9<L>  /  Alb  3.4  /  TBili  0.4  /  DBili  <0.1  /  AST  42<H>  /  ALT  73<H>  /  AlkPhos  175<H>  05-25      RADIOLOGY & ADDITIONAL TESTS:  Telemetry reviewed: sinus 1st def block,  Consultant(s) Notes Reviewed:  podiatry, Dr. Matos  Care Discussed with Consultants/Other Providers: Floor NP    MEDICATIONS  (STANDING):  amLODIPine   Tablet 10 milliGRAM(s) Oral daily  aspirin enteric coated 81 milliGRAM(s) Oral daily  atorvastatin 80 milliGRAM(s) Oral at bedtime  buDESOnide 160 MICROgram(s)/formoterol 4.5 MICROgram(s) Inhaler 2 Puff(s) Inhalation two times a day  carvedilol 25 milliGRAM(s) Oral every 12 hours  clopidogrel Tablet 75 milliGRAM(s) Oral daily  heparin  Injectable 5000 Unit(s) SubCutaneous every 8 hours  hydrALAZINE 100 milliGRAM(s) Oral every 8 hours  isosorbide   dinitrate Tablet (ISORDIL) 40 milliGRAM(s) Oral three times a day  montelukast 10 milliGRAM(s) Oral daily  pantoprazole    Tablet 40 milliGRAM(s) Oral before breakfast  polyethylene glycol 3350 17 Gram(s) Oral daily  QUEtiapine 25 milliGRAM(s) Oral at bedtime  senna 2 Tablet(s) Oral at bedtime  sodium chloride 0.9%. 1000 milliLiter(s) (30 mL/Hr) IV Continuous <Continuous>  tamsulosin 0.4 milliGRAM(s) Oral daily  tiotropium 18 MICROgram(s) Capsule 1 Capsule(s) Inhalation daily    MEDICATIONS  (PRN):  ALBUTerol    90 MICROgram(s) HFA Inhaler 1 Puff(s) Inhalation every 6 hours PRN Shortness of Breath and/or Wheezing  oxyCODONE    5 mG/acetaminophen 325 mG 1 Tablet(s) Oral every 6 hours PRN Severe Pain (7 - 10)

## 2018-05-26 ENCOUNTER — TRANSCRIPTION ENCOUNTER (OUTPATIENT)
Age: 78
End: 2018-05-26

## 2018-05-26 LAB
ALBUMIN SERPL ELPH-MCNC: 3.4 G/DL — SIGNIFICANT CHANGE UP (ref 3.3–5)
ALP SERPL-CCNC: 148 U/L — HIGH (ref 40–120)
ALT FLD-CCNC: 47 U/L — HIGH (ref 10–45)
ANION GAP SERPL CALC-SCNC: 12 MMOL/L — SIGNIFICANT CHANGE UP (ref 5–17)
AST SERPL-CCNC: 29 U/L — SIGNIFICANT CHANGE UP (ref 10–40)
BILIRUB SERPL-MCNC: 0.4 MG/DL — SIGNIFICANT CHANGE UP (ref 0.2–1.2)
BUN SERPL-MCNC: 20 MG/DL — SIGNIFICANT CHANGE UP (ref 7–23)
CALCIUM SERPL-MCNC: 8.9 MG/DL — SIGNIFICANT CHANGE UP (ref 8.4–10.5)
CHLORIDE SERPL-SCNC: 105 MMOL/L — SIGNIFICANT CHANGE UP (ref 96–108)
CO2 SERPL-SCNC: 22 MMOL/L — SIGNIFICANT CHANGE UP (ref 22–31)
CREAT SERPL-MCNC: 1.72 MG/DL — HIGH (ref 0.5–1.3)
GLUCOSE BLDC GLUCOMTR-MCNC: 84 MG/DL — SIGNIFICANT CHANGE UP (ref 70–99)
GLUCOSE SERPL-MCNC: 78 MG/DL — SIGNIFICANT CHANGE UP (ref 70–99)
MAGNESIUM SERPL-MCNC: 2.1 MG/DL — SIGNIFICANT CHANGE UP (ref 1.6–2.6)
PHOSPHATE SERPL-MCNC: 4.1 MG/DL — SIGNIFICANT CHANGE UP (ref 2.5–4.5)
POTASSIUM SERPL-MCNC: 4 MMOL/L — SIGNIFICANT CHANGE UP (ref 3.5–5.3)
POTASSIUM SERPL-SCNC: 4 MMOL/L — SIGNIFICANT CHANGE UP (ref 3.5–5.3)
PROT SERPL-MCNC: 6 G/DL — SIGNIFICANT CHANGE UP (ref 6–8.3)
SODIUM SERPL-SCNC: 139 MMOL/L — SIGNIFICANT CHANGE UP (ref 135–145)

## 2018-05-26 PROCEDURE — 99233 SBSQ HOSP IP/OBS HIGH 50: CPT

## 2018-05-26 RX ADMIN — HEPARIN SODIUM 5000 UNIT(S): 5000 INJECTION INTRAVENOUS; SUBCUTANEOUS at 12:29

## 2018-05-26 RX ADMIN — Medication 100 MILLIGRAM(S): at 12:28

## 2018-05-26 RX ADMIN — MONTELUKAST 10 MILLIGRAM(S): 4 TABLET, CHEWABLE ORAL at 12:28

## 2018-05-26 RX ADMIN — HEPARIN SODIUM 5000 UNIT(S): 5000 INJECTION INTRAVENOUS; SUBCUTANEOUS at 05:42

## 2018-05-26 RX ADMIN — AMLODIPINE BESYLATE 10 MILLIGRAM(S): 2.5 TABLET ORAL at 05:41

## 2018-05-26 RX ADMIN — HEPARIN SODIUM 5000 UNIT(S): 5000 INJECTION INTRAVENOUS; SUBCUTANEOUS at 21:27

## 2018-05-26 RX ADMIN — OXYCODONE AND ACETAMINOPHEN 1 TABLET(S): 5; 325 TABLET ORAL at 09:15

## 2018-05-26 RX ADMIN — Medication 1 APPLICATION(S): at 18:19

## 2018-05-26 RX ADMIN — OXYCODONE AND ACETAMINOPHEN 1 TABLET(S): 5; 325 TABLET ORAL at 05:45

## 2018-05-26 RX ADMIN — Medication 100 MILLIGRAM(S): at 05:45

## 2018-05-26 RX ADMIN — Medication 100 MILLIGRAM(S): at 21:28

## 2018-05-26 RX ADMIN — Medication 1 APPLICATION(S): at 05:42

## 2018-05-26 RX ADMIN — ISOSORBIDE DINITRATE 40 MILLIGRAM(S): 5 TABLET ORAL at 05:41

## 2018-05-26 RX ADMIN — OXYCODONE AND ACETAMINOPHEN 1 TABLET(S): 5; 325 TABLET ORAL at 08:44

## 2018-05-26 RX ADMIN — CLOPIDOGREL BISULFATE 75 MILLIGRAM(S): 75 TABLET, FILM COATED ORAL at 12:28

## 2018-05-26 RX ADMIN — TAMSULOSIN HYDROCHLORIDE 0.4 MILLIGRAM(S): 0.4 CAPSULE ORAL at 12:28

## 2018-05-26 RX ADMIN — ISOSORBIDE DINITRATE 40 MILLIGRAM(S): 5 TABLET ORAL at 21:28

## 2018-05-26 RX ADMIN — Medication 1 TABLET(S): at 12:27

## 2018-05-26 RX ADMIN — CARVEDILOL PHOSPHATE 25 MILLIGRAM(S): 80 CAPSULE, EXTENDED RELEASE ORAL at 05:41

## 2018-05-26 RX ADMIN — OXYCODONE AND ACETAMINOPHEN 1 TABLET(S): 5; 325 TABLET ORAL at 06:39

## 2018-05-26 RX ADMIN — CARVEDILOL PHOSPHATE 25 MILLIGRAM(S): 80 CAPSULE, EXTENDED RELEASE ORAL at 18:20

## 2018-05-26 RX ADMIN — ISOSORBIDE DINITRATE 40 MILLIGRAM(S): 5 TABLET ORAL at 12:26

## 2018-05-26 RX ADMIN — BUDESONIDE AND FORMOTEROL FUMARATE DIHYDRATE 2 PUFF(S): 160; 4.5 AEROSOL RESPIRATORY (INHALATION) at 18:19

## 2018-05-26 RX ADMIN — Medication 81 MILLIGRAM(S): at 12:27

## 2018-05-26 RX ADMIN — OXYCODONE AND ACETAMINOPHEN 1 TABLET(S): 5; 325 TABLET ORAL at 22:24

## 2018-05-26 RX ADMIN — BUDESONIDE AND FORMOTEROL FUMARATE DIHYDRATE 2 PUFF(S): 160; 4.5 AEROSOL RESPIRATORY (INHALATION) at 05:42

## 2018-05-26 RX ADMIN — ATORVASTATIN CALCIUM 80 MILLIGRAM(S): 80 TABLET, FILM COATED ORAL at 21:28

## 2018-05-26 RX ADMIN — TIOTROPIUM BROMIDE 1 CAPSULE(S): 18 CAPSULE ORAL; RESPIRATORY (INHALATION) at 12:26

## 2018-05-26 RX ADMIN — OXYCODONE AND ACETAMINOPHEN 1 TABLET(S): 5; 325 TABLET ORAL at 21:43

## 2018-05-26 RX ADMIN — PANTOPRAZOLE SODIUM 40 MILLIGRAM(S): 20 TABLET, DELAYED RELEASE ORAL at 05:41

## 2018-05-26 RX ADMIN — QUETIAPINE FUMARATE 25 MILLIGRAM(S): 200 TABLET, FILM COATED ORAL at 21:28

## 2018-05-26 NOTE — DISCHARGE NOTE ADULT - SECONDARY DIAGNOSIS.
Systolic congestive heart failure, unspecified HF chronicity Essential hypertension DM (diabetes mellitus) COPD (chronic obstructive pulmonary disease) PAD (peripheral artery disease)

## 2018-05-26 NOTE — DISCHARGE NOTE ADULT - CARE PLAN
Goal:	left foot toe amputation  Assessment and plan of treatment:	WOUNDCARE: Keep dressing clean, dry, and intact until follow-up.  ANTIBIOTIC: Finish course of antibiotic as prescribed.  WEIGHTBEARING: Weight bearing as tolerated to left foot in post-op shoe.  FOLLOW-UP: Follow-up with Dr. Curtis within 1 week of discharge. Call [phone] for appointment. Principal Discharge DX:	Dry gangrene  Goal:	left foot toe amputation  Assessment and plan of treatment:	WOUNDCARE: Keep dressing clean, dry, and intact until follow-up.  ANTIBIOTIC: Finish course of antibiotic as prescribed.  WEIGHTBEARING: Weight bearing as tolerated to left foot in post-op shoe.  FOLLOW-UP: Follow-up with Dr. Curtis within 1 week of discharge. Call [phone] for appointment.  Secondary Diagnosis:	Systolic congestive heart failure, unspecified HF chronicity  Assessment and plan of treatment:	Weigh yourself daily.  If you gain 3lbs in 3 days, or 5lbs in a week call your Health Care Provider.  Do not eat or drink foods containing more than 2000mg of salt (sodium) in your diet every day.  Call your Health Care Provider if you have any swelling or increased swelling in your feet, ankles, and/or stomach.  Take all of your medication as directed.  If you become dizzy call your Health Care Provider.  Secondary Diagnosis:	Essential hypertension  Assessment and plan of treatment:	Low salt diet  Activity as tolerated.  Take all medication as prescribed.  Follow up with your medical doctor for routine blood pressure monitoring at your next visit.  Notify your doctor if you have any of the following symptoms:   Dizziness, Lightheadedness, Blurry vision, Headache, Chest pain, Shortness of breath  Secondary Diagnosis:	DM (diabetes mellitus)  Assessment and plan of treatment:	HgA1C this admission.  Make sure you get your HgA1c checked every three months.  If you take oral diabetes medications, check your blood glucose two times a day.  If you take insulin, check your blood glucose before meals and at bedtime.  It's important not to skip any meals.  Keep a log of your blood glucose results and always take it with you to your doctor appointments.  Keep a list of your current medications including injectables and over the counter medications and bring this medication list with you to all your doctor appointments.  If you have not seen your opthalmologist this year call for appointment.  Check your feet daily for redness, sores, or openings. Do not self treat. If no improvement in two days call your primary care physician for an appointment.  Low blood sugar (hypoglycemia) is a blood sugar below 70mg/dl. Check your blood sugar if you feel signs/symptoms of hypoglycemia. If your blood sugar is below 70 take 15 grams of carbohydrates (ex 4 oz of apple juice, 3-4 glucosr tablets, or 4-6 oz of regular soda) wait 15 minutes and repeat blood sugar to make sure it comes up above 70.  If your blood sugar is above 70 and you are due for a meal, have a meal.  If you are not due for a meal have a snack.  This snack helps keeps your blood sugar at a safe range.  Secondary Diagnosis:	COPD (chronic obstructive pulmonary disease)  Assessment and plan of treatment:	Call your Health Care provider upon arrival home to make a follow up appointment within one week.  Take all inhalers as prescribed by your Health Care Provider.  Take steroids as prescribed by your Health Care Provider.  If your cough increases infrequency and severity and/or you have shortness of breath or increased shortness of breath call your Health Care Provider.  If you develop fever, chills, night sweats, malaise, and/or change in mental status call your Health care Provider.  Nutrition is very important.  Eat small frequent meals.  Increase your activity as tolerated.  Do not stay in bed all day  Secondary Diagnosis:	PAD (peripheral artery disease)  Assessment and plan of treatment:	stable Principal Discharge DX:	Dry gangrene  Goal:	s/p left foot toe amputation  Assessment and plan of treatment:	WOUNDCARE: Keep dressing clean, dry, and intact until follow-up.  ANTIBIOTIC: Finish course of Augmentin as prescribed.  WEIGHTBEARING: Weight bearing as tolerated to left foot in post-op shoe.  FOLLOW-UP: Follow-up with Dr. Curtis within 1 week of discharge. Call [phone] for appointment.  Secondary Diagnosis:	Systolic congestive heart failure, unspecified HF chronicity  Assessment and plan of treatment:	Weigh yourself daily.  If you gain 3lbs in 3 days, or 5lbs in a week call your Health Care Provider.  Do not eat or drink foods containing more than 2000mg of salt (sodium) in your diet every day.  Call your Health Care Provider if you have any swelling or increased swelling in your feet, ankles, and/or stomach.  Take all of your medication as directed.  If you become dizzy call your Health Care Provider.  Secondary Diagnosis:	Essential hypertension  Assessment and plan of treatment:	Low salt diet  Activity as tolerated.  Take all medication as prescribed.  Follow up with your medical doctor for routine blood pressure monitoring at your next visit.  Notify your doctor if you have any of the following symptoms:   Dizziness, Lightheadedness, Blurry vision, Headache, Chest pain, Shortness of breath  Secondary Diagnosis:	DM (diabetes mellitus)  Assessment and plan of treatment:	HgA1C this admission.  Make sure you get your HgA1c checked every three months.  If you take oral diabetes medications, check your blood glucose two times a day.  If you take insulin, check your blood glucose before meals and at bedtime.  It's important not to skip any meals.  Keep a log of your blood glucose results and always take it with you to your doctor appointments.  Keep a list of your current medications including injectables and over the counter medications and bring this medication list with you to all your doctor appointments.  If you have not seen your opthalmologist this year call for appointment.  Check your feet daily for redness, sores, or openings. Do not self treat. If no improvement in two days call your primary care physician for an appointment.  Low blood sugar (hypoglycemia) is a blood sugar below 70mg/dl. Check your blood sugar if you feel signs/symptoms of hypoglycemia. If your blood sugar is below 70 take 15 grams of carbohydrates (ex 4 oz of apple juice, 3-4 glucosr tablets, or 4-6 oz of regular soda) wait 15 minutes and repeat blood sugar to make sure it comes up above 70.  If your blood sugar is above 70 and you are due for a meal, have a meal.  If you are not due for a meal have a snack.  This snack helps keeps your blood sugar at a safe range.  Secondary Diagnosis:	COPD (chronic obstructive pulmonary disease)  Assessment and plan of treatment:	Call your Health Care provider upon arrival home to make a follow up appointment within one week.  Take all inhalers as prescribed by your Health Care Provider.  Take steroids as prescribed by your Health Care Provider.  If your cough increases infrequency and severity and/or you have shortness of breath or increased shortness of breath call your Health Care Provider.  If you develop fever, chills, night sweats, malaise, and/or change in mental status call your Health care Provider.  Nutrition is very important.  Eat small frequent meals.  Increase your activity as tolerated.  Do not stay in bed all day  Secondary Diagnosis:	PAD (peripheral artery disease)  Assessment and plan of treatment:	you had LEFT SFA stent on 5/22   Continue Aspirin, Plavix, statin

## 2018-05-26 NOTE — DISCHARGE NOTE ADULT - MEDICATION SUMMARY - MEDICATIONS TO STOP TAKING
I will STOP taking the medications listed below when I get home from the hospital:    potassium chloride 10 mEq oral tablet, extended release  -- 1 tab(s) by mouth 2 times a day    NexIUM 40 mg oral delayed release capsule  -- 1 cap(s) by mouth once a day

## 2018-05-26 NOTE — DISCHARGE NOTE ADULT - PATIENT PORTAL LINK FT
You can access the [facility], offered by [facility], by registering with the following website: [email or web address]

## 2018-05-26 NOTE — DISCHARGE NOTE ADULT - HOSPITAL COURSE
75 yo M PMH of DMT2, HTN, HLD, PAD, CKD3 (BL Cr 1.8), BPH, Prostate cancer, psychosis, GERD, COPD not on home O2, previously homeless who presents as a transfer for L foot dry gangrene, found to have an abnormal stress test and EF 36%, transferred LE angiogram now s/p L SFA stent 5/22 pending L 2nd toe amputation 5/25     Problem/Plan - 1:  ·  Problem: PAD (peripheral artery disease).  Plan: GRISEL/PVR and Arterial duplex consistent with b/l severe PAD.   s/p LEFT SFA stent on 5/22  - c/w DAPT aspirin and plavix and PPI, c/w statin  - will need DAPT for 3 months post stent per Dr. Matos.      Problem/Plan - 2:  ·  Problem: Dry gangrene.  Plan: s/p amputation on 5/25 of 2nd digit.  -on prophylactic augmentin as per podiatry for 10 days, given fluctuating rnela function, close to 30 GFR, will give 500mg BID instead of 875mg BID.  -cutures noted of GPC.  Discussed with podiatry who states is not concerned, thinks contaminant vs. not at border and ok with current Abx course.  -awaiting documentation.  If not or If patient spikes, will consider ID.      Problem/Plan - 3:  ·  Problem: Abnormal stress test.  Plan: asymptomatic  outside hospital stress showed partially reversible defect involving the anterior, mid anterior and apical anterior segments consistent with probable ischemia of the LAD and possible infarct of the RCA.   - nuclear stress at OSH showed Global systolic function is moderately reduced with an EF of 36% and 2 mm downsloping ST depressions in V2-V6.   - prev d/w cards Dr Matos - patient recently had negative cardiac cath as outpatient, no need for repeat cath   - Continue with ASA, Carvedilol, Lipitor, BB, increased beta blocker as per patient's electrocardiologist.      Problem/Plan - 4:  ·  Problem: Systolic congestive heart failure, unspecified HF chronicity.  Plan: stress test w/ EF36%, no signs of overload or acute CHF  - monitor symptoms, exam  - c/w BB.      Problem/Plan - 5:  ·  Problem: Transaminitis.  Plan: elevated LFTs on labs 2/21, unclear if lab error. pt asymptomatic  downtrending, no further testing at this itm.      Problem/Plan - 6:  Problem: Chronic kidney disease (CKD), stage III (moderate). Plan: creatinine at baseline ~1.8  - monitor creatinine  - avoid nephrotoxic meds.     Problem/Plan - 7:  ·  Problem: HTN (hypertension).  Plan: moderate control   - c/w increased coreg to 25mg bid by EP  - c/w increased isosorbide to 40mg TID by EP  - c/w amlodipine 10mg daily  - c/w hydralazine 100mg TID  - monitor BP, adjust meds as needed.      Problem/Plan - 8:  ·  Problem: DM (diabetes mellitus).  Plan: A1c 5.3, FS stable, resolved  - monitor glc in BMP.      Problem/Plan - 9:  ·  Problem: COPD (chronic obstructive pulmonary disease).  Plan: stable, no exacerbation  - c/w Spiriva.     Pt stable discharged to Nursing Home with outpatient follow up with Dr. Matos, Dr. Curtis, Dr perry as an outpatient. 75 yo M PMH of DMT2, HTN, HLD, PAD, CKD3 (BL Cr 1.8), BPH, Prostate cancer, psychosis, GERD, COPD not on home O2, previously homeless who presents as a transfer for L foot dry gangrene. Pt was found to have severe b/l LE PAD and had an abnormal stress test w/ EF 36%. Pt underwent left LE angiogram w/ L SFA stent 5/22. Pt to continue ASA/Plavix/statin, will need to be on DAPT for at 3 months. In addition, patient w/ abnormal stress test, this was discussed with cardiology, recent cardiac cath was negative and there was no need to repeat, continue medical management of CAD. Pt was also seen by Podiatry for left foot dry gangrene and underwent L 2nd toe amputation 5/25. Pt will continue wound care and pain control. OR cultures grew coag negative staph, which is likely a contaminant, no signs of infection post-op, complete 10 days of prophylactic Augmentin as per podiatry. Furthermore, patient w/ uncontrolled HTN, BP meds were up-titrated. Chronic systolic heart failure, remained euvolemic during hospitalization. At time of discharge, patient was refusing to return to LTC facility. Pt had poor insight and was deemed not to have medical capacity to refuse. Patient discharged to Nursing Home with outpatient follow up with Dr. Matos, Dr. Curtis, Dr Mcdaniel.

## 2018-05-26 NOTE — DISCHARGE NOTE ADULT - MEDICATION SUMMARY - MEDICATIONS TO TAKE
I will START or STAY ON the medications listed below when I get home from the hospital:    acetaminophen 325 mg oral tablet  -- 2 tab(s) by mouth every 6 hours, As needed, Mild Pain (1 - 3)  -- Indication: For PAin    aspirin 81 mg oral delayed release tablet  -- 1 tab(s) by mouth once a day  -- Indication: For Coronary artery disease involving native coronary artery without angina pectoris, unspecified whether native or transplanted heart    oxyCODONE 10 mg oral tablet  -- 1 tab(s) by mouth every 6 hours, As Needed - 10)  -- Indication: For PAin    Flomax 0.4 mg oral capsule  -- 1 cap(s) by mouth once a day  -- Indication: For BPH    isosorbide dinitrate 40 mg oral tablet  -- 1 tab(s) by mouth 3 times a day  -- Indication: For Coronary artery disease involving native coronary artery without angina pectoris, unspecified whether native or transplanted heart    atorvastatin 80 mg oral tablet  -- 1 tab(s) by mouth once a day (at bedtime)  -- Indication: For HLD (hyperlipidemia)    clopidogrel 75 mg oral tablet  -- 1 tab(s) by mouth once a day  -- Indication: For Coronary artery disease involving native coronary artery without angina pectoris, unspecified whether native or transplanted heart    QUEtiapine 25 mg oral tablet  -- 1 tab(s) by mouth once a day (at bedtime)  -- Indication: For Coronary artery disease involving native coronary artery without angina pectoris, unspecified whether native or transplanted heart    carvedilol 25 mg oral tablet  -- 1 tab(s) by mouth every 12 hours  -- Indication: For Essential hypertension    Symbicort 160 mcg-4.5 mcg/inh inhalation aerosol  -- 2 puff(s) inhaled 2 times a day  -- Indication: For COPD (chronic obstructive pulmonary disease)    Ventolin 90 mcg/inh inhalation aerosol with adapter  -- Indication: For COPD (chronic obstructive pulmonary disease)    tiotropium 18 mcg inhalation capsule  -- 1 cap(s) inhaled once a day  -- Indication: For COPD (chronic obstructive pulmonary disease)    amLODIPine 10 mg oral tablet  -- 1 tab(s) by mouth once a day  -- Indication: For HTN (hypertension)    senna oral tablet  -- 2 tab(s) by mouth once a day (at bedtime)  -- Indication: For CONSTIPATION    docusate sodium 100 mg oral capsule  -- 1 cap(s) by mouth 3 times a day  -- Indication: For CONSTIPATION    polyethylene glycol 3350 oral powder for reconstitution  -- 17 gram(s) by mouth once a day  -- Indication: For CONSTIPATION    Singulair  -- 10 milligram(s) by mouth once a day  -- Indication: For Need for prophylactic measure    amoxicillin-clavulanate 500 mg-125 mg oral tablet  -- 1 tab(s) by mouth 2 times a day, until 6/4/18  -- Indication: For Dry gangrene    omeprazole 40 mg oral delayed release capsule  -- 1 cap(s) by mouth once a day  -- Indication: For GERD    hydrALAZINE 100 mg oral tablet  -- 1 tab(s) by mouth every 8 hours  -- Indication: For Essential hypertension

## 2018-05-26 NOTE — DISCHARGE NOTE ADULT - CARE PROVIDERS DIRECT ADDRESSES
[email or web address]

## 2018-05-26 NOTE — PROGRESS NOTE ADULT - SUBJECTIVE AND OBJECTIVE BOX
Patient is a 77y old  Male who presents with a chief complaint of Positive Stress Test ahead of Preop evaluation (20 May 2018 20:23)       INTERVAL HPI/OVERNIGHT EVENTS:  Patient seen and evaluated at bedside.  Pt is resting comfortable in NAD. Denies N/V/F/C.  Pain rated at X/10    Allergies    No Known Allergies    Intolerances        Vital Signs Last 24 Hrs  T(C): 36.9 (26 May 2018 05:23), Max: 36.9 (26 May 2018 05:23)  T(F): 98.5 (26 May 2018 05:23), Max: 98.5 (26 May 2018 05:23)  HR: 70 (26 May 2018 05:23) (59 - 70)  BP: 141/60 (26 May 2018 05:23) (118/67 - 167/74)  BP(mean): 111 (25 May 2018 14:00) (87 - 115)  RR: 18 (26 May 2018 05:23) (14 - 20)  SpO2: 96% (26 May 2018 05:23) (94% - 97%)    LABS:                        11.6   5.2   )-----------( 167      ( 25 May 2018 22:45 )             35.9     05-26    139  |  105  |  20  ----------------------------<  78  4.0   |  22  |  1.72<H>    Ca    8.9      26 May 2018 07:15  Phos  4.1     05-26  Mg     2.1     05-26    TPro  6.0  /  Alb  3.4  /  TBili  0.4  /  DBili  x   /  AST  29  /  ALT  47<H>  /  AlkPhos  148<H>  05-26    PT/INR - ( 25 May 2018 09:21 )   PT: 12.8 sec;   INR: 1.13 ratio         PTT - ( 25 May 2018 09:21 )  PTT:36.6 sec    CAPILLARY BLOOD GLUCOSE      POCT Blood Glucose.: 84 mg/dL (26 May 2018 07:11)  POCT Blood Glucose.: 106 mg/dL (25 May 2018 17:15)  POCT Blood Glucose.: 90 mg/dL (25 May 2018 10:08)  POCT Blood Glucose.: 93 mg/dL (25 May 2018 08:22)      Lower Extremity Physical Exam:  Left foot 2nd toe amp site skin well coapted, sutures intact, no hematoma, flap warm to touch, no dehiscence    RADIOLOGY & ADDITIONAL TESTS:

## 2018-05-26 NOTE — DISCHARGE NOTE ADULT - MEDICATION SUMMARY - MEDICATIONS TO CHANGE
I will SWITCH the dose or number of times a day I take the medications listed below when I get home from the hospital:    oxyCODONE-acetaminophen 5 mg-325 mg oral tablet  -- 2 tab(s) by mouth every 4 hours, As needed, Severe Pain (7 - 10)

## 2018-05-26 NOTE — PROGRESS NOTE ADULT - ASSESSMENT
77M s/p Left partial 2nd ray amputation due to dry gangrene of 2nd toe  - POD #1  - Left foot surgical site stable  - No concern for infection however will need prophylactic PO abx on discharge, Augmentin 875mg BID x 10 days  - WBAT to left foot in post-op shoe  - Pod stable for d/c  - Keep dressing CDI on d/c until f/u at office  - F/u outpt with Dr. Curtis within 1 week of discharge. Call [phone] for appointment  - D/w attending

## 2018-05-26 NOTE — PROGRESS NOTE ADULT - ASSESSMENT
75 yo M PMH of DMT2, HTN, HLD, PAD, CKD3 (BL Cr 1.8), BPH, Prostate cancer, psychosis, GERD, COPD not on home O2, previously homeless who presents as a transfer for L foot dry gangrene, found to have an abnormal stress test and EF 36%, transferred LE angiogram now s/p L SFA stent 5/22 pending L 2nd toe amputation 5/25

## 2018-05-26 NOTE — PROGRESS NOTE ADULT - SUBJECTIVE AND OBJECTIVE BOX
Patient is a 77y old  Male who presents with a chief complaint of Positive Stress Test ahead of Preop evaluation (20 May 2018 20:23)        SUBJECTIVE / OVERNIGHT EVENTS:  patient s/p ampuation of 2nd digit of left foot yesterday.  Pain controlled with current medication. No other complaints.       CAPILLARY BLOOD GLUCOSE      POCT Blood Glucose.: 105 mg/dL (23 May 2018 22:40)    Vital Signs Last 24 Hrs  T(C): 36.9 (26 May 2018 05:23), Max: 36.9 (26 May 2018 05:23)  T(F): 98.5 (26 May 2018 05:23), Max: 98.5 (26 May 2018 05:23)  HR: 70 (26 May 2018 05:23) (59 - 70)  BP: 141/60 (26 May 2018 05:23) (141/60 - 167/74)  BP(mean): 111 (25 May 2018 14:00) (111 - 115)  RR: 18 (26 May 2018 05:23) (15 - 20)  SpO2: 96% (26 May 2018 05:23) (94% - 96%)      PHYSICAL EXAM:  GENERAL:  Chronically ill appearing cachectic M, lying in bed, in NAD  HEAD:  left sided cheek soft mass (chronic per pt)  ORAL: poor dentition, missing teeth  EYES: PERRLA  NECK: Supple, No JVD  CHEST/LUNG: diffuse mild expiratory wheezing b/l.   HEART: Reg rate. Normal S1, S2. No m/r/g.   ABDOMEN: SNTND. Bowel sounds present  EXTREMITIES: No clubbing, cyanosis.  left amputated 2nd digit, wrapped in bandage, clean bandages.   PSYCH: AAOx2-3,odd affect    LABS:                                            10.6   4.89  )-----------( 182      ( 26 May 2018 07:53 )             30.5   05-26    139  |  105  |  20  ----------------------------<  78  4.0   |  22  |  1.72<H>    Ca    8.9      26 May 2018 07:15  Phos  4.1     05-26  Mg     2.1     05-26    TPro  6.0  /  Alb  3.4  /  TBili  0.4  /  DBili  x   /  AST  29  /  ALT  47<H>  /  AlkPhos  148<H>  05-26      RADIOLOGY & ADDITIONAL TESTS:< from: Xray Foot AP + Lateral + Oblique, Left (05.25.18 @ 12:25) >    Interval amputation of the second ray at the level of the head of the   second metatarsal. Adjacent soft tissue swelling and subcutaneous   emphysema. No other interval change.    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   Tarsometatarsal degenerative arthrosis.      < end of copied text >    Telemetry reviewed: sinus 1st def block,  Consultant(s) Notes Reviewed:  podiatry, Dr. Matos  Care Discussed with Consultants/Other Providers: Floor NP    MEDICATIONS  (STANDING):  amLODIPine   Tablet 10 milliGRAM(s) Oral daily  aspirin enteric coated 81 milliGRAM(s) Oral daily  atorvastatin 80 milliGRAM(s) Oral at bedtime  buDESOnide 160 MICROgram(s)/formoterol 4.5 MICROgram(s) Inhaler 2 Puff(s) Inhalation two times a day  carvedilol 25 milliGRAM(s) Oral every 12 hours  clopidogrel Tablet 75 milliGRAM(s) Oral daily  heparin  Injectable 5000 Unit(s) SubCutaneous every 8 hours  hydrALAZINE 100 milliGRAM(s) Oral every 8 hours  isosorbide   dinitrate Tablet (ISORDIL) 40 milliGRAM(s) Oral three times a day  montelukast 10 milliGRAM(s) Oral daily  pantoprazole    Tablet 40 milliGRAM(s) Oral before breakfast  polyethylene glycol 3350 17 Gram(s) Oral daily  QUEtiapine 25 milliGRAM(s) Oral at bedtime  senna 2 Tablet(s) Oral at bedtime  sodium chloride 0.9%. 1000 milliLiter(s) (30 mL/Hr) IV Continuous <Continuous>  tamsulosin 0.4 milliGRAM(s) Oral daily  tiotropium 18 MICROgram(s) Capsule 1 Capsule(s) Inhalation daily    MEDICATIONS  (PRN):  ALBUTerol    90 MICROgram(s) HFA Inhaler 1 Puff(s) Inhalation every 6 hours PRN Shortness of Breath and/or Wheezing  oxyCODONE    5 mG/acetaminophen 325 mG 1 Tablet(s) Oral every 6 hours PRN Severe Pain (7 - 10)

## 2018-05-26 NOTE — PROGRESS NOTE ADULT - PROBLEM SELECTOR PLAN 5
elevated LFTs on labs 2/21, unclear if lab error. pt asymptomatic  downtrending, no further testing at this itm.

## 2018-05-27 LAB
ANION GAP SERPL CALC-SCNC: 11 MMOL/L — SIGNIFICANT CHANGE UP (ref 5–17)
BUN SERPL-MCNC: 26 MG/DL — HIGH (ref 7–23)
CALCIUM SERPL-MCNC: 8.6 MG/DL — SIGNIFICANT CHANGE UP (ref 8.4–10.5)
CHLORIDE SERPL-SCNC: 105 MMOL/L — SIGNIFICANT CHANGE UP (ref 96–108)
CO2 SERPL-SCNC: 23 MMOL/L — SIGNIFICANT CHANGE UP (ref 22–31)
CREAT SERPL-MCNC: 1.88 MG/DL — HIGH (ref 0.5–1.3)
GLUCOSE SERPL-MCNC: 84 MG/DL — SIGNIFICANT CHANGE UP (ref 70–99)
HCT VFR BLD CALC: 31.1 % — LOW (ref 39–50)
HGB BLD-MCNC: 10.6 G/DL — LOW (ref 13–17)
MAGNESIUM SERPL-MCNC: 1.9 MG/DL — SIGNIFICANT CHANGE UP (ref 1.6–2.6)
MCHC RBC-ENTMCNC: 30.3 PG — SIGNIFICANT CHANGE UP (ref 27–34)
MCHC RBC-ENTMCNC: 34.1 GM/DL — SIGNIFICANT CHANGE UP (ref 32–36)
MCV RBC AUTO: 88.9 FL — SIGNIFICANT CHANGE UP (ref 80–100)
PHOSPHATE SERPL-MCNC: 3.5 MG/DL — SIGNIFICANT CHANGE UP (ref 2.5–4.5)
PLATELET # BLD AUTO: 165 K/UL — SIGNIFICANT CHANGE UP (ref 150–400)
POTASSIUM SERPL-MCNC: 3.7 MMOL/L — SIGNIFICANT CHANGE UP (ref 3.5–5.3)
POTASSIUM SERPL-SCNC: 3.7 MMOL/L — SIGNIFICANT CHANGE UP (ref 3.5–5.3)
RBC # BLD: 3.5 M/UL — LOW (ref 4.2–5.8)
RBC # FLD: 14.6 % — HIGH (ref 10.3–14.5)
SODIUM SERPL-SCNC: 139 MMOL/L — SIGNIFICANT CHANGE UP (ref 135–145)
WBC # BLD: 4.57 K/UL — SIGNIFICANT CHANGE UP (ref 3.8–10.5)
WBC # FLD AUTO: 4.57 K/UL — SIGNIFICANT CHANGE UP (ref 3.8–10.5)

## 2018-05-27 PROCEDURE — 99233 SBSQ HOSP IP/OBS HIGH 50: CPT

## 2018-05-27 PROCEDURE — 99232 SBSQ HOSP IP/OBS MODERATE 35: CPT

## 2018-05-27 RX ORDER — OXYCODONE HYDROCHLORIDE 5 MG/1
10 TABLET ORAL EVERY 4 HOURS
Qty: 0 | Refills: 0 | Status: DISCONTINUED | OUTPATIENT
Start: 2018-05-27 | End: 2018-05-31

## 2018-05-27 RX ORDER — OXYCODONE HYDROCHLORIDE 5 MG/1
5 TABLET ORAL EVERY 4 HOURS
Qty: 0 | Refills: 0 | Status: DISCONTINUED | OUTPATIENT
Start: 2018-05-27 | End: 2018-05-31

## 2018-05-27 RX ADMIN — OXYCODONE HYDROCHLORIDE 10 MILLIGRAM(S): 5 TABLET ORAL at 12:31

## 2018-05-27 RX ADMIN — HEPARIN SODIUM 5000 UNIT(S): 5000 INJECTION INTRAVENOUS; SUBCUTANEOUS at 05:05

## 2018-05-27 RX ADMIN — HEPARIN SODIUM 5000 UNIT(S): 5000 INJECTION INTRAVENOUS; SUBCUTANEOUS at 22:06

## 2018-05-27 RX ADMIN — HEPARIN SODIUM 5000 UNIT(S): 5000 INJECTION INTRAVENOUS; SUBCUTANEOUS at 13:25

## 2018-05-27 RX ADMIN — Medication 1 APPLICATION(S): at 17:55

## 2018-05-27 RX ADMIN — BUDESONIDE AND FORMOTEROL FUMARATE DIHYDRATE 2 PUFF(S): 160; 4.5 AEROSOL RESPIRATORY (INHALATION) at 17:55

## 2018-05-27 RX ADMIN — ATORVASTATIN CALCIUM 80 MILLIGRAM(S): 80 TABLET, FILM COATED ORAL at 22:05

## 2018-05-27 RX ADMIN — TAMSULOSIN HYDROCHLORIDE 0.4 MILLIGRAM(S): 0.4 CAPSULE ORAL at 12:31

## 2018-05-27 RX ADMIN — ISOSORBIDE DINITRATE 40 MILLIGRAM(S): 5 TABLET ORAL at 13:26

## 2018-05-27 RX ADMIN — Medication 81 MILLIGRAM(S): at 12:30

## 2018-05-27 RX ADMIN — Medication 100 MILLIGRAM(S): at 13:26

## 2018-05-27 RX ADMIN — Medication 1 TABLET(S): at 05:05

## 2018-05-27 RX ADMIN — MONTELUKAST 10 MILLIGRAM(S): 4 TABLET, CHEWABLE ORAL at 12:30

## 2018-05-27 RX ADMIN — OXYCODONE HYDROCHLORIDE 10 MILLIGRAM(S): 5 TABLET ORAL at 18:03

## 2018-05-27 RX ADMIN — CARVEDILOL PHOSPHATE 25 MILLIGRAM(S): 80 CAPSULE, EXTENDED RELEASE ORAL at 05:05

## 2018-05-27 RX ADMIN — AMLODIPINE BESYLATE 10 MILLIGRAM(S): 2.5 TABLET ORAL at 05:05

## 2018-05-27 RX ADMIN — Medication 100 MILLIGRAM(S): at 22:05

## 2018-05-27 RX ADMIN — Medication 1 TABLET(S): at 17:55

## 2018-05-27 RX ADMIN — QUETIAPINE FUMARATE 25 MILLIGRAM(S): 200 TABLET, FILM COATED ORAL at 22:05

## 2018-05-27 RX ADMIN — TIOTROPIUM BROMIDE 1 CAPSULE(S): 18 CAPSULE ORAL; RESPIRATORY (INHALATION) at 12:30

## 2018-05-27 RX ADMIN — BUDESONIDE AND FORMOTEROL FUMARATE DIHYDRATE 2 PUFF(S): 160; 4.5 AEROSOL RESPIRATORY (INHALATION) at 05:05

## 2018-05-27 RX ADMIN — CLOPIDOGREL BISULFATE 75 MILLIGRAM(S): 75 TABLET, FILM COATED ORAL at 12:31

## 2018-05-27 RX ADMIN — OXYCODONE HYDROCHLORIDE 10 MILLIGRAM(S): 5 TABLET ORAL at 13:00

## 2018-05-27 RX ADMIN — Medication 100 MILLIGRAM(S): at 05:05

## 2018-05-27 RX ADMIN — PANTOPRAZOLE SODIUM 40 MILLIGRAM(S): 20 TABLET, DELAYED RELEASE ORAL at 05:05

## 2018-05-27 RX ADMIN — ISOSORBIDE DINITRATE 40 MILLIGRAM(S): 5 TABLET ORAL at 22:05

## 2018-05-27 RX ADMIN — CARVEDILOL PHOSPHATE 25 MILLIGRAM(S): 80 CAPSULE, EXTENDED RELEASE ORAL at 17:55

## 2018-05-27 RX ADMIN — ISOSORBIDE DINITRATE 40 MILLIGRAM(S): 5 TABLET ORAL at 05:05

## 2018-05-27 NOTE — PHYSICAL THERAPY INITIAL EVALUATION ADULT - PERTINENT HX OF CURRENT PROBLEM, REHAB EVAL
77 yo M PMH of DM2, HTN, HLD, PAD, CKD, BPH, Prostate cancer, psychosis, GERD, COPD, previously homeless who presents as a transfer for L foot dry gangrene. The patient states that 2 months ago, he was walking around and cut his foot on some glass and the wound never healed. contd below:
77 yo M PMH of DM2, HTN, HLD, PAD, CKD, BPH, Prostate cancer, psychosis, GERD, COPD, previously homeless who presents as a transfer for L foot dry gangrene. The patient states that 2 months ago, he was walking around and cut his foot on some glass and the wound never healed. contd below:

## 2018-05-27 NOTE — PHYSICAL THERAPY INITIAL EVALUATION ADULT - ADDITIONAL COMMENTS
contd from above: The patient denies any CP, SOB, NVD, F, chills, rash, HA, dysuria. While at the outside hospital, the patient was evaluated by Vascular Surgery, who performed a CTA of the lower extremities and was found to have bilateral superficial femoral artery occlusions. The plan was for the patient to have a femoral popliteal bypass, however prior to that, vascular surgery requested Cards clearance. The patient was noted to have t wave inversions in V4-V6. The patient underwent a a stress test which was abnormal with a partially reversible defect involving the anterior, mid anterior and apical anterior segments consistent with probable ischemia of the LAD and possible infarct of the RCA. CXR: (-)    social history: pt states lives alone in apartment, rolling walker. pt poor historian; as per chart, and confirmed with CM, pt is a long term resident of nursing home
contd from above: The patient denies any CP, SOB, NVD, F, chills, rash, HA, dysuria. While at the outside hospital, the patient was evaluated by Vascular Surgery, who performed a CTA of the lower extremities and was found to have bilateral superficial femoral artery occlusions. The plan was for the patient to have a femoral popliteal bypass, however prior to that, vascular surgery requested Cards clearance. The patient was noted to have t wave inversions in V4-V6. The patient underwent a a stress test which was abnormal with a partially reversible defect involving the anterior, mid anterior and apical anterior segments consistent with probable ischemia of the LAD and possible infarct of the RCA. CXR: (-)    social history: pt states lives alone in apartment, rolling walker. pt poor historian; as per chart, and confirmed with CM, pt is a long term resident of nursing home

## 2018-05-27 NOTE — PROGRESS NOTE ADULT - SUBJECTIVE AND OBJECTIVE BOX
Patient is a 77y old  Male who presents with a chief complaint of Positive Stress Test ahead of Preop evaluation (20 May 2018 20:23)        SUBJECTIVE / OVERNIGHT EVENTS:  no acute events overnight. Patient still in pain at site of amputation, mildly relieved with PO pain medication.  Patient offers no other complaints at [facility]. No fever, chills, chest pain, SOB, abdominal pain, n/v/d/c    CAPILLARY BLOOD GLUCOSE      Vital Signs Last 24 Hrs  T(C): 36.8 (27 May 2018 04:41), Max: 37 (26 May 2018 14:33)  T(F): 98.2 (27 May 2018 04:41), Max: 98.6 (26 May 2018 14:33)  HR: 71 (27 May 2018 04:41) (68 - 71)  BP: 137/69 (27 May 2018 04:41) (137/69 - 142/59)  BP(mean): --  RR: 18 (27 May 2018 04:41) (16 - 18)  SpO2: 96% (27 May 2018 04:41) (95% - 96%)      PHYSICAL EXAM:  GENERAL:  Chronically ill appearing cachectic M, lying in bed, in NAD  HEAD:  left sided cheek soft mass (chronic per pt)  ORAL: poor dentition, missing teeth  EYES: PERRLA  NECK: Supple, No JVD  CHEST/LUNG: diffuse mild expiratory wheezing b/l.   HEART: Reg rate. Normal S1, S2. No m/r/g.   ABDOMEN: SNTND. Bowel sounds present  EXTREMITIES: No clubbing, cyanosis, edema.  Left 2nd toe amputation, site c/d/i, no warmth.   PSYCH: AAOx2-3,odd affect    LABS:                                              10.6   4.57  )-----------( 165      ( 27 May 2018 09:17 )             31.1   05-27    139  |  105  |  26<H>  ----------------------------<  84  3.7   |  23  |  1.88<H>    Ca    8.6      27 May 2018 06:43  Phos  3.5     05-27  Mg     1.9     05-27    TPro  6.0  /  Alb  3.4  /  TBili  0.4  /  DBili  x   /  AST  29  /  ALT  47<H>  /  AlkPhos  148<H>  05-26    Interval amputation of the second ray at the level of the head of the   second metatarsal. Adjacent soft tissue swelling and subcutaneous   emphysema. No other interval change.    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   Tarsometatarsal degenerative arthrosis.      < end of copied text >    Telemetry reviewed: sinus 1st def block,  Consultant(s) Notes Reviewed:  podiatry, Dr. Matos  Care Discussed with Consultants/Other Providers: Floor NP    MEDICATIONS  (STANDING):  amLODIPine   Tablet 10 milliGRAM(s) Oral daily  aspirin enteric coated 81 milliGRAM(s) Oral daily  atorvastatin 80 milliGRAM(s) Oral at bedtime  buDESOnide 160 MICROgram(s)/formoterol 4.5 MICROgram(s) Inhaler 2 Puff(s) Inhalation two times a day  carvedilol 25 milliGRAM(s) Oral every 12 hours  clopidogrel Tablet 75 milliGRAM(s) Oral daily  heparin  Injectable 5000 Unit(s) SubCutaneous every 8 hours  hydrALAZINE 100 milliGRAM(s) Oral every 8 hours  isosorbide   dinitrate Tablet (ISORDIL) 40 milliGRAM(s) Oral three times a day  montelukast 10 milliGRAM(s) Oral daily  pantoprazole    Tablet 40 milliGRAM(s) Oral before breakfast  polyethylene glycol 3350 17 Gram(s) Oral daily  QUEtiapine 25 milliGRAM(s) Oral at bedtime  senna 2 Tablet(s) Oral at bedtime  sodium chloride 0.9%. 1000 milliLiter(s) (30 mL/Hr) IV Continuous <Continuous>  tamsulosin 0.4 milliGRAM(s) Oral daily  tiotropium 18 MICROgram(s) Capsule 1 Capsule(s) Inhalation daily    MEDICATIONS  (PRN):  ALBUTerol    90 MICROgram(s) HFA Inhaler 1 Puff(s) Inhalation every 6 hours PRN Shortness of Breath and/or Wheezing  oxyCODONE    5 mG/acetaminophen 325 mG 1 Tablet(s) Oral every 6 hours PRN Severe Pain (7 - 10)

## 2018-05-27 NOTE — PHYSICAL THERAPY INITIAL EVALUATION ADULT - CRITERIA FOR SKILLED THERAPEUTIC INTERVENTIONS
anticipated discharge recommendation/anticipated equipment needs at discharge/functional limitations in following categories/impairments found
impairments found

## 2018-05-27 NOTE — PROGRESS NOTE ADULT - ASSESSMENT
76M with Alesia 4 critical limb ischemia with L 2nd digit gangrene, noted with CFA, SFA and pop occlusions on L, also with R sided femoral disease. S/p LSFA PTCA    Continues to do well post intervention.  Amp site looks well  Continue local care  No further vascular intervention/testing at this time.     Mars Matos [id number]  Vascular Medicine

## 2018-05-27 NOTE — PROGRESS NOTE ADULT - ASSESSMENT
77 yo M PMH of DMT2, HTN, HLD, PAD, CKD3 (BL Cr 1.8), BPH, Prostate cancer, psychosis, GERD, COPD not on home O2, previously homeless who presents as a transfer for L foot dry gangrene, found to have an abnormal stress test and EF 36%, transferred LE angiogram now s/p L SFA stent 5/22 pending L 2nd toe amputation 5/25

## 2018-05-27 NOTE — PHYSICAL THERAPY INITIAL EVALUATION ADULT - ACTIVE RANGE OF MOTION EXAMINATION, REHAB EVAL
bilateral  lower extremity Active ROM was WFL (within functional limits)/bilateral upper extremity Active ROM was WFL (within functional limits)
pt refusing assessment of left LE/bilateral upper extremity Active ROM was WFL (within functional limits)/bilateral  lower extremity Active ROM was WFL (within functional limits)

## 2018-05-27 NOTE — PROGRESS NOTE ADULT - SUBJECTIVE AND OBJECTIVE BOX
PAGER:  [phone]               [facility] [id number]              EMAIL [email or web address]   OFFICE [phone]                              ********VASCULAR MEDICINE PROGRESS NOTE********                            CC:  CLI LLE     INTERVAL HISTORY:   Doing well.  No CP or SOB.     MEDICATIONS  (STANDING):  amLODIPine   Tablet 10 milliGRAM(s) Oral daily  amoxicillin  500 milliGRAM(s)/clavulanate 1 Tablet(s) Oral two times a day  AQUAPHOR (petrolatum Ointment) 1 Application(s) Topical two times a day  aspirin enteric coated 81 milliGRAM(s) Oral daily  atorvastatin 80 milliGRAM(s) Oral at bedtime  buDESOnide 160 MICROgram(s)/formoterol 4.5 MICROgram(s) Inhaler 2 Puff(s) Inhalation two times a day  carvedilol 25 milliGRAM(s) Oral every 12 hours  clopidogrel Tablet 75 milliGRAM(s) Oral daily  docusate sodium 100 milliGRAM(s) Oral three times a day  heparin  Injectable 5000 Unit(s) SubCutaneous every 8 hours  hydrALAZINE 100 milliGRAM(s) Oral every 8 hours  isosorbide   dinitrate Tablet (ISORDIL) 40 milliGRAM(s) Oral three times a day  montelukast 10 milliGRAM(s) Oral daily  pantoprazole    Tablet 40 milliGRAM(s) Oral before breakfast  polyethylene glycol 3350 17 Gram(s) Oral daily  QUEtiapine 25 milliGRAM(s) Oral at bedtime  senna 2 Tablet(s) Oral at bedtime  tamsulosin 0.4 milliGRAM(s) Oral daily  tiotropium 18 MICROgram(s) Capsule 1 Capsule(s) Inhalation daily    MEDICATIONS  (PRN):  acetaminophen   Tablet. 650 milliGRAM(s) Oral every 6 hours PRN Mild Pain (1 - 3)  ALBUTerol    90 MICROgram(s) HFA Inhaler 1 Puff(s) Inhalation every 6 hours PRN Shortness of Breath and/or Wheezing  oxyCODONE    IR 5 milliGRAM(s) Oral every 4 hours PRN Moderate Pain (4 - 6)  oxyCODONE    IR 10 milliGRAM(s) Oral every 4 hours PRN Severe Pain (7 - 10)  FAMILY HISTORY:  No pertinent family history in first degree relatives      SOCIAL HISTORY:  unchanged    REVIEW OF SYSTEMS:  CONSTITUTIONAL: No fever, weight loss, or fatigue  EYES: No eye pain, visual disturbances, or discharge  ENMT:  No difficulty hearing, tinnitus, vertigo; No sinus or throat pain  NECK: No pain or stiffness  RESPIRATORY: No cough, wheezing, chills or hemoptysis; No Shortness of Breath  CARDIOVASCULAR: No chest pain, palpitations, passing out, dizziness, or leg swelling  GASTROINTESTINAL: No abdominal or epigastric pain. No nausea, vomiting, or hematemesis; No diarrhea or constipation. No melena or hematochezia.  GENITOURINARY: No dysuria, frequency, hematuria, or incontinence  NEUROLOGICAL: No headaches, memory loss, loss of strength, numbness, or tremors  SKIN: No itching, burning, rashes, or lesions   LYMPH Nodes: No enlarged glands  ENDOCRINE: No heat or cold intolerance; No hair loss  MUSCULOSKELETAL: L foot pain with walking  PSYCHIATRIC: No depression, anxiety, mood swings, or difficulty sleeping  HEME/LYMPH: No easy bruising, or bleeding gums  ALLERY AND IMMUNOLOGIC: No hives or eczema	      ICU Vital Signs Last 24 Hrs  T(C): 36.8 (27 May 2018 04:41), Max: 37 (26 May 2018 14:33)  T(F): 98.2 (27 May 2018 04:41), Max: 98.6 (26 May 2018 14:33)  HR: 71 (27 May 2018 04:41) (68 - 71)  BP: 137/69 (27 May 2018 04:41) (137/69 - 142/59)  BP(mean): --  ABP: --  ABP(mean): --  RR: 18 (27 May 2018 04:41) (16 - 18)  SpO2: 96% (27 May 2018 04:41) (95% - 96%)        Appearance: Normal	  HEENT:   Normal oral mucosa, PERRL, EOMI	  Lymphatic: No lymphadenopathy  Cardiovascular: Normal S1 S2, No JVD, No murmurs, No edema  Respiratory: Lungs clear to auscultation	  Psychiatry: A & O x 3, Mood & affect appropriate  Gastrointestinal:  Soft, Non-tender, + BS	  Skin: No rashes, No ecchymoses, No cyanosis	  Neurologic: Non-focal  Extremities: Normal range of motion, No clubbing, cyanosis or edema  Vascular: Nonpalpable pulses bilaterally. R groin soft, no hematoma, no bleeding.  2nd toe amp site stable.                                     10.6   4.57  )-----------( 165      ( 27 May 2018 09:17 )             31.1   05-27    139  |  105  |  26<H>  ----------------------------<  84  3.7   |  23  |  1.88<H>    Ca    8.6      27 May 2018 06:43  Phos  3.5     05-27  Mg     1.9     05-27    TPro  6.0  /  Alb  3.4  /  TBili  0.4  /  DBili  x   /  AST  29  /  ALT  47<H>  /  AlkPhos  148<H>  05-26

## 2018-05-27 NOTE — PHYSICAL THERAPY INITIAL EVALUATION ADULT - GAIT TRAINING, PT EVAL
GOAL: pt will ambulate 50 feet with rolling walker, with supervision by 2 weeks
GOAL: patient will be able to amb with RW and Cg A for >150 ft in 2 weeks

## 2018-05-27 NOTE — PHYSICAL THERAPY INITIAL EVALUATION ADULT - TRANSFER TRAINING, PT EVAL
GOAL: pt will perform sit-stand transfers with supervision by 2 weeks
GOAL: patient will be able to performed sit<>stand transfer (I) with RW in 2 weeks

## 2018-05-27 NOTE — PHYSICAL THERAPY INITIAL EVALUATION ADULT - IMPAIRMENTS FOUND, PT EVAL
gait, locomotion, and balance/aerobic capacity/endurance/muscle strength
muscle strength/aerobic capacity/endurance/gait, locomotion, and balance

## 2018-05-27 NOTE — PHYSICAL THERAPY INITIAL EVALUATION ADULT - ORIENTATION, REHAB EVAL
reorientation to time, and name of hospital/time/person
person/time/reorientation to time, and name of hospital

## 2018-05-28 LAB
ANION GAP SERPL CALC-SCNC: 11 MMOL/L — SIGNIFICANT CHANGE UP (ref 5–17)
BUN SERPL-MCNC: 23 MG/DL — SIGNIFICANT CHANGE UP (ref 7–23)
CALCIUM SERPL-MCNC: 9.1 MG/DL — SIGNIFICANT CHANGE UP (ref 8.4–10.5)
CHLORIDE SERPL-SCNC: 103 MMOL/L — SIGNIFICANT CHANGE UP (ref 96–108)
CO2 SERPL-SCNC: 23 MMOL/L — SIGNIFICANT CHANGE UP (ref 22–31)
CREAT SERPL-MCNC: 1.63 MG/DL — HIGH (ref 0.5–1.3)
GLUCOSE SERPL-MCNC: 147 MG/DL — HIGH (ref 70–99)
HCT VFR BLD CALC: 31.4 % — LOW (ref 39–50)
HGB BLD-MCNC: 10.7 G/DL — LOW (ref 13–17)
MCHC RBC-ENTMCNC: 29.8 PG — SIGNIFICANT CHANGE UP (ref 27–34)
MCHC RBC-ENTMCNC: 34.1 GM/DL — SIGNIFICANT CHANGE UP (ref 32–36)
MCV RBC AUTO: 87.5 FL — SIGNIFICANT CHANGE UP (ref 80–100)
PLATELET # BLD AUTO: 200 K/UL — SIGNIFICANT CHANGE UP (ref 150–400)
POTASSIUM SERPL-MCNC: 3.5 MMOL/L — SIGNIFICANT CHANGE UP (ref 3.5–5.3)
POTASSIUM SERPL-SCNC: 3.5 MMOL/L — SIGNIFICANT CHANGE UP (ref 3.5–5.3)
RBC # BLD: 3.59 M/UL — LOW (ref 4.2–5.8)
RBC # FLD: 14.6 % — HIGH (ref 10.3–14.5)
SODIUM SERPL-SCNC: 137 MMOL/L — SIGNIFICANT CHANGE UP (ref 135–145)
WBC # BLD: 4.2 K/UL — SIGNIFICANT CHANGE UP (ref 3.8–10.5)
WBC # FLD AUTO: 4.2 K/UL — SIGNIFICANT CHANGE UP (ref 3.8–10.5)

## 2018-05-28 PROCEDURE — 99232 SBSQ HOSP IP/OBS MODERATE 35: CPT

## 2018-05-28 PROCEDURE — 99233 SBSQ HOSP IP/OBS HIGH 50: CPT

## 2018-05-28 RX ORDER — POTASSIUM CHLORIDE 20 MEQ
20 PACKET (EA) ORAL ONCE
Qty: 0 | Refills: 0 | Status: COMPLETED | OUTPATIENT
Start: 2018-05-28 | End: 2018-05-28

## 2018-05-28 RX ADMIN — ISOSORBIDE DINITRATE 40 MILLIGRAM(S): 5 TABLET ORAL at 05:16

## 2018-05-28 RX ADMIN — Medication 100 MILLIGRAM(S): at 20:27

## 2018-05-28 RX ADMIN — Medication 650 MILLIGRAM(S): at 20:27

## 2018-05-28 RX ADMIN — QUETIAPINE FUMARATE 25 MILLIGRAM(S): 200 TABLET, FILM COATED ORAL at 20:27

## 2018-05-28 RX ADMIN — Medication 1 APPLICATION(S): at 05:17

## 2018-05-28 RX ADMIN — Medication 100 MILLIGRAM(S): at 05:16

## 2018-05-28 RX ADMIN — Medication 1 TABLET(S): at 05:37

## 2018-05-28 RX ADMIN — SENNA PLUS 2 TABLET(S): 8.6 TABLET ORAL at 20:27

## 2018-05-28 RX ADMIN — Medication 100 MILLIGRAM(S): at 13:07

## 2018-05-28 RX ADMIN — HEPARIN SODIUM 5000 UNIT(S): 5000 INJECTION INTRAVENOUS; SUBCUTANEOUS at 05:15

## 2018-05-28 RX ADMIN — CARVEDILOL PHOSPHATE 25 MILLIGRAM(S): 80 CAPSULE, EXTENDED RELEASE ORAL at 05:17

## 2018-05-28 RX ADMIN — Medication 81 MILLIGRAM(S): at 11:42

## 2018-05-28 RX ADMIN — Medication 100 MILLIGRAM(S): at 20:26

## 2018-05-28 RX ADMIN — MONTELUKAST 10 MILLIGRAM(S): 4 TABLET, CHEWABLE ORAL at 11:42

## 2018-05-28 RX ADMIN — OXYCODONE HYDROCHLORIDE 10 MILLIGRAM(S): 5 TABLET ORAL at 12:05

## 2018-05-28 RX ADMIN — TIOTROPIUM BROMIDE 1 CAPSULE(S): 18 CAPSULE ORAL; RESPIRATORY (INHALATION) at 11:42

## 2018-05-28 RX ADMIN — OXYCODONE HYDROCHLORIDE 10 MILLIGRAM(S): 5 TABLET ORAL at 11:45

## 2018-05-28 RX ADMIN — ISOSORBIDE DINITRATE 40 MILLIGRAM(S): 5 TABLET ORAL at 13:07

## 2018-05-28 RX ADMIN — BUDESONIDE AND FORMOTEROL FUMARATE DIHYDRATE 2 PUFF(S): 160; 4.5 AEROSOL RESPIRATORY (INHALATION) at 05:37

## 2018-05-28 RX ADMIN — ATORVASTATIN CALCIUM 80 MILLIGRAM(S): 80 TABLET, FILM COATED ORAL at 20:26

## 2018-05-28 RX ADMIN — ISOSORBIDE DINITRATE 40 MILLIGRAM(S): 5 TABLET ORAL at 20:26

## 2018-05-28 RX ADMIN — POLYETHYLENE GLYCOL 3350 17 GRAM(S): 17 POWDER, FOR SOLUTION ORAL at 11:42

## 2018-05-28 RX ADMIN — CARVEDILOL PHOSPHATE 25 MILLIGRAM(S): 80 CAPSULE, EXTENDED RELEASE ORAL at 17:07

## 2018-05-28 RX ADMIN — Medication 20 MILLIEQUIVALENT(S): at 11:45

## 2018-05-28 RX ADMIN — Medication 1 TABLET(S): at 17:07

## 2018-05-28 RX ADMIN — AMLODIPINE BESYLATE 10 MILLIGRAM(S): 2.5 TABLET ORAL at 05:17

## 2018-05-28 RX ADMIN — PANTOPRAZOLE SODIUM 40 MILLIGRAM(S): 20 TABLET, DELAYED RELEASE ORAL at 05:17

## 2018-05-28 RX ADMIN — TAMSULOSIN HYDROCHLORIDE 0.4 MILLIGRAM(S): 0.4 CAPSULE ORAL at 11:42

## 2018-05-28 RX ADMIN — HEPARIN SODIUM 5000 UNIT(S): 5000 INJECTION INTRAVENOUS; SUBCUTANEOUS at 13:07

## 2018-05-28 RX ADMIN — Medication 1 APPLICATION(S): at 17:07

## 2018-05-28 RX ADMIN — BUDESONIDE AND FORMOTEROL FUMARATE DIHYDRATE 2 PUFF(S): 160; 4.5 AEROSOL RESPIRATORY (INHALATION) at 17:07

## 2018-05-28 RX ADMIN — HEPARIN SODIUM 5000 UNIT(S): 5000 INJECTION INTRAVENOUS; SUBCUTANEOUS at 20:27

## 2018-05-28 RX ADMIN — CLOPIDOGREL BISULFATE 75 MILLIGRAM(S): 75 TABLET, FILM COATED ORAL at 11:41

## 2018-05-28 NOTE — PROGRESS NOTE ADULT - SUBJECTIVE AND OBJECTIVE BOX
Patient is a 77y old  Male who presents with a chief complaint of Positive Stress Test ahead of Preop evaluation (20 May 2018 20:23)        SUBJECTIVE / OVERNIGHT EVENTS:  pain improved.  No other complaints.       CAPILLARY BLOOD GLUCOSE      Vital Signs Last 24 Hrs  T(C): 36.3 (28 May 2018 13:05), Max: 36.8 (28 May 2018 05:20)  T(F): 97.4 (28 May 2018 13:05), Max: 98.3 (28 May 2018 05:20)  HR: 58 (28 May 2018 13:05) (57 - 58)  BP: 131/63 (28 May 2018 13:05) (131/63 - 142/62)  BP(mean): --  RR: 20 (28 May 2018 13:05) (20 - 20)  SpO2: 98% (28 May 2018 13:05) (96% - 98%)    PHYSICAL EXAM:  GENERAL:  Chronically ill appearing cachectic M, lying in bed, in NAD  HEAD:  left sided cheek soft mass (chronic per pt)  ORAL: poor dentition, missing teeth  EYES: PERRLA  NECK: Supple, No JVD  CHEST/LUNG: diffuse mild expiratory wheezing b/l.   HEART: Reg rate. Normal S1, S2. No m/r/g.   ABDOMEN: SNTND. Bowel sounds present  EXTREMITIES: No clubbing, cyanosis, edema.  Left 2nd toe amputation, site c/d/i, no warmth.   PSYCH: AAOx2-3,odd affect    LABS:                                     Culture - Surgical Swab (collected 25 May 2018 16:18)  Source: .Surgical Swab left foot surgical swab  Preliminary Report (27 May 2018 22:45):    Growth in fluid media only Gram positive cocci in pairs      Interval amputation of the second ray at the level of the head of the   second metatarsal. Adjacent soft tissue swelling and subcutaneous   emphysema. No other interval change.    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   Tarsometatarsal degenerative arthrosis.      < end of copied text >    Telemetry reviewed: sinus 1st def block,  Consultant(s) Notes Reviewed:  podiatry, Dr. Matos  Care Discussed with Consultants/Other Providers: Floor NP    MEDICATIONS  (STANDING):  amLODIPine   Tablet 10 milliGRAM(s) Oral daily  aspirin enteric coated 81 milliGRAM(s) Oral daily  atorvastatin 80 milliGRAM(s) Oral at bedtime  buDESOnide 160 MICROgram(s)/formoterol 4.5 MICROgram(s) Inhaler 2 Puff(s) Inhalation two times a day  carvedilol 25 milliGRAM(s) Oral every 12 hours  clopidogrel Tablet 75 milliGRAM(s) Oral daily  heparin  Injectable 5000 Unit(s) SubCutaneous every 8 hours  hydrALAZINE 100 milliGRAM(s) Oral every 8 hours  isosorbide   dinitrate Tablet (ISORDIL) 40 milliGRAM(s) Oral three times a day  montelukast 10 milliGRAM(s) Oral daily  pantoprazole    Tablet 40 milliGRAM(s) Oral before breakfast  polyethylene glycol 3350 17 Gram(s) Oral daily  QUEtiapine 25 milliGRAM(s) Oral at bedtime  senna 2 Tablet(s) Oral at bedtime  sodium chloride 0.9%. 1000 milliLiter(s) (30 mL/Hr) IV Continuous <Continuous>  tamsulosin 0.4 milliGRAM(s) Oral daily  tiotropium 18 MICROgram(s) Capsule 1 Capsule(s) Inhalation daily    MEDICATIONS  (PRN):  ALBUTerol    90 MICROgram(s) HFA Inhaler 1 Puff(s) Inhalation every 6 hours PRN Shortness of Breath and/or Wheezing  oxyCODONE    5 mG/acetaminophen 325 mG 1 Tablet(s) Oral every 6 hours PRN Severe Pain (7 - 10)

## 2018-05-28 NOTE — PROGRESS NOTE ADULT - SUBJECTIVE AND OBJECTIVE BOX
PAGER:  [phone]               [facility] [id number]              EMAIL [email or web address]   OFFICE [phone]                              ********VASCULAR MEDICINE PROGRESS NOTE********                            CC:  CLI LLE     INTERVAL HISTORY:   Doing well.  No CP or SOB.   Tele Sinus with 4 beats WCT    MEDICATIONS  (STANDING):  amLODIPine   Tablet 10 milliGRAM(s) Oral daily  amoxicillin  500 milliGRAM(s)/clavulanate 1 Tablet(s) Oral two times a day  AQUAPHOR (petrolatum Ointment) 1 Application(s) Topical two times a day  aspirin enteric coated 81 milliGRAM(s) Oral daily  atorvastatin 80 milliGRAM(s) Oral at bedtime  buDESOnide 160 MICROgram(s)/formoterol 4.5 MICROgram(s) Inhaler 2 Puff(s) Inhalation two times a day  carvedilol 25 milliGRAM(s) Oral every 12 hours  clopidogrel Tablet 75 milliGRAM(s) Oral daily  docusate sodium 100 milliGRAM(s) Oral three times a day  heparin  Injectable 5000 Unit(s) SubCutaneous every 8 hours  hydrALAZINE 100 milliGRAM(s) Oral every 8 hours  isosorbide   dinitrate Tablet (ISORDIL) 40 milliGRAM(s) Oral three times a day  montelukast 10 milliGRAM(s) Oral daily  pantoprazole    Tablet 40 milliGRAM(s) Oral before breakfast  polyethylene glycol 3350 17 Gram(s) Oral daily  QUEtiapine 25 milliGRAM(s) Oral at bedtime  senna 2 Tablet(s) Oral at bedtime  tamsulosin 0.4 milliGRAM(s) Oral daily  tiotropium 18 MICROgram(s) Capsule 1 Capsule(s) Inhalation daily    MEDICATIONS  (PRN):  acetaminophen   Tablet. 650 milliGRAM(s) Oral every 6 hours PRN Mild Pain (1 - 3)  ALBUTerol    90 MICROgram(s) HFA Inhaler 1 Puff(s) Inhalation every 6 hours PRN Shortness of Breath and/or Wheezing  oxyCODONE    IR 5 milliGRAM(s) Oral every 4 hours PRN Moderate Pain (4 - 6)  oxyCODONE    IR 10 milliGRAM(s) Oral every 4 hours PRN Severe Pain (7 - 10)    SOCIAL HISTORY:  unchanged    REVIEW OF SYSTEMS:  CONSTITUTIONAL: No fever, weight loss, or fatigue  EYES: No eye pain, visual disturbances, or discharge  ENMT:  No difficulty hearing, tinnitus, vertigo; No sinus or throat pain  NECK: No pain or stiffness  RESPIRATORY: No cough, wheezing, chills or hemoptysis; No Shortness of Breath  CARDIOVASCULAR: No chest pain, palpitations, passing out, dizziness, or leg swelling  GASTROINTESTINAL: No abdominal or epigastric pain. No nausea, vomiting, or hematemesis; No diarrhea or constipation. No melena or hematochezia.  GENITOURINARY: No dysuria, frequency, hematuria, or incontinence  NEUROLOGICAL: No headaches, memory loss, loss of strength, numbness, or tremors  SKIN: No itching, burning, rashes, or lesions   LYMPH Nodes: No enlarged glands  ENDOCRINE: No heat or cold intolerance; No hair loss  MUSCULOSKELETAL: L foot pain with walking  PSYCHIATRIC: No depression, anxiety, mood swings, or difficulty sleeping  HEME/LYMPH: No easy bruising, or bleeding gums  ALLERY AND IMMUNOLOGIC: No hives or eczema	    1ICU Vital Signs Last 24 Hrs  T(C): 36.8 (28 May 2018 05:20), Max: 36.8 (28 May 2018 05:20)  T(F): 98.3 (28 May 2018 05:20), Max: 98.3 (28 May 2018 05:20)  HR: 57 (28 May 2018 05:20) (57 - 73)  BP: 142/62 (28 May 2018 05:20) (103/57 - 142/62)  BP(mean): --  ABP: --  ABP(mean): --  RR: 20 (28 May 2018 05:20) (20 - 20)  SpO2: 96% (28 May 2018 05:20) (95% - 96%)          Appearance: Normal	  HEENT:   Normal oral mucosa, PERRL, EOMI	  Lymphatic: No lymphadenopathy  Cardiovascular: Normal S1 S2, No JVD, No murmurs, No edema  Respiratory: Lungs clear to auscultation	  Psychiatry: A & O x 3, Mood & affect appropriate  Gastrointestinal:  Soft, Non-tender, + BS	  Skin: No rashes, No ecchymoses, No cyanosis	  Neurologic: Non-focal  Extremities: Normal range of motion, No clubbing, cyanosis or edema  Vascular: Nonpalpable pulses bilaterally. R groin soft, no hematoma, no bleeding.  2nd toe amp site stable.    '                        10.6   4.57  )-----------( 165      ( 27 May 2018 09:17 )             31.1   05-27    139  |  105  |  26<H>  ----------------------------<  84  3.7   |  23  |  1.88<H>    Ca    8.6      27 May 2018 06:43  Phos  3.5     05-27  Mg     1.9     05-27    TPro  6.0  /  Alb  3.4  /  TBili  0.4  /  DBili  x   /  AST  29  /  ALT  47<H>  /  AlkPhos  148<H>  05-26

## 2018-05-28 NOTE — PROGRESS NOTE ADULT - ASSESSMENT
76M with Alesia 4 critical limb ischemia with L 2nd digit gangrene, noted with CFA, SFA and pop occlusions on L, also with R sided femoral disease. S/p LSFA PTCA    Continues to do well post intervention.  Amp site looks well  Continue local care  No further vascular intervention/testing at this time.   PT eval and d/c planning    Mars Matos [id number]  Vascular Medicine

## 2018-05-29 DIAGNOSIS — I50.22 CHRONIC SYSTOLIC (CONGESTIVE) HEART FAILURE: ICD-10-CM

## 2018-05-29 LAB
-  AMPICILLIN/SULBACTAM: SIGNIFICANT CHANGE UP
-  CEFAZOLIN: SIGNIFICANT CHANGE UP
-  CIPROFLOXACIN: SIGNIFICANT CHANGE UP
-  CLINDAMYCIN: SIGNIFICANT CHANGE UP
-  ERYTHROMYCIN: SIGNIFICANT CHANGE UP
-  GENTAMICIN: SIGNIFICANT CHANGE UP
-  LEVOFLOXACIN: SIGNIFICANT CHANGE UP
-  MOXIFLOXACIN(AEROBIC): SIGNIFICANT CHANGE UP
-  OXACILLIN: SIGNIFICANT CHANGE UP
-  PENICILLIN: SIGNIFICANT CHANGE UP
-  RIFAMPIN: SIGNIFICANT CHANGE UP
-  TETRACYCLINE: SIGNIFICANT CHANGE UP
-  TRIMETHOPRIM/SULFAMETHOXAZOLE: SIGNIFICANT CHANGE UP
-  VANCOMYCIN: SIGNIFICANT CHANGE UP
ANION GAP SERPL CALC-SCNC: 10 MMOL/L — SIGNIFICANT CHANGE UP (ref 5–17)
BUN SERPL-MCNC: 25 MG/DL — HIGH (ref 7–23)
CALCIUM SERPL-MCNC: 8.8 MG/DL — SIGNIFICANT CHANGE UP (ref 8.4–10.5)
CHLORIDE SERPL-SCNC: 104 MMOL/L — SIGNIFICANT CHANGE UP (ref 96–108)
CO2 SERPL-SCNC: 24 MMOL/L — SIGNIFICANT CHANGE UP (ref 22–31)
CREAT SERPL-MCNC: 1.89 MG/DL — HIGH (ref 0.5–1.3)
GLUCOSE SERPL-MCNC: 80 MG/DL — SIGNIFICANT CHANGE UP (ref 70–99)
HCT VFR BLD CALC: 30.4 % — LOW (ref 39–50)
HGB BLD-MCNC: 10.5 G/DL — LOW (ref 13–17)
MCHC RBC-ENTMCNC: 30.3 PG — SIGNIFICANT CHANGE UP (ref 27–34)
MCHC RBC-ENTMCNC: 34.5 GM/DL — SIGNIFICANT CHANGE UP (ref 32–36)
MCV RBC AUTO: 87.9 FL — SIGNIFICANT CHANGE UP (ref 80–100)
METHOD TYPE: SIGNIFICANT CHANGE UP
PLATELET # BLD AUTO: 216 K/UL — SIGNIFICANT CHANGE UP (ref 150–400)
POTASSIUM SERPL-MCNC: 3.7 MMOL/L — SIGNIFICANT CHANGE UP (ref 3.5–5.3)
POTASSIUM SERPL-SCNC: 3.7 MMOL/L — SIGNIFICANT CHANGE UP (ref 3.5–5.3)
RBC # BLD: 3.46 M/UL — LOW (ref 4.2–5.8)
RBC # FLD: 14.6 % — HIGH (ref 10.3–14.5)
SODIUM SERPL-SCNC: 138 MMOL/L — SIGNIFICANT CHANGE UP (ref 135–145)
WBC # BLD: 4.09 K/UL — SIGNIFICANT CHANGE UP (ref 3.8–10.5)
WBC # FLD AUTO: 4.09 K/UL — SIGNIFICANT CHANGE UP (ref 3.8–10.5)

## 2018-05-29 PROCEDURE — 99232 SBSQ HOSP IP/OBS MODERATE 35: CPT

## 2018-05-29 PROCEDURE — 99233 SBSQ HOSP IP/OBS HIGH 50: CPT

## 2018-05-29 RX ORDER — CARVEDILOL PHOSPHATE 80 MG/1
1 CAPSULE, EXTENDED RELEASE ORAL
Qty: 0 | Refills: 0 | COMMUNITY
Start: 2018-05-29

## 2018-05-29 RX ORDER — DOCUSATE SODIUM 100 MG
1 CAPSULE ORAL
Qty: 0 | Refills: 0 | COMMUNITY
Start: 2018-05-29

## 2018-05-29 RX ORDER — ASPIRIN/CALCIUM CARB/MAGNESIUM 324 MG
1 TABLET ORAL
Qty: 0 | Refills: 0 | COMMUNITY
Start: 2018-05-29

## 2018-05-29 RX ORDER — SENNA PLUS 8.6 MG/1
2 TABLET ORAL
Qty: 0 | Refills: 0 | COMMUNITY
Start: 2018-05-29

## 2018-05-29 RX ORDER — ISOSORBIDE DINITRATE 5 MG/1
1 TABLET ORAL
Qty: 0 | Refills: 0 | COMMUNITY
Start: 2018-05-29

## 2018-05-29 RX ORDER — ATORVASTATIN CALCIUM 80 MG/1
1 TABLET, FILM COATED ORAL
Qty: 0 | Refills: 0 | COMMUNITY
Start: 2018-05-29

## 2018-05-29 RX ORDER — ACETAMINOPHEN 500 MG
2 TABLET ORAL
Qty: 0 | Refills: 0 | COMMUNITY
Start: 2018-05-29

## 2018-05-29 RX ORDER — HYDRALAZINE HCL 50 MG
1 TABLET ORAL
Qty: 0 | Refills: 0 | COMMUNITY
Start: 2018-05-29

## 2018-05-29 RX ORDER — POLYETHYLENE GLYCOL 3350 17 G/17G
17 POWDER, FOR SOLUTION ORAL
Qty: 0 | Refills: 0 | COMMUNITY
Start: 2018-05-29

## 2018-05-29 RX ORDER — QUETIAPINE FUMARATE 200 MG/1
1 TABLET, FILM COATED ORAL
Qty: 0 | Refills: 0 | COMMUNITY
Start: 2018-05-29

## 2018-05-29 RX ORDER — CLOPIDOGREL BISULFATE 75 MG/1
1 TABLET, FILM COATED ORAL
Qty: 0 | Refills: 0 | COMMUNITY
Start: 2018-05-29

## 2018-05-29 RX ADMIN — BUDESONIDE AND FORMOTEROL FUMARATE DIHYDRATE 2 PUFF(S): 160; 4.5 AEROSOL RESPIRATORY (INHALATION) at 05:00

## 2018-05-29 RX ADMIN — Medication 100 MILLIGRAM(S): at 22:00

## 2018-05-29 RX ADMIN — POLYETHYLENE GLYCOL 3350 17 GRAM(S): 17 POWDER, FOR SOLUTION ORAL at 11:42

## 2018-05-29 RX ADMIN — BUDESONIDE AND FORMOTEROL FUMARATE DIHYDRATE 2 PUFF(S): 160; 4.5 AEROSOL RESPIRATORY (INHALATION) at 17:12

## 2018-05-29 RX ADMIN — TIOTROPIUM BROMIDE 1 CAPSULE(S): 18 CAPSULE ORAL; RESPIRATORY (INHALATION) at 11:43

## 2018-05-29 RX ADMIN — Medication 100 MILLIGRAM(S): at 13:29

## 2018-05-29 RX ADMIN — ISOSORBIDE DINITRATE 40 MILLIGRAM(S): 5 TABLET ORAL at 05:00

## 2018-05-29 RX ADMIN — MONTELUKAST 10 MILLIGRAM(S): 4 TABLET, CHEWABLE ORAL at 11:42

## 2018-05-29 RX ADMIN — ISOSORBIDE DINITRATE 40 MILLIGRAM(S): 5 TABLET ORAL at 13:29

## 2018-05-29 RX ADMIN — Medication 81 MILLIGRAM(S): at 11:43

## 2018-05-29 RX ADMIN — Medication 1 TABLET(S): at 05:00

## 2018-05-29 RX ADMIN — HEPARIN SODIUM 5000 UNIT(S): 5000 INJECTION INTRAVENOUS; SUBCUTANEOUS at 13:29

## 2018-05-29 RX ADMIN — PANTOPRAZOLE SODIUM 40 MILLIGRAM(S): 20 TABLET, DELAYED RELEASE ORAL at 05:00

## 2018-05-29 RX ADMIN — Medication 100 MILLIGRAM(S): at 05:00

## 2018-05-29 RX ADMIN — HEPARIN SODIUM 5000 UNIT(S): 5000 INJECTION INTRAVENOUS; SUBCUTANEOUS at 05:00

## 2018-05-29 RX ADMIN — QUETIAPINE FUMARATE 25 MILLIGRAM(S): 200 TABLET, FILM COATED ORAL at 22:01

## 2018-05-29 RX ADMIN — OXYCODONE HYDROCHLORIDE 10 MILLIGRAM(S): 5 TABLET ORAL at 04:56

## 2018-05-29 RX ADMIN — TAMSULOSIN HYDROCHLORIDE 0.4 MILLIGRAM(S): 0.4 CAPSULE ORAL at 11:42

## 2018-05-29 RX ADMIN — OXYCODONE HYDROCHLORIDE 10 MILLIGRAM(S): 5 TABLET ORAL at 12:23

## 2018-05-29 RX ADMIN — ISOSORBIDE DINITRATE 40 MILLIGRAM(S): 5 TABLET ORAL at 22:01

## 2018-05-29 RX ADMIN — HEPARIN SODIUM 5000 UNIT(S): 5000 INJECTION INTRAVENOUS; SUBCUTANEOUS at 22:00

## 2018-05-29 RX ADMIN — CLOPIDOGREL BISULFATE 75 MILLIGRAM(S): 75 TABLET, FILM COATED ORAL at 11:42

## 2018-05-29 RX ADMIN — OXYCODONE HYDROCHLORIDE 10 MILLIGRAM(S): 5 TABLET ORAL at 11:53

## 2018-05-29 RX ADMIN — SENNA PLUS 2 TABLET(S): 8.6 TABLET ORAL at 22:02

## 2018-05-29 RX ADMIN — Medication 0.25 MILLIGRAM(S): at 00:31

## 2018-05-29 RX ADMIN — OXYCODONE HYDROCHLORIDE 10 MILLIGRAM(S): 5 TABLET ORAL at 17:19

## 2018-05-29 RX ADMIN — CARVEDILOL PHOSPHATE 25 MILLIGRAM(S): 80 CAPSULE, EXTENDED RELEASE ORAL at 05:00

## 2018-05-29 RX ADMIN — ATORVASTATIN CALCIUM 80 MILLIGRAM(S): 80 TABLET, FILM COATED ORAL at 21:59

## 2018-05-29 RX ADMIN — CARVEDILOL PHOSPHATE 25 MILLIGRAM(S): 80 CAPSULE, EXTENDED RELEASE ORAL at 17:12

## 2018-05-29 RX ADMIN — AMLODIPINE BESYLATE 10 MILLIGRAM(S): 2.5 TABLET ORAL at 05:00

## 2018-05-29 RX ADMIN — Medication 1 TABLET(S): at 17:12

## 2018-05-29 RX ADMIN — Medication 1 APPLICATION(S): at 17:12

## 2018-05-29 NOTE — PROGRESS NOTE ADULT - ASSESSMENT
76M with Alesia 4 critical limb ischemia with L 2nd digit gangrene, noted with CFA, SFA and pop occlusions on L, also with R sided femoral disease. S/p LSFA PTCA    Continues to do well post intervention.  Amp site looks well  Continue local care  No further vascular intervention/testing at this time.   PT eval and d/c planning    Mars Matos [id number]  Vascular Medicine

## 2018-05-29 NOTE — PROGRESS NOTE ADULT - SUBJECTIVE AND OBJECTIVE BOX
PAGER:  [phone]               [facility] [id number]              EMAIL [email or web address]   OFFICE [phone]                              ********VASCULAR MEDICINE PROGRESS NOTE********                            CC:  CLI LLE     INTERVAL HISTORY:   Doing well.  No CP or SOB.         MEDICATIONS  (STANDING):  amLODIPine   Tablet 10 milliGRAM(s) Oral daily  amoxicillin  500 milliGRAM(s)/clavulanate 1 Tablet(s) Oral two times a day  AQUAPHOR (petrolatum Ointment) 1 Application(s) Topical two times a day  aspirin enteric coated 81 milliGRAM(s) Oral daily  atorvastatin 80 milliGRAM(s) Oral at bedtime  buDESOnide 160 MICROgram(s)/formoterol 4.5 MICROgram(s) Inhaler 2 Puff(s) Inhalation two times a day  carvedilol 25 milliGRAM(s) Oral every 12 hours  clopidogrel Tablet 75 milliGRAM(s) Oral daily  docusate sodium 100 milliGRAM(s) Oral three times a day  heparin  Injectable 5000 Unit(s) SubCutaneous every 8 hours  hydrALAZINE 100 milliGRAM(s) Oral every 8 hours  isosorbide   dinitrate Tablet (ISORDIL) 40 milliGRAM(s) Oral three times a day  montelukast 10 milliGRAM(s) Oral daily  pantoprazole    Tablet 40 milliGRAM(s) Oral before breakfast  polyethylene glycol 3350 17 Gram(s) Oral daily  QUEtiapine 25 milliGRAM(s) Oral at bedtime  senna 2 Tablet(s) Oral at bedtime  tamsulosin 0.4 milliGRAM(s) Oral daily  tiotropium 18 MICROgram(s) Capsule 1 Capsule(s) Inhalation daily    MEDICATIONS  (PRN):  acetaminophen   Tablet. 650 milliGRAM(s) Oral every 6 hours PRN Mild Pain (1 - 3)  ALBUTerol    90 MICROgram(s) HFA Inhaler 1 Puff(s) Inhalation every 6 hours PRN Shortness of Breath and/or Wheezing  oxyCODONE    IR 5 milliGRAM(s) Oral every 4 hours PRN Moderate Pain (4 - 6)  oxyCODONE    IR 10 milliGRAM(s) Oral every 4 hours PRN Severe Pain (7 - 10)      SOCIAL HISTORY:  unchanged    REVIEW OF SYSTEMS:  CONSTITUTIONAL: No fever, weight loss, or fatigue  EYES: No eye pain, visual disturbances, or discharge  ENMT:  No difficulty hearing, tinnitus, vertigo; No sinus or throat pain  NECK: No pain or stiffness  RESPIRATORY: No cough, wheezing, chills or hemoptysis; No Shortness of Breath  CARDIOVASCULAR: No chest pain, palpitations, passing out, dizziness, or leg swelling  GASTROINTESTINAL: No abdominal or epigastric pain. No nausea, vomiting, or hematemesis; No diarrhea or constipation. No melena or hematochezia.  GENITOURINARY: No dysuria, frequency, hematuria, or incontinence  NEUROLOGICAL: No headaches, memory loss, loss of strength, numbness, or tremors  SKIN: No itching, burning, rashes, or lesions   LYMPH Nodes: No enlarged glands  ENDOCRINE: No heat or cold intolerance; No hair loss  MUSCULOSKELETAL: L foot pain with walking  PSYCHIATRIC: No depression, anxiety, mood swings, or difficulty sleeping  HEME/LYMPH: No easy bruising, or bleeding gums  ALLERY AND IMMUNOLOGIC: No hives or eczema	    ICU Vital Signs Last 24 Hrs  T(C): 36.3 (29 May 2018 12:29), Max: 36.4 (28 May 2018 20:20)  T(F): 97.3 (29 May 2018 12:29), Max: 97.6 (29 May 2018 04:54)  HR: 71 (29 May 2018 12:29) (67 - 80)  BP: 138/58 (29 May 2018 12:29) (138/58 - 175/84)  BP(mean): --  ABP: --  ABP(mean): --  RR: 20 (29 May 2018 12:29) (20 - 20)  SpO2: 97% (29 May 2018 12:29) (97% - 98%)          Appearance: Normal	  HEENT:   Normal oral mucosa, PERRL, EOMI	  Lymphatic: No lymphadenopathy  Cardiovascular: Normal S1 S2, No JVD, No murmurs, No edema  Respiratory: Lungs clear to auscultation	  Psychiatry: A & O x 3, Mood & affect appropriate  Gastrointestinal:  Soft, Non-tender, + BS	  Skin: No rashes, No ecchymoses, No cyanosis	  Neurologic: Non-focal  Extremities: Normal range of motion, No clubbing, cyanosis or edema  Vascular: Nonpalpable pulses bilaterally. R groin soft, no hematoma, no bleeding.  2nd toe amp site stable.    '                                10.5   4.09  )-----------( 216      ( 29 May 2018 07:46 )             30.4   05-29    138  |  104  |  25<H>  ----------------------------<  80  3.7   |  24  |  1.89<H>    Ca    8.8      29 May 2018 06:49

## 2018-05-29 NOTE — PROGRESS NOTE ADULT - SUBJECTIVE AND OBJECTIVE BOX
Patient is a 77y old  Male who presents with a chief complaint of Positive Stress Test ahead of Preop evaluation (26 May 2018 10:50)       INTERVAL HPI/OVERNIGHT EVENTS:  Patient seen and evaluated at bedside.  Pt is resting comfortable in NAD. Denies N/V/F/C.      Allergies    No Known Allergies    Intolerances        Vital Signs Last 24 Hrs  T(C): 36.4 (29 May 2018 04:54), Max: 36.4 (28 May 2018 20:20)  T(F): 97.6 (29 May 2018 04:54), Max: 97.6 (29 May 2018 04:54)  HR: 80 (29 May 2018 04:54) (58 - 80)  BP: 172/93 (29 May 2018 04:54) (131/63 - 175/84)  BP(mean): --  RR: 20 (29 May 2018 04:54) (20 - 20)  SpO2: 98% (29 May 2018 04:54) (97% - 98%)    LABS:                        10.5   4.09  )-----------( 216      ( 29 May 2018 07:46 )             30.4     05-29    138  |  104  |  25<H>  ----------------------------<  80  3.7   |  24  |  1.89<H>    Ca    8.8      29 May 2018 06:49          CAPILLARY BLOOD GLUCOSE          Lower Extremity Physical Exam:  Left foot 2nd toe amp site skin well coapted, sutures intact, no hematoma, flap warm to touch, no dehiscence

## 2018-05-29 NOTE — PROVIDER CONTACT NOTE (OTHER) - RECOMMENDATIONS
MD made aware
NP to assess situation
Notify PA
per provider OK to give seroquel early.
provider at bedside.  ativan IV given.

## 2018-05-29 NOTE — PROGRESS NOTE ADULT - PROBLEM SELECTOR PLAN 4
stress test w/ EF36%, no signs of overload or acute CHF  - monitor for symptoms  - c/w BB stress test w/ EF36%, no signs of overload or acute CHF  - monitor for signs of hypervolemia   - c/w BB

## 2018-05-29 NOTE — CHART NOTE - NSCHARTNOTEFT_GEN_A_CORE
Patient seen at the bedside for agitation, restlessness and hallucinations. Patient with a history of psychosis on Seroquel. Patient seen at the bedside for agitation, restlessness and hallucinations. Patient with a history of psychosis on Seroquel. Patient with extreme agitation, trying to get out of bed, screaming at staff and with hallucinations of seeing people in the corner of the room. Patient is alert and oriented to self, place, time and situation. Patient has received ativan during this hospitalization with improvement per documentation. Ativan 0.25 mg IV given and patient placed in lounge chair with bed alarm. Fall precaution with fall precaution socks. Will continue to monitor. Attending to follow up in the morning.     Brigitte Sharma PA-C   [id number]    Addendum 0210  Patient remains fidgety in the chair. No further outbursts or talk of hallucination post ativan. Will continue to monitor.   Brigitte Sharma PA-C   [id number]

## 2018-05-29 NOTE — PROGRESS NOTE ADULT - PROBLEM SELECTOR PLAN 6
moderate control   - c/w coreg to 25mg bid   - c/w isosorbide to 40mg TID   - c/w amlodipine 10mg daily  - c/w hydralazine 100mg TID  - monitor BP, adjust meds as needed

## 2018-05-29 NOTE — PROVIDER CONTACT NOTE (OTHER) - DATE AND TIME:
20-May-2018 23:17
20-May-2018 23:17
22-May-2018 05:00
23-May-2018 06:40
28-May-2018 20:30
29-May-2018 00:00
20-May-2018 23:17

## 2018-05-29 NOTE — PROVIDER CONTACT NOTE (OTHER) - ACTION/TREATMENT ORDERED:
MD Blankenship made aware; per MD Blankenship cards will consult. will continue to monitor.
MD made aware; per MD Krzysztof MD will come to speak with patient. will try again. Will continue to monitor
MD made aware; will continue to monitor.
NP aware of situation, try to attempt to perform tasks during day shift. will continue to monitor.
Nilsa BANKS aware and acknowledged, no interventions at this time. Will continue to monitor.
will monitor
will monitor.  chair alarm in place.

## 2018-05-29 NOTE — PROVIDER CONTACT NOTE (OTHER) - ASSESSMENT
Pt Aox4. VSS. Pt denies any chest pain, chest palpitation, sob. pt asymptomatic
Patient asleep at time of event. Patient awakened A&Ox4, asymptomatic. VSS.
Pt Aox4.
Pt Aox4. VSS. Pt denies any chest pain, chest palpitation, sob. pt asymptomatic
VSS.  pt moved to bed for safety.  bed alarm in place.
pt agitated, continues to refuse treatment
pt stating that there are "people in his room".  pointing to corner of room.  with lights on patient still reports seeing group of people.  aggitated, screaming.  trying to get out of bed.  is oriented to self, date and location.

## 2018-05-29 NOTE — PROGRESS NOTE ADULT - ASSESSMENT
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 Curtis within 1 week of discharge. Call [phone] for appointment  - D/w attending

## 2018-05-29 NOTE — PROVIDER CONTACT NOTE (OTHER) - REASON
Patient had brief sinus bradycardia HR 40 on cardiac monitor
Pt irwin down to 46, non sustaining. Pt asymptomatic. VSS.
Pt refusing blood work. Pt educated on benefits and risk of blood work. Pt still refusing.
pt aggitated, hallucinating, screaming
pt anxious, pulling off tele monitor, disable chair alarm
pt refusing to have labs drawn, vital signs to be taken and to take medication
Pt irwin down to 39, non sustaining. Pt asymptomatic. VSS.

## 2018-05-29 NOTE — PROGRESS NOTE ADULT - SUBJECTIVE AND OBJECTIVE BOX
Patient is a 77y old  Male who presents with a chief complaint of Positive Stress Test ahead of Preop evaluation (26 May 2018 10:50)      SUBJECTIVE / OVERNIGHT EVENTS: Pt c/o left foot pain, pain meds help.     MEDICATIONS  (STANDING):  amLODIPine   Tablet 10 milliGRAM(s) Oral daily  amoxicillin  500 milliGRAM(s)/clavulanate 1 Tablet(s) Oral two times a day  AQUAPHOR (petrolatum Ointment) 1 Application(s) Topical two times a day  aspirin enteric coated 81 milliGRAM(s) Oral daily  atorvastatin 80 milliGRAM(s) Oral at bedtime  buDESOnide 160 MICROgram(s)/formoterol 4.5 MICROgram(s) Inhaler 2 Puff(s) Inhalation two times a day  carvedilol 25 milliGRAM(s) Oral every 12 hours  clopidogrel Tablet 75 milliGRAM(s) Oral daily  docusate sodium 100 milliGRAM(s) Oral three times a day  heparin  Injectable 5000 Unit(s) SubCutaneous every 8 hours  hydrALAZINE 100 milliGRAM(s) Oral every 8 hours  isosorbide   dinitrate Tablet (ISORDIL) 40 milliGRAM(s) Oral three times a day  montelukast 10 milliGRAM(s) Oral daily  pantoprazole    Tablet 40 milliGRAM(s) Oral before breakfast  polyethylene glycol 3350 17 Gram(s) Oral daily  QUEtiapine 25 milliGRAM(s) Oral at bedtime  senna 2 Tablet(s) Oral at bedtime  tamsulosin 0.4 milliGRAM(s) Oral daily  tiotropium 18 MICROgram(s) Capsule 1 Capsule(s) Inhalation daily    MEDICATIONS  (PRN):  acetaminophen   Tablet. 650 milliGRAM(s) Oral every 6 hours PRN Mild Pain (1 - 3)  ALBUTerol    90 MICROgram(s) HFA Inhaler 1 Puff(s) Inhalation every 6 hours PRN Shortness of Breath and/or Wheezing  oxyCODONE    IR 5 milliGRAM(s) Oral every 4 hours PRN Moderate Pain (4 - 6)  oxyCODONE    IR 10 milliGRAM(s) Oral every 4 hours PRN Severe Pain (7 - 10)      Vital Signs Last 24 Hrs  T(C): 36.3 (29 May 2018 12:29), Max: 36.4 (28 May 2018 20:20)  T(F): 97.3 (29 May 2018 12:29), Max: 97.6 (29 May 2018 04:54)  HR: 71 (29 May 2018 12:29) (58 - 80)  BP: 138/58 (29 May 2018 12:29) (131/63 - 175/84)  BP(mean): --  RR: 20 (29 May 2018 12:29) (20 - 20)  SpO2: 97% (29 May 2018 12:29) (97% - 98%)  CAPILLARY BLOOD GLUCOSE        I&O's Summary    28 May 2018 07:01  -  29 May 2018 07:00  --------------------------------------------------------  IN: 840 mL / OUT: 1200 mL / NET: -360 mL    29 May 2018 07:01  -  29 May 2018 12:31  --------------------------------------------------------  IN: 540 mL / OUT: 100 mL / NET: 440 mL        PHYSICAL EXAM:  GENERAL: NAD  HEAD:  Atraumatic, Normocephalic  EYES: EOMI, PERRLA, conjunctiva and sclera clear  NECK: Supple, No JVD  CHEST/LUNG: Clear to auscultation bilaterally; No wheeze  HEART: Regular rate and rhythm; No murmurs, rubs, or gallops  ABDOMEN: Soft, Nontender, Nondistended; Bowel sounds present  EXTREMITIES:  2+ Peripheral Pulses, No clubbing, cyanosis, or edema  PSYCH: AAOx3  NEUROLOGY: non-focal  SKIN: L foot dressing c/d/i.     LABS:                        10.5   4.09  )-----------( 216      ( 29 May 2018 07:46 )             30.4     05-29    138  |  104  |  25<H>  ----------------------------<  80  3.7   |  24  |  1.89<H>    Ca    8.8      29 May 2018 06:49                RADIOLOGY & ADDITIONAL TESTS:    Imaging Personally Reviewed:  Tele reviewed:  1st degree AVB    Consultant(s) Notes Reviewed:      Care Discussed with Consultants/Other Providers:

## 2018-05-29 NOTE — PROVIDER CONTACT NOTE (OTHER) - BACKGROUND
Pt admitted for PVD. PMH- diabetes, HTN, asthma
Admit Dx: peripheral vascular disease
Pt admitted for PVD. PMH- diabetes, HTN, asthma
Pt admitted for PVD. PMH- diabetes, HTN, asthma
admiited with PVD
admitted with PVD
s/p right peripheral cath

## 2018-05-29 NOTE — PROVIDER CONTACT NOTE (OTHER) - SITUATION
Pt irwin down to 46, non sustaining. Pt asymptomatic. VSS.
Patient had brief sinus bradycardia HR 40 on cardiac monitor
Pt irwin down to 46, non sustaining. Pt asymptomatic. VSS.
Pt refusing blood work. Pt educated on benefits and risk of blood work. Tried two different times to convince patient of getting blood work, Pt still refusing.
notified provider pt aggitated, screaming, hallucinating.
patient is very agitated upon waking up. Pt is refusing to have his labs drawn. He continues to move when vital signs are trying to be checked causing a false reading. He refuses to take any medicatio
s/p provider.  pt disabled chair alarm, anxious, keeps pulling of tele monitor.  unable to redirect patient.

## 2018-05-30 DIAGNOSIS — F05 DELIRIUM DUE TO KNOWN PHYSIOLOGICAL CONDITION: ICD-10-CM

## 2018-05-30 LAB
CULTURE RESULTS: SIGNIFICANT CHANGE UP
ORGANISM # SPEC MICROSCOPIC CNT: SIGNIFICANT CHANGE UP
ORGANISM # SPEC MICROSCOPIC CNT: SIGNIFICANT CHANGE UP
SPECIMEN SOURCE: SIGNIFICANT CHANGE UP

## 2018-05-30 PROCEDURE — 99233 SBSQ HOSP IP/OBS HIGH 50: CPT

## 2018-05-30 PROCEDURE — 99232 SBSQ HOSP IP/OBS MODERATE 35: CPT

## 2018-05-30 PROCEDURE — 99222 1ST HOSP IP/OBS MODERATE 55: CPT | Mod: GC

## 2018-05-30 RX ADMIN — Medication 100 MILLIGRAM(S): at 21:45

## 2018-05-30 RX ADMIN — ATORVASTATIN CALCIUM 80 MILLIGRAM(S): 80 TABLET, FILM COATED ORAL at 21:45

## 2018-05-30 RX ADMIN — CARVEDILOL PHOSPHATE 25 MILLIGRAM(S): 80 CAPSULE, EXTENDED RELEASE ORAL at 18:38

## 2018-05-30 RX ADMIN — OXYCODONE HYDROCHLORIDE 10 MILLIGRAM(S): 5 TABLET ORAL at 10:11

## 2018-05-30 RX ADMIN — ISOSORBIDE DINITRATE 40 MILLIGRAM(S): 5 TABLET ORAL at 05:26

## 2018-05-30 RX ADMIN — Medication 81 MILLIGRAM(S): at 11:33

## 2018-05-30 RX ADMIN — QUETIAPINE FUMARATE 25 MILLIGRAM(S): 200 TABLET, FILM COATED ORAL at 21:45

## 2018-05-30 RX ADMIN — TIOTROPIUM BROMIDE 1 CAPSULE(S): 18 CAPSULE ORAL; RESPIRATORY (INHALATION) at 13:09

## 2018-05-30 RX ADMIN — HEPARIN SODIUM 5000 UNIT(S): 5000 INJECTION INTRAVENOUS; SUBCUTANEOUS at 21:45

## 2018-05-30 RX ADMIN — AMLODIPINE BESYLATE 10 MILLIGRAM(S): 2.5 TABLET ORAL at 05:23

## 2018-05-30 RX ADMIN — Medication 100 MILLIGRAM(S): at 05:25

## 2018-05-30 RX ADMIN — CARVEDILOL PHOSPHATE 25 MILLIGRAM(S): 80 CAPSULE, EXTENDED RELEASE ORAL at 05:24

## 2018-05-30 RX ADMIN — PANTOPRAZOLE SODIUM 40 MILLIGRAM(S): 20 TABLET, DELAYED RELEASE ORAL at 05:26

## 2018-05-30 RX ADMIN — OXYCODONE HYDROCHLORIDE 10 MILLIGRAM(S): 5 TABLET ORAL at 11:10

## 2018-05-30 RX ADMIN — Medication 1 APPLICATION(S): at 18:39

## 2018-05-30 RX ADMIN — Medication 1 APPLICATION(S): at 05:24

## 2018-05-30 RX ADMIN — MONTELUKAST 10 MILLIGRAM(S): 4 TABLET, CHEWABLE ORAL at 11:34

## 2018-05-30 RX ADMIN — ISOSORBIDE DINITRATE 40 MILLIGRAM(S): 5 TABLET ORAL at 13:13

## 2018-05-30 RX ADMIN — CLOPIDOGREL BISULFATE 75 MILLIGRAM(S): 75 TABLET, FILM COATED ORAL at 11:34

## 2018-05-30 RX ADMIN — ISOSORBIDE DINITRATE 40 MILLIGRAM(S): 5 TABLET ORAL at 21:45

## 2018-05-30 RX ADMIN — BUDESONIDE AND FORMOTEROL FUMARATE DIHYDRATE 2 PUFF(S): 160; 4.5 AEROSOL RESPIRATORY (INHALATION) at 18:46

## 2018-05-30 RX ADMIN — Medication 1 TABLET(S): at 05:23

## 2018-05-30 RX ADMIN — HEPARIN SODIUM 5000 UNIT(S): 5000 INJECTION INTRAVENOUS; SUBCUTANEOUS at 05:25

## 2018-05-30 RX ADMIN — HEPARIN SODIUM 5000 UNIT(S): 5000 INJECTION INTRAVENOUS; SUBCUTANEOUS at 13:13

## 2018-05-30 RX ADMIN — TAMSULOSIN HYDROCHLORIDE 0.4 MILLIGRAM(S): 0.4 CAPSULE ORAL at 11:34

## 2018-05-30 RX ADMIN — BUDESONIDE AND FORMOTEROL FUMARATE DIHYDRATE 2 PUFF(S): 160; 4.5 AEROSOL RESPIRATORY (INHALATION) at 05:22

## 2018-05-30 RX ADMIN — Medication 100 MILLIGRAM(S): at 13:11

## 2018-05-30 RX ADMIN — Medication 100 MILLIGRAM(S): at 05:27

## 2018-05-30 RX ADMIN — Medication 1 TABLET(S): at 18:38

## 2018-05-30 NOTE — BEHAVIORAL HEALTH ASSESSMENT NOTE - NSBHSUICRISKFACTOR_PSY_A_CORE
Chronic pain or acute medical issue Chronic pain or acute medical issue/Unable to engage in safety planning

## 2018-05-30 NOTE — BEHAVIORAL HEALTH ASSESSMENT NOTE - NSBHSOCIALHXDETAILSFT_PSY_A_CORE
single, childless, used to live in section 8 housing, recently placed in NH, per chart review from last admission at [facility] 4/18 pt had active APS case open with pending hearing for guardianship

## 2018-05-30 NOTE — BEHAVIORAL HEALTH ASSESSMENT NOTE - RISK ASSESSMENT
Risk factors: not receiving treatment, decline in self-care, health stressors    Protective factors: no current SIIP/HIIP, domiciled in LTC facility, future-oriented, lack of familial support    Overall, pt is a low acute risk for intentional harm to self/others

## 2018-05-30 NOTE — BEHAVIORAL HEALTH ASSESSMENT NOTE - NSBHCHARTREVIEWVS_PSY_A_CORE FT
Vital Signs Last 24 Hrs  T(C): 36.2 (30 May 2018 12:49), Max: 36.7 (30 May 2018 04:02)  T(F): 97.2 (30 May 2018 12:49), Max: 98 (30 May 2018 04:02)  HR: 63 (30 May 2018 12:49) (63 - 80)  BP: 145/69 (30 May 2018 12:49) (145/69 - 162/84)  BP(mean): --  RR: 18 (30 May 2018 12:49) (18 - 18)  SpO2: 96% (30 May 2018 12:49) (96% - 97%)

## 2018-05-30 NOTE — BEHAVIORAL HEALTH ASSESSMENT NOTE - SUMMARY
The patient is a 77 yo M PMH of DM2, HTN, HLD, PAD, CKD, BPH, Prostate cancer, psychosis, GERD, COPD, previously homeless who presents as a transfer for L foot dry gangrene.  Patient has documented h/o of psychosis being treated with Seroquel 25mg po qhs.  Patient denies any psychiatric history or suicide attempts.  Psychiatry consulted to determine capacity for disposition planning.  On evaluation, patient is disoriented to time and place.  Patient unable to state reason and need for long term care following discharge to the hospital.  Reports "I don't have any medical issues, I had this foot pain, they cut off the toe and now it's better and I can go back to my apartment".  Denies the need to continue taking medication for his medical comorbidities.  Unable to state what aftercare of his left foot s/p amputation.  At this time, patient lacks capacity to participate in his disposition planning. 77 yo single, AA M domiciled in a long term care facility, no dependents, unemployed, PMH of DM2, HTN, HLD, PAD, CKD, BPH, Prostate cancer, psychosis, GERD, COPD, previously homeless who presents as a transfer for L foot dry gangrene.  Patient has documented h/o of "psychosis" being treated with Seroquel 25mg po qhs.  Patient denies any psychiatric history or suicide attempts.  Psychiatry consulted to determine capacity for discharge planning.    On evaluation, patient is disoriented to time and place.  Patient unable to state reason and need for long term care following discharge to the hospital.  Reports "I don't have any medical issues, I had this foot pain, they cut off the toe and now it's better and I can go back to my apartment".  Denies the need to continue taking medication for his medical comorbidities.  Unable to state what aftercare of his left foot s/p amputation.  At this time, patient lacks capacity to participate in discharge planning.

## 2018-05-30 NOTE — PROGRESS NOTE ADULT - SUBJECTIVE AND OBJECTIVE BOX
PAGER:  [phone]               [facility] [address]              EMAIL [email or web address]   OFFICE [phone]                              ********VASCULAR MEDICINE PROGRESS NOTE********                            CC:  CLI LLE     INTERVAL HISTORY:   Doing well.  No CP or SOB.      MEDICATIONS  (STANDING):  amLODIPine   Tablet 10 milliGRAM(s) Oral daily  amoxicillin  500 milliGRAM(s)/clavulanate 1 Tablet(s) Oral two times a day  AQUAPHOR (petrolatum Ointment) 1 Application(s) Topical two times a day  aspirin enteric coated 81 milliGRAM(s) Oral daily  atorvastatin 80 milliGRAM(s) Oral at bedtime  buDESOnide 160 MICROgram(s)/formoterol 4.5 MICROgram(s) Inhaler 2 Puff(s) Inhalation two times a day  carvedilol 25 milliGRAM(s) Oral every 12 hours  clopidogrel Tablet 75 milliGRAM(s) Oral daily  docusate sodium 100 milliGRAM(s) Oral three times a day  heparin  Injectable 5000 Unit(s) SubCutaneous every 8 hours  hydrALAZINE 100 milliGRAM(s) Oral every 8 hours  isosorbide   dinitrate Tablet (ISORDIL) 40 milliGRAM(s) Oral three times a day  montelukast 10 milliGRAM(s) Oral daily  pantoprazole    Tablet 40 milliGRAM(s) Oral before breakfast  polyethylene glycol 3350 17 Gram(s) Oral daily  QUEtiapine 25 milliGRAM(s) Oral at bedtime  senna 2 Tablet(s) Oral at bedtime  tamsulosin 0.4 milliGRAM(s) Oral daily  tiotropium 18 MICROgram(s) Capsule 1 Capsule(s) Inhalation daily    MEDICATIONS  (PRN):  acetaminophen   Tablet. 650 milliGRAM(s) Oral every 6 hours PRN Mild Pain (1 - 3)  ALBUTerol    90 MICROgram(s) HFA Inhaler 1 Puff(s) Inhalation every 6 hours PRN Shortness of Breath and/or Wheezing  oxyCODONE    IR 5 milliGRAM(s) Oral every 4 hours PRN Moderate Pain (4 - 6)  oxyCODONE    IR 10 milliGRAM(s) Oral every 4 hours PRN Severe Pain (7 - 10)  SOCIAL HISTORY:  unchanged    REVIEW OF SYSTEMS:  CONSTITUTIONAL: No fever, weight loss, or fatigue  EYES: No eye pain, visual disturbances, or discharge  ENMT:  No difficulty hearing, tinnitus, vertigo; No sinus or throat pain  NECK: No pain or stiffness  RESPIRATORY: No cough, wheezing, chills or hemoptysis; No Shortness of Breath  CARDIOVASCULAR: No chest pain, palpitations, passing out, dizziness, or leg swelling  GASTROINTESTINAL: No abdominal or epigastric pain. No nausea, vomiting, or hematemesis; No diarrhea or constipation. No melena or hematochezia.  GENITOURINARY: No dysuria, frequency, hematuria, or incontinence  NEUROLOGICAL: No headaches, memory loss, loss of strength, numbness, or tremors  SKIN: No itching, burning, rashes, or lesions   LYMPH Nodes: No enlarged glands  ENDOCRINE: No heat or cold intolerance; No hair loss  MUSCULOSKELETAL: L foot pain with walking  PSYCHIATRIC: No depression, anxiety, mood swings, or difficulty sleeping  HEME/LYMPH: No easy bruising, or bleeding gums  ALLERY AND IMMUNOLOGIC: No hives or eczema	    ICU Vital Signs Last 24 Hrs  T(C): 36.2 (30 May 2018 12:49), Max: 36.7 (30 May 2018 04:02)  T(F): 97.2 (30 May 2018 12:49), Max: 98 (30 May 2018 04:02)  HR: 67 (30 May 2018 18:42) (63 - 80)  BP: 137/64 (30 May 2018 18:42) (137/64 - 162/84)  BP(mean): --  ABP: --  ABP(mean): --  RR: 18 (30 May 2018 12:49) (18 - 18)  SpO2: 96% (30 May 2018 12:49) (96% - 97%)          Appearance: Normal	  HEENT:   Normal oral mucosa, PERRL, EOMI	  Lymphatic: No lymphadenopathy  Cardiovascular: Normal S1 S2, No JVD, No murmurs, No edema  Respiratory: Lungs clear to auscultation	  Psychiatry: A & O x 3, Mood & affect appropriate  Gastrointestinal:  Soft, Non-tender, + BS	  Skin: No rashes, No ecchymoses, No cyanosis	  Neurologic: Non-focal  Extremities: Normal range of motion, No clubbing, cyanosis or edema  Vascular: Nonpalpable pulses bilaterally. R groin soft, no hematoma, no bleeding.  2nd toe amp site stable.                            10.5   4.09  )-----------( 216      ( 29 May 2018 07:46 )             30.4   05-29    138  |  104  |  25<H>  ----------------------------<  80  3.7   |  24  |  1.89<H>    Ca    8.8      29 May 2018 06:49

## 2018-05-30 NOTE — PROGRESS NOTE ADULT - PROBLEM SELECTOR PLAN 4
stress test w/ EF36%, no signs of overload or acute CHF  - monitor for signs of hypervolemia   - c/w BB

## 2018-05-30 NOTE — BEHAVIORAL HEALTH ASSESSMENT NOTE - NSBHCHARTREVIEWLAB_PSY_A_CORE FT
10.5   4.09  )-----------( 216      ( 29 May 2018 07:46 )             30.4     05-29    138  |  104  |  25<H>  ----------------------------<  80  3.7   |  24  |  1.89<H>    Ca    8.8      29 May 2018 06:49

## 2018-05-30 NOTE — PROGRESS NOTE ADULT - SUBJECTIVE AND OBJECTIVE BOX
Patient is a 77y old  Male who presents with a chief complaint of Positive Stress Test ahead of Preop evaluation (26 May 2018 10:50)      SUBJECTIVE / OVERNIGHT EVENTS: Pt says L foot pain is controlled. No new complaints.     MEDICATIONS  (STANDING):  amLODIPine   Tablet 10 milliGRAM(s) Oral daily  amoxicillin  500 milliGRAM(s)/clavulanate 1 Tablet(s) Oral two times a day  AQUAPHOR (petrolatum Ointment) 1 Application(s) Topical two times a day  aspirin enteric coated 81 milliGRAM(s) Oral daily  atorvastatin 80 milliGRAM(s) Oral at bedtime  buDESOnide 160 MICROgram(s)/formoterol 4.5 MICROgram(s) Inhaler 2 Puff(s) Inhalation two times a day  carvedilol 25 milliGRAM(s) Oral every 12 hours  clopidogrel Tablet 75 milliGRAM(s) Oral daily  docusate sodium 100 milliGRAM(s) Oral three times a day  heparin  Injectable 5000 Unit(s) SubCutaneous every 8 hours  hydrALAZINE 100 milliGRAM(s) Oral every 8 hours  isosorbide   dinitrate Tablet (ISORDIL) 40 milliGRAM(s) Oral three times a day  montelukast 10 milliGRAM(s) Oral daily  pantoprazole    Tablet 40 milliGRAM(s) Oral before breakfast  polyethylene glycol 3350 17 Gram(s) Oral daily  QUEtiapine 25 milliGRAM(s) Oral at bedtime  senna 2 Tablet(s) Oral at bedtime  tamsulosin 0.4 milliGRAM(s) Oral daily  tiotropium 18 MICROgram(s) Capsule 1 Capsule(s) Inhalation daily    MEDICATIONS  (PRN):  acetaminophen   Tablet. 650 milliGRAM(s) Oral every 6 hours PRN Mild Pain (1 - 3)  ALBUTerol    90 MICROgram(s) HFA Inhaler 1 Puff(s) Inhalation every 6 hours PRN Shortness of Breath and/or Wheezing  oxyCODONE    IR 5 milliGRAM(s) Oral every 4 hours PRN Moderate Pain (4 - 6)  oxyCODONE    IR 10 milliGRAM(s) Oral every 4 hours PRN Severe Pain (7 - 10)      Vital Signs Last 24 Hrs  T(C): 36.7 (30 May 2018 04:02), Max: 36.7 (30 May 2018 04:02)  T(F): 98 (30 May 2018 04:02), Max: 98 (30 May 2018 04:02)  HR: 66 (30 May 2018 10:08) (65 - 80)  BP: 161/65 (30 May 2018 10:08) (138/58 - 162/84)  BP(mean): --  RR: 18 (30 May 2018 04:02) (18 - 20)  SpO2: 97% (30 May 2018 04:02) (97% - 97%)  CAPILLARY BLOOD GLUCOSE        I&O's Summary    29 May 2018 07:01  -  30 May 2018 07:00  --------------------------------------------------------  IN: 900 mL / OUT: 1250 mL / NET: -350 mL    30 May 2018 07:01  -  30 May 2018 12:19  --------------------------------------------------------  IN: 360 mL / OUT: 0 mL / NET: 360 mL        PHYSICAL EXAM:  GENERAL: NAD  HEAD:  Atraumatic, Normocephalic  EYES: EOMI, PERRLA, conjunctiva and sclera clear  NECK: Supple, No JVD  CHEST/LUNG: Clear to auscultation bilaterally; No wheeze  HEART: Regular rate and rhythm; No murmurs, rubs, or gallops  ABDOMEN: Soft, Nontender, Nondistended; Bowel sounds present  EXTREMITIES:  2+ Peripheral Pulses, No clubbing, cyanosis, or edema  PSYCH: AAOx3  NEUROLOGY: non-focal  SKIN: L foot dressing c/d/i.     LABS:                        10.5   4.09  )-----------( 216      ( 29 May 2018 07:46 )             30.4     05-29    138  |  104  |  25<H>  ----------------------------<  80  3.7   |  24  |  1.89<H>    Ca    8.8      29 May 2018 06:49                RADIOLOGY & ADDITIONAL TESTS:    Imaging Personally Reviewed:  Tele reviewed: SR    Consultant(s) Notes Reviewed:      Care Discussed with Consultants/Other Providers:

## 2018-05-30 NOTE — BEHAVIORAL HEALTH ASSESSMENT NOTE - NSBHCONSULTRECOMMENDOTHER_PSY_A_CORE FT
-Minimize use of benzos, opiods, anticholinergics or deliriogenic agents if possible as they can cause more confusion and AMS

## 2018-05-30 NOTE — BEHAVIORAL HEALTH ASSESSMENT NOTE - HPI (INCLUDE ILLNESS QUALITY, SEVERITY, DURATION, TIMING, CONTEXT, MODIFYING FACTORS, ASSOCIATED SIGNS AND SYMPTOMS)
The patient is a 75 yo single, AA M domiciled in a long term care facility, no dependents, unemployed, PMH of DM2, HTN, HLD, PAD, CKD, BPH, Prostate cancer, psychosis, GERD, COPD, previously homeless who presents as a transfer for L foot dry gangrene.  Patient has documented h/o of psychosis being treated with Seroquel 25mg po qhs.  Patient denies any psychiatric history or suicide attempts.  Psychiatry consulted to determine capacity for disposition planning.    On evaluation, patient initially cooperative, speech is loud and pressured.  AAOx2- unable to state correct place.  Patient reports coming from a long term facility to the hospital because he stepped on glass and was experiencing left foot pain.  Patient noted to have left foot gangrene and admission and during hospitalization had left toe amputation which was uneventful.  Patient now expressing wishes to return back to "[facility]" to his one bed room apartment he had been living in prior to going to the long term facility.  Asked patient about his knowledge regarding his medical history and patient answers, "I don't have any medical issues, I just had this foot pain, they cut my toe off and now I'm in no pain.  I need to go back to my one bed room apartment at [facility]."  Patient reports the need not to take any medications reports he is able to care for himself in his apartment stating, "I clean, cook, and take care of everything on my own."  Asked patient about aftercare of his left foot to which he responds, "I can just change the bandage by myself, there's no more pain."  unable to elaborate on the details regarding the care of his foot.  Patient unable to state the need for long term care following discharge from hospital- perseverating that he can care for himself despite reinforcing the fact that he has multiple medical comorbidities requiring the need to take medications.    On evaluation, patient awake alert, disorient to place.  Patient easily irritable throughout interview when asked about the possibility of going to a long term facility after discharge. Concentration noted to be impaired during interview, unable to spell WORLD backwards.  Per reports, patient yesterday morning was extremely agitated and restless, hallucinating stating that he is seeing people in the corner of the room and was given prn Ativan.  Today, patient denies A/V/H, or delusional thoughts.  Currently denies S/H/I/I/P. The patient is a 77 yo single, AA M domiciled in a long term care facility, no dependents, unemployed, PMH of DM2, HTN, HLD, PAD, CKD, BPH, Prostate cancer, psychosis, GERD, COPD, previously homeless who presents as a transfer for L foot dry gangrene.  Patient has documented h/o of "psychosis" being treated with Seroquel 25mg po qhs.  Patient denies any psychiatric history or suicide attempts.  Psychiatry consulted to determine capacity for discharge planning.    On evaluation, patient initially cooperative, speech is loud and pressured.  AAOx2- unable to state correct place.  Patient reports coming from a long term facility to the hospital because he stepped on glass and was experiencing left foot pain.  Patient noted to have left foot gangrene and admission and during hospitalization had left toe amputation which was uneventful.  Patient now expressing wishes to return back to "[facility]" to his one bed room apartment he had been living in prior to going to the long term facility.  Asked patient about his knowledge regarding his medical history and patient answers, "I don't have any medical issues, I just had this foot pain, they cut my toe off and now I'm in no pain.  I need to go back to my one bed room apartment at [facility]  Patient reports the need not to take any medications reports he is able to care for himself in his apartment stating, "I clean, cook, and take care of everything on my own."  Asked patient about aftercare of his left foot to which he responds, "I can just change the bandage by myself, there's no more pain."  unable to elaborate on the details regarding the care of his foot.  Patient unable to state the need for long term care following discharge from hospital- perseverating that he can care for himself despite reinforcing the fact that he has multiple medical comorbidities requiring the need to take medications.    On evaluation, patient awake alert, oriented to person but not place.  Patient easily irritable throughout interview when asked about the possibility of going to a long term facility after discharge. Concentration noted to be impaired during interview, unable to spell WORLD backwards.  Per reports, patient yesterday morning was extremely agitated and restless, hallucinating stating that he is seeing people in the corner of the room and was given prn Ativan.  Today, patient denies A/V/H, or delusional thoughts.  Currently denies S/H/I/I/P.  Denies depressed mood or anxiety.

## 2018-05-30 NOTE — BEHAVIORAL HEALTH ASSESSMENT NOTE - OTHER PAST PSYCHIATRIC HISTORY (INCLUDE DETAILS REGARDING ONSET, COURSE OF ILLNESS, INPATIENT/OUTPATIENT TREATMENT)
documented h/o psychosis, currently on regimen of Seroquel, however patient denies any psychiatric history
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
## 2018-05-30 NOTE — PROGRESS NOTE ADULT - ASSESSMENT
76M with Alesia 4 critical limb ischemia with L 2nd digit gangrene, noted with CFA, SFA and pop occlusions on L, also with R sided femoral disease. S/p LSFA PTCA    Continues to do well post intervention.  Amp site looks well  Continue local care  No further vascular intervention/testing at this time.    d/c planning    Mars Matos [id number]  Vascular Medicine

## 2018-05-31 VITALS
DIASTOLIC BLOOD PRESSURE: 69 MMHG | OXYGEN SATURATION: 99 % | TEMPERATURE: 98 F | HEART RATE: 63 BPM | SYSTOLIC BLOOD PRESSURE: 129 MMHG | RESPIRATION RATE: 16 BRPM

## 2018-05-31 LAB
ANION GAP SERPL CALC-SCNC: 11 MMOL/L — SIGNIFICANT CHANGE UP (ref 5–17)
BUN SERPL-MCNC: 26 MG/DL — HIGH (ref 7–23)
CALCIUM SERPL-MCNC: 8.9 MG/DL — SIGNIFICANT CHANGE UP (ref 8.4–10.5)
CHLORIDE SERPL-SCNC: 104 MMOL/L — SIGNIFICANT CHANGE UP (ref 96–108)
CO2 SERPL-SCNC: 24 MMOL/L — SIGNIFICANT CHANGE UP (ref 22–31)
CREAT SERPL-MCNC: 1.6 MG/DL — HIGH (ref 0.5–1.3)
GLUCOSE SERPL-MCNC: 96 MG/DL — SIGNIFICANT CHANGE UP (ref 70–99)
HCT VFR BLD CALC: 30.9 % — LOW (ref 39–50)
HGB BLD-MCNC: 10.5 G/DL — LOW (ref 13–17)
MCHC RBC-ENTMCNC: 30.2 PG — SIGNIFICANT CHANGE UP (ref 27–34)
MCHC RBC-ENTMCNC: 34 GM/DL — SIGNIFICANT CHANGE UP (ref 32–36)
MCV RBC AUTO: 88.8 FL — SIGNIFICANT CHANGE UP (ref 80–100)
PLATELET # BLD AUTO: 218 K/UL — SIGNIFICANT CHANGE UP (ref 150–400)
POTASSIUM SERPL-MCNC: 3.4 MMOL/L — LOW (ref 3.5–5.3)
POTASSIUM SERPL-SCNC: 3.4 MMOL/L — LOW (ref 3.5–5.3)
RBC # BLD: 3.48 M/UL — LOW (ref 4.2–5.8)
RBC # FLD: 14.5 % — SIGNIFICANT CHANGE UP (ref 10.3–14.5)
SODIUM SERPL-SCNC: 139 MMOL/L — SIGNIFICANT CHANGE UP (ref 135–145)
WBC # BLD: 3.79 K/UL — LOW (ref 3.8–10.5)
WBC # FLD AUTO: 3.79 K/UL — LOW (ref 3.8–10.5)

## 2018-05-31 PROCEDURE — 37224: CPT

## 2018-05-31 PROCEDURE — 88311 DECALCIFY TISSUE: CPT

## 2018-05-31 PROCEDURE — 99232 SBSQ HOSP IP/OBS MODERATE 35: CPT

## 2018-05-31 PROCEDURE — 80076 HEPATIC FUNCTION PANEL: CPT

## 2018-05-31 PROCEDURE — 81003 URINALYSIS AUTO W/O SCOPE: CPT

## 2018-05-31 PROCEDURE — 85730 THROMBOPLASTIN TIME PARTIAL: CPT

## 2018-05-31 PROCEDURE — 87070 CULTURE OTHR SPECIMN AEROBIC: CPT

## 2018-05-31 PROCEDURE — 93923 UPR/LXTR ART STDY 3+ LVLS: CPT

## 2018-05-31 PROCEDURE — 75716 ARTERY X-RAYS ARMS/LEGS: CPT | Mod: XU

## 2018-05-31 PROCEDURE — 71045 X-RAY EXAM CHEST 1 VIEW: CPT

## 2018-05-31 PROCEDURE — 99153 MOD SED SAME PHYS/QHP EA: CPT

## 2018-05-31 PROCEDURE — C1760: CPT

## 2018-05-31 PROCEDURE — 83036 HEMOGLOBIN GLYCOSYLATED A1C: CPT

## 2018-05-31 PROCEDURE — 88305 TISSUE EXAM BY PATHOLOGIST: CPT

## 2018-05-31 PROCEDURE — 97162 PT EVAL MOD COMPLEX 30 MIN: CPT

## 2018-05-31 PROCEDURE — 94640 AIRWAY INHALATION TREATMENT: CPT

## 2018-05-31 PROCEDURE — 82550 ASSAY OF CK (CPK): CPT

## 2018-05-31 PROCEDURE — 84484 ASSAY OF TROPONIN QUANT: CPT

## 2018-05-31 PROCEDURE — 76775 US EXAM ABDO BACK WALL LIM: CPT

## 2018-05-31 PROCEDURE — 99239 HOSP IP/OBS DSCHRG MGMT >30: CPT

## 2018-05-31 PROCEDURE — 83735 ASSAY OF MAGNESIUM: CPT

## 2018-05-31 PROCEDURE — 86900 BLOOD TYPING SEROLOGIC ABO: CPT

## 2018-05-31 PROCEDURE — 86901 BLOOD TYPING SEROLOGIC RH(D): CPT

## 2018-05-31 PROCEDURE — C1725: CPT

## 2018-05-31 PROCEDURE — 87075 CULTR BACTERIA EXCEPT BLOOD: CPT

## 2018-05-31 PROCEDURE — 84100 ASSAY OF PHOSPHORUS: CPT

## 2018-05-31 PROCEDURE — 73630 X-RAY EXAM OF FOOT: CPT

## 2018-05-31 PROCEDURE — 80048 BASIC METABOLIC PNL TOTAL CA: CPT

## 2018-05-31 PROCEDURE — 87186 SC STD MICRODIL/AGAR DIL: CPT

## 2018-05-31 PROCEDURE — 86850 RBC ANTIBODY SCREEN: CPT

## 2018-05-31 PROCEDURE — C1887: CPT

## 2018-05-31 PROCEDURE — 80053 COMPREHEN METABOLIC PANEL: CPT

## 2018-05-31 PROCEDURE — 93925 LOWER EXTREMITY STUDY: CPT

## 2018-05-31 PROCEDURE — 99152 MOD SED SAME PHYS/QHP 5/>YRS: CPT

## 2018-05-31 PROCEDURE — C1769: CPT

## 2018-05-31 PROCEDURE — 85027 COMPLETE CBC AUTOMATED: CPT

## 2018-05-31 PROCEDURE — 84156 ASSAY OF PROTEIN URINE: CPT

## 2018-05-31 PROCEDURE — C2623: CPT

## 2018-05-31 PROCEDURE — 85610 PROTHROMBIN TIME: CPT

## 2018-05-31 PROCEDURE — 82553 CREATINE MB FRACTION: CPT

## 2018-05-31 PROCEDURE — C1894: CPT

## 2018-05-31 PROCEDURE — 82962 GLUCOSE BLOOD TEST: CPT

## 2018-05-31 RX ORDER — OXYCODONE HYDROCHLORIDE 5 MG/1
1 TABLET ORAL
Qty: 0 | Refills: 0 | COMMUNITY
Start: 2018-05-31

## 2018-05-31 RX ADMIN — ISOSORBIDE DINITRATE 40 MILLIGRAM(S): 5 TABLET ORAL at 05:57

## 2018-05-31 RX ADMIN — BUDESONIDE AND FORMOTEROL FUMARATE DIHYDRATE 2 PUFF(S): 160; 4.5 AEROSOL RESPIRATORY (INHALATION) at 05:58

## 2018-05-31 RX ADMIN — AMLODIPINE BESYLATE 10 MILLIGRAM(S): 2.5 TABLET ORAL at 05:57

## 2018-05-31 RX ADMIN — OXYCODONE HYDROCHLORIDE 10 MILLIGRAM(S): 5 TABLET ORAL at 12:00

## 2018-05-31 RX ADMIN — TIOTROPIUM BROMIDE 1 CAPSULE(S): 18 CAPSULE ORAL; RESPIRATORY (INHALATION) at 11:28

## 2018-05-31 RX ADMIN — CARVEDILOL PHOSPHATE 25 MILLIGRAM(S): 80 CAPSULE, EXTENDED RELEASE ORAL at 05:57

## 2018-05-31 RX ADMIN — OXYCODONE HYDROCHLORIDE 10 MILLIGRAM(S): 5 TABLET ORAL at 11:33

## 2018-05-31 RX ADMIN — TAMSULOSIN HYDROCHLORIDE 0.4 MILLIGRAM(S): 0.4 CAPSULE ORAL at 11:28

## 2018-05-31 RX ADMIN — Medication 0.25 MILLIGRAM(S): at 03:53

## 2018-05-31 RX ADMIN — ISOSORBIDE DINITRATE 40 MILLIGRAM(S): 5 TABLET ORAL at 13:23

## 2018-05-31 RX ADMIN — Medication 100 MILLIGRAM(S): at 05:57

## 2018-05-31 RX ADMIN — Medication 100 MILLIGRAM(S): at 13:22

## 2018-05-31 RX ADMIN — Medication 81 MILLIGRAM(S): at 11:28

## 2018-05-31 RX ADMIN — HEPARIN SODIUM 5000 UNIT(S): 5000 INJECTION INTRAVENOUS; SUBCUTANEOUS at 05:57

## 2018-05-31 RX ADMIN — HEPARIN SODIUM 5000 UNIT(S): 5000 INJECTION INTRAVENOUS; SUBCUTANEOUS at 13:22

## 2018-05-31 RX ADMIN — CLOPIDOGREL BISULFATE 75 MILLIGRAM(S): 75 TABLET, FILM COATED ORAL at 11:28

## 2018-05-31 RX ADMIN — PANTOPRAZOLE SODIUM 40 MILLIGRAM(S): 20 TABLET, DELAYED RELEASE ORAL at 05:58

## 2018-05-31 RX ADMIN — MONTELUKAST 10 MILLIGRAM(S): 4 TABLET, CHEWABLE ORAL at 11:28

## 2018-05-31 RX ADMIN — Medication 1 APPLICATION(S): at 05:59

## 2018-05-31 RX ADMIN — Medication 1 TABLET(S): at 05:57

## 2018-05-31 NOTE — PROGRESS NOTE ADULT - SUBJECTIVE AND OBJECTIVE BOX
PAGER:  [phone]               [facility] [address]              EMAIL [email or web address]   OFFICE [phone]                              ********VASCULAR MEDICINE PROGRESS NOTE********                            CC:  CLI LLE     INTERVAL HISTORY:   Doing well.  No CP or SOB.      MEDICATIONS  (STANDING):  amLODIPine   Tablet 10 milliGRAM(s) Oral daily  amoxicillin  500 milliGRAM(s)/clavulanate 1 Tablet(s) Oral two times a day  AQUAPHOR (petrolatum Ointment) 1 Application(s) Topical two times a day  aspirin enteric coated 81 milliGRAM(s) Oral daily  atorvastatin 80 milliGRAM(s) Oral at bedtime  buDESOnide 160 MICROgram(s)/formoterol 4.5 MICROgram(s) Inhaler 2 Puff(s) Inhalation two times a day  carvedilol 25 milliGRAM(s) Oral every 12 hours  clopidogrel Tablet 75 milliGRAM(s) Oral daily  docusate sodium 100 milliGRAM(s) Oral three times a day  heparin  Injectable 5000 Unit(s) SubCutaneous every 8 hours  hydrALAZINE 100 milliGRAM(s) Oral every 8 hours  isosorbide   dinitrate Tablet (ISORDIL) 40 milliGRAM(s) Oral three times a day  montelukast 10 milliGRAM(s) Oral daily  pantoprazole    Tablet 40 milliGRAM(s) Oral before breakfast  polyethylene glycol 3350 17 Gram(s) Oral daily  QUEtiapine 25 milliGRAM(s) Oral at bedtime  senna 2 Tablet(s) Oral at bedtime  tamsulosin 0.4 milliGRAM(s) Oral daily  tiotropium 18 MICROgram(s) Capsule 1 Capsule(s) Inhalation daily    MEDICATIONS  (PRN):  acetaminophen   Tablet. 650 milliGRAM(s) Oral every 6 hours PRN Mild Pain (1 - 3)  ALBUTerol    90 MICROgram(s) HFA Inhaler 1 Puff(s) Inhalation every 6 hours PRN Shortness of Breath and/or Wheezing  oxyCODONE    IR 5 milliGRAM(s) Oral every 4 hours PRN Moderate Pain (4 - 6)  oxyCODONE    IR 10 milliGRAM(s) Oral every 4 hours PRN Severe Pain (7 - 10)    REVIEW OF SYSTEMS:  CONSTITUTIONAL: No fever, weight loss, or fatigue  EYES: No eye pain, visual disturbances, or discharge  ENMT:  No difficulty hearing, tinnitus, vertigo; No sinus or throat pain  NECK: No pain or stiffness  RESPIRATORY: No cough, wheezing, chills or hemoptysis; No Shortness of Breath  CARDIOVASCULAR: No chest pain, palpitations, passing out, dizziness, or leg swelling  GASTROINTESTINAL: No abdominal or epigastric pain. No nausea, vomiting, or hematemesis; No diarrhea or constipation. No melena or hematochezia.  GENITOURINARY: No dysuria, frequency, hematuria, or incontinence  NEUROLOGICAL: No headaches, memory loss, loss of strength, numbness, or tremors  SKIN: No itching, burning, rashes, or lesions   LYMPH Nodes: No enlarged glands  ENDOCRINE: No heat or cold intolerance; No hair loss  MUSCULOSKELETAL: L foot pain with walking  PSYCHIATRIC: No depression, anxiety, mood swings, or difficulty sleeping  HEME/LYMPH: No easy bruising, or bleeding gums  ALLERY AND IMMUNOLOGIC: No hives or eczema	    ICU Vital Signs Last 24 Hrs  T(C): 36.4 (31 May 2018 04:14), Max: 36.4 (30 May 2018 21:08)  T(F): 97.5 (31 May 2018 04:14), Max: 97.6 (30 May 2018 21:08)  HR: 60 (31 May 2018 04:14) (60 - 76)  BP: 164/76 (31 May 2018 04:14) (137/64 - 167/69)  BP(mean): --  ABP: --  ABP(mean): --  RR: 20 (31 May 2018 04:14) (18 - 20)  SpO2: 98% (31 May 2018 04:14) (96% - 99%)        Appearance: Normal	  HEENT:   Normal oral mucosa, PERRL, EOMI	  Lymphatic: No lymphadenopathy  Cardiovascular: Normal S1 S2, No JVD, No murmurs, No edema  Respiratory: Lungs clear to auscultation	  Psychiatry: A & O x 3, Mood & affect appropriate  Gastrointestinal:  Soft, Non-tender, + BS	  Skin: No rashes, No ecchymoses, No cyanosis	  Neurologic: Non-focal  Extremities: Normal range of motion, No clubbing, cyanosis or edema  Vascular: Nonpalpable pulses bilaterally. R groin soft, no hematoma, no bleeding.  2nd toe amp site stable.                                         10.5   3.79  )-----------( 218      ( 31 May 2018 07:51 )             30.9   05-31    139  |  104  |  26<H>  ----------------------------<  96  3.4<L>   |  24  |  1.60<H>    Ca    8.9      31 May 2018 06:11

## 2018-05-31 NOTE — PROGRESS NOTE ADULT - PROBLEM SELECTOR PROBLEM 2
PAD (peripheral artery disease)
Dry gangrene
Essential hypertension
Essential hypertension
PAD (peripheral artery disease)
Dry gangrene
Dry gangrene

## 2018-05-31 NOTE — PROGRESS NOTE ADULT - ASSESSMENT
76M with Alesia 4 critical limb ischemia with L 2nd digit gangrene, noted with CFA, SFA and pop occlusions on L, also with R sided femoral disease. S/p LSFA PTCA    Continues to do well post intervention.  Amp site looks well, Continue local care and dressing changes  No further vascular intervention/testing at this time.    d/c planning    Mars Matos [id number]  Vascular Medicine

## 2018-05-31 NOTE — PROGRESS NOTE ADULT - PROBLEM SELECTOR PLAN 9
stable, no exacerbation  - c/w Spiriva
stable  c/w Spiriva
stable  c/w spirvia
DVT: HSQ  Dispo: d/c planning to nursing home, d/c time 38 minutes
DVT: HSQ  Dispo: d/c planning to nursing home, patient does not have capacity to refuse
DVT: HSQ  Dispo: d/c planning to nursing home. Patient refusing to go back to LTC, states that he has an apartment where he lives. Pt has poor insight, does not understand why he would benefit from LTC. Asked for psych eval to determine capacity to refuse LTC.
stable  c/w Spiriva
stable, no exacerbation  - c/w Spiriva

## 2018-05-31 NOTE — PROGRESS NOTE ADULT - PROBLEM SELECTOR PROBLEM 8
DM (diabetes mellitus)
COPD (chronic obstructive pulmonary disease)
DM (diabetes mellitus)

## 2018-05-31 NOTE — PROGRESS NOTE ADULT - PROBLEM SELECTOR PROBLEM 4
Chronic kidney disease (CKD), stage III (moderate)
Chronic systolic heart failure
Systolic congestive heart failure, unspecified HF chronicity

## 2018-05-31 NOTE — PROGRESS NOTE ADULT - SUBJECTIVE AND OBJECTIVE BOX
Patient is a 77y old  Male who presents with a chief complaint of Positive Stress Test ahead of Preop evaluation (26 May 2018 10:50)      SUBJECTIVE / OVERNIGHT EVENTS: Pt c/o left foot pain, pain meds help.     MEDICATIONS  (STANDING):  amLODIPine   Tablet 10 milliGRAM(s) Oral daily  amoxicillin  500 milliGRAM(s)/clavulanate 1 Tablet(s) Oral two times a day  AQUAPHOR (petrolatum Ointment) 1 Application(s) Topical two times a day  aspirin enteric coated 81 milliGRAM(s) Oral daily  atorvastatin 80 milliGRAM(s) Oral at bedtime  buDESOnide 160 MICROgram(s)/formoterol 4.5 MICROgram(s) Inhaler 2 Puff(s) Inhalation two times a day  carvedilol 25 milliGRAM(s) Oral every 12 hours  clopidogrel Tablet 75 milliGRAM(s) Oral daily  docusate sodium 100 milliGRAM(s) Oral three times a day  heparin  Injectable 5000 Unit(s) SubCutaneous every 8 hours  hydrALAZINE 100 milliGRAM(s) Oral every 8 hours  isosorbide   dinitrate Tablet (ISORDIL) 40 milliGRAM(s) Oral three times a day  montelukast 10 milliGRAM(s) Oral daily  pantoprazole    Tablet 40 milliGRAM(s) Oral before breakfast  polyethylene glycol 3350 17 Gram(s) Oral daily  QUEtiapine 25 milliGRAM(s) Oral at bedtime  senna 2 Tablet(s) Oral at bedtime  tamsulosin 0.4 milliGRAM(s) Oral daily  tiotropium 18 MICROgram(s) Capsule 1 Capsule(s) Inhalation daily    MEDICATIONS  (PRN):  acetaminophen   Tablet. 650 milliGRAM(s) Oral every 6 hours PRN Mild Pain (1 - 3)  ALBUTerol    90 MICROgram(s) HFA Inhaler 1 Puff(s) Inhalation every 6 hours PRN Shortness of Breath and/or Wheezing  oxyCODONE    IR 5 milliGRAM(s) Oral every 4 hours PRN Moderate Pain (4 - 6)  oxyCODONE    IR 10 milliGRAM(s) Oral every 4 hours PRN Severe Pain (7 - 10)      Vital Signs Last 24 Hrs  T(C): 36.4 (31 May 2018 12:16), Max: 36.4 (30 May 2018 21:08)  T(F): 97.6 (31 May 2018 12:16), Max: 97.6 (30 May 2018 21:08)  HR: 74 (31 May 2018 12:16) (60 - 76)  BP: 145/53 (31 May 2018 12:16) (137/64 - 167/69)  BP(mean): --  RR: 19 (31 May 2018 12:16) (18 - 20)  SpO2: 96% (31 May 2018 12:16) (96% - 99%)  CAPILLARY BLOOD GLUCOSE        I&O's Summary    30 May 2018 07:01  -  31 May 2018 07:00  --------------------------------------------------------  IN: 1320 mL / OUT: 350 mL / NET: 970 mL    31 May 2018 07:01  -  31 May 2018 12:56  --------------------------------------------------------  IN: 240 mL / OUT: 0 mL / NET: 240 mL        PHYSICAL EXAM:  GENERAL: NAD  HEAD:  Atraumatic, Normocephalic  EYES: EOMI, PERRLA, conjunctiva and sclera clear  NECK: Supple, No JVD  CHEST/LUNG: Clear to auscultation bilaterally; No wheeze  HEART: Regular rate and rhythm; No murmurs, rubs, or gallops  ABDOMEN: Soft, Nontender, Nondistended; Bowel sounds present  EXTREMITIES:  2+ Peripheral Pulses, No clubbing, cyanosis, or edema  PSYCH: AAOx3  NEUROLOGY: non-focal  SKIN: L foot dressing c/d/i.     LABS:                        10.5   3.79  )-----------( 218      ( 31 May 2018 07:51 )             30.9     05-31    139  |  104  |  26<H>  ----------------------------<  96  3.4<L>   |  24  |  1.60<H>    Ca    8.9      31 May 2018 06:11                RADIOLOGY & ADDITIONAL TESTS:    Imaging Personally Reviewed:  Tele reviewed:  1st degree AVB    Consultant(s) Notes Reviewed:      Care Discussed with Consultants/Other Providers: Spoke with Dr Matos, continue current meds

## 2018-05-31 NOTE — PROGRESS NOTE ADULT - ATTENDING COMMENTS
Plan for angio today  Podiatry to see patient      Shawna [id number]
Doing well post intervention.  Groin access site now stable  Await GRISEL/PVR today to assess distal perfusion - if it is adequate, then will proceed with amp with podiatry    Await GRISEL/PVR    Mars Matos  [id number]
DC to LTC, DC time 40 minutes
11. acute blood loss anemia.  - insetting of recent procedure, stable today, patient hemodynamically stable.  -continue to monitor.
RCRI 3 elevated risk (heart disease, heart failure, and high risk surgery). Patient is at high risk for high risk procedure left 2nd toe amputation.  Unable to assess METS given functional status.  Patient had been evaluated by cardiology during this admission for abnormal stress test and deemed not to need further testing with [facility] given recent outpatient C 4/2018.  Continue w/ medications as above, medical therapy for optimization.  Would defer to cardiology following if patient requires any further workup prior to procedure. Otherwise, If not further cardiac workup per cardiology prior to amputation, patient is medically optimized for said procedure.    HOLLY PEPE MD  HOSPITALIST  #[phone]

## 2018-05-31 NOTE — PROGRESS NOTE ADULT - PROBLEM SELECTOR PROBLEM 9
COPD (chronic obstructive pulmonary disease)
Need for prophylactic measure

## 2018-05-31 NOTE — PROGRESS NOTE ADULT - PROBLEM SELECTOR PROBLEM 3
Abnormal stress test
Dry gangrene
Abnormal stress test
Coronary artery disease involving native coronary artery without angina pectoris, unspecified whether native or transplanted heart
Coronary artery disease involving native coronary artery without angina pectoris, unspecified whether native or transplanted heart
Dry gangrene
Abnormal stress test

## 2018-05-31 NOTE — PROGRESS NOTE ADULT - PROBLEM SELECTOR PLAN 8
A1c 5.3, FS stable, resolved  - monitor glc in BMP
A1c 5.3, FS stable  will cancel FS and sliding scale
FS controlled  c/w CRYSTAL  A1c
A1c 5.3, FS stable, resolved  - monitor glc in BMP
FS controlled  c/w CRYSTAL  A1c
stable, no exacerbation  - c/w Spiriva

## 2018-05-31 NOTE — PROGRESS NOTE ADULT - PROVIDER SPECIALTY LIST ADULT
Cardiology
Electrophysiology
Electrophysiology
Hospitalist
Podiatry
Hospitalist

## 2018-05-31 NOTE — PROGRESS NOTE ADULT - PROBLEM SELECTOR PROBLEM 1
Abnormal stress test
PAD (peripheral artery disease)
Abnormal stress test

## 2018-05-31 NOTE — PROGRESS NOTE ADULT - PROBLEM SELECTOR PLAN 1
stress showed partially reversible defect involving the anterior, mid anterior and apical anterior segments consistent with probable ischemia of the LAD and possible infarct of the RCA. Global systolic function is moderately reduced with an EF of 36%. Stress test shows 2 mm downsloping ST depressions in V2-V6.   -appreciate cardiac eval - cardiac cath sedrick  Continue with ASA, Carvedilol, Lipitor
GRISEL/PVR and Arterial duplex consistent with b/l severe PAD.   s/p LEFT SFA stent on 5/22  - c/w DAPT aspirin and plavix and PPI, c/w statin  - discussed with cards Dr Matos repeat GRISEL appears improved, to go for L toe amputation tomorrow
GRISEL/PVR and Arterial duplex consistent with b/l severe PAD.   s/p LEFT SFA stent on 5/22  - c/w DAPT aspirin and plavix and PPI, c/w statin  - discussed with cards Dr Matos repeat GRISEL appears improved, to go for L toe amputation tomorrow  - IV NS 30 cc/hr started in anticipation for procedure tomorrow
GRISEL/PVR and Arterial duplex consistent with b/l severe PAD.   s/p LEFT SFA stent on 5/22  - c/w DAPT aspirin and plavix and PPI, c/w statin  - discussed with renita Matos repeat GRISEL appears improved.
GRISEL/PVR and Arterial duplex consistent with b/l severe PAD.   s/p LEFT SFA stent on 5/22  - c/w DAPT aspirin and plavix and PPI, c/w statin  - will need DAPT for 3 months post stent per Dr. Matos
GRISEL/PVR and Arterial duplex consistent with b/l severe PAD.   s/p LEFT SFA stent on 5/22  - c/w aspirin and plavix, c/w statin  - will need DAPT for 3 months post stent per Dr. Matos
continue treatment as per vascular service
continue treatment as per vascular service, check hemoglobin in afternoon. no abdominal or pelvic discomfort
stress showed partially reversible defect involving the anterior, mid anterior and apical anterior segments consistent with probable ischemia of the LAD and possible infarct of the RCA.   -nuclear stress at OSH showed Global systolic function is moderately reduced with an EF of 36% and 2 mm downsloping ST depressions in V2-V6.   -cardiac cath today, IVF and mucormyst prior  -Continue with ASA, Carvedilol, Lipitor
GRISEL/PVR and Arterial duplex consistent with b/l severe PAD.   s/p LEFT SFA stent on 5/22  - c/w DAPT aspirin and plavix and PPI, c/w statin  - will need DAPT for 3 months post stent per Dr. Matos
-GRISEL/PVR and Arterial duplex consistent with b/l severe PAD.   -s/p LEFT SFA stent on 5/22  -c/w DAPT, aspirin and plavix  -discussed with renita Matos repeat GRISEL post revascularization

## 2018-05-31 NOTE — PROGRESS NOTE ADULT - PROBLEM SELECTOR PLAN 2
Afebrile, no leukocytosis, noninfected, no need for antibiotics at this time  percocet prn pain  pending 2nd left toe amputation with podiatry on friday 5/25 depending on repeat GRISEL results
- GRISEL/PVR today per discussion with cards, Dr Matos  - plan for angiogram sedrick
-GRISEL/PVR and Arterial duplex consistent with b/l severe PAD.   -angiogram today
Afebrile, no leukocytosis, noninfected, no need for antibiotics at this time  repeat GRISEL 5/24 w/ improved PVR as d/w Vascular  - percocet prn severe pain  - senna/colace/miralax standing while on opioids  - pending 2nd left toe amputation with podiatry on friday 5/25
Afebrile, no leukocytosis, noninfected, no need for antibiotics at this time  repeat GRISEL 5/24 w/ improved PVR as d/w Vascular  - percocet prn severe pain  - senna/colace/miralax standing while on opioids  - pending 2nd left toe amputation with podiatry today.
increase isordil to 40 tid, increase coreg to 25 BID
increase isordil to 40 tid, increase coreg to 25 BID
s/p ampuation on 5/25 of 2nd digit.  -on prophylactic augmentin as per podiatry for 10 days, given fluctuating rnela function, close to 30 GFR, will give 500mg BID instead of 875mg BID.  -PT to obtain walking boot.  -cutures noted of GPC.  Discussed with podiatry who states is not concerned, thinks conaminant vs. not at border and ok with current Abx course.  -awaiting documentation.  If not or If patient spikes, will consider ID
s/p ampuation on 5/25 of 2nd digit.  -on prophylactic augmentin as per podiatry for 10 days.  -PT to obtain walking boot.  -stable from Podiatry note for D/C
s/p amputation on 5/25 of 2nd digit.  -on prophylactic augmentin as per podiatry for 10 days, given fluctuating renal function (close to 30 GFR), will give 500mg BID instead of 875mg BID.  -PT to obtain walking boot.  -OR cultures with coag negative staph, likely a contaminant, no concerns for infection as per podiatry
s/p ampuation on 5/25 of 2nd digit.  -on prophylactic augmentin as per podiatry for 10 days, given fluctuating rnela function, close to 30 GFR, will give 500mg BID instead of 875mg BID.  -PT to obtain walking boot.  -stable from Podiatry note for D/C

## 2018-06-04 ENCOUNTER — APPOINTMENT (OUTPATIENT)
Dept: WOUND CARE | Facility: CLINIC | Age: 78
End: 2018-06-04
Payer: MEDICARE

## 2018-06-04 PROBLEM — Z00.00 ENCOUNTER FOR PREVENTIVE HEALTH EXAMINATION: Status: ACTIVE | Noted: 2018-06-04

## 2018-06-04 PROCEDURE — 99213 OFFICE O/P EST LOW 20 MIN: CPT | Mod: 25

## 2018-06-18 ENCOUNTER — APPOINTMENT (OUTPATIENT)
Dept: WOUND CARE | Facility: CLINIC | Age: 78
End: 2018-06-18
Payer: MEDICARE

## 2018-06-18 VITALS
BODY MASS INDEX: 23.27 KG/M2 | HEIGHT: 67.5 IN | TEMPERATURE: 97.8 F | SYSTOLIC BLOOD PRESSURE: 137 MMHG | DIASTOLIC BLOOD PRESSURE: 67 MMHG | WEIGHT: 150 LBS | HEART RATE: 56 BPM

## 2018-06-18 DIAGNOSIS — E11.52 TYPE 2 DIABETES MELLITUS WITH DIABETIC PERIPHERAL ANGIOPATHY WITH GANGRENE: ICD-10-CM

## 2018-06-18 DIAGNOSIS — I96 GANGRENE, NOT ELSEWHERE CLASSIFIED: ICD-10-CM

## 2018-06-18 PROCEDURE — 99213 OFFICE O/P EST LOW 20 MIN: CPT | Mod: 24

## 2018-06-27 ENCOUNTER — APPOINTMENT (OUTPATIENT)
Dept: CARDIOLOGY | Facility: CLINIC | Age: 78
End: 2018-06-27
Payer: MEDICARE

## 2018-06-27 VITALS
SYSTOLIC BLOOD PRESSURE: 130 MMHG | BODY MASS INDEX: 21.66 KG/M2 | DIASTOLIC BLOOD PRESSURE: 70 MMHG | HEIGHT: 67 IN | WEIGHT: 138 LBS | OXYGEN SATURATION: 98 % | HEART RATE: 70 BPM

## 2018-06-27 DIAGNOSIS — Z89.422 ACQUIRED ABSENCE OF OTHER LEFT TOE(S): ICD-10-CM

## 2018-06-27 DIAGNOSIS — I73.9 PERIPHERAL VASCULAR DISEASE, UNSPECIFIED: ICD-10-CM

## 2018-06-27 DIAGNOSIS — E11.9 TYPE 2 DIABETES MELLITUS W/OUT COMPLICATIONS: ICD-10-CM

## 2018-06-27 DIAGNOSIS — I50.9 HEART FAILURE, UNSPECIFIED: ICD-10-CM

## 2018-06-27 PROCEDURE — 99215 OFFICE O/P EST HI 40 MIN: CPT

## 2018-07-02 ENCOUNTER — APPOINTMENT (OUTPATIENT)
Dept: WOUND CARE | Facility: CLINIC | Age: 78
End: 2018-07-02

## 2018-09-19 NOTE — ED PROVIDER NOTE - NS ED MD DISPO ADMIT [facility] UNIT
Spoke with patients hood son who states patient patient was never found to have a fall, when god son arrived he could not get patient out of bed and patient \"seemed paralyzed\". Hood son phone number is [phone] and name is Elvis Rothman. Can call with any questions. Theresa Lozoya., Pa notified of update in patient story CCU

## 2018-12-01 NOTE — BEHAVIORAL HEALTH ASSESSMENT NOTE - PATIENT'S CHIEF COMPLAINT
Syncope
"My foot isn't in pain anymore, I want to go back to my one bedroom apartment in [facility]!"

## 2019-02-15 NOTE — DISCHARGE NOTE ADULT - PROVIDER TOKENS
they can be involved in my care TOKEN:'[id number]',TOKEN:'[id number]',TOKEN:'[id number]'

## 2020-03-29 NOTE — ED ADULT NURSE REASSESSMENT NOTE - NS ED NURSE REASSESS COMMENT FT1
Facilitator RN, pt called over to [facility] 17 for pt in respiratory distress, pt started on biPAP and nitro drip, pt became hypotensive, ntg dc'd, 2nd peripheral line established in left ac with a 20g after multiple attempts, pt given iv hydralazine, and restarted on ntg drip at a decreased dose, pts condition improved, less work of breathing, appears considerably more comfortable. will titrate drip as necessary, Will continue to monitor.
patient c/o shortness of breath, tachypnea at 30 respiratory/min , attending at bed side, BIPAP on per attending' s order, Nitroglycerin started per attending
Private car

## 2020-08-11 NOTE — DISCHARGE NOTE ADULT - CARE PROVIDER_API CALL
toileting/standing/walking Solitario Curtis (DPCINDY), Podiatric Medicine and Surgery  [address] [place] [address]  Phone: [phone]  Fax: [phone]    Mars Matos (), Internal Medicine; Nuclear Cardiology  [address] [place] [address]  Phone: [phone]  Fax: [phone]    Ketan Mcdaniel), Cardiology  [address] [place] [address]  Phone: [phone]  Fax: [phone]

## 2020-09-23 NOTE — PROGRESS NOTE ADULT - PROBLEM SELECTOR PLAN 5
Misael Daniels's goals for this visit include:   Chief Complaint   Patient presents with     Skin Check     FBSE, hx of NMSC, no known family hx of NMSC, check Rosacea on eyelids, not immunosuppressed     He requests these members of his care team be copied on today's visit information: Yes    PCP: Mynor Carbajal    Referring Provider:  No referring provider defined for this encounter.    There were no vitals taken for this visit.    Do you need any medication refills at today's visit? No    Mary Ponce LPN       JINA on CKD; patient with unclear baseline Cr but chart history of CKD; Cr increased to 2.26 today  - Likely 2/2 diuresis on admission +/- HTN emergency  - Trend Cr as BP improves  - Trial 500 cc NS today; repeat BMP in PM  - 1200 of urine yesterday, monitor UOP JINA on CKD; patient with unclear baseline Cr but chart history of CKD; Cr 2.06 today  - Likely 2/2 diuresis on admission +/- HTN emergency  - Trend Cr as BP improves  - I+O for UOP monitoring

## 2022-06-02 NOTE — BEHAVIORAL HEALTH ASSESSMENT NOTE - FUND OF KNOWLEDGE

## 2022-10-15 NOTE — BEHAVIORAL HEALTH ASSESSMENT NOTE - NSBHSUICPROTECTFACT_PSY_A_CORE
Identifies reasons for living/Future oriented
Constitutional:  (-) fever, (-) chills, (-) fatigue  Eyes:  (-) eye pain (-) visual changes  ENMT: (-) nasal discharge, (-) sore throat. (-) neck pain or stiffness  Cardiac: (-) chest pain (-) palpitations  Respiratory:  (-) cough (-) shortness of breath  GI:  (-) nausea (-) vomiting (-) diarrhea (-) abdominal pain  :  (-) dysuria (-) frequency (-) burning  MS:  (-) back pain (-) joint pain  Neuro:  (-) headache (-) numbness (-) tingling (-) focal weakness  Skin:  (-) rash  Except as documented in the HPI,  all other systems are negative

## 2022-11-23 NOTE — PROGRESS NOTE ADULT - PROBLEM SELECTOR PROBLEM 3
Encephalopathy Scleral hemorrhage, right Acitretin Counseling:  I discussed with the patient the risks of acitretin including but not limited to hair loss, dry lips/skin/eyes, liver damage, hyperlipidemia, depression/suicidal ideation, photosensitivity.  Serious rare side effects can include but are not limited to pancreatitis, pseudotumor cerebri, bony changes, clot formation/stroke/heart attack.  Patient understands that alcohol is contraindicated since it can result in liver toxicity and significantly prolong the elimination of the drug by many years.

## 2025-04-05 NOTE — PROGRESS NOTE ADULT - PROBLEM/PLAN-10
WARNING: May cause drowsiness, may impair ability to operate vehicle or machinery, and do not use in combination with alcohol.   
DISPLAY PLAN FREE TEXT